# Patient Record
Sex: FEMALE | Race: WHITE | NOT HISPANIC OR LATINO | Employment: OTHER | URBAN - METROPOLITAN AREA
[De-identification: names, ages, dates, MRNs, and addresses within clinical notes are randomized per-mention and may not be internally consistent; named-entity substitution may affect disease eponyms.]

---

## 2017-01-11 ENCOUNTER — HOSPITAL ENCOUNTER (OUTPATIENT)
Dept: RADIOLOGY | Facility: HOSPITAL | Age: 72
Discharge: HOME | End: 2017-01-11
Attending: FAMILY MEDICINE
Payer: MEDICARE

## 2017-01-11 ENCOUNTER — HOSPITAL ENCOUNTER (OUTPATIENT)
Dept: RADIOLOGY | Facility: HOSPITAL | Age: 72
Discharge: HOME | End: 2017-01-11
Attending: NURSE PRACTITIONER
Payer: MEDICARE

## 2017-01-11 DIAGNOSIS — M81.0 OSTEOPOROSIS, POST-MENOPAUSAL: ICD-10-CM

## 2017-01-11 DIAGNOSIS — C50.912 BREAST CANCER, LEFT: ICD-10-CM

## 2017-01-11 PROCEDURE — 71020 XR CHEST PA AND LATERAL: CPT | Mod: 26,,, | Performed by: RADIOLOGY

## 2017-01-11 PROCEDURE — 77080 DXA BONE DENSITY AXIAL: CPT | Mod: 26,,, | Performed by: RADIOLOGY

## 2017-01-11 PROCEDURE — 77061 BREAST TOMOSYNTHESIS UNI: CPT | Mod: TC,RT,LT

## 2017-01-11 PROCEDURE — 77061 BREAST TOMOSYNTHESIS UNI: CPT | Mod: 26,LT,, | Performed by: RADIOLOGY

## 2017-01-11 PROCEDURE — 71020 XR CHEST PA AND LATERAL: CPT | Mod: TC,PO

## 2017-01-11 PROCEDURE — 77080 DXA BONE DENSITY AXIAL: CPT | Mod: TC,PO

## 2017-01-11 PROCEDURE — 77065 DX MAMMO INCL CAD UNI: CPT | Mod: 26,LT,, | Performed by: RADIOLOGY

## 2017-01-13 ENCOUNTER — OFFICE VISIT (OUTPATIENT)
Dept: HEMATOLOGY/ONCOLOGY | Facility: CLINIC | Age: 72
End: 2017-01-13
Payer: MEDICARE

## 2017-01-13 VITALS
TEMPERATURE: 99 F | WEIGHT: 199.5 LBS | HEIGHT: 64 IN | RESPIRATION RATE: 18 BRPM | SYSTOLIC BLOOD PRESSURE: 137 MMHG | DIASTOLIC BLOOD PRESSURE: 66 MMHG | BODY MASS INDEX: 34.06 KG/M2 | HEART RATE: 68 BPM

## 2017-01-13 DIAGNOSIS — M85.80 OSTEOPENIA: ICD-10-CM

## 2017-01-13 DIAGNOSIS — C50.912 MALIGNANT NEOPLASM OF LEFT FEMALE BREAST, UNSPECIFIED SITE OF BREAST: Primary | ICD-10-CM

## 2017-01-13 PROCEDURE — 3078F DIAST BP <80 MM HG: CPT | Mod: S$GLB,,, | Performed by: NURSE PRACTITIONER

## 2017-01-13 PROCEDURE — 1160F RVW MEDS BY RX/DR IN RCRD: CPT | Mod: S$GLB,,, | Performed by: NURSE PRACTITIONER

## 2017-01-13 PROCEDURE — 1159F MED LIST DOCD IN RCRD: CPT | Mod: S$GLB,,, | Performed by: NURSE PRACTITIONER

## 2017-01-13 PROCEDURE — 3075F SYST BP GE 130 - 139MM HG: CPT | Mod: S$GLB,,, | Performed by: NURSE PRACTITIONER

## 2017-01-13 PROCEDURE — 99213 OFFICE O/P EST LOW 20 MIN: CPT | Mod: S$GLB,,, | Performed by: NURSE PRACTITIONER

## 2017-01-13 PROCEDURE — 1157F ADVNC CARE PLAN IN RCRD: CPT | Mod: S$GLB,,, | Performed by: NURSE PRACTITIONER

## 2017-01-13 PROCEDURE — 1126F AMNT PAIN NOTED NONE PRSNT: CPT | Mod: S$GLB,,, | Performed by: NURSE PRACTITIONER

## 2017-01-13 PROCEDURE — 99999 PR PBB SHADOW E&M-EST. PATIENT-LVL IV: CPT | Mod: PBBFAC,,, | Performed by: NURSE PRACTITIONER

## 2017-01-13 NOTE — PROGRESS NOTES
HISTORY OF PRESENT ILLNESS:  This is a 71-year-old white female known to Dr. Corral for stage I infiltrating left breast carcinoma.  She was diagnosed with   this in 1995 and treated with lumpectomy followed by radiation.  In 1998, she   was diagnosed with DCIS of the left breast, which was high-grade, ER positive   and NE negative.  She then underwent a left mastectomy with immediate   reconstruction as well as 60 months of adjuvant Tamoxifen/Arimidex.  She   presents to the clinic today for her annual evaluation.  She carries a diagnosis   of osteoporosis that is now found to be osteopenic.  Dr. Palumbo is managing   this with calcium supplementation as well as weightbearing exercises.  The   patient had one injection of Prolia, but declined further injections.  She   presents to the clinic today for her annual breast evaluation.   She denies any   new breast complaints, bone pain, fevers, chills, drenching night sweats, lymphadenopathy,   irregular heartbeat, chest pain, abdominal discomfort, nausea, vomiting,   constipation, diarrhea, postmenopausal bleeding, difficulties with hot   flashes, etc.  No other new complaints or pertinent findings on a 14-point   review of systems.  No other new complaints or pertinent findings on a 14-point review of systems.    PHYSICAL EXAMINATION:  GENERAL:  Well-developed, well-nourished white female in no acute distress.    Alert and oriented x3.  VITAL SIGNS:  Weight 199.5 pounds (increase of 19 1/2 pounds in one year),   /66, pulse 68 and regular, respirations 18, temperature 98.9.  HEENT:  Normocephalic, atraumatic.  Oral mucosa pink and moist.  Lips without   lesions.  Tongue midline.  Oropharynx clear.  Nonicteric sclerae.  NECK:  Supple, no adenopathy.  No carotid bruits, thyromegaly or thyroid nodule.  HEART:  Regular rate and rhythm without murmur, gallop or rub.  LUNGS:  Clear to auscultation bilaterally.  Normal respiratory effort.  ABDOMEN:  Soft,  nontender, nondistended with positive normoactive bowel sounds,   no hepatosplenomegaly.  EXTREMITIES:  No cyanosis, clubbing or edema.  Distal pulses are intact.  AXILLAE AND GROIN:  No palpable pathologic lymphadenopathy is appreciated.  SKIN:  Intact/turgor normal.  NEUROLOGIC:  Cranial nerves II-XII grossly intact.  Motor:  Good muscle bulk and   tone.  Strength/sensory 5/5 throughout.  Gait stable.  BREASTS:  Right breast remains soft; free of masses, tenderness, nipple   discharge or inversion.  Left breast with noted reconstruction with no signs of   local reoccurrence.    LABORATORY:  Dated 01/11/2017:  WBC 6.87, H & H 13.1/38.6, platelets 270.    Differential remarkable for 73.8% grans, 15% lymphs.  Chemistry:  Sodium 141, potassium 4.0, chloride 103, CO2 of 26, BUN 18,   creatinine 0.9.  EGFR > 60.  Glucose 107, calcium 9.7, albumin 3.6.    Alk phos 128,  AST 27, ALT 41, .    RADIOLOGY:  Chest x-ray dated 01/11/2017, impression:  No acute cardiopulmonary.    Mammogram dated 01/11/2017, impression:  No mammographic evidence of malignancy.    Mammogram in one year is recommended.  ACR BI-RADS category 1 negative.    BONE MINERAL DENSITY:  Impression:  Osteopenia with 12% risk of major osteoporotic fracture.    IMPRESSION:  1.  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  2.  Osteopenia, followed by Dr. Palumbo.    PLAN:  1.  In regards to #1, we will have the patient continue with observation and   return in one year with interval CBC, CMP, LDH, chest x-ray and mammogram prior   to appointment.  2.  In regards to osteopenia, the patient will follow with Dr. Palumbo; continue calcium & vitamin D  supplementation daily along with weight bearing exercises/walking.    Assessment/plan reviewed and approved by Dr. Corral.

## 2017-03-24 RX ORDER — LISINOPRIL AND HYDROCHLOROTHIAZIDE 12.5; 2 MG/1; MG/1
TABLET ORAL
Qty: 90 TABLET | Refills: 0 | Status: SHIPPED | OUTPATIENT
Start: 2017-03-24 | End: 2017-07-01 | Stop reason: SDUPTHER

## 2017-06-14 ENCOUNTER — TELEPHONE (OUTPATIENT)
Dept: FAMILY MEDICINE | Facility: CLINIC | Age: 72
End: 2017-06-14

## 2017-07-03 RX ORDER — LISINOPRIL AND HYDROCHLOROTHIAZIDE 12.5; 2 MG/1; MG/1
TABLET ORAL
Qty: 90 TABLET | Refills: 0 | Status: SHIPPED | OUTPATIENT
Start: 2017-07-03 | End: 2017-10-20 | Stop reason: SDUPTHER

## 2017-10-02 ENCOUNTER — OFFICE VISIT (OUTPATIENT)
Dept: DERMATOLOGY | Facility: CLINIC | Age: 72
End: 2017-10-02
Payer: MEDICARE

## 2017-10-02 ENCOUNTER — IMMUNIZATION (OUTPATIENT)
Dept: FAMILY MEDICINE | Facility: CLINIC | Age: 72
End: 2017-10-02
Payer: MEDICARE

## 2017-10-02 VITALS — HEIGHT: 64 IN | WEIGHT: 199 LBS | BODY MASS INDEX: 33.97 KG/M2

## 2017-10-02 DIAGNOSIS — Z12.83 SKIN CANCER SCREENING: ICD-10-CM

## 2017-10-02 DIAGNOSIS — R23.8 VENOUS LAKE OF LIP: ICD-10-CM

## 2017-10-02 DIAGNOSIS — L57.0 ACTINIC KERATOSES: Primary | ICD-10-CM

## 2017-10-02 DIAGNOSIS — L40.8 SEBOPSORIASIS: ICD-10-CM

## 2017-10-02 DIAGNOSIS — L71.9 ROSACEA: ICD-10-CM

## 2017-10-02 PROCEDURE — 90662 IIV NO PRSV INCREASED AG IM: CPT | Mod: S$GLB,,, | Performed by: INTERNAL MEDICINE

## 2017-10-02 PROCEDURE — 1126F AMNT PAIN NOTED NONE PRSNT: CPT | Mod: S$GLB,,, | Performed by: DERMATOLOGY

## 2017-10-02 PROCEDURE — 17000 DESTRUCT PREMALG LESION: CPT | Mod: S$GLB,,, | Performed by: DERMATOLOGY

## 2017-10-02 PROCEDURE — 17003 DESTRUCT PREMALG LES 2-14: CPT | Mod: S$GLB,,, | Performed by: DERMATOLOGY

## 2017-10-02 PROCEDURE — 99214 OFFICE O/P EST MOD 30 MIN: CPT | Mod: 25,S$GLB,, | Performed by: DERMATOLOGY

## 2017-10-02 PROCEDURE — 99999 PR PBB SHADOW E&M-EST. PATIENT-LVL II: CPT | Mod: PBBFAC,,, | Performed by: DERMATOLOGY

## 2017-10-02 PROCEDURE — 3008F BODY MASS INDEX DOCD: CPT | Mod: S$GLB,,, | Performed by: DERMATOLOGY

## 2017-10-02 PROCEDURE — 1159F MED LIST DOCD IN RCRD: CPT | Mod: S$GLB,,, | Performed by: DERMATOLOGY

## 2017-10-02 PROCEDURE — G0008 ADMIN INFLUENZA VIRUS VAC: HCPCS | Mod: S$GLB,,, | Performed by: INTERNAL MEDICINE

## 2017-10-02 RX ORDER — SULFACETAMIDE SODIUM, SULFUR 100; 50 MG/G; MG/G
EMULSION TOPICAL
Qty: 360 G | Refills: 3 | Status: SHIPPED | OUTPATIENT
Start: 2017-10-02 | End: 2020-10-27

## 2017-10-02 RX ORDER — FLUOCINOLONE ACETONIDE 0.11 MG/ML
OIL AURICULAR (OTIC)
Qty: 20 ML | Refills: 2 | Status: SHIPPED | OUTPATIENT
Start: 2017-10-02 | End: 2019-07-08

## 2017-10-02 RX ORDER — FLUOCINOLONE ACETONIDE 0.1 MG/ML
SOLUTION TOPICAL
Qty: 60 ML | Refills: 5 | Status: SHIPPED | OUTPATIENT
Start: 2017-10-02 | End: 2017-12-11

## 2017-10-02 RX ORDER — TRETINOIN 0.25 MG/G
CREAM TOPICAL
Qty: 45 G | Refills: 3 | Status: SHIPPED | OUTPATIENT
Start: 2017-10-02

## 2017-10-02 NOTE — PROGRESS NOTES
Subjective:       Patient ID:  Yuliana Mattson is a 72 y.o. female who presents for No chief complaint on file.    Last seen 9/27/2016 for rosacea and Sebopsoriasis.Has some itching in ears.  Well controlled with current regimen.  Also has a few crusty spots on face and lip that appeared in last year.    Request Refills today and would like paper prescriptions so can shop around.  -     sulfacetamide sodium-sulfur 10-5 % (w/w) Clsr; Wash face qd - bid    -     doxycycline (MONODOX) 100 MG capsule; 1 po daily with food prn flare  -     tretinoin (RETIN-A) 0.025 % cream; Apply a pea-sized amount to entire face at bedtime, start every other night and increase as tolerated. Take night off if irritation develops.     -     fluocinolone acetonide oil (DERMOTIC OIL) 0.01 % Drop; 5 drops in each ear bid x 7-10 days    Phx AK's   Father & Sisters hx of NMSC     history cryo to ISKs in past. Resolved.    dark spot on lip, months, asx, no tx      Review of Systems   Constitutional: Negative for weight loss, weight gain and fatigue.   Skin: Positive for daily sunscreen use, activity-related sunscreen use and wears hat.   Hematologic/Lymphatic: Does not bruise/bleed easily.        Objective:    Physical Exam   Constitutional: She appears well-developed and well-nourished. No distress.   HENT:   Mouth/Throat: Lips normal.    Eyes: Lids are normal.  No conjunctival no injection.   Neurological: She is alert and oriented to person, place, and time. She is not disoriented.   Psychiatric: She has a normal mood and affect.   Skin:   Areas Examined (abnormalities noted in diagram):   Scalp / Hair Palpated and Inspected  Head / Face Inspection Performed  Neck Inspection Performed  Chest / Axilla Inspection Performed  Abdomen Inspection Performed  Back Inspection Performed  RUE Inspected  LUE Inspection Performed                   Diagram Legend     Erythematous scaling macule/papule c/w actinic keratosis       Vascular papule c/w  angioma      Pigmented verrucoid papule/plaque c/w seborrheic keratosis      Yellow umbilicated papule c/w sebaceous hyperplasia      Irregularly shaped tan macule c/w lentigo     1-2 mm smooth white papules consistent with Milia      Movable subcutaneous cyst with punctum c/w epidermal inclusion cyst      Subcutaneous movable cyst c/w pilar cyst      Firm pink to brown papule c/w dermatofibroma      Pedunculated fleshy papule(s) c/w skin tag(s)      Evenly pigmented macule c/w junctional nevus     Mildly variegated pigmented, slightly irregular-bordered macule c/w mildly atypical nevus      Flesh colored to evenly pigmented papule c/w intradermal nevus       Pink pearly papule/plaque c/w basal cell carcinoma      Erythematous hyperkeratotic cursted plaque c/w SCC      Surgical scar with no sign of skin cancer recurrence      Open and closed comedones      Inflammatory papules and pustules      Verrucoid papule consistent consistent with wart     Erythematous eczematous patches and plaques     Dystrophic onycholytic nail with subungual debris c/w onychomycosis     Umbilicated papule    Erythematous-base heme-crusted tan verrucoid plaque consistent with inflamed seborrheic keratosis     Erythematous Silvery Scaling Plaque c/w Psoriasis     See annotation      Assessment / Plan:        Actinic keratoses  Cryosurgery Procedure Note    Verbal consent from the patient is obtained and the patient is aware of the precancerous quality and need for treatment of these lesions. Liquid nitrogen cryosurgery is applied to the 2 actinic keratoses, as detailed in the physical exam, to produce a freeze injury. The patient is aware that blisters may form and is instructed on wound care with gentle cleansing and use of vaseline ointment to keep moist until healed. The patient is supplied a handout on cryosurgery and is instructed to call if lesions do not completely resolve. Discussed risk postinflammatory pigmentary changes.        Rosacea, face  Well controlled with below, refill today  -     sulfacetamide sodium-sulfur 10-5 % (w/w) Clsr; Wash face qd - bid  Dispense: 360 g; Refill: 3  -     tretinoin (RETIN-A) 0.025 % cream; Apply a pea-sized amount to entire face at bedtime, start every other night and increase as tolerated. Take night off if irritation develops.  Dispense: 45 g; Refill: 3    Sebopsoriasis    -     fluocinolone acetonide oil (DERMOTIC OIL) 0.01 % Drop; 5 drops in each ear bid x 7-10 days, avoid chronic use  Dispense: 20 mL; Refill: 2  -     fluocinolone (SYNALAR) 0.01 % external solution; Apply to affected area scalp bid prn  Dispense: 60 mL; Refill: 5  discussed avoiding chronic use and to use only on affected areas- risk atrophy, striae, ecchymoses, hypopigmentation      Skin cancer screening  Upper body skin examination performed today including at least 6 points as noted in physical examination. No lesions suspicious for malignancy noted.        Venous lake of lip  This is a benign vascular lesion. Reassurance given. No treatment required.                Return in about 6 months (around 4/2/2018).

## 2017-10-22 RX ORDER — LISINOPRIL AND HYDROCHLOROTHIAZIDE 12.5; 2 MG/1; MG/1
TABLET ORAL
Qty: 90 TABLET | Refills: 0 | Status: SHIPPED | OUTPATIENT
Start: 2017-10-22 | End: 2017-12-11 | Stop reason: SDUPTHER

## 2017-10-31 RX ORDER — FLUTICASONE PROPIONATE 50 MCG
1 SPRAY, SUSPENSION (ML) NASAL DAILY
Qty: 48 G | Refills: 3 | Status: SHIPPED | OUTPATIENT
Start: 2017-10-31 | End: 2018-02-01 | Stop reason: SDUPTHER

## 2017-10-31 RX ORDER — MONTELUKAST SODIUM 10 MG/1
10 TABLET ORAL NIGHTLY
Qty: 90 TABLET | Refills: 3 | Status: SHIPPED | OUTPATIENT
Start: 2017-10-31 | End: 2019-01-15

## 2017-11-17 ENCOUNTER — OFFICE VISIT (OUTPATIENT)
Dept: PRIMARY CARE CLINIC | Facility: CLINIC | Age: 72
End: 2017-11-17
Payer: MEDICARE

## 2017-11-17 VITALS
OXYGEN SATURATION: 97 % | SYSTOLIC BLOOD PRESSURE: 144 MMHG | TEMPERATURE: 99 F | HEART RATE: 92 BPM | DIASTOLIC BLOOD PRESSURE: 86 MMHG | BODY MASS INDEX: 34.03 KG/M2 | HEIGHT: 64 IN | WEIGHT: 199.31 LBS

## 2017-11-17 DIAGNOSIS — J06.9 URI, ACUTE: Primary | ICD-10-CM

## 2017-11-17 PROCEDURE — 99213 OFFICE O/P EST LOW 20 MIN: CPT | Mod: S$GLB,,, | Performed by: NURSE PRACTITIONER

## 2017-11-17 PROCEDURE — 99999 PR PBB SHADOW E&M-EST. PATIENT-LVL IV: CPT | Mod: PBBFAC,,, | Performed by: NURSE PRACTITIONER

## 2017-11-17 RX ORDER — BENZONATATE 200 MG/1
200 CAPSULE ORAL 3 TIMES DAILY PRN
Qty: 30 CAPSULE | Refills: 1 | Status: SHIPPED | OUTPATIENT
Start: 2017-11-17 | End: 2017-11-27

## 2017-11-17 RX ORDER — AZITHROMYCIN 250 MG/1
TABLET, FILM COATED ORAL
Qty: 6 TABLET | Refills: 0 | Status: SHIPPED | OUTPATIENT
Start: 2017-11-17 | End: 2017-12-11 | Stop reason: ALTCHOICE
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
## 2017-11-17 NOTE — PATIENT INSTRUCTIONS
"Your symptoms today are consistent with upper respiratory infection.  This is likely a viral illness that needs to run its course.    Azithromycin antibiotic prescribed for the infection.  Start it if you don't improve in the next couple of days..  If you get stomach upset from antibiotics, try taking probiotic or eating a yogurt with active culture to prevent stomach upset while on the antibiotic.    Comfort measures:  Increase fluids  Get plenty of rest  Vaporizer or frequent showers may be helpful.  Take tylenol of ibuprofen as needed for pain or fever  For cough and thick mucus secretions, you can take over the counter Nyquil, drink plenty of water while taking this to help loosen mucus.  The following is also prescribed for the cough benzonatate  If you have high blood pressure, avoid any over the counter medications with "D" or decongestant.    Salt water gargles.  Saline and flonase nasal spray  Wash cloth with warm water placed over the sinus area can lessen discomfort and pressure.  Sleep with head of bed elevated to decrease drainage, cough and congestion.    I recommend frequent handwashing to limit spread of infection to others     If you are not better in 3-5 days, if worse or you have concerns or questions, please do not hesitate to call.  You can reach us at [phone] Tueday through Friday (except holidays) 10 a.m. To 6 p.m.  Or you can follow up with your primary care provider/NP.    Thank you for using the [facility]!    JENNIFER Gabriel, CNP, FNP-BC  [facility]  [facility]    "

## 2017-11-17 NOTE — Clinical Note
Tommy Palumbo MD,  I saw your patient today in the [facility].  If you have any questions, please do not hesitate to contact me.  Thank you!  FÉLIX Gabriel

## 2017-12-11 ENCOUNTER — OFFICE VISIT (OUTPATIENT)
Dept: FAMILY MEDICINE | Facility: CLINIC | Age: 72
End: 2017-12-11
Payer: MEDICARE

## 2017-12-11 VITALS
BODY MASS INDEX: 34.36 KG/M2 | TEMPERATURE: 99 F | HEART RATE: 76 BPM | WEIGHT: 201.25 LBS | OXYGEN SATURATION: 96 % | DIASTOLIC BLOOD PRESSURE: 70 MMHG | SYSTOLIC BLOOD PRESSURE: 122 MMHG | HEIGHT: 64 IN

## 2017-12-11 DIAGNOSIS — E55.9 VITAMIN D DEFICIENCY: ICD-10-CM

## 2017-12-11 DIAGNOSIS — I10 ESSENTIAL HYPERTENSION: ICD-10-CM

## 2017-12-11 DIAGNOSIS — Z00.00 PREVENTATIVE HEALTH CARE: Primary | ICD-10-CM

## 2017-12-11 PROCEDURE — 99999 PR PBB SHADOW E&M-EST. PATIENT-LVL IV: CPT | Mod: PBBFAC,,, | Performed by: FAMILY MEDICINE

## 2017-12-11 PROCEDURE — 99397 PER PM REEVAL EST PAT 65+ YR: CPT | Mod: S$GLB,,, | Performed by: FAMILY MEDICINE

## 2017-12-11 PROCEDURE — 99499 UNLISTED E&M SERVICE: CPT | Mod: S$GLB,,, | Performed by: FAMILY MEDICINE

## 2017-12-11 RX ORDER — LISINOPRIL AND HYDROCHLOROTHIAZIDE 12.5; 2 MG/1; MG/1
1 TABLET ORAL DAILY
Qty: 90 TABLET | Refills: 3 | Status: SHIPPED | OUTPATIENT
Start: 2017-12-11 | End: 2018-02-01 | Stop reason: SDUPTHER

## 2017-12-11 NOTE — PROGRESS NOTES
Chief Complaint   Patient presents with    Follow-up     Annual check up     HISTORY OF PRESENT ILLNESS:  here for annual HTN f/u.  HTN - tolerating Lisinopril HCT 20/12.5 daily  HLD - following a low-fat diet for the past year  Allergic rhinitis - Tolerating flonase and Singulair as needed during fall season  Osteoporosis - BMD 12/10/12 showed Osteoporosis -- there is a 12% risk of a major osteoporotic fracture in the next 10 years (FRAX). She took Prolia in December and feels like this caused a number of symptoms (aching in back, generalized itching, dizziness episodes).  Managing calcium intake with diet  Breast cancer - following with Dr. Corral annually in January      Past Medical History:   Diagnosis Date    Allergic rhinitis     Anticoagulant long-term use     ASPIRIN ONLY - (stops it when she presents a hemorrhagic cystitis)    Bladder cancer     Blood transfusion     Breast cancer     CAD (coronary artery disease), native coronary artery     40% non obstructive    Cholelithiasis     Endometriosis     Headache(784.0)     HEARING LOSS     Hemorrhoids     HLD (hyperlipidemia)     Hypertension     Left wrist fracture 2009    traumatic    Osteoporosis, postmenopausal     PONV (postoperative nausea and vomiting)     Rash     Rosacea     Vaginal delivery     x 2       Past Surgical History:   Procedure Laterality Date    APPENDECTOMY      BLADDER TUMOR EXCISION  1979    [facility], [place], operated by [facility], dr garcia - no recurrences since    BREAST SURGERY Left 1998    HYSTERECTOMY      MASTECTOMY  1995    left side - cancer - had radiotherapy 1995, 1998 had chemotherapy    oophrect      pelvic mass      benign    TONSILLECTOMY      TONSILLECTOMY, ADENOIDECTOMY, BILATERAL MYRINGOTOMY AND TUBES      tram flap Left 1998       Review of patient's allergies indicates:   Allergen Reactions    Compazine [prochlorperazine edisylate] Anaphylaxis       Social History     Social History    Marital status:       Spouse name: N/A    Number of children: N/A    Years of education: N/A     Occupational History    retired accounting      Social History Main Topics    Smoking status: Never Smoker    Smokeless tobacco: Never Used    Alcohol use No    Drug use: No    Sexual activity: Not on file     Other Topics Concern    Not on file     Social History Narrative    No narrative on file       Current Outpatient Prescriptions on File Prior to Visit   Medication Sig Dispense Refill    doxycycline (MONODOX) 100 MG capsule 1 po daily with food prn flare 30 capsule 3    fluocinolone acetonide oil (DERMOTIC OIL) 0.01 % Drop 5 drops in each ear bid x 7-10 days, avoid chronic use 20 mL 2    fluticasone (FLONASE) 50 mcg/actuation nasal spray 1 spray by Each Nare route once daily. 48 g 3    montelukast (SINGULAIR) 10 mg tablet Take 1 tablet (10 mg total) by mouth every evening. 90 tablet 3    pimecrolimus (ELIDEL) 1 % cream aaa eyelids bid x 2 weeks then prn pruritus 30 g 2    sulfacetamide sodium-sulfur 10-5 % (w/w) Clsr Wash face qd - bid 360 g 3    tretinoin (RETIN-A) 0.025 % cream Apply a pea-sized amount to entire face at bedtime, start every other night and increase as tolerated. Take night off if irritation develops. 45 g 3    [DISCONTINUED] lisinopril-hydrochlorothiazide (PRINZIDE,ZESTORETIC) 20-12.5 mg per tablet TAKE 1 TABLET BY MOUTH EVERY DAY 90 tablet 0    fluocinonide (LIDEX) 0.05 % external solution Apply topically 2 (two) times daily. aaa scalp scale and itch bid prn 60 mL 3    [DISCONTINUED] azithromycin (Z-OLE) 250 MG tablet 2 tabs Day 1 then 1 tab Days 2-5 6 tablet 0    [DISCONTINUED] fluocinolone (SYNALAR) 0.01 % external solution Apply to affected area scalp bid prn 60 mL 5     No current facility-administered medications on file prior to visit.        Family History   Problem Relation Age of Onset    Glaucoma Father     Glaucoma Paternal Aunt     Glaucoma Paternal Grandmother      "Cancer Cousin      1st, ovarian    Amblyopia Neg Hx     Blindness Neg Hx     Cataracts Neg Hx     Hypertension Neg Hx     Macular degeneration Neg Hx     Retinal detachment Neg Hx     Strabismus Neg Hx     Stroke Neg Hx     Thyroid disease Neg Hx        REVIEW OF SYSTEMS:   GENERAL: No fever, chills, fatigability or weight changes.  EYES: Visual acuity fine. Denies blurriness, tearing, itching, photophobia, diplopia, or visual changes.   EARS: Denies ear pain, discharge, tinnitus or vertigo. Denies hearing loss.   MOUTH & THROAT: No hoarseness, change in voice, swallowing difficulty. No excessive gum bleeding.   CARDIOVASCULAR: Denies dyspnea, orthopnea, or palpitations.  GI/ABDOMEN: Appetite fine. No weight loss. Denies nausea, vomiting, diarrhea, constipation, abdominal pain, hematemesis or blood in stool.  URINARY: No dysuria,hematuria, nocturia, incontinence, flank pain, urgency, or urinary difficulty.  PERIPHERAL VASCULAR: No claudication, cold intolerance or cyanosis.    PHYSICAL EXAM:   /70 (BP Location: Right arm, Patient Position: Sitting, BP Method: Medium (Manual))   Pulse 76   Temp 98.8 °F (37.1 °C) (Oral)   Ht 5' 4" (1.626 m)   Wt 91.3 kg (201 lb 4.5 oz)   SpO2 96%   BMI 34.55 kg/m²   GENERAL: This is a healthy-appearing white female, sitting   upright, in no apparent distress. Alert and oriented x4.   TMs clear  Oropharynx clear  NECK: Supple. There is no lymphadenopathy, thyromegaly or JVD.  CV: S1, S2, RRR; 2/6 SHENA at RSB   CHEST: Clear to auscultation bilaterally with good respiratory movement.  ABD: soft, NT/ND + BS no HSM   EXTREMITIES: Showed no cyanosis, clubbing. Trace edema     Results for orders placed or performed in visit on 01/11/17   Lipid panel   Result Value Ref Range    Cholesterol 187 120 - 199 mg/dL    Triglycerides 99 30 - 150 mg/dL    HDL 55 40 - 75 mg/dL    LDL Cholesterol 112.2 63.0 - 159.0 mg/dL    HDL/Chol Ratio 29.4 20.0 - 50.0 %    Total Cholesterol/HDL " Ratio 3.4 2.0 - 5.0    Non-HDL Cholesterol 132 mg/dL   CBC w/ DIFF   Result Value Ref Range    WBC 6.87 3.90 - 12.70 K/uL    RBC 4.55 4.00 - 5.40 M/uL    Hemoglobin 13.1 12.0 - 16.0 g/dL    Hematocrit 38.6 37.0 - 48.5 %    MCV 85 82 - 98 fL    MCH 28.8 27.0 - 31.0 pg    MCHC 33.9 32.0 - 36.0 %    RDW 13.8 11.5 - 14.5 %    Platelets 270 150 - 350 K/uL    MPV 9.6 9.2 - 12.9 fL    Gran # 5.1 1.8 - 7.7 K/uL    Lymph # 1.0 1.0 - 4.8 K/uL    Mono # 0.6 0.3 - 1.0 K/uL    Eos # 0.1 0.0 - 0.5 K/uL    Baso # 0.06 0.00 - 0.20 K/uL    Gran% 73.8 (H) 38.0 - 73.0 %    Lymph% 15.0 (L) 18.0 - 48.0 %    Mono% 9.0 4.0 - 15.0 %    Eosinophil% 1.3 0.0 - 8.0 %    Basophil% 0.9 0.0 - 1.9 %    Differential Method Automated    CMP   Result Value Ref Range    Sodium 141 136 - 145 mmol/L    Potassium 4.0 3.5 - 5.1 mmol/L    Chloride 103 95 - 110 mmol/L    CO2 26 23 - 29 mmol/L    Glucose 107 70 - 110 mg/dL    BUN, Bld 18 8 - 23 mg/dL    Creatinine 0.9 0.5 - 1.4 mg/dL    Calcium 9.7 8.7 - 10.5 mg/dL    Total Protein 6.9 6.0 - 8.4 g/dL    Albumin 3.6 3.5 - 5.2 g/dL    Total Bilirubin 0.5 0.1 - 1.0 mg/dL    Alkaline Phosphatase 128 55 - 135 U/L    AST 27 10 - 40 U/L    ALT 41 10 - 44 U/L    Anion Gap 12 8 - 16 mmol/L    eGFR if African American >60 >60 mL/min/1.73 m^2    eGFR if non African American >60 >60 mL/min/1.73 m^2   LDH   Result Value Ref Range     110 - 260 U/L   Vitamin D   Result Value Ref Range    Vit D, 25-Hydroxy 13 (L) 30 - 96 ng/mL         A/P:  Preventative health care    Vitamin D deficiency  -     Calcitriol; Future; Expected date: 12/11/2017    Essential hypertension  -     lisinopril-hydrochlorothiazide (PRINZIDE,ZESTORETIC) 20-12.5 mg per tablet; Take 1 tablet by mouth once daily.  Dispense: 90 tablet; Refill: 3    add vitamin d to upcoming labs  Continue lisinopril-hydrochlorothiazide (PRINZIDE,ZESTORETIC) 20-12.5 mg per tablet; Take 1 tablet by mouth once daily.  Continue low-fat diet  Continue regular weight  bearing exrcise  Discussed dietary replacement of calcium and vitamin D  Continue follow-up with oncology  Counseled on regular exercise, maintenance of a healthy weight, balanced diet rich in fruits/vegetables and lean protein, and avoidance of unhealthy habits like smoking and excessive alcohol intake.    F/u annually or PRN

## 2018-01-10 ENCOUNTER — OFFICE VISIT (OUTPATIENT)
Dept: FAMILY MEDICINE | Facility: CLINIC | Age: 73
End: 2018-01-10
Payer: MEDICARE

## 2018-01-10 VITALS
OXYGEN SATURATION: 97 % | WEIGHT: 197.56 LBS | SYSTOLIC BLOOD PRESSURE: 114 MMHG | HEIGHT: 64 IN | DIASTOLIC BLOOD PRESSURE: 84 MMHG | HEART RATE: 86 BPM | TEMPERATURE: 99 F | RESPIRATION RATE: 16 BRPM | BODY MASS INDEX: 33.73 KG/M2

## 2018-01-10 DIAGNOSIS — I10 ESSENTIAL HYPERTENSION: ICD-10-CM

## 2018-01-10 DIAGNOSIS — J06.9 UPPER RESPIRATORY TRACT INFECTION, UNSPECIFIED TYPE: ICD-10-CM

## 2018-01-10 DIAGNOSIS — R05.9 COUGH: ICD-10-CM

## 2018-01-10 DIAGNOSIS — Z85.3 HISTORY OF BREAST CANCER: ICD-10-CM

## 2018-01-10 DIAGNOSIS — J01.00 ACUTE NON-RECURRENT MAXILLARY SINUSITIS: Primary | ICD-10-CM

## 2018-01-10 DIAGNOSIS — Z85.51 HISTORY OF BLADDER CANCER: ICD-10-CM

## 2018-01-10 PROCEDURE — 99999 PR PBB SHADOW E&M-EST. PATIENT-LVL IV: CPT | Mod: PBBFAC,,, | Performed by: NURSE PRACTITIONER

## 2018-01-10 PROCEDURE — 99499 UNLISTED E&M SERVICE: CPT | Mod: S$GLB,,, | Performed by: NURSE PRACTITIONER

## 2018-01-10 PROCEDURE — 99214 OFFICE O/P EST MOD 30 MIN: CPT | Mod: S$GLB,,, | Performed by: NURSE PRACTITIONER

## 2018-01-10 RX ORDER — BENZONATATE 200 MG/1
200 CAPSULE ORAL 3 TIMES DAILY PRN
Qty: 30 CAPSULE | Refills: 1 | Status: SHIPPED | OUTPATIENT
Start: 2018-01-10 | End: 2018-01-20

## 2018-01-10 RX ORDER — CEFDINIR 300 MG/1
300 CAPSULE ORAL 2 TIMES DAILY
Qty: 20 CAPSULE | Refills: 0 | Status: SHIPPED | OUTPATIENT
Start: 2018-01-10 | End: 2018-01-20

## 2018-01-10 NOTE — PROGRESS NOTES
Subjective:       Patient ID: Yuliana Mattson is a 72 y.o. female.    Chief Complaint: Fatigue; Cough (dry ); Fever (away on 28th ); and Headache    Cough   This is a new problem. The current episode started 1 to 4 weeks ago (12-26-18). The problem has been gradually worsening. The problem occurs constantly. The cough is non-productive. Associated symptoms include a fever, nasal congestion, postnasal drip, rhinorrhea, a sore throat and wheezing. Pertinent negatives include no chest pain, chills, ear congestion, heartburn, hemoptysis, myalgias, rash, shortness of breath or sweats. Nothing aggravates the symptoms. Treatments tried: flonase and singulair, dayquil, tessalon pearles  The treatment provided mild relief. There is no history of asthma, bronchiectasis, bronchitis, COPD, emphysema, environmental allergies or pneumonia.   Fever    This is a new problem. The current episode started in the past 7 days. The problem occurs every several days. The problem has been gradually worsening. The maximum temperature noted was 99 to 99.9 F. The temperature was taken using an oral thermometer. Associated symptoms include congestion, coughing, a sore throat and wheezing. Pertinent negatives include no abdominal pain, chest pain, diarrhea, nausea, rash, sleepiness or urinary pain. She has tried acetaminophen for the symptoms. The treatment provided no relief.     Vitals:    01/10/18 1310   BP: 114/84   Pulse: 86   Resp: 16   Temp: 99.1 °F (37.3 °C)     Review of Systems   Constitutional: Positive for fatigue and fever. Negative for chills and diaphoresis.   HENT: Positive for congestion, postnasal drip, rhinorrhea and sore throat.    Eyes: Negative.    Respiratory: Positive for cough and wheezing. Negative for hemoptysis and shortness of breath.    Cardiovascular: Negative.  Negative for chest pain.   Gastrointestinal: Negative.  Negative for abdominal pain, diarrhea, heartburn and nausea.   Endocrine: Negative.     Genitourinary: Negative.  Negative for dysuria and hematuria.   Musculoskeletal: Negative.  Negative for myalgias.   Skin: Negative for color change and rash.   Allergic/Immunologic: Negative.  Negative for environmental allergies.   Neurological: Negative.  Negative for speech difficulty and numbness.   Hematological: Negative.    Psychiatric/Behavioral: Negative.        Past Medical History:   Diagnosis Date    Allergic rhinitis     Anticoagulant long-term use     ASPIRIN ONLY - (stops it when she presents a hemorrhagic cystitis)    Bladder cancer     Blood transfusion     Breast cancer     CAD (coronary artery disease), native coronary artery     40% non obstructive    Cholelithiasis     Endometriosis     Headache(784.0)     HEARING LOSS     Hemorrhoids     HLD (hyperlipidemia)     Hypertension     Left wrist fracture 2009    traumatic    Osteoporosis, postmenopausal     PONV (postoperative nausea and vomiting)     Rash     Rosacea     Vaginal delivery     x 2     Objective:      Physical Exam   Constitutional: She is oriented to person, place, and time. She appears well-developed and well-nourished. She appears ill.   HENT:   Head: Normocephalic and atraumatic.   Right Ear: Hearing, tympanic membrane and ear canal normal.   Left Ear: Hearing, tympanic membrane and ear canal normal.   Nose: Mucosal edema and rhinorrhea present. Right sinus exhibits maxillary sinus tenderness. Right sinus exhibits no frontal sinus tenderness. Left sinus exhibits maxillary sinus tenderness. Left sinus exhibits no frontal sinus tenderness.   Mouth/Throat: Uvula is midline and mucous membranes are normal. Posterior oropharyngeal erythema present.   Eyes: Conjunctivae and EOM are normal. Pupils are equal, round, and reactive to light.   Neck: Neck supple.   Cardiovascular: Normal rate, regular rhythm, normal heart sounds and intact distal pulses.  Exam reveals no friction rub.    No murmur heard.  Pulmonary/Chest:  Effort normal and breath sounds normal. No respiratory distress. She has no wheezes. She has no rales.   Abdominal: Soft. Bowel sounds are normal.   Musculoskeletal: Normal range of motion.   Neurological: She is alert and oriented to person, place, and time.   Skin: Skin is warm and dry.   Psychiatric: She has a normal mood and affect. Her behavior is normal. Judgment and thought content normal.   Nursing note and vitals reviewed.      Assessment:       1. Acute non-recurrent maxillary sinusitis    2. Upper respiratory tract infection, unspecified type    3. Cough    4. Essential hypertension    5. History of bladder cancer    6. History of breast cancer        Plan:       Acute non-recurrent maxillary sinusitis  -     benzonatate (TESSALON) 200 MG capsule; Take 1 capsule (200 mg total) by mouth 3 (three) times daily as needed for Cough.  Dispense: 30 capsule; Refill: 1  -     cefdinir (OMNICEF) 300 MG capsule; Take 1 capsule (300 mg total) by mouth 2 (two) times daily.  Dispense: 20 capsule; Refill: 0    Upper respiratory tract infection, unspecified type  -     benzonatate (TESSALON) 200 MG capsule; Take 1 capsule (200 mg total) by mouth 3 (three) times daily as needed for Cough.  Dispense: 30 capsule; Refill: 1  -     cefdinir (OMNICEF) 300 MG capsule; Take 1 capsule (300 mg total) by mouth 2 (two) times daily.  Dispense: 20 capsule; Refill: 0    Cough  -     benzonatate (TESSALON) 200 MG capsule; Take 1 capsule (200 mg total) by mouth 3 (three) times daily as needed for Cough.  Dispense: 30 capsule; Refill: 1  -     cefdinir (OMNICEF) 300 MG capsule; Take 1 capsule (300 mg total) by mouth 2 (two) times daily.  Dispense: 20 capsule; Refill: 0    Essential hypertension  Stable on meds     History of bladder cancer  History of breast cancer  No issues with any complications with cancers     FU if not better

## 2018-01-12 ENCOUNTER — HOSPITAL ENCOUNTER (OUTPATIENT)
Dept: RADIOLOGY | Facility: HOSPITAL | Age: 73
Discharge: HOME | End: 2018-01-12
Attending: NURSE PRACTITIONER
Payer: MEDICARE

## 2018-01-12 DIAGNOSIS — C50.912 MALIGNANT NEOPLASM OF LEFT FEMALE BREAST: ICD-10-CM

## 2018-01-12 DIAGNOSIS — R92.8 ABNORMAL MAMMOGRAM OF RIGHT BREAST: ICD-10-CM

## 2018-01-12 PROCEDURE — 76642 ULTRASOUND BREAST LIMITED: CPT | Mod: 26,RT,, | Performed by: RADIOLOGY

## 2018-01-12 PROCEDURE — 71046 X-RAY EXAM CHEST 2 VIEWS: CPT | Mod: 26,,, | Performed by: RADIOLOGY

## 2018-01-12 PROCEDURE — 77065 DX MAMMO INCL CAD UNI: CPT | Mod: 26,,, | Performed by: RADIOLOGY

## 2018-01-12 PROCEDURE — 71046 X-RAY EXAM CHEST 2 VIEWS: CPT | Mod: TC,FY,PO

## 2018-01-12 PROCEDURE — 77065 DX MAMMO INCL CAD UNI: CPT | Mod: TC,PO

## 2018-01-12 PROCEDURE — 77061 BREAST TOMOSYNTHESIS UNI: CPT | Mod: TC,PO

## 2018-01-12 PROCEDURE — 77061 BREAST TOMOSYNTHESIS UNI: CPT | Mod: 26,,, | Performed by: RADIOLOGY

## 2018-01-12 PROCEDURE — 76642 ULTRASOUND BREAST LIMITED: CPT | Mod: TC,PO,RT

## 2018-01-15 ENCOUNTER — OFFICE VISIT (OUTPATIENT)
Dept: HEMATOLOGY/ONCOLOGY | Facility: CLINIC | Age: 73
End: 2018-01-15
Payer: MEDICARE

## 2018-01-15 VITALS
WEIGHT: 196.75 LBS | TEMPERATURE: 100 F | RESPIRATION RATE: 16 BRPM | HEART RATE: 70 BPM | SYSTOLIC BLOOD PRESSURE: 133 MMHG | HEIGHT: 64 IN | DIASTOLIC BLOOD PRESSURE: 72 MMHG | BODY MASS INDEX: 33.59 KG/M2

## 2018-01-15 DIAGNOSIS — M94.9 DISORDER OF BONE AND CARTILAGE: ICD-10-CM

## 2018-01-15 DIAGNOSIS — M89.9 DISORDER OF BONE AND CARTILAGE: ICD-10-CM

## 2018-01-15 DIAGNOSIS — Z85.3 HISTORY OF BREAST CANCER: Primary | ICD-10-CM

## 2018-01-15 DIAGNOSIS — Z12.31 ENCOUNTER FOR SCREENING MAMMOGRAM FOR MALIGNANT NEOPLASM OF BREAST: ICD-10-CM

## 2018-01-15 PROCEDURE — 99999 PR PBB SHADOW E&M-EST. PATIENT-LVL IV: CPT | Mod: PBBFAC,,, | Performed by: NURSE PRACTITIONER

## 2018-01-15 PROCEDURE — 99213 OFFICE O/P EST LOW 20 MIN: CPT | Mod: S$GLB,,, | Performed by: NURSE PRACTITIONER

## 2018-01-15 PROCEDURE — 99499 UNLISTED E&M SERVICE: CPT | Mod: S$GLB,,, | Performed by: NURSE PRACTITIONER

## 2018-01-15 NOTE — PROGRESS NOTES
HISTORY OF PRESENT ILLNESS:  This is a 72-year-old white female known to Dr. Corral for stage I infiltrating left breast carcinoma.  She was diagnosed with   this in 1995 and treated with lumpectomy followed by radiation.  In 1998, she   was diagnosed with DCIS of the left breast, which was high-grade, ER positive   and MN negative.  She then underwent a left mastectomy with immediate   reconstruction as well as 60 months of adjuvant Tamoxifen/Arimidex.  She   presents to the clinic today for her annual evaluation.  She carries a diagnosis   of osteoporosis that is now found to be osteopenic.  Dr. Palumbo is managing   this with calcium supplementation as well as weightbearing exercises.  The   patient had one injection of Prolia, but declined further injections.  She   presents to the clinic today for her annual breast evaluation.   She denies any   new breast complaints, bone pain, fevers, chills, drenching night sweats, lymphadenopathy,   irregular heartbeat, chest pain, abdominal discomfort, nausea, vomiting, constipation, diarrhea,   postmenopausal bleeding, difficulties with hot flashes, etc.  She is requesting a   Measurement on the cyst found in the right breast.  No other new complaints or   pertinent findings on a 14-point review of systems.    PHYSICAL EXAMINATION:  GENERAL:  Well-developed, well-nourished white female in no acute distress.    Alert and oriented x3.  VITAL SIGNS:    Wt Readings from Last 3 Encounters:   01/15/18 89.3 kg (196 lb 12.2 oz)   01/10/18 89.6 kg (197 lb 8.5 oz)   12/11/17 91.3 kg (201 lb 4.5 oz)     Temp Readings from Last 3 Encounters:   01/15/18 99.5 °F (37.5 °C)   01/10/18 99.1 °F (37.3 °C) (Oral)   12/11/17 98.8 °F (37.1 °C) (Oral)     BP Readings from Last 3 Encounters:   01/15/18 133/72   01/10/18 114/84   12/11/17 122/70     Pulse Readings from Last 3 Encounters:   01/15/18 70   01/10/18 86   12/11/17 76     HEENT:  Normocephalic, atraumatic.  Oral mucosa pink and  moist.  Lips without   lesions.  Tongue midline.  Oropharynx clear.  Nonicteric sclerae.  NECK:  Supple, no adenopathy.  No carotid bruits, thyromegaly or thyroid nodule.  HEART:  Regular rate and rhythm without murmur, gallop or rub.  LUNGS:  Clear to auscultation bilaterally.  Normal respiratory effort.  ABDOMEN:  Soft, nontender, nondistended with positive normoactive bowel sounds,   no hepatosplenomegaly.  EXTREMITIES:  No cyanosis, clubbing or edema.  Distal pulses are intact.  AXILLAE AND GROIN:  No palpable pathologic lymphadenopathy is appreciated.  SKIN:  Intact/turgor normal.  NEUROLOGIC:  Cranial nerves II-XII grossly intact.  Motor:  Good muscle bulk and   tone.  Strength/sensory 5/5 throughout.  Gait stable.  BREASTS:  Right breast remains soft; free of masses, tenderness, nipple   discharge or inversion.  Left breast with noted reconstruction with no signs of   local reoccurrence.    LABORATORY:    Lab Results   Component Value Date    WBC 7.91 01/12/2018    HGB 13.3 01/12/2018    HCT 39.0 01/12/2018    MCV 87 01/12/2018     01/12/2018     Differential remarkable for 74.5% grans, 12.1% lymph    CMP  Sodium   Date Value Ref Range Status   01/12/2018 136 136 - 145 mmol/L Final     Potassium   Date Value Ref Range Status   01/12/2018 3.9 3.5 - 5.1 mmol/L Final     Chloride   Date Value Ref Range Status   01/12/2018 100 95 - 110 mmol/L Final     CO2   Date Value Ref Range Status   01/12/2018 27 23 - 29 mmol/L Final     Glucose   Date Value Ref Range Status   01/12/2018 111 (H) 70 - 110 mg/dL Final     BUN, Bld   Date Value Ref Range Status   01/12/2018 16 8 - 23 mg/dL Final     Creatinine   Date Value Ref Range Status   01/12/2018 0.8 0.5 - 1.4 mg/dL Final     Calcium   Date Value Ref Range Status   01/12/2018 9.6 8.7 - 10.5 mg/dL Final     Total Protein   Date Value Ref Range Status   01/12/2018 7.0 6.0 - 8.4 g/dL Final     Albumin   Date Value Ref Range Status   01/12/2018 3.5 3.5 - 5.2 g/dL Final      Total Bilirubin   Date Value Ref Range Status   01/12/2018 0.7 0.1 - 1.0 mg/dL Final     Comment:     For infants and newborns, interpretation of results should be based  on gestational age, weight and in agreement with clinical  observations.  Premature Infant recommended reference ranges:  Up to 24 hours.............<8.0 mg/dL  Up to 48 hours............<12.0 mg/dL  3-5 days..................<15.0 mg/dL  6-29 days.................<15.0 mg/dL       Alkaline Phosphatase   Date Value Ref Range Status   01/12/2018 124 55 - 135 U/L Final     AST   Date Value Ref Range Status   01/12/2018 33 10 - 40 U/L Final     ALT   Date Value Ref Range Status   01/12/2018 43 10 - 44 U/L Final     Anion Gap   Date Value Ref Range Status   01/12/2018 9 8 - 16 mmol/L Final     eGFR if    Date Value Ref Range Status   01/12/2018 >60 >60 mL/min/1.73 m^2 Final     eGFR if non    Date Value Ref Range Status   01/12/2018 >60 >60 mL/min/1.73 m^2 Final     Comment:     Calculation used to obtain the estimated glomerular filtration  rate (eGFR) is the CKD-EPI equation.            RADIOLOGY:  Chest x-ray dated 01/12/2018,   Impression        No evidence of active chest disease.     Mammogram dated 01/12/2018, Impression: There is no mammographic or sonographic evidence of malignancy.   BI-RADS Category: Right: 2 - Benign  Overall: 2 - Benign   Recommendation:  Routine screening mammogram in 1 year is recommended.    IMPRESSION:  1.  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  2.  Oval cyst in right breast - benign  3.  Osteopenia, followed by Dr. Palumbo.     PLAN:  1.  In regards to #1, return in one year with interval CBC, CMP, LDH, BMD, chest x-ray and mammogram prior to appointment.  2.  In regards to osteopenia, the patient will follow with Dr. Palumbo; continue calcium & vitamin D  supplementation daily along with weight bearing  exercises/walking.    Assessment/plan reviewed and approved by Dr. Corral.

## 2018-02-01 DIAGNOSIS — I10 ESSENTIAL HYPERTENSION: ICD-10-CM

## 2018-02-01 RX ORDER — FLUTICASONE PROPIONATE 50 MCG
1 SPRAY, SUSPENSION (ML) NASAL DAILY
Qty: 48 G | Refills: 3 | Status: SHIPPED | OUTPATIENT
Start: 2018-02-01 | End: 2020-01-17 | Stop reason: ALTCHOICE

## 2018-02-01 RX ORDER — LISINOPRIL AND HYDROCHLOROTHIAZIDE 12.5; 2 MG/1; MG/1
1 TABLET ORAL DAILY
Qty: 90 TABLET | Refills: 3 | Status: SHIPPED | OUTPATIENT
Start: 2018-02-01 | End: 2019-01-15

## 2018-04-13 ENCOUNTER — PES CALL (OUTPATIENT)
Dept: ADMINISTRATIVE | Facility: CLINIC | Age: 73
End: 2018-04-13

## 2018-06-28 ENCOUNTER — TELEPHONE (OUTPATIENT)
Dept: FAMILY MEDICINE | Facility: CLINIC | Age: 73
End: 2018-06-28

## 2018-09-25 ENCOUNTER — IMMUNIZATION (OUTPATIENT)
Dept: FAMILY MEDICINE | Facility: CLINIC | Age: 73
End: 2018-09-25
Payer: MEDICARE

## 2018-09-25 PROCEDURE — 90662 IIV NO PRSV INCREASED AG IM: CPT | Mod: PBBFAC,PO

## 2018-10-10 ENCOUNTER — TELEPHONE (OUTPATIENT)
Dept: FAMILY MEDICINE | Facility: CLINIC | Age: 73
End: 2018-10-10

## 2018-10-11 ENCOUNTER — TELEPHONE (OUTPATIENT)
Dept: FAMILY MEDICINE | Facility: CLINIC | Age: 73
End: 2018-10-11

## 2018-10-11 NOTE — TELEPHONE ENCOUNTER
I spoke to Ms. Mattson concerning scheduling her annual [facility] wellness visit~ pt declines to schedule.

## 2018-12-12 ENCOUNTER — OFFICE VISIT (OUTPATIENT)
Dept: FAMILY MEDICINE | Facility: CLINIC | Age: 73
End: 2018-12-12
Payer: MEDICARE

## 2018-12-12 VITALS
HEIGHT: 64 IN | DIASTOLIC BLOOD PRESSURE: 80 MMHG | TEMPERATURE: 98 F | WEIGHT: 172.38 LBS | BODY MASS INDEX: 29.43 KG/M2 | HEART RATE: 64 BPM | SYSTOLIC BLOOD PRESSURE: 120 MMHG

## 2018-12-12 DIAGNOSIS — N39.0 URINARY TRACT INFECTION WITH HEMATURIA, SITE UNSPECIFIED: ICD-10-CM

## 2018-12-12 DIAGNOSIS — R31.9 URINARY TRACT INFECTION WITH HEMATURIA, SITE UNSPECIFIED: ICD-10-CM

## 2018-12-12 DIAGNOSIS — N39.0 URINARY TRACT INFECTION WITHOUT HEMATURIA, SITE UNSPECIFIED: ICD-10-CM

## 2018-12-12 DIAGNOSIS — R30.0 DYSURIA: Primary | ICD-10-CM

## 2018-12-12 LAB
BILIRUB SERPL-MCNC: ABNORMAL MG/DL
BLOOD URINE, POC: 250
COLOR, POC UA: ABNORMAL
GLUCOSE UR QL STRIP: ABNORMAL
KETONES UR QL STRIP: ABNORMAL
LEUKOCYTE ESTERASE URINE, POC: ABNORMAL
NITRITE, POC UA: ABNORMAL
PH, POC UA: 7
PROTEIN, POC: ABNORMAL
SPECIFIC GRAVITY, POC UA: 1.01
UROBILINOGEN, POC UA: NORMAL

## 2018-12-12 PROCEDURE — 3074F SYST BP LT 130 MM HG: CPT | Mod: CPTII,S$GLB,, | Performed by: PHYSICIAN ASSISTANT

## 2018-12-12 PROCEDURE — 3079F DIAST BP 80-89 MM HG: CPT | Mod: CPTII,S$GLB,, | Performed by: PHYSICIAN ASSISTANT

## 2018-12-12 PROCEDURE — 99213 OFFICE O/P EST LOW 20 MIN: CPT | Mod: 25,S$GLB,, | Performed by: PHYSICIAN ASSISTANT

## 2018-12-12 PROCEDURE — 81001 URINALYSIS AUTO W/SCOPE: CPT | Mod: S$GLB,,, | Performed by: PHYSICIAN ASSISTANT

## 2018-12-12 PROCEDURE — 99999 PR PBB SHADOW E&M-EST. PATIENT-LVL IV: CPT | Mod: PBBFAC,HCWC,, | Performed by: PHYSICIAN ASSISTANT

## 2018-12-12 PROCEDURE — 87086 URINE CULTURE/COLONY COUNT: CPT | Mod: HCWC

## 2018-12-12 PROCEDURE — 1101F PT FALLS ASSESS-DOCD LE1/YR: CPT | Mod: CPTII,S$GLB,, | Performed by: PHYSICIAN ASSISTANT

## 2018-12-12 RX ORDER — SULFAMETHOXAZOLE AND TRIMETHOPRIM 800; 160 MG/1; MG/1
1 TABLET ORAL 2 TIMES DAILY
Qty: 20 TABLET | Refills: 0 | Status: SHIPPED | OUTPATIENT
Start: 2018-12-12 | End: 2018-12-22

## 2018-12-12 RX ORDER — PHENAZOPYRIDINE HYDROCHLORIDE 200 MG/1
200 TABLET, FILM COATED ORAL 3 TIMES DAILY PRN
Qty: 12 TABLET | Refills: 1 | Status: SHIPPED | OUTPATIENT
Start: 2018-12-12 | End: 2018-12-22

## 2018-12-12 NOTE — PROGRESS NOTES
Subjective:       Patient ID: Yuliana Mattson is a 73 y.o. female.    Chief Complaint: Urinary Tract Infection    HPI   dysuria and frequency x 3 days  Pain and blood x 1 day  Past hx of bladder cancer  Review of Systems   Constitutional: Negative.  Negative for activity change, appetite change, chills, diaphoresis, fatigue, fever and unexpected weight change.   HENT: Negative.    Eyes: Negative.    Respiratory: Negative.    Cardiovascular: Negative.    Gastrointestinal: Negative.    Endocrine: Negative.    Genitourinary: Positive for dysuria, frequency, hematuria and urgency. Negative for flank pain.   Musculoskeletal: Negative.    Skin: Negative.  Negative for rash.       Objective:      Physical Exam   Constitutional: She appears well-developed and well-nourished. No distress.   HENT:   Head: Normocephalic and atraumatic.   Right Ear: External ear normal.   Left Ear: External ear normal.   Nose: Nose normal.   Mouth/Throat: Oropharynx is clear and moist. No oropharyngeal exudate.   Eyes: Conjunctivae are normal. No scleral icterus.   Neck: Normal range of motion. Neck supple. No tracheal deviation present. No thyromegaly present.   Abdominal: Soft. She exhibits no distension and no mass. There is no tenderness. There is no rebound and no guarding.   No cva tenderness  No organomegaly   Musculoskeletal: She exhibits no edema.   Lymphadenopathy:     She has no cervical adenopathy.   Skin: Skin is warm and dry. No rash noted.   Vitals reviewed.      Assessment:       1. Dysuria    2. Urinary tract infection without hematuria, site unspecified        Plan:       Dysuria  -     POCT urinalysis, dipstick or tablet reag  -     Urine culture  -     sulfamethoxazole-trimethoprim 800-160mg (BACTRIM DS) 800-160 mg Tab; Take 1 tablet by mouth 2 (two) times daily. for 10 days  Dispense: 20 tablet; Refill: 0  -     phenazopyridine (PYRIDIUM) 200 MG tablet; Take 1 tablet (200 mg total) by mouth 3 (three) times daily as needed  for Pain.  Dispense: 12 tablet; Refill: 1  -     Urinalysis; Future; Expected date: 01/03/2019    Urinary tract infection without hematuria, site unspecified  -     POCT urinalysis, dipstick or tablet reag  -     Urine culture  -     sulfamethoxazole-trimethoprim 800-160mg (BACTRIM DS) 800-160 mg Tab; Take 1 tablet by mouth 2 (two) times daily. for 10 days  Dispense: 20 tablet; Refill: 0  -     phenazopyridine (PYRIDIUM) 200 MG tablet; Take 1 tablet (200 mg total) by mouth 3 (three) times daily as needed for Pain.  Dispense: 12 tablet; Refill: 1  -     Urinalysis; Future; Expected date: 01/03/2019    Urinary tract infection with hematuria, site unspecified    f/u check in 3 wks

## 2018-12-14 LAB
BACTERIA UR CULT: NORMAL
BACTERIA UR CULT: NORMAL

## 2019-01-03 ENCOUNTER — LAB VISIT (OUTPATIENT)
Dept: LAB | Facility: HOSPITAL | Age: 74
End: 2019-01-03
Attending: PHYSICIAN ASSISTANT
Payer: MEDICARE

## 2019-01-03 DIAGNOSIS — R30.0 DYSURIA: ICD-10-CM

## 2019-01-03 DIAGNOSIS — N39.0 URINARY TRACT INFECTION WITHOUT HEMATURIA, SITE UNSPECIFIED: ICD-10-CM

## 2019-01-03 LAB
BILIRUB UR QL STRIP: NEGATIVE
CLARITY UR: CLEAR
COLOR UR: YELLOW
GLUCOSE UR QL STRIP: NEGATIVE
HGB UR QL STRIP: NEGATIVE
KETONES UR QL STRIP: NEGATIVE
LEUKOCYTE ESTERASE UR QL STRIP: NEGATIVE
NITRITE UR QL STRIP: NEGATIVE
PH UR STRIP: 5 [PH] (ref 5–8)
PROT UR QL STRIP: NEGATIVE
SP GR UR STRIP: 1.01 (ref 1–1.03)
URN SPEC COLLECT METH UR: NORMAL

## 2019-01-03 PROCEDURE — 81003 URINALYSIS AUTO W/O SCOPE: CPT | Mod: PO

## 2019-01-08 ENCOUNTER — TELEPHONE (OUTPATIENT)
Dept: OPHTHALMOLOGY | Facility: CLINIC | Age: 74
End: 2019-01-08

## 2019-01-08 NOTE — TELEPHONE ENCOUNTER
----- Message from Sánchez Crawley sent at 1/8/2019  4:41 PM CST -----  Contact: Patient  Type: Needs Medical Advice    Who Called: Patient  Best Call Back Number: [phone]; [phone]  Additional Information: Patient has questions about upcoming appointment. Please call to advise. Thanks!

## 2019-01-08 NOTE — TELEPHONE ENCOUNTER
Spoke to patient, informed her Dr. Joseph does do cataract surgery and we can see her for an exam.

## 2019-01-14 ENCOUNTER — HOSPITAL ENCOUNTER (OUTPATIENT)
Dept: RADIOLOGY | Facility: HOSPITAL | Age: 74
Discharge: HOME | End: 2019-01-14
Attending: NURSE PRACTITIONER
Payer: MEDICARE

## 2019-01-14 DIAGNOSIS — Z85.3 HISTORY OF BREAST CANCER: ICD-10-CM

## 2019-01-14 DIAGNOSIS — Z12.31 ENCOUNTER FOR SCREENING MAMMOGRAM FOR MALIGNANT NEOPLASM OF BREAST: ICD-10-CM

## 2019-01-14 DIAGNOSIS — M89.9 DISORDER OF BONE AND CARTILAGE: ICD-10-CM

## 2019-01-14 DIAGNOSIS — M94.9 DISORDER OF BONE AND CARTILAGE: ICD-10-CM

## 2019-01-14 PROCEDURE — 77067 MAMMO DIGITAL SCREENING RIGHT WITH TOMOSYNTHESIS_CAD: ICD-10-PCS | Mod: 26,52,, | Performed by: RADIOLOGY

## 2019-01-14 PROCEDURE — 77067 SCR MAMMO BI INCL CAD: CPT | Mod: 26,52,, | Performed by: RADIOLOGY

## 2019-01-14 PROCEDURE — 71046 X-RAY EXAM CHEST 2 VIEWS: CPT | Mod: 26,,, | Performed by: RADIOLOGY

## 2019-01-14 PROCEDURE — 71046 X-RAY EXAM CHEST 2 VIEWS: CPT | Mod: TC,FY,PO

## 2019-01-14 PROCEDURE — 77067 SCR MAMMO BI INCL CAD: CPT | Mod: TC,PO

## 2019-01-14 PROCEDURE — 77080 DXA BONE DENSITY AXIAL: CPT | Mod: 26,,, | Performed by: RADIOLOGY

## 2019-01-14 PROCEDURE — 77063 MAMMO DIGITAL SCREENING RIGHT WITH TOMOSYNTHESIS_CAD: ICD-10-PCS | Mod: 26,52,, | Performed by: RADIOLOGY

## 2019-01-14 PROCEDURE — 71046 XR CHEST PA AND LATERAL: ICD-10-PCS | Mod: 26,,, | Performed by: RADIOLOGY

## 2019-01-14 PROCEDURE — 77063 BREAST TOMOSYNTHESIS BI: CPT | Mod: 26,52,, | Performed by: RADIOLOGY

## 2019-01-14 PROCEDURE — 77080 DXA BONE DENSITY AXIAL: CPT | Mod: TC,PO

## 2019-01-14 PROCEDURE — 77080 DEXA BONE DENSITY SPINE HIP: ICD-10-PCS | Mod: 26,,, | Performed by: RADIOLOGY

## 2019-01-15 ENCOUNTER — OFFICE VISIT (OUTPATIENT)
Dept: FAMILY MEDICINE | Facility: CLINIC | Age: 74
End: 2019-01-15
Payer: MEDICARE

## 2019-01-15 VITALS
SYSTOLIC BLOOD PRESSURE: 114 MMHG | BODY MASS INDEX: 29.24 KG/M2 | HEIGHT: 64 IN | DIASTOLIC BLOOD PRESSURE: 68 MMHG | HEART RATE: 68 BPM | WEIGHT: 171.31 LBS | RESPIRATION RATE: 18 BRPM | OXYGEN SATURATION: 99 %

## 2019-01-15 DIAGNOSIS — I10 HYPERTENSION, UNSPECIFIED TYPE: ICD-10-CM

## 2019-01-15 DIAGNOSIS — Z00.00 PREVENTATIVE HEALTH CARE: Primary | ICD-10-CM

## 2019-01-15 PROCEDURE — 99499 RISK ADDL DX/OHS AUDIT: ICD-10-PCS | Mod: S$GLB,,, | Performed by: FAMILY MEDICINE

## 2019-01-15 PROCEDURE — 3074F PR MOST RECENT SYSTOLIC BLOOD PRESSURE < 130 MM HG: ICD-10-PCS | Mod: CPTII,S$GLB,, | Performed by: FAMILY MEDICINE

## 2019-01-15 PROCEDURE — 99213 OFFICE O/P EST LOW 20 MIN: CPT | Mod: S$GLB,,, | Performed by: FAMILY MEDICINE

## 2019-01-15 PROCEDURE — 3074F SYST BP LT 130 MM HG: CPT | Mod: CPTII,S$GLB,, | Performed by: FAMILY MEDICINE

## 2019-01-15 PROCEDURE — 3078F PR MOST RECENT DIASTOLIC BLOOD PRESSURE < 80 MM HG: ICD-10-PCS | Mod: CPTII,S$GLB,, | Performed by: FAMILY MEDICINE

## 2019-01-15 PROCEDURE — 99213 PR OFFICE/OUTPT VISIT, EST, LEVL III, 20-29 MIN: ICD-10-PCS | Mod: S$GLB,,, | Performed by: FAMILY MEDICINE

## 2019-01-15 PROCEDURE — 3078F DIAST BP <80 MM HG: CPT | Mod: CPTII,S$GLB,, | Performed by: FAMILY MEDICINE

## 2019-01-15 PROCEDURE — 99499 UNLISTED E&M SERVICE: CPT | Mod: S$GLB,,, | Performed by: FAMILY MEDICINE

## 2019-01-15 PROCEDURE — 99999 PR PBB SHADOW E&M-EST. PATIENT-LVL IV: ICD-10-PCS | Mod: PBBFAC,,, | Performed by: FAMILY MEDICINE

## 2019-01-15 PROCEDURE — 99999 PR PBB SHADOW E&M-EST. PATIENT-LVL IV: CPT | Mod: PBBFAC,,, | Performed by: FAMILY MEDICINE

## 2019-01-15 RX ORDER — LISINOPRIL 10 MG/1
10 TABLET ORAL DAILY
Qty: 30 TABLET | Refills: 5 | Status: SHIPPED | OUTPATIENT
Start: 2019-01-15 | End: 2019-07-08 | Stop reason: DRUGHIGH

## 2019-01-15 NOTE — PROGRESS NOTES
Chief Complaint   Patient presents with    Preventative health care     HISTORY OF PRESENT ILLNESS:   Here for annual exam and for HTN f/u.    HTN - tolerating Lisinopril HCT 20/12.5 daily  HLD - following a low-fat diet for the past year  Allergic rhinitis - Tolerating flonase and Singulair as needed during fall season  Osteoporosis - BMD 12/10/12 showed Osteoporosis -- there is a 12% risk of a major osteoporotic fracture in the next 10 years (FRAX). She took Prolia in December and feels like this caused a number of symptoms (aching in back, generalized itching, dizziness episodes).  Managing calcium intake with diet  Breast cancer - following with Dr. Corral annually in January  Lost 30 pounds in the last year with weight watchers. Getting some low blood pressures and occasional faintness.      Past Medical History:   Diagnosis Date    Allergic rhinitis     Anticoagulant long-term use     ASPIRIN ONLY - (stops it when she presents a hemorrhagic cystitis)    Bladder cancer     Blood transfusion     Breast cancer     CAD (coronary artery disease), native coronary artery     40% non obstructive    Cholelithiasis     Endometriosis     Headache(784.0)     HEARING LOSS     Hemorrhoids     HLD (hyperlipidemia)     Hypertension     Left wrist fracture 2009    traumatic    Osteoporosis, postmenopausal     PONV (postoperative nausea and vomiting)     Rash     Rosacea     Vaginal delivery     x 2       Past Surgical History:   Procedure Laterality Date    APPENDECTOMY      BLADDER TUMOR EXCISION  1979    [facility], dr garcia - no recurrences since    BREAST BIOPSY Right     4 core bx negative    BREAST SURGERY Left 1998    COLONOSCOPY N/A 10/8/2013    Performed by Guru [facility]ox [facility]    HYSTERECTOMY      MASTECTOMY  1995    left side - cancer - had radiotherapy 1995, 1998 had chemotherapy    oophrect      pelvic mass      benign    TONSILLECTOMY      TONSILLECTOMY,  ADENOIDECTOMY, BILATERAL MYRINGOTOMY AND TUBES      tram flap Left 1998       Review of patient's allergies indicates:   Allergen Reactions    Compazine [prochlorperazine edisylate] Anaphylaxis       Social History     Socioeconomic History    Marital status:      Spouse name: Not on file    Number of children: Not on file    Years of education: Not on file    Highest education level: Not on file   Social Needs    Financial resource strain: Not on file    Food insecurity - worry: Not on file    Food insecurity - inability: Not on file    Transportation needs - medical: Not on file    Transportation needs - non-medical: Not on file   Occupational History    Occupation: retired accounting   Tobacco Use    Smoking status: Never Smoker    Smokeless tobacco: Never Used   Substance and Sexual Activity    Alcohol use: No    Drug use: No    Sexual activity: Not on file   Other Topics Concern    Not on file   Social History Narrative    Not on file       Current Outpatient Medications on File Prior to Visit   Medication Sig Dispense Refill    doxycycline (MONODOX) 100 MG capsule 1 po daily with food prn flare 30 capsule 3    fluocinolone acetonide oil (DERMOTIC OIL) 0.01 % Drop 5 drops in each ear bid x 7-10 days, avoid chronic use 20 mL 2    fluocinonide (LIDEX) 0.05 % external solution Apply topically 2 (two) times daily. aaa scalp scale and itch bid prn 60 mL 3    fluticasone (FLONASE) 50 mcg/actuation nasal spray 1 spray (50 mcg total) by Each Nare route once daily. 48 g 3    pimecrolimus (ELIDEL) 1 % cream aaa eyelids bid x 2 weeks then prn pruritus 30 g 2    sulfacetamide sodium-sulfur 10-5 % (w/w) Clsr Wash face qd - bid 360 g 3    tretinoin (RETIN-A) 0.025 % cream Apply a pea-sized amount to entire face at bedtime, start every other night and increase as tolerated. Take night off if irritation develops. 45 g 3    [DISCONTINUED] lisinopril-hydrochlorothiazide (PRINZIDE,ZESTORETIC)  "20-12.5 mg per tablet Take 1 tablet by mouth once daily. 90 tablet 3    [DISCONTINUED] montelukast (SINGULAIR) 10 mg tablet Take 1 tablet (10 mg total) by mouth every evening. 90 tablet 3     No current facility-administered medications on file prior to visit.        Family History   Problem Relation Age of Onset    Glaucoma Father     Glaucoma Paternal Aunt     Glaucoma Paternal Grandmother     Cancer Cousin         1st, ovarian    Amblyopia Neg Hx     Blindness Neg Hx     Cataracts Neg Hx     Hypertension Neg Hx     Macular degeneration Neg Hx     Retinal detachment Neg Hx     Strabismus Neg Hx     Stroke Neg Hx     Thyroid disease Neg Hx        REVIEW OF SYSTEMS:   GENERAL: No fever, chills, fatigability or weight changes.  EYES: Visual acuity fine. Denies blurriness, tearing, itching, photophobia, diplopia, or visual changes.   EARS: Denies ear pain, discharge, tinnitus or vertigo. Denies hearing loss.   MOUTH & THROAT: No hoarseness, change in voice, swallowing difficulty. No excessive gum bleeding.   CARDIOVASCULAR: Denies dyspnea, orthopnea, or palpitations.  GI/ABDOMEN: Appetite fine. No weight loss. Denies nausea, vomiting, diarrhea, constipation, abdominal pain, hematemesis or blood in stool.  URINARY: No dysuria,hematuria, nocturia, incontinence, flank pain, urgency, or urinary difficulty.  PERIPHERAL VASCULAR: No claudication, cold intolerance or cyanosis.    PHYSICAL EXAM:   /68   Pulse 68   Resp 18   Ht 5' 4" (1.626 m)   Wt 77.7 kg (171 lb 4.8 oz)   SpO2 99%   BMI 29.40 kg/m²   GENERAL: This is a healthy-appearing white female, sitting   upright, in no apparent distress. Alert and oriented x4.   TMs clear  Oropharynx clear  NECK: Supple. There is no lymphadenopathy, thyromegaly or JVD.  CV: S1, S2, RRR; 2/6 SHENA at RSB   CHEST: Clear to auscultation bilaterally with good respiratory movement.  ABD: soft, NT/ND + BS no HSM   EXTREMITIES: Showed no cyanosis, clubbing. Trace edema "     Results for orders placed or performed in visit on 01/14/19   CBC w/ DIFF   Result Value Ref Range    WBC 7.20 3.90 - 12.70 K/uL    RBC 4.21 4.00 - 5.40 M/uL    Hemoglobin 12.7 12.0 - 16.0 g/dL    Hematocrit 37.9 37.0 - 48.5 %    MCV 90 82 - 98 fL    MCH 30.2 27.0 - 31.0 pg    MCHC 33.5 32.0 - 36.0 g/dL    RDW 13.9 11.5 - 14.5 %    Platelets 271 150 - 350 K/uL    MPV 9.8 9.2 - 12.9 fL    Gran # (ANC) 5.4 1.8 - 7.7 K/uL    Lymph # 1.0 1.0 - 4.8 K/uL    Mono # 0.7 0.3 - 1.0 K/uL    Eos # 0.1 0.0 - 0.5 K/uL    Baso # 0.05 0.00 - 0.20 K/uL    Gran% 75.0 (H) 38.0 - 73.0 %    Lymph% 13.6 (L) 18.0 - 48.0 %    Mono% 9.9 4.0 - 15.0 %    Eosinophil% 0.8 0.0 - 8.0 %    Basophil% 0.7 0.0 - 1.9 %    Differential Method Automated    CMP   Result Value Ref Range    Sodium 138 136 - 145 mmol/L    Potassium 4.4 3.5 - 5.1 mmol/L    Chloride 101 95 - 110 mmol/L    CO2 28 23 - 29 mmol/L    Glucose 103 70 - 110 mg/dL    BUN, Bld 13 8 - 23 mg/dL    Creatinine 0.8 0.5 - 1.4 mg/dL    Calcium 10.1 8.7 - 10.5 mg/dL    Total Protein 6.8 6.0 - 8.4 g/dL    Albumin 3.7 3.5 - 5.2 g/dL    Total Bilirubin 0.3 0.1 - 1.0 mg/dL    Alkaline Phosphatase 123 55 - 135 U/L    AST 19 10 - 40 U/L    ALT 19 10 - 44 U/L    Anion Gap 9 8 - 16 mmol/L    eGFR if African American >60 >60 mL/min/1.73 m^2    eGFR if non African American >60 >60 mL/min/1.73 m^2   LDH   Result Value Ref Range     110 - 260 U/L         A/P:  Preventative health care    Hypertension, unspecified type  -     lisinopril 10 MG tablet; Take 1 tablet (10 mg total) by mouth once daily.  Dispense: 30 tablet; Refill: 5      Stop lisinopril HCT  Begin lisinopril 10mg daily  Nurse BP check in 2 weeks; if diastolic still in 60s, would likely drop to 5mg daily  Continue low-fat diet  Continue regular weight bearing exrcise  The test results show osteopenia.  I would suggest you take calcium 1200mg and vitamin D 2,000 IU daily in addition to regular walking to help strengthen your bones.   We will need to repeat the bone density test in 2 years.  Continue follow-up with oncology  Counseled on regular exercise, maintenance of a healthy weight, balanced diet rich in fruits/vegetables and lean protein, and avoidance of unhealthy habits like smoking and excessive alcohol intake.    F/u annually or PRN

## 2019-01-17 ENCOUNTER — OFFICE VISIT (OUTPATIENT)
Dept: HEMATOLOGY/ONCOLOGY | Facility: CLINIC | Age: 74
End: 2019-01-17
Payer: MEDICARE

## 2019-01-17 VITALS
BODY MASS INDEX: 29.1 KG/M2 | HEART RATE: 70 BPM | TEMPERATURE: 98 F | WEIGHT: 170.44 LBS | HEIGHT: 64 IN | RESPIRATION RATE: 18 BRPM | DIASTOLIC BLOOD PRESSURE: 59 MMHG | SYSTOLIC BLOOD PRESSURE: 116 MMHG

## 2019-01-17 DIAGNOSIS — M89.9 DISORDER OF BONE AND CARTILAGE: ICD-10-CM

## 2019-01-17 DIAGNOSIS — M85.80 OSTEOPENIA, UNSPECIFIED LOCATION: ICD-10-CM

## 2019-01-17 DIAGNOSIS — M94.9 DISORDER OF BONE AND CARTILAGE: ICD-10-CM

## 2019-01-17 DIAGNOSIS — Z85.3 HISTORY OF BREAST CANCER: Primary | ICD-10-CM

## 2019-01-17 DIAGNOSIS — Z90.12 H/O LEFT MASTECTOMY: ICD-10-CM

## 2019-01-17 PROCEDURE — 3078F DIAST BP <80 MM HG: CPT | Mod: CPTII,S$GLB,, | Performed by: NURSE PRACTITIONER

## 2019-01-17 PROCEDURE — 99999 PR PBB SHADOW E&M-EST. PATIENT-LVL IV: ICD-10-PCS | Mod: PBBFAC,,, | Performed by: NURSE PRACTITIONER

## 2019-01-17 PROCEDURE — 99213 OFFICE O/P EST LOW 20 MIN: CPT | Mod: S$GLB,,, | Performed by: NURSE PRACTITIONER

## 2019-01-17 PROCEDURE — 1101F PR PT FALLS ASSESS DOC 0-1 FALLS W/OUT INJ PAST YR: ICD-10-PCS | Mod: CPTII,S$GLB,, | Performed by: NURSE PRACTITIONER

## 2019-01-17 PROCEDURE — 99213 PR OFFICE/OUTPT VISIT, EST, LEVL III, 20-29 MIN: ICD-10-PCS | Mod: S$GLB,,, | Performed by: NURSE PRACTITIONER

## 2019-01-17 PROCEDURE — 3074F PR MOST RECENT SYSTOLIC BLOOD PRESSURE < 130 MM HG: ICD-10-PCS | Mod: CPTII,S$GLB,, | Performed by: NURSE PRACTITIONER

## 2019-01-17 PROCEDURE — 1101F PT FALLS ASSESS-DOCD LE1/YR: CPT | Mod: CPTII,S$GLB,, | Performed by: NURSE PRACTITIONER

## 2019-01-17 PROCEDURE — 3078F PR MOST RECENT DIASTOLIC BLOOD PRESSURE < 80 MM HG: ICD-10-PCS | Mod: CPTII,S$GLB,, | Performed by: NURSE PRACTITIONER

## 2019-01-17 PROCEDURE — 99999 PR PBB SHADOW E&M-EST. PATIENT-LVL IV: CPT | Mod: PBBFAC,,, | Performed by: NURSE PRACTITIONER

## 2019-01-17 PROCEDURE — 3074F SYST BP LT 130 MM HG: CPT | Mod: CPTII,S$GLB,, | Performed by: NURSE PRACTITIONER

## 2019-01-17 NOTE — PROGRESS NOTES
HISTORY OF PRESENT ILLNESS:  This is a 73-year-old white female known   to Dr. Corral for DCIS of the left breast.  She was diagnosed in 1995 and treated   with lumpectomy followed by radiation.  In 1998, she was diagnosed with   Stage I infiltrating left breast carcinoma, which was high-grade, ER positive   and CA negative.  She then underwent a left mastectomy with immediate   reconstruction, 4 cycles of A/C as well as 60 months of adjuvant Tamoxifen/  Arimidex.  She also carries a diagnosis of osteoporosis that is now osteopenic.    Dr. Palumbo is managing this with calcium supplementation as well as   weightbearing exercises.  To note: The patient had one injection of Prolia,   but declined further injections.      She presents to the clinic today for her annual breast evaluation (approx 20 yrs).    She denies any new breast complaints, bone pain, fevers, chills, drenching night   sweats, lymphadenopathy, irregular heartbeat, chest pain, abdominal discomfort,   nausea, vomiting, constipation, diarrhea, postmenopausal bleeding, difficulties   with hot flashes, unexplained weight loss (on Weight Watcher's), etc.  She is   questioning Digital Screening vs Digital Diagnostic Mammograms.  We   will proceed with Digital Diagnostic Mammograms in the future per patient   request.  No other new complaints or pertinent findings on a 14-point review   of systems.    PHYSICAL EXAMINATION:  GENERAL:  Well-developed, well-nourished white female in no acute distress.    Alert and oriented x3.  VITAL SIGNS:  Weight:  Loss of 26 pounds in 1 year on Weight Watchers  Wt Readings from Last 3 Encounters:   01/17/19 77.3 kg (170 lb 6.7 oz)   01/15/19 77.7 kg (171 lb 4.8 oz)   12/12/18 78.2 kg (172 lb 6.4 oz)     Temp Readings from Last 3 Encounters:   01/17/19 97.8 °F (36.6 °C)   12/12/18 97.5 °F (36.4 °C) (Oral)   01/15/18 99.5 °F (37.5 °C)     BP Readings from Last 3 Encounters:   01/17/19 (!) 116/59   01/15/19 114/68   12/12/18  120/80     Pulse Readings from Last 3 Encounters:   01/17/19 70   01/15/19 68   12/12/18 64     HEENT:  Normocephalic, atraumatic.  Oral mucosa pink and moist.  Lips without   lesions.  Tongue midline.  Oropharynx clear.  Nonicteric sclerae.  NECK:  Supple, no adenopathy.  No carotid bruits, thyromegaly or thyroid nodule.  HEART:  Regular rate and rhythm without murmur, gallop or rub.  LUNGS:  Clear to auscultation bilaterally.  Normal respiratory effort.  ABDOMEN:  Soft, nontender, nondistended with positive normoactive bowel sounds,   no hepatosplenomegaly.  EXTREMITIES:  No cyanosis, clubbing or edema.  Distal pulses are intact.  AXILLAE AND GROIN:  No palpable pathologic lymphadenopathy is appreciated.  SKIN:  Intact/turgor normal.  NEUROLOGIC:  Cranial nerves II-XII grossly intact.  Motor:  Good muscle bulk and   tone.  Strength/sensory 5/5 throughout.  Gait stable.  BREASTS:  Right breast remains soft; free of masses, tenderness, nipple   discharge or inversion.  Left breast with noted reconstruction with no signs of   local reoccurrence.    LABORATORY:    Lab Results   Component Value Date    WBC 7.20 01/14/2019    HGB 12.7 01/14/2019    HCT 37.9 01/14/2019    MCV 90 01/14/2019     01/14/2019     Differential remarkable for 75% grans, 13.6% lymph    CMP  Sodium   Date Value Ref Range Status   01/14/2019 138 136 - 145 mmol/L Final     Potassium   Date Value Ref Range Status   01/14/2019 4.4 3.5 - 5.1 mmol/L Final     Chloride   Date Value Ref Range Status   01/14/2019 101 95 - 110 mmol/L Final     CO2   Date Value Ref Range Status   01/14/2019 28 23 - 29 mmol/L Final     Glucose   Date Value Ref Range Status   01/14/2019 103 70 - 110 mg/dL Final     BUN, Bld   Date Value Ref Range Status   01/14/2019 13 8 - 23 mg/dL Final     Creatinine   Date Value Ref Range Status   01/14/2019 0.8 0.5 - 1.4 mg/dL Final     Calcium   Date Value Ref Range Status   01/14/2019 10.1 8.7 - 10.5 mg/dL Final     Total Protein    Date Value Ref Range Status   01/14/2019 6.8 6.0 - 8.4 g/dL Final     Albumin   Date Value Ref Range Status   01/14/2019 3.7 3.5 - 5.2 g/dL Final     Total Bilirubin   Date Value Ref Range Status   01/14/2019 0.3 0.1 - 1.0 mg/dL Final     Comment:     For infants and newborns, interpretation of results should be based  on gestational age, weight and in agreement with clinical  observations.  Premature Infant recommended reference ranges:  Up to 24 hours.............<8.0 mg/dL  Up to 48 hours............<12.0 mg/dL  3-5 days..................<15.0 mg/dL  6-29 days.................<15.0 mg/dL       Alkaline Phosphatase   Date Value Ref Range Status   01/14/2019 123 55 - 135 U/L Final     AST   Date Value Ref Range Status   01/14/2019 19 10 - 40 U/L Final     ALT   Date Value Ref Range Status   01/14/2019 19 10 - 44 U/L Final     Anion Gap   Date Value Ref Range Status   01/14/2019 9 8 - 16 mmol/L Final     eGFR if    Date Value Ref Range Status   01/14/2019 >60 >60 mL/min/1.73 m^2 Final     eGFR if non    Date Value Ref Range Status   01/14/2019 >60 >60 mL/min/1.73 m^2 Final     Comment:     Calculation used to obtain the estimated glomerular filtration  rate (eGFR) is the CKD-EPI equation.            RADIOLOGY:  Chest x-ray dated 01/12/2018,   Impression      No evidence of active chest disease.     Mammogram (digital screening) dated 01/15/19, Impression: No mammographic evidence of malignancy.   BI-RADS Category 1: Negative   Recommendation:  Routine screening mammogram in 1 year is recommended.    CXR dated 01/14/19:  Impression:  No radiographic evidence of active chest disease.    BMD 01/14/19:  Osteopenia with decreased density in vertebral bone & increased density in femoral neck.    IMPRESSION:  1.  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  2.  Oval cyst in right breast - benign  3.  Osteopenia, followed by Dr. Palumbo.     PLAN:  1.  In  regards to #1, return in one year with interval CBC, CMP, LDH, Vitamin D,chest x-ray and   Digital Diagnostic mammogram prior to appointment.  2.  In regards to osteopenia, the patient will follow with Dr. Palumbo; continue calcium & vitamin D  supplementation daily along with weight bearing exercises/walking.    Assessment/plan reviewed and approved by Dr. Lopez.

## 2019-01-22 ENCOUNTER — PATIENT MESSAGE (OUTPATIENT)
Dept: HEMATOLOGY/ONCOLOGY | Facility: CLINIC | Age: 74
End: 2019-01-22

## 2019-01-29 ENCOUNTER — CLINICAL SUPPORT (OUTPATIENT)
Dept: FAMILY MEDICINE | Facility: CLINIC | Age: 74
End: 2019-01-29
Payer: MEDICARE

## 2019-01-29 VITALS — HEART RATE: 68 BPM | DIASTOLIC BLOOD PRESSURE: 66 MMHG | SYSTOLIC BLOOD PRESSURE: 140 MMHG

## 2019-01-29 DIAGNOSIS — Z00.00 PREVENTATIVE HEALTH CARE: Primary | ICD-10-CM

## 2019-01-29 PROCEDURE — 99999 PR PBB SHADOW E&M-EST. PATIENT-LVL I: CPT | Mod: PBBFAC,,,

## 2019-01-29 PROCEDURE — 99499 NO LOS: ICD-10-PCS | Mod: S$GLB,,, | Performed by: FAMILY MEDICINE

## 2019-01-29 PROCEDURE — 99999 PR PBB SHADOW E&M-EST. PATIENT-LVL I: ICD-10-PCS | Mod: PBBFAC,,,

## 2019-01-29 PROCEDURE — 99499 UNLISTED E&M SERVICE: CPT | Mod: S$GLB,,, | Performed by: FAMILY MEDICINE

## 2019-01-29 NOTE — PROGRESS NOTES
Yuliana Mattson 73 y.o. female is here today for Blood Pressure check.   History of HTN yes.    Review of patient's allergies indicates:   Allergen Reactions    Compazine [prochlorperazine edisylate] Anaphylaxis     Creatinine   Date Value Ref Range Status   01/14/2019 0.8 0.5 - 1.4 mg/dL Final     Sodium   Date Value Ref Range Status   01/14/2019 138 136 - 145 mmol/L Final     Potassium   Date Value Ref Range Status   01/14/2019 4.4 3.5 - 5.1 mmol/L Final   ]  Patient verifies taking blood pressure medications on a regular basis at the same time of the day.     Current Outpatient Medications:     doxycycline (MONODOX) 100 MG capsule, 1 po daily with food prn flare, Disp: 30 capsule, Rfl: 3    fluocinolone acetonide oil (DERMOTIC OIL) 0.01 % Drop, 5 drops in each ear bid x 7-10 days, avoid chronic use, Disp: 20 mL, Rfl: 2    fluocinonide (LIDEX) 0.05 % external solution, Apply topically 2 (two) times daily. aaa scalp scale and itch bid prn, Disp: 60 mL, Rfl: 3    fluticasone (FLONASE) 50 mcg/actuation nasal spray, 1 spray (50 mcg total) by Each Nare route once daily., Disp: 48 g, Rfl: 3    lisinopril 10 MG tablet, Take 1 tablet (10 mg total) by mouth once daily., Disp: 30 tablet, Rfl: 5    pimecrolimus (ELIDEL) 1 % cream, aaa eyelids bid x 2 weeks then prn pruritus, Disp: 30 g, Rfl: 2    sulfacetamide sodium-sulfur 10-5 % (w/w) Clsr, Wash face qd - bid, Disp: 360 g, Rfl: 3    tretinoin (RETIN-A) 0.025 % cream, Apply a pea-sized amount to entire face at bedtime, start every other night and increase as tolerated. Take night off if irritation develops., Disp: 45 g, Rfl: 3  Does patient have record of home blood pressure readings yes. Readings have been averaging 130/60's.   Last dose of blood pressure medication was taken at 830am.  Patient is asymptomatic.   Complains of nothing.    BP: (!) 140/66 , Pulse: 68 .    Blood pressure reading after 15 minutes was 136/72, Pulse 66.  Dr. Palumbo  notified.

## 2019-03-20 ENCOUNTER — OFFICE VISIT (OUTPATIENT)
Dept: FAMILY MEDICINE | Facility: CLINIC | Age: 74
End: 2019-03-20
Payer: MEDICARE

## 2019-03-20 VITALS
SYSTOLIC BLOOD PRESSURE: 136 MMHG | BODY MASS INDEX: 29.47 KG/M2 | RESPIRATION RATE: 18 BRPM | OXYGEN SATURATION: 99 % | HEART RATE: 61 BPM | TEMPERATURE: 98 F | DIASTOLIC BLOOD PRESSURE: 82 MMHG | HEIGHT: 64 IN | WEIGHT: 172.63 LBS

## 2019-03-20 DIAGNOSIS — Z85.51 HISTORY OF BLADDER CANCER: ICD-10-CM

## 2019-03-20 DIAGNOSIS — J30.9 ALLERGIC RHINITIS, UNSPECIFIED SEASONALITY, UNSPECIFIED TRIGGER: Primary | ICD-10-CM

## 2019-03-20 DIAGNOSIS — E78.5 HYPERLIPIDEMIA, UNSPECIFIED HYPERLIPIDEMIA TYPE: ICD-10-CM

## 2019-03-20 DIAGNOSIS — I10 ESSENTIAL HYPERTENSION: ICD-10-CM

## 2019-03-20 DIAGNOSIS — H26.9 CATARACT, UNSPECIFIED CATARACT TYPE, UNSPECIFIED LATERALITY: ICD-10-CM

## 2019-03-20 DIAGNOSIS — Z01.818 PREOPERATIVE CLEARANCE: ICD-10-CM

## 2019-03-20 PROCEDURE — 99999 PR PBB SHADOW E&M-EST. PATIENT-LVL III: CPT | Mod: PBBFAC,,, | Performed by: NURSE PRACTITIONER

## 2019-03-20 PROCEDURE — 3079F DIAST BP 80-89 MM HG: CPT | Mod: CPTII,S$GLB,, | Performed by: NURSE PRACTITIONER

## 2019-03-20 PROCEDURE — 99999 PR PBB SHADOW E&M-EST. PATIENT-LVL III: ICD-10-PCS | Mod: PBBFAC,,, | Performed by: NURSE PRACTITIONER

## 2019-03-20 PROCEDURE — 99214 PR OFFICE/OUTPT VISIT, EST, LEVL IV, 30-39 MIN: ICD-10-PCS | Mod: S$GLB,,, | Performed by: NURSE PRACTITIONER

## 2019-03-20 PROCEDURE — 3079F PR MOST RECENT DIASTOLIC BLOOD PRESSURE 80-89 MM HG: ICD-10-PCS | Mod: CPTII,S$GLB,, | Performed by: NURSE PRACTITIONER

## 2019-03-20 PROCEDURE — 1101F PT FALLS ASSESS-DOCD LE1/YR: CPT | Mod: CPTII,S$GLB,, | Performed by: NURSE PRACTITIONER

## 2019-03-20 PROCEDURE — 99214 OFFICE O/P EST MOD 30 MIN: CPT | Mod: S$GLB,,, | Performed by: NURSE PRACTITIONER

## 2019-03-20 PROCEDURE — 1101F PR PT FALLS ASSESS DOC 0-1 FALLS W/OUT INJ PAST YR: ICD-10-PCS | Mod: CPTII,S$GLB,, | Performed by: NURSE PRACTITIONER

## 2019-03-20 PROCEDURE — 3075F SYST BP GE 130 - 139MM HG: CPT | Mod: CPTII,S$GLB,, | Performed by: NURSE PRACTITIONER

## 2019-03-20 PROCEDURE — 3075F PR MOST RECENT SYSTOLIC BLOOD PRESS GE 130-139MM HG: ICD-10-PCS | Mod: CPTII,S$GLB,, | Performed by: NURSE PRACTITIONER

## 2019-03-20 RX ORDER — MONTELUKAST SODIUM 10 MG/1
10 TABLET ORAL NIGHTLY
Qty: 90 TABLET | Refills: 3 | Status: SHIPPED | OUTPATIENT
Start: 2019-03-20 | End: 2019-04-19

## 2019-03-20 RX ORDER — LEVOCETIRIZINE DIHYDROCHLORIDE 5 MG/1
5 TABLET, FILM COATED ORAL NIGHTLY
COMMUNITY
End: 2020-12-23

## 2019-03-20 NOTE — PROGRESS NOTES
Subjective:       Patient ID: Yuliana Mattson is a 73 y.o. female.    Chief Complaint: Pre-op Exam (paperwork)    Here today for cataract clearance - doing well - no issues   Feeling good   Will refill singulair for her allergies - since xyzal not working as well       Vitals:    03/20/19 1039   BP: 136/82   Pulse: 61   Resp: 18   Temp: 98.4 °F (36.9 °C)     Review of Systems   Constitutional: Negative.  Negative for activity change, diaphoresis, fatigue, fever and unexpected weight change.   HENT: Negative.  Negative for hearing loss, rhinorrhea and trouble swallowing.    Eyes: Negative.  Negative for discharge and visual disturbance.   Respiratory: Negative.  Negative for cough, chest tightness, shortness of breath and wheezing.    Cardiovascular: Negative.  Negative for chest pain and palpitations.   Gastrointestinal: Negative.  Negative for abdominal pain, blood in stool, constipation, diarrhea, nausea and vomiting.   Endocrine: Negative.  Negative for polydipsia and polyuria.   Genitourinary: Negative.  Negative for difficulty urinating, dysuria, hematuria and menstrual problem.   Musculoskeletal: Negative.  Negative for arthralgias, joint swelling and neck pain.   Skin: Negative for color change and rash.   Allergic/Immunologic: Negative.    Neurological: Negative.  Negative for speech difficulty, weakness, numbness and headaches.   Hematological: Negative.    Psychiatric/Behavioral: Negative.  Negative for confusion and dysphoric mood.       Past Medical History:   Diagnosis Date    Allergic rhinitis     Anticoagulant long-term use     ASPIRIN ONLY - (stops it when she presents a hemorrhagic cystitis)    Bladder cancer     Blood transfusion     Breast cancer     CAD (coronary artery disease), native coronary artery     40% non obstructive    Cholelithiasis     Endometriosis     Headache(784.0)     HEARING LOSS     Hemorrhoids     HLD (hyperlipidemia)     Hypertension     Left wrist fracture  2009    traumatic    Malignant neoplasm of other specified sites of bladder 12/3/2009    Osteoporosis, postmenopausal     PONV (postoperative nausea and vomiting)     Rash     Rosacea     Vaginal delivery     x 2     Objective:      Physical Exam   Constitutional: She is oriented to person, place, and time. She appears well-developed and well-nourished.   HENT:   Head: Normocephalic and atraumatic.   Right Ear: Hearing, tympanic membrane, external ear and ear canal normal.   Left Ear: Hearing, tympanic membrane, external ear and ear canal normal.   Nose: Nose normal. No mucosal edema, rhinorrhea or sinus tenderness. Right sinus exhibits no maxillary sinus tenderness and no frontal sinus tenderness. Left sinus exhibits no maxillary sinus tenderness and no frontal sinus tenderness.   Mouth/Throat: Uvula is midline, oropharynx is clear and moist and mucous membranes are normal. No oropharyngeal exudate or posterior oropharyngeal edema.   Eyes: Conjunctivae and EOM are normal. Pupils are equal, round, and reactive to light.   Neck: Normal range of motion. Neck supple.   Cardiovascular: Normal rate, regular rhythm, normal heart sounds and intact distal pulses. Exam reveals no friction rub.   No murmur heard.  Pulmonary/Chest: Effort normal and breath sounds normal. No stridor. No respiratory distress. She has no wheezes. She has no rales.   Abdominal: Soft. Bowel sounds are normal.   Musculoskeletal: Normal range of motion. She exhibits no edema or tenderness.   Neurological: She is alert and oriented to person, place, and time. No cranial nerve deficit. Coordination normal.   Skin: Skin is warm and dry.   Psychiatric: She has a normal mood and affect. Her behavior is normal. Judgment and thought content normal.   Nursing note and vitals reviewed.      Assessment:       1. Allergic rhinitis, unspecified seasonality, unspecified trigger    2. History of bladder cancer    3. Essential hypertension    4.  Hyperlipidemia, unspecified hyperlipidemia type    5. Cataract, unspecified cataract type, unspecified laterality    6. Preoperative clearance        Plan:       Allergic rhinitis, unspecified seasonality, unspecified trigger    montelukast (SINGULAIR) 10 mg tablet; Take 1 tablet (10 mg total) by mouth every evening.  Dispense: 90 tablet; Refill: 3    History of bladder cancer  Stable     Essential hypertension  On meds - stable     Hyperlipidemia, unspecified hyperlipidemia type  Followed - worked on diet and lost 30 lbs     Cataract, unspecified cataract type, unspecified laterality  Preoperative clearance    preop clearance form completed              Stable to have cataract surgery and FU eye also   FU with Dr Palumbo as directed

## 2019-07-02 ENCOUNTER — TELEPHONE (OUTPATIENT)
Dept: FAMILY MEDICINE | Facility: CLINIC | Age: 74
End: 2019-07-02

## 2019-07-08 ENCOUNTER — HOSPITAL ENCOUNTER (OUTPATIENT)
Dept: RADIOLOGY | Facility: HOSPITAL | Age: 74
Discharge: HOME | End: 2019-07-08
Attending: NURSE PRACTITIONER
Payer: MEDICARE

## 2019-07-08 ENCOUNTER — OFFICE VISIT (OUTPATIENT)
Dept: FAMILY MEDICINE | Facility: CLINIC | Age: 74
End: 2019-07-08
Payer: MEDICARE

## 2019-07-08 VITALS
HEART RATE: 69 BPM | TEMPERATURE: 99 F | DIASTOLIC BLOOD PRESSURE: 90 MMHG | WEIGHT: 171.75 LBS | SYSTOLIC BLOOD PRESSURE: 138 MMHG | BODY MASS INDEX: 29.32 KG/M2 | OXYGEN SATURATION: 97 % | HEIGHT: 64 IN

## 2019-07-08 DIAGNOSIS — Z91.09 ENVIRONMENTAL ALLERGIES: ICD-10-CM

## 2019-07-08 DIAGNOSIS — M54.2 NECK PAIN: ICD-10-CM

## 2019-07-08 DIAGNOSIS — J34.89 SINUS PRESSURE: ICD-10-CM

## 2019-07-08 DIAGNOSIS — M79.10 MYALGIA: ICD-10-CM

## 2019-07-08 DIAGNOSIS — J34.89 SINUS PRESSURE: Primary | ICD-10-CM

## 2019-07-08 DIAGNOSIS — I10 ESSENTIAL HYPERTENSION: ICD-10-CM

## 2019-07-08 DIAGNOSIS — H57.9 ITCHY EYES: ICD-10-CM

## 2019-07-08 DIAGNOSIS — R51.9 ACUTE NONINTRACTABLE HEADACHE, UNSPECIFIED HEADACHE TYPE: ICD-10-CM

## 2019-07-08 PROCEDURE — 3080F DIAST BP >= 90 MM HG: CPT | Mod: CPTII,S$GLB,, | Performed by: NURSE PRACTITIONER

## 2019-07-08 PROCEDURE — 1101F PR PT FALLS ASSESS DOC 0-1 FALLS W/OUT INJ PAST YR: ICD-10-PCS | Mod: CPTII,S$GLB,, | Performed by: NURSE PRACTITIONER

## 2019-07-08 PROCEDURE — 70220 X-RAY EXAM OF SINUSES: CPT | Mod: 26,,, | Performed by: RADIOLOGY

## 2019-07-08 PROCEDURE — 99214 OFFICE O/P EST MOD 30 MIN: CPT | Mod: S$GLB,,, | Performed by: NURSE PRACTITIONER

## 2019-07-08 PROCEDURE — 99499 UNLISTED E&M SERVICE: CPT | Mod: S$GLB,,, | Performed by: NURSE PRACTITIONER

## 2019-07-08 PROCEDURE — 70220 XR SINUSES MIN 3 VIEWS: ICD-10-PCS | Mod: 26,,, | Performed by: RADIOLOGY

## 2019-07-08 PROCEDURE — 99499 RISK ADDL DX/OHS AUDIT: ICD-10-PCS | Mod: S$GLB,,, | Performed by: NURSE PRACTITIONER

## 2019-07-08 PROCEDURE — 72050 X-RAY EXAM NECK SPINE 4/5VWS: CPT | Mod: TC,FY,PO

## 2019-07-08 PROCEDURE — 72050 X-RAY EXAM NECK SPINE 4/5VWS: CPT | Mod: 26,,, | Performed by: RADIOLOGY

## 2019-07-08 PROCEDURE — 99999 PR PBB SHADOW E&M-EST. PATIENT-LVL IV: ICD-10-PCS | Mod: PBBFAC,,, | Performed by: NURSE PRACTITIONER

## 2019-07-08 PROCEDURE — 72050 XR CERVICAL SPINE COMPLETE 5 VIEW: ICD-10-PCS | Mod: 26,,, | Performed by: RADIOLOGY

## 2019-07-08 PROCEDURE — 3080F PR MOST RECENT DIASTOLIC BLOOD PRESSURE >= 90 MM HG: ICD-10-PCS | Mod: CPTII,S$GLB,, | Performed by: NURSE PRACTITIONER

## 2019-07-08 PROCEDURE — 3075F PR MOST RECENT SYSTOLIC BLOOD PRESS GE 130-139MM HG: ICD-10-PCS | Mod: CPTII,S$GLB,, | Performed by: NURSE PRACTITIONER

## 2019-07-08 PROCEDURE — 3075F SYST BP GE 130 - 139MM HG: CPT | Mod: CPTII,S$GLB,, | Performed by: NURSE PRACTITIONER

## 2019-07-08 PROCEDURE — 99999 PR PBB SHADOW E&M-EST. PATIENT-LVL IV: CPT | Mod: PBBFAC,,, | Performed by: NURSE PRACTITIONER

## 2019-07-08 PROCEDURE — 70220 X-RAY EXAM OF SINUSES: CPT | Mod: TC,FY,PO

## 2019-07-08 PROCEDURE — 99214 PR OFFICE/OUTPT VISIT, EST, LEVL IV, 30-39 MIN: ICD-10-PCS | Mod: S$GLB,,, | Performed by: NURSE PRACTITIONER

## 2019-07-08 PROCEDURE — 1101F PT FALLS ASSESS-DOCD LE1/YR: CPT | Mod: CPTII,S$GLB,, | Performed by: NURSE PRACTITIONER

## 2019-07-08 RX ORDER — MONTELUKAST SODIUM 10 MG/1
10 TABLET ORAL NIGHTLY
Qty: 90 TABLET | Refills: 0 | Status: SHIPPED | OUTPATIENT
Start: 2019-07-08 | End: 2020-03-04

## 2019-07-08 RX ORDER — LISINOPRIL 20 MG/1
20 TABLET ORAL DAILY
Qty: 90 TABLET | Refills: 3 | Status: SHIPPED | OUTPATIENT
Start: 2019-07-08 | End: 2020-07-22

## 2019-07-08 RX ORDER — BRIMONIDINE TARTRATE 1 MG/ML
SOLUTION/ DROPS OPHTHALMIC
Refills: 4 | COMMUNITY
Start: 2019-06-19 | End: 2021-02-28

## 2019-07-08 NOTE — PROGRESS NOTES
Subjective:       Patient ID: Yuliana Mattson is a 73 y.o. female.    Chief Complaint: Neck Pain; Headache; Cough (Dry(Allergies)); and Dry Eye (and Itchy(Allergies))        Has issues with cataract surgery - has been on Opth steroids   Now on glaucome drops     Neck Pain    This is a recurrent problem. The current episode started 1 to 4 weeks ago. The problem occurs intermittently. The pain is associated with nothing. The quality of the pain is described as aching. Associated symptoms include headaches. Pertinent negatives include no chest pain, fever, leg pain, numbness, pain with swallowing, paresis, photophobia, syncope, tingling, trouble swallowing, visual change or weakness.   Sinusitis   This is a new problem. The current episode started 1 to 4 weeks ago. The problem has been gradually worsening since onset. There has been no fever. Associated symptoms include congestion, ear pain, headaches, neck pain and sinus pressure. Pertinent negatives include no chills, coughing, diaphoresis, hoarse voice, shortness of breath, sneezing, sore throat or swollen glands. Treatments tried: antihistamine.     Vitals:    07/08/19 0959   BP: (!) 138/90   Pulse: 69   Temp: 98.5 °F (36.9 °C)     Review of Systems   Constitutional: Positive for fatigue. Negative for activity change, appetite change, chills, diaphoresis and fever.   HENT: Positive for congestion, ear pain, postnasal drip, sinus pressure and sinus pain. Negative for hoarse voice, sneezing, sore throat and trouble swallowing.    Eyes: Positive for redness, itching and visual disturbance. Negative for photophobia.        Visual disturbance right eye due to cataract surgery.   Respiratory: Negative.  Negative for cough, shortness of breath and wheezing.    Cardiovascular: Negative.  Negative for chest pain and syncope.   Gastrointestinal: Negative.  Negative for abdominal pain, diarrhea and nausea.   Endocrine: Negative.    Genitourinary: Negative.  Negative for  dysuria and hematuria.   Musculoskeletal: Positive for neck pain.   Skin: Negative.  Negative for color change and rash.   Allergic/Immunologic: Negative.    Neurological: Positive for headaches. Negative for tingling, speech difficulty, weakness and numbness.   Hematological: Negative.    Psychiatric/Behavioral: Negative.        Past Medical History:   Diagnosis Date    Allergic rhinitis     Anticoagulant long-term use     ASPIRIN ONLY - (stops it when she presents a hemorrhagic cystitis)    Bladder cancer     Blood transfusion     Breast cancer     CAD (coronary artery disease), native coronary artery     40% non obstructive    Cholelithiasis     Endometriosis     Headache(784.0)     HEARING LOSS     Hemorrhoids     HLD (hyperlipidemia)     Hypertension     Left wrist fracture 2009    traumatic    Malignant neoplasm of other specified sites of bladder 12/3/2009    Osteoporosis, postmenopausal     PONV (postoperative nausea and vomiting)     Rash     Rosacea     Vaginal delivery     x 2       Objective:      Physical Exam   Constitutional: She is oriented to person, place, and time. She appears well-developed and well-nourished.   HENT:   Head: Normocephalic and atraumatic.       Right Ear: Hearing, tympanic membrane, external ear and ear canal normal.   Left Ear: Hearing, tympanic membrane, external ear and ear canal normal.   Nose: No mucosal edema, rhinorrhea or sinus tenderness. Right sinus exhibits maxillary sinus tenderness and frontal sinus tenderness. Left sinus exhibits maxillary sinus tenderness and frontal sinus tenderness.   Mouth/Throat: Uvula is midline, oropharynx is clear and moist and mucous membranes are normal. No oropharyngeal exudate or posterior oropharyngeal edema.   Headache pain occipital area.  Radiating down and to upper new traps.  Pain along the sternocleidomastoid.   Eyes: Pupils are equal, round, and reactive to light. Conjunctivae and EOM are normal.   Neck:  Normal range of motion. Neck supple.   Cardiovascular: Normal rate, regular rhythm, normal heart sounds and intact distal pulses. Exam reveals no friction rub.   No murmur heard.  Pulmonary/Chest: Effort normal and breath sounds normal. No stridor. No respiratory distress. She has no wheezes. She has no rales.   Abdominal: Soft. Bowel sounds are normal.   Musculoskeletal: She exhibits no edema.        Cervical back: She exhibits decreased range of motion, tenderness, pain and spasm.   No radicular pain   Neurological: She is alert and oriented to person, place, and time. No cranial nerve deficit. Coordination normal.   Skin: Skin is warm and dry.   Psychiatric: She has a normal mood and affect. Her behavior is normal. Judgment and thought content normal.   Nursing note and vitals reviewed.      Assessment:       1. Sinus pressure    2. Neck pain    3. Myalgia    4. Itchy eyes    5. Acute nonintractable headache, unspecified headache type    6. Environmental allergies    7. Essential hypertension        Plan:       Sinus pressure  -     Ambulatory Referral to Physical/Occupational Therapy  -     X-Ray Sinuses Min 3 Views; Future; Expected date: 07/08/2019    Neck pain  -     Ambulatory Referral to Physical/Occupational Therapy    Myalgia  -     Ambulatory Referral to Physical/Occupational Therapy  -     X-Ray Cervical Spine Complete 5 view; Future; Expected date: 07/08/2019    Itchy eyes  Discussed with her some allergy eyedrops that she could use over-the-counter.  But she should discuss this with her ophthalmologist based on issues with both eyes.    Acute nonintractable headache, unspecified headache type    Environmental allergies  -     montelukast (SINGULAIR) 10 mg tablet; Take 1 tablet (10 mg total) by mouth every evening.  Dispense: 90 tablet; Refill: 0  Can go back on Singulair at this time.  Do not use Flonase.    Essential hypertension  -     lisinopril (PRINIVIL,ZESTRIL) 20 MG tablet; Take 1 tablet (20 mg  total) by mouth once daily.  Dispense: 90 tablet; Refill: 3  Increased lisinopril back to 20 mg due to higher blood pressure readings.  Monitor BP at home.        Physical therapy for neck pain  And myalgia.  X-rays for sinus pressure and pain to exclude acute sinusitis.  Neck x-ray for evaluation of neck pain.  Will follow if not better.  Follow-up as directed by PCP.

## 2019-07-30 ENCOUNTER — CLINICAL SUPPORT (OUTPATIENT)
Dept: REHABILITATION | Facility: HOSPITAL | Age: 74
End: 2019-07-30
Payer: MEDICARE

## 2019-07-30 DIAGNOSIS — M54.2 NECK PAIN: ICD-10-CM

## 2019-07-30 PROCEDURE — 97162 PT EVAL MOD COMPLEX 30 MIN: CPT | Mod: PO

## 2019-07-30 PROCEDURE — 97110 THERAPEUTIC EXERCISES: CPT | Mod: PO

## 2019-07-30 NOTE — PLAN OF CARE
[facility]  Physical Therapy Initial Evaluation    Name: Yuliana Mattson  Clinic Number: [id number]    Therapy Diagnosis:   Encounter Diagnosis   Name Primary?    Neck pain      Physician: Cece Wheeler, *    Physician Orders: PT Eval and Treat   Medical Diagnosis from Referral: J34.89 (ICD-10-CM) - Sinus pressure M54.2 (ICD-10-CM) - Neck pain M79.10 (ICD-10-CM) - Myalgia     Evaluation Date: 7/30/2019  Authorization Period Expiration: 09/24/2019  Plan of Care Expiration: 09/17/2019  Visit # / Visits authorized: 1/ 12    Time In: 1300  Time Out: 1400  Total Billable Time: 55 minutes    Precautions: Standard    Subjective   Date of onset: Chronic pain with increased in symptoms in Apr 2018  History of current condition - Yuliana reports: several year Hx of neck pain and soreness in midline cervical spine into thoracic spine with tightness through that area also. In  Apr she started to have increase in pain, beginning  in  [place] she woke up and had decreased ROM and pinching on right side of the neck and then progressed to bilateral upper trap area. Over the past 2 weeks pain has localized back into the neck area but still worse than her baseline  Previous pain and she continues to have pain with ROM       Past Medical History:   Diagnosis Date    Allergic rhinitis     Anticoagulant long-term use     ASPIRIN ONLY - (stops it when she presents a hemorrhagic cystitis)    Bladder cancer     Blood transfusion     Breast cancer     CAD (coronary artery disease), native coronary artery     40% non obstructive    Cholelithiasis     Endometriosis     Headache(784.0)     HEARING LOSS     Hemorrhoids     HLD (hyperlipidemia)     Hypertension     Left wrist fracture 2009    traumatic    Malignant neoplasm of other specified sites of bladder 12/3/2009    Osteoporosis, postmenopausal     PONV (postoperative nausea and vomiting)     Rash     Rosacea     Vaginal delivery     x 2      Yuliana Mattson  has a past surgical history that includes Hysterectomy; oophrect; TONSILLECTOMY, ADENOIDECTOMY, BILATERAL yringotomy and tubes; Appendectomy; Tonsillectomy; pelvic mass; Bladder tumor excision (1979); tram flap (Left, 1998); Breast biopsy (Right); Breast surgery (Left, 1998); and Mastectomy, partial (1995).    Yuliana has a current medication list which includes the following prescription(s): alphagan p, fluticasone propionate, levocetirizine, lisinopril, montelukast, sulfacetamide sodium-sulfur, and tretinoin.    Review of patient's allergies indicates:   Allergen Reactions    Prochlorperazine edisylate Anaphylaxis     Other reaction(s): Unknown        Imaging, X-Ray:     FINDINGS:  There is prominent disc space narrowing and anterior and posterior marginal osteophyte formation at C5-C6.  There is uncovertebral spurring and facet arthropathy resulting in mild bilateral neuroforaminal stenosis at C5-C6.  No acute fracture or osseous destructive process.  No prevertebral soft tissue swelling.  No widening of the anterior atlantodental interval.      Impression       As above      Electronically signed by: Camilo Calderon MD  Date: 07/08/2019  Time: 14:18       Prior Therapy: none  Social History:  lives with their spouse  Occupation: none  Prior Level of Function: none  Current Level of Function: driving    Pain:  Current 2/10, worst 3/10, best 2/10   Location: bilateral neck   Description: Aching and Dull  Aggravating Factors: Flexing, Lifting and turning  Easing Factors: rest and NSAID    Pts goals: decrease pain      Objective   Mental status: oriented x3  Posture/ Alignment: Good    ROM: Cervical  ROM:   AROM  Comment   Flexion: 1 finger from chest* Pulling into T-Spine   Extension: 45 degrees* Midline cervical pain   Lat Flex R: 25 degrees*    Lat Flex L: 25  Degrees*    Rotation R: 60 degrees*    Rotation L: 60  Degrees*    *pain  Dermatomes:   Right Left Comment   C4 intact intact    C5  intact intact    C6 intact intact    C7 intact intact    C8 intact intact    T1 intact intact      Myotomes:   Right Left Comment   Shoulder abduction (C5): 4+/5 4+/5    Wrist extension (C6): 5/5 5/5    Wrist flexion (C7): 5/5 5/5    Thumb Extension (C8): 5/5 5/5     (T1): 5/5 4/5      DTR:   Right Left Comment   Biceps (C5-6) 2+ 2+    Triceps (C6-7) 2+ 2+          Special Tests:  Cervical radiculopathy   (-) Spurlings A, (-) Cervical Quadrant w/out radicular symptoms, (+) Neck Distraction, (-) Cervical rotation < 60 deg ipsilateral side,(-) Peripheralization w/ lower cervical mobility assessment     Palpation:  + TTP Bilateral upper traps, cervical paraspinals      Accessory Motion Assessment   PAIVM: C2-C6 grade IV lateral glides with pain and hypomobility; C2-T4 central PA glides WNL w/ reproduction of pain      Pt/family was provided educational information, including: role of PT, goals for PT, scheduling - pt verbalized understanding. Discussed insurance plan with pt.     CMS Impairment/Limitation/Restriction for FOTO neck Survey    Therapist reviewed FOTO scores for Yuliana Mattson on 7/30/2019.   FOTO documents entered into [employer] - see Media section.    Limitation Score: 44%           TREATMENT   Treatment Time In: 1340  Treatment Time Out: 1350  Total Treatment time separate from Evaluation: 10 minutes    Yuliana received therapeutic exercises to develop strength, ROM, flexibility and posture for 10 minutes including:  Chin tucks x 10  Upper trap stretch: Bilateral x  3 min  Barrel hugs x 8      Home Exercises and Patient Education Provided    Education provided:   - stay moving:don't have to push into sharp pain but  don't be afraid of it either    Written Home Exercises Provided: Patient instructed to cont prior HEP.  Exercises were reviewed and Yuliana was able to demonstrate them prior to the end of the session.  Yuliana demonstrated good  understanding of the education provided.     See EMR under  Media for exercises provided 7/30/2019.    Assessment     Pt presents with acute on chronic mechanical neck pain with decreased ROM and pain causing limitations and pain with ADLs. She has some hypomobility as would be expected in the cervical/thoracic spine that can be improved hopefully leading to decreased pain. She should benefit from some manual therapy and progressive exercise plan.     Pt prognosis is Good.   Pt will benefit from skilled outpatient Physical Therapy to address the deficits stated above and in the chart below, provide pt/family education, and to maximize pt's level of independence.     Plan of care discussed with patient: Yes  Pt's spiritual, cultural and educational needs considered and patient is agreeable to the plan of care and goals as stated below:     Anticipated Barriers for therapy: none    Medical Necessity is demonstrated by the following  History  Co-morbidities and personal factors that may impact the plan of care Co-morbidities:   advanced age, coping style/mechanism and HTN    Personal Factors:   age  coping style     moderate   Examination  Body Structures and Functions, activity limitations and participation restrictions that may impact the plan of care Body Regions:   neck    Body Systems:    ROM  strength    Participation Restrictions:   none    Activity limitations:   Learning and applying knowledge  no deficits    General Tasks and Commands  no deficits    Communication  no deficits    Mobility  lifting and carrying objects  driving (bike, car, motorcycle)    Self care  washing oneself (bathing, drying, washing hands)    Domestic Life  doing house work (cleaning house, washing dishes, laundry)    Interactions/Relationships  no deficits    Life Areas  no deficits    Community and Social Life  recreation and leisure         moderate   Clinical Presentation evolving clinical presentation with changing clinical characteristics moderate   Decision Making/ Complexity Score:  moderate     Goals:  Short Term Goals: 4 weeks   Decrease pain with ADLs at worse by 2 points or more  Increase cervical rotation to 80 degrees or more    Long Term Goals: 8 weeks   Decrease pain at worse by 4 points with ADLs  Decrease FOTO limitations by 10% or more      Plan    Plan of care Certification: 7/30/2019 to 09/17/2019    Outpatient physical therapy 2 times weekly to include: pt ed, HEP, therapeutic exercises, therapeutic activities, neuromuscular re-education/ balance exercises, manual therapy, and modalities prn. Cont PT for 2 months.      Geo Mcclure, PT

## 2019-08-05 NOTE — PROGRESS NOTES
Physical Therapy Daily Treatment Note     Name: Yuliana Mattson  Clinic Number: [id number]    Therapy Diagnosis:   Encounter Diagnosis   Name Primary?    Neck pain      Physician: Cece Wheeler, *    Visit Date: 8/6/2019   Physician Orders: PT Eval and Treat   Medical Diagnosis from Referral: J34.89 (ICD-10-CM) - Sinus pressure M54.2 (ICD-10-CM) - Neck pain M79.10 (ICD-10-CM) - Myalgia      Evaluation Date: 7/30/2019  Authorization Period Expiration: 09/24/2019  Plan of Care Expiration: 09/17/2019  Visit # / Visits authorized: 2/ 12     Time In: 1003  Time Out: 1100  Total Billable Time: 57 minutes     Precautions: Standard       Subjective     Pt reports: she has been doing her exercises and is feeling a little better with decreased pain . She thinks the exercises have been helpful and has been doing them during the day. She is still having pain to bilateral neck and into upper traps but can be intermittent now.     She was compliant with home exercise program.  Response to previous treatment: decreased pain  Functional change: turning head now with less pain    Pain: 2=/10  Location: bilateral head  and neck      Objective     Yuliana received therapeutic exercises to develop strength, endurance, ROM and flexibility for 27 minutes including:  UBE x 4 min: alternating each min  Chin tucks: 2 x 10  Levator stretch: x 3 min  Supine shoulder flexion: x 15 , 1lb PVC  Supine serratus punches x 15, 1lb PVC  Upper trap stretch: 3 min  Passive Cervical, ROM; rotation    Yuliana received the following manual therapy techniques: Joint mobilizations, Manual traction and Soft tissue Mobilization were applied to the: neck for 30 minutes, including:  Manual cervical Traction  Sub-occipital release  Cervical : Jnt mobs: garde II-IV, C2-C7, Rotational  Cervical : Jnt mobs: grade II-IV, C2-T1, Central P-As  Cervical: Jnt mobs: Grade: II-IV, C2-C7, Lateral glides    Home Exercises Provided and Patient Education Provided      Education provided:   - continue with current exercises  - function 1st then decreased pain usually    Written Home Exercises Provided: Patient instructed to cont prior HEP.  Exercises were reviewed and Yuliana was able to demonstrate them prior to the end of the session.  Yuliana demonstrated good  understanding of the education provided.     See EMR under Patient Instructions for exercises provided prior visit.    Assessment   Patient is reporting some improvement with symptoms since last visit and did well with her exercises today. I will see how she does after treatment today to see how irritable her tissue is. She will continue to progress on her exercises per her response to treatment.    Yuliana is progressing well towards her goals.   Pt prognosis is Good.     Pt will continue to benefit from skilled outpatient physical therapy to address the deficits listed in the problem list box on initial evaluation, provide pt/family education and to maximize pt's level of independence in the home and community environment.     Pt's spiritual, cultural and educational needs considered and pt agreeable to plan of care and goals.    Anticipated barriers to physical therapy:     Goals:  Short Term Goals: 4 weeks   Decrease pain with ADLs at worse by 2 points or more:progressing, not met  Increase cervical rotation to 80 degrees or more:progressing not met     Long Term Goals: 8 weeks   Decrease pain at worse by 4 points with ADLs:progressing, not met:progressing, not met  Decrease FOTO limitations by 10% or more:progressing not met        Plan    Plan of care Certification: 7/30/2019 to 09/17/2019     Outpatient physical therapy 2 times weekly to include: pt ed, HEP, therapeutic exercises, therapeutic activities, neuromuscular re-education/ balance exercises, manual therapy, and modalities prn. Cont PT for 2 months.       Geo Mccluer, PT

## 2019-08-06 ENCOUNTER — CLINICAL SUPPORT (OUTPATIENT)
Dept: REHABILITATION | Facility: HOSPITAL | Age: 74
End: 2019-08-06
Payer: MEDICARE

## 2019-08-06 DIAGNOSIS — M54.2 NECK PAIN: ICD-10-CM

## 2019-08-06 PROCEDURE — 97110 THERAPEUTIC EXERCISES: CPT | Mod: PO

## 2019-08-06 PROCEDURE — 97140 MANUAL THERAPY 1/> REGIONS: CPT | Mod: PO

## 2019-08-08 ENCOUNTER — CLINICAL SUPPORT (OUTPATIENT)
Dept: REHABILITATION | Facility: HOSPITAL | Age: 74
End: 2019-08-08
Payer: MEDICARE

## 2019-08-08 DIAGNOSIS — M54.2 NECK PAIN: ICD-10-CM

## 2019-08-08 PROCEDURE — 97110 THERAPEUTIC EXERCISES: CPT | Mod: PO

## 2019-08-08 PROCEDURE — 97140 MANUAL THERAPY 1/> REGIONS: CPT | Mod: PO

## 2019-08-08 NOTE — PROGRESS NOTES
Physical Therapy Daily Treatment Note     Name: Yuliana Mattson  Clinic Number: [id number]    Therapy Diagnosis:   Encounter Diagnosis   Name Primary?    Neck pain      Physician: Cece Wheeler, *    Visit Date: 8/8/2019   Physician Orders: PT Eval and Treat   Medical Diagnosis from Referral: J34.89 (ICD-10-CM) - Sinus pressure M54.2 (ICD-10-CM) - Neck pain M79.10 (ICD-10-CM) - Myalgia      Evaluation Date: 7/30/2019  Authorization Period Expiration: 09/24/2019  Plan of Care Expiration: 09/17/2019  Visit # / Visits authorized: 3/ 12     Time In: 1000  Time Out: 1055  Total Billable Time: 55 minutes     Precautions: Standard       Subjective     Pt reports: felt a little sore after lat visit but was gone by the next day. She has been feeling better  overall.     She was compliant with home exercise program.  Response to previous treatment: decreased pain  Functional change: turning head now with less pain    Pain: 1/10  Location: bilateral head  and neck      Objective     Yuliana received therapeutic exercises to develop strength, endurance, ROM and flexibility for 25 minutes including:  UBE x 4 min: alternating each min  Chin tucks: 2 x 10  Levator stretch: x 3 min  Supine shoulder flexion: x 15 , 1lb PVC  Supine serratus punches x 15, 1lb PVC  Upper trap stretch: 3 min  Levator scap stretch x 3 min  Chin tucks x 10, 5-10 sec holds  Passive Cervical, ROM; rotation    Yuliana received the following manual therapy techniques: Joint mobilizations, Manual traction and Soft tissue Mobilization were applied to the: neck for 30 minutes, including:  Manual cervical Traction  Sub-occipital release  Cervical : Jnt mobs: garde II-IV, C2-C7, Rotational  Cervical : Jnt mobs: grade II-IV, C2-T1, Central P-As  Cervical: Jnt mobs: Grade: II-IV, C2-C7, Lateral glides  Thoracic: Central P-As    Home Exercises Provided and Patient Education Provided     Education provided:   - continue with current exercises      Written  Home Exercises Provided: Patient instructed to cont prior HEP.  Exercises were reviewed and Yuliana was able to demonstrate them prior to the end of the session.  Yuliana demonstrated good  understanding of the education provided.     See EMR under Patient Instructions for exercises provided prior visit.    Assessment   Patient continues to report improvement with symptoms with decrease in the intensity of her symptoms. She will continue with treatment and slowly advance as appopriate    Yuliana is progressing well towards her goals.   Pt prognosis is Good.     Pt will continue to benefit from skilled outpatient physical therapy to address the deficits listed in the problem list box on initial evaluation, provide pt/family education and to maximize pt's level of independence in the home and community environment.     Pt's spiritual, cultural and educational needs considered and pt agreeable to plan of care and goals.    Anticipated barriers to physical therapy:     Goals:  Short Term Goals: 4 weeks   Decrease pain with ADLs at worse by 2 points or more:progressing, not met  Increase cervical rotation to 80 degrees or more:progressing not met     Long Term Goals: 8 weeks   Decrease pain at worse by 4 points with ADLs:progressing, not met:progressing, not met  Decrease FOTO limitations by 10% or more:progressing not met        Plan    Plan of care Certification: 7/30/2019 to 09/17/2019     Outpatient physical therapy 2 times weekly to include: pt ed, HEP, therapeutic exercises, therapeutic activities, neuromuscular re-education/ balance exercises, manual therapy, and modalities prn. Cont PT for 2 months.       Geo Mcclure, PT

## 2019-08-20 ENCOUNTER — CLINICAL SUPPORT (OUTPATIENT)
Dept: REHABILITATION | Facility: HOSPITAL | Age: 74
End: 2019-08-20
Payer: MEDICARE

## 2019-08-20 DIAGNOSIS — M54.2 NECK PAIN: ICD-10-CM

## 2019-08-20 PROCEDURE — 97140 MANUAL THERAPY 1/> REGIONS: CPT | Mod: PO

## 2019-08-20 PROCEDURE — 97110 THERAPEUTIC EXERCISES: CPT | Mod: PO

## 2019-08-20 NOTE — PROGRESS NOTES
Physical Therapy Daily Treatment Note     Name: Yuliana Mattson  Clinic Number: [id number]    Therapy Diagnosis:   Encounter Diagnosis   Name Primary?    Neck pain      Physician: Cece Wheeler, *    Visit Date: 8/20/2019   Physician Orders: PT Eval and Treat   Medical Diagnosis from Referral: J34.89 (ICD-10-CM) - Sinus pressure M54.2 (ICD-10-CM) - Neck pain M79.10 (ICD-10-CM) - Myalgia      Evaluation Date: 7/30/2019  Authorization Period Expiration: 09/24/2019  Plan of Care Expiration: 09/17/2019  Visit # / Visits authorized: 4/ 12     Time In: 0955  Time Out: 1045  Total Billable Time: 50 minutes     Precautions: Standard       Subjective     Pt reports:she has been feeling good without any pain despite being busy and traveling on a plane. She has continued to do her exercises at home and throughout the day w/o any problems.         She was compliant with home exercise program.  Response to previous treatment: minimal pain if any now  Functional change: able to turn head w/o symptoms    Pain: 0/10  Location: bilateral head  and neck      Objective     Yuliana received therapeutic exercises to develop strength, endurance, ROM and flexibility for 20 minutes including:  UBE x 4 min: alternating each min  Chin tucks: 2 x 10  Levator stretch: x 3 min  Supine shoulder flexion: x 15 , 1lb PVC  Supine serratus punches x 15, 1lb PVC  Upper trap stretch: 3 min  Levator scap stretch x 3 min  Chin tucks x 10, 5-10 sec holds  Passive Cervical, ROM; rotation x 2 min  Seated: rows: yellow theraband    Yuliana received the following manual therapy techniques: Joint mobilizations, Manual traction and Soft tissue Mobilization were applied to the: neck for 30 minutes, including:  Manual cervical Traction  Sub-occipital release  Cervical : Jnt mobs: garde II-IV, C2-C7, Rotational  Cervical : Jnt mobs: grade II-IV, C2-T1, Central P-As  Cervical: Jnt mobs: Grade: II-IV, C2-C7, Lateral glides  Thoracic: Central P-As    Home  Exercises Provided and Patient Education Provided     Education provided:   - continue with current exercises  - start doing rows with theraband 2-3 times a week      Written Home Exercises Provided: Patient instructed to cont prior HEP.  Exercises were reviewed and Yuliana was able to demonstrate them prior to the end of the session.  Yuliana demonstrated good  understanding of the education provided.     See EMR under Patient Instructions for exercises provided prior visit.    Assessment   Patient is doing really well and hasn't been having symptoms despite several mental and physical stressors over the past week . She has been able to stay consistent with her HEP which has been helpful. I had her cancel her appt for Thursday and will see how she does with treatment just once this week.     Yuliana is progressing well towards her goals.   Pt prognosis is Good.     Pt will continue to benefit from skilled outpatient physical therapy to address the deficits listed in the problem list box on initial evaluation, provide pt/family education and to maximize pt's level of independence in the home and community environment.     Pt's spiritual, cultural and educational needs considered and pt agreeable to plan of care and goals.    Anticipated barriers to physical therapy:     Goals:  Short Term Goals: 4 weeks   Decrease pain with ADLs at worse by 2 points or more: met  Increase cervical rotation to 80 degrees or more: met     Long Term Goals: 8 weeks   Decrease pain at worse by 4 points with ADLs:progressing, not met:progressing, not met  Decrease FOTO limitations by 10% or more:progressing not met        Plan    Plan of care Certification: 7/30/2019 to 09/17/2019     Outpatient physical therapy 2 times weekly to include: pt ed, HEP, therapeutic exercises, therapeutic activities, neuromuscular re-education/ balance exercises, manual therapy, and modalities prn. Cont PT for 2 months.       Geo Mcclure, PT

## 2019-09-24 ENCOUNTER — IMMUNIZATION (OUTPATIENT)
Dept: FAMILY MEDICINE | Facility: CLINIC | Age: 74
End: 2019-09-24
Payer: MEDICARE

## 2019-09-24 PROCEDURE — 90662 FLU VACCINE - HIGH DOSE (65+) PRESERVATIVE FREE IM: ICD-10-PCS | Mod: S$GLB,,, | Performed by: FAMILY MEDICINE

## 2019-09-24 PROCEDURE — G0008 ADMIN INFLUENZA VIRUS VAC: HCPCS | Mod: S$GLB,,, | Performed by: FAMILY MEDICINE

## 2019-09-24 PROCEDURE — 90662 IIV NO PRSV INCREASED AG IM: CPT | Mod: S$GLB,,, | Performed by: FAMILY MEDICINE

## 2019-09-24 PROCEDURE — G0008 FLU VACCINE - HIGH DOSE (65+) PRESERVATIVE FREE IM: ICD-10-PCS | Mod: S$GLB,,, | Performed by: FAMILY MEDICINE

## 2020-01-02 ENCOUNTER — PATIENT OUTREACH (OUTPATIENT)
Dept: ADMINISTRATIVE | Facility: HOSPITAL | Age: 75
End: 2020-01-02

## 2020-01-08 ENCOUNTER — HOSPITAL ENCOUNTER (OUTPATIENT)
Dept: RADIOLOGY | Facility: HOSPITAL | Age: 75
Discharge: HOME | End: 2020-01-08
Attending: NURSE PRACTITIONER
Payer: MEDICARE

## 2020-01-08 DIAGNOSIS — Z85.3 HISTORY OF BREAST CANCER: ICD-10-CM

## 2020-01-08 PROCEDURE — 77061 MAMMO DIGITAL DIAGNOSTIC RIGHT WITH TOMOSYNTHESIS_CAD: ICD-10-PCS | Mod: 26,,, | Performed by: RADIOLOGY

## 2020-01-08 PROCEDURE — 71046 XR CHEST PA AND LATERAL: ICD-10-PCS | Mod: 26,,, | Performed by: RADIOLOGY

## 2020-01-08 PROCEDURE — 77065 MAMMO DIGITAL DIAGNOSTIC RIGHT WITH TOMOSYNTHESIS_CAD: ICD-10-PCS | Mod: 26,,, | Performed by: RADIOLOGY

## 2020-01-08 PROCEDURE — 77061 BREAST TOMOSYNTHESIS UNI: CPT | Mod: TC,PO

## 2020-01-08 PROCEDURE — 71046 X-RAY EXAM CHEST 2 VIEWS: CPT | Mod: 26,,, | Performed by: RADIOLOGY

## 2020-01-08 PROCEDURE — 71046 X-RAY EXAM CHEST 2 VIEWS: CPT | Mod: TC,FY,PO

## 2020-01-08 PROCEDURE — 77065 DX MAMMO INCL CAD UNI: CPT | Mod: 26,,, | Performed by: RADIOLOGY

## 2020-01-08 PROCEDURE — 77061 BREAST TOMOSYNTHESIS UNI: CPT | Mod: 26,,, | Performed by: RADIOLOGY

## 2020-01-08 PROCEDURE — 77065 DX MAMMO INCL CAD UNI: CPT | Mod: TC,PO

## 2020-01-17 ENCOUNTER — OFFICE VISIT (OUTPATIENT)
Dept: HEMATOLOGY/ONCOLOGY | Facility: CLINIC | Age: 75
End: 2020-01-17
Payer: MEDICARE

## 2020-01-17 VITALS
SYSTOLIC BLOOD PRESSURE: 148 MMHG | RESPIRATION RATE: 16 BRPM | WEIGHT: 180.75 LBS | BODY MASS INDEX: 30.86 KG/M2 | HEIGHT: 64 IN | HEART RATE: 68 BPM | OXYGEN SATURATION: 98 % | DIASTOLIC BLOOD PRESSURE: 82 MMHG | TEMPERATURE: 98 F

## 2020-01-17 DIAGNOSIS — M89.9 DISORDER OF BONE AND CARTILAGE: ICD-10-CM

## 2020-01-17 DIAGNOSIS — M85.80 OSTEOPENIA, UNSPECIFIED LOCATION: ICD-10-CM

## 2020-01-17 DIAGNOSIS — M94.9 DISORDER OF BONE AND CARTILAGE: ICD-10-CM

## 2020-01-17 DIAGNOSIS — Z85.3 HISTORY OF BREAST CANCER: Primary | ICD-10-CM

## 2020-01-17 DIAGNOSIS — M85.88 OTHER SPECIFIED DISORDERS OF BONE DENSITY AND STRUCTURE, OTHER SITE: ICD-10-CM

## 2020-01-17 DIAGNOSIS — E55.9 VITAMIN D DEFICIENCY: ICD-10-CM

## 2020-01-17 DIAGNOSIS — Z90.12 H/O LEFT MASTECTOMY: ICD-10-CM

## 2020-01-17 PROCEDURE — 3079F PR MOST RECENT DIASTOLIC BLOOD PRESSURE 80-89 MM HG: ICD-10-PCS | Mod: CPTII,S$GLB,, | Performed by: NURSE PRACTITIONER

## 2020-01-17 PROCEDURE — 1101F PR PT FALLS ASSESS DOC 0-1 FALLS W/OUT INJ PAST YR: ICD-10-PCS | Mod: CPTII,S$GLB,, | Performed by: NURSE PRACTITIONER

## 2020-01-17 PROCEDURE — 1159F PR MEDICATION LIST DOCUMENTED IN MEDICAL RECORD: ICD-10-PCS | Mod: S$GLB,,, | Performed by: NURSE PRACTITIONER

## 2020-01-17 PROCEDURE — 99214 PR OFFICE/OUTPT VISIT, EST, LEVL IV, 30-39 MIN: ICD-10-PCS | Mod: S$GLB,,, | Performed by: NURSE PRACTITIONER

## 2020-01-17 PROCEDURE — 99999 PR PBB SHADOW E&M-EST. PATIENT-LVL IV: CPT | Mod: PBBFAC,,, | Performed by: NURSE PRACTITIONER

## 2020-01-17 PROCEDURE — 1126F PR PAIN SEVERITY QUANTIFIED, NO PAIN PRESENT: ICD-10-PCS | Mod: S$GLB,,, | Performed by: NURSE PRACTITIONER

## 2020-01-17 PROCEDURE — 99214 OFFICE O/P EST MOD 30 MIN: CPT | Mod: S$GLB,,, | Performed by: NURSE PRACTITIONER

## 2020-01-17 PROCEDURE — 3077F PR MOST RECENT SYSTOLIC BLOOD PRESSURE >= 140 MM HG: ICD-10-PCS | Mod: CPTII,S$GLB,, | Performed by: NURSE PRACTITIONER

## 2020-01-17 PROCEDURE — 1159F MED LIST DOCD IN RCRD: CPT | Mod: S$GLB,,, | Performed by: NURSE PRACTITIONER

## 2020-01-17 PROCEDURE — 1126F AMNT PAIN NOTED NONE PRSNT: CPT | Mod: S$GLB,,, | Performed by: NURSE PRACTITIONER

## 2020-01-17 PROCEDURE — 99999 PR PBB SHADOW E&M-EST. PATIENT-LVL IV: ICD-10-PCS | Mod: PBBFAC,,, | Performed by: NURSE PRACTITIONER

## 2020-01-17 PROCEDURE — 3079F DIAST BP 80-89 MM HG: CPT | Mod: CPTII,S$GLB,, | Performed by: NURSE PRACTITIONER

## 2020-01-17 PROCEDURE — 1101F PT FALLS ASSESS-DOCD LE1/YR: CPT | Mod: CPTII,S$GLB,, | Performed by: NURSE PRACTITIONER

## 2020-01-17 PROCEDURE — 3077F SYST BP >= 140 MM HG: CPT | Mod: CPTII,S$GLB,, | Performed by: NURSE PRACTITIONER

## 2020-01-17 RX ORDER — ERGOCALCIFEROL 1.25 MG/1
50000 CAPSULE ORAL
Qty: 12 CAPSULE | Refills: 0 | Status: SHIPPED | OUTPATIENT
Start: 2020-01-17 | End: 2020-04-04

## 2020-01-17 NOTE — PROGRESS NOTES
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  She was diagnosed in 1995 and treated with lumpectomy followed by radiation.  In 1998, she was diagnosed with Stage I infiltrating left breast carcinoma, which was high-grade, ER positive and MN negative.  She then underwent a left mastectomy with immediate reconstruction, 4 cycles of A/C as well as 60 months of adjuvant Tamoxifen/Arimidex.  She also carries a diagnosis of osteoporosis that is now osteopenic.  Dr. Palumbo is managing this with calcium supplementation as well as weightbearing exercises.  To note: The patient had one injection of Prolia, but declined further injections.      She presents to the clinic today for her annual breast evaluation (approx 21 yrs).  She reports increased fatigue of late.  She denies any new breast complaints, bone pain, fevers, chills, drenching night sweats, lymphadenopathy, irregular heartbeat, chest pain, abdominal discomfort, nausea, vomiting, constipation, diarrhea, postmenopausal bleeding, difficulties with hot flashes, unexplained weight loss, etc.  To note:  Patient requested Digital Diagnostic Mammograms each year.  No other new complaints or pertinent findings on a 14-point review of systems.    PHYSICAL EXAMINATION:  GENERAL:  Well-developed, well-nourished white female in no acute distress.  Alert and oriented x3.  VITAL SIGNS:  Weight:  Gain of 10 pounds in 1 year   Wt Readings from Last 3 Encounters:   01/17/20 82 kg (180 lb 12.4 oz)   07/08/19 77.9 kg (171 lb 11.8 oz)   03/20/19 78.3 kg (172 lb 9.9 oz)     Temp Readings from Last 3 Encounters:   01/17/20 98.4 °F (36.9 °C)   07/08/19 98.5 °F (36.9 °C) (Oral)   03/20/19 98.4 °F (36.9 °C)     BP Readings from Last 3 Encounters:   01/17/20 (!) 148/82   07/08/19 (!) 138/90   03/20/19 136/82     Pulse Readings from Last 3 Encounters:   01/17/20 68   07/08/19 69   03/20/19 61     HEENT:  Normocephalic,  atraumatic.  Oral mucosa pink and moist.  Lips without lesions.  Tongue midline.  Oropharynx clear.  Nonicteric sclerae.  NECK:  Supple, no adenopathy.  No carotid bruits, thyromegaly or thyroid nodule.  HEART:  Regular rate and rhythm without murmur, gallop or rub.  LUNGS:  Clear to auscultation bilaterally.  Normal respiratory effort.  ABDOMEN:  Soft, nontender, nondistended with positive normoactive bowel sounds, no hepatosplenomegaly.  EXTREMITIES:  No cyanosis, clubbing or edema.  Distal pulses are intact.  AXILLAE AND GROIN:  No palpable pathologic lymphadenopathy is appreciated.  SKIN:  Intact/turgor normal.  NEUROLOGIC:  Cranial nerves II-XII grossly intact.  Motor:  Good muscle bulk and tone.  Strength/sensory 5/5 throughout.  Gait stable.  BREASTS:  Right breast remains soft; free of masses, tenderness, nipple discharge or inversion.  Left breast with noted reconstruction with no signs of local reoccurrence.    LABORATORY:    Lab Results   Component Value Date    WBC 7.15 01/08/2020    RBC 4.17 01/08/2020    HGB 12.5 01/08/2020    HCT 37.7 01/08/2020    MCV 90 01/08/2020    MCH 30.0 01/08/2020    MCHC 33.2 01/08/2020    RDW 13.4 01/08/2020     01/08/2020    MPV 10.4 01/08/2020    GRAN 5.2 01/08/2020    GRAN 73.0 01/08/2020    LYMPH 1.0 01/08/2020    LYMPH 13.7 (L) 01/08/2020    MONO 0.7 01/08/2020    MONO 10.3 01/08/2020    EOS 0.2 01/08/2020    BASO 0.06 01/08/2020    EOSINOPHIL 2.2 01/08/2020    BASOPHIL 0.8 01/08/2020     CMP  Sodium   Date Value Ref Range Status   01/08/2020 140 136 - 145 mmol/L Final     Potassium   Date Value Ref Range Status   01/08/2020 4.8 3.5 - 5.1 mmol/L Final     Chloride   Date Value Ref Range Status   01/08/2020 107 95 - 110 mmol/L Final     CO2   Date Value Ref Range Status   01/08/2020 25 23 - 29 mmol/L Final     Glucose   Date Value Ref Range Status   01/08/2020 109 70 - 110 mg/dL Final     BUN, Bld   Date Value Ref Range Status   01/08/2020 16 8 - 23 mg/dL Final  "    Creatinine   Date Value Ref Range Status   01/08/2020 0.9 0.5 - 1.4 mg/dL Final     Calcium   Date Value Ref Range Status   01/08/2020 9.7 8.7 - 10.5 mg/dL Final     Total Protein   Date Value Ref Range Status   01/08/2020 6.7 6.0 - 8.4 g/dL Final     Albumin   Date Value Ref Range Status   01/08/2020 3.7 3.5 - 5.2 g/dL Final     Total Bilirubin   Date Value Ref Range Status   01/08/2020 0.5 0.1 - 1.0 mg/dL Final     Comment:     For infants and newborns, interpretation of results should be based  on gestational age, weight and in agreement with clinical  observations.  Premature Infant recommended reference ranges:  Up to 24 hours.............<8.0 mg/dL  Up to 48 hours............<12.0 mg/dL  3-5 days..................<15.0 mg/dL  6-29 days.................<15.0 mg/dL       Alkaline Phosphatase   Date Value Ref Range Status   01/08/2020 125 55 - 135 U/L Final     AST   Date Value Ref Range Status   01/08/2020 15 10 - 40 U/L Final     ALT   Date Value Ref Range Status   01/08/2020 15 10 - 44 U/L Final     Anion Gap   Date Value Ref Range Status   01/08/2020 8 8 - 16 mmol/L Final     eGFR if    Date Value Ref Range Status   01/08/2020 >60 >60 mL/min/1.73 m^2 Final     eGFR if non    Date Value Ref Range Status   01/08/2020 >60 >60 mL/min/1.73 m^2 Final     Comment:     Calculation used to obtain the estimated glomerular filtration  rate (eGFR) is the CKD-EPI equation.            Vitamin D:  "7"    RADIOLOGY:  Chest x-ray dated 01/08/2020,   Impression No evidence of acute cardiopulmonary disease    Mammogram (digital screening) dated 01/08/2020, Impression: No mammographic evidence of malignancy.   BI-RADS Category 1: Negative   Recommendation:  Routine screening mammogram in 1 year is recommended.    BMD 01/14/19:  Osteopenia with decreased density in vertebral bone & increased density in femoral neck.    IMPRESSION:  1.  Stage I infiltrating left breast carcinoma with " recurrent DCIS -- STACY.  2.  Oval cyst in right breast - benign  3.  Osteopenia, followed by Dr. Palumbo.  4.  Vitamin D deficiency - Ergocalciferol 50,000 units weekly x 12; Vitamin D level in 3 1/2 months; phone review  5.  Left mastectomy with reconstruction     PLAN:  1.  In regards to #1, return in one year with interval CBC, CMP, LDH, Vitamin D, CXR, BMD and Digital Diagnostic mammogram, right prior to appointment.  2.  In regards to osteopenia, the patient will follow with Dr. Palumbo; continue calcium & vitamin D supplementation daily along with weight bearing exercises/walking.  3.  Recheck Vitamin D level in 3 1/2 months - phone review    Assessment/plan reviewed and approved by Dr. Corral

## 2020-02-10 ENCOUNTER — OFFICE VISIT (OUTPATIENT)
Dept: HEMATOLOGY/ONCOLOGY | Facility: CLINIC | Age: 75
End: 2020-02-10
Payer: MEDICARE

## 2020-02-10 VITALS
DIASTOLIC BLOOD PRESSURE: 69 MMHG | HEART RATE: 65 BPM | RESPIRATION RATE: 20 BRPM | SYSTOLIC BLOOD PRESSURE: 134 MMHG | HEIGHT: 64 IN | BODY MASS INDEX: 31.81 KG/M2 | TEMPERATURE: 98 F | WEIGHT: 186.31 LBS

## 2020-02-10 DIAGNOSIS — E55.9 VITAMIN D DEFICIENCY: ICD-10-CM

## 2020-02-10 DIAGNOSIS — Z85.3 HISTORY OF BREAST CANCER: ICD-10-CM

## 2020-02-10 DIAGNOSIS — N64.4 PAIN IN THE BREAST: Primary | ICD-10-CM

## 2020-02-10 PROCEDURE — 3078F DIAST BP <80 MM HG: CPT | Mod: CPTII,S$GLB,, | Performed by: NURSE PRACTITIONER

## 2020-02-10 PROCEDURE — 3288F FALL RISK ASSESSMENT DOCD: CPT | Mod: CPTII,S$GLB,, | Performed by: NURSE PRACTITIONER

## 2020-02-10 PROCEDURE — 99214 OFFICE O/P EST MOD 30 MIN: CPT | Mod: S$GLB,,, | Performed by: NURSE PRACTITIONER

## 2020-02-10 PROCEDURE — 1100F PTFALLS ASSESS-DOCD GE2>/YR: CPT | Mod: CPTII,S$GLB,, | Performed by: NURSE PRACTITIONER

## 2020-02-10 PROCEDURE — 99999 PR PBB SHADOW E&M-EST. PATIENT-LVL IV: CPT | Mod: PBBFAC,,, | Performed by: NURSE PRACTITIONER

## 2020-02-10 PROCEDURE — 1125F PR PAIN SEVERITY QUANTIFIED, PAIN PRESENT: ICD-10-PCS | Mod: S$GLB,,, | Performed by: NURSE PRACTITIONER

## 2020-02-10 PROCEDURE — 3078F PR MOST RECENT DIASTOLIC BLOOD PRESSURE < 80 MM HG: ICD-10-PCS | Mod: CPTII,S$GLB,, | Performed by: NURSE PRACTITIONER

## 2020-02-10 PROCEDURE — 99999 PR PBB SHADOW E&M-EST. PATIENT-LVL IV: ICD-10-PCS | Mod: PBBFAC,,, | Performed by: NURSE PRACTITIONER

## 2020-02-10 PROCEDURE — 99214 PR OFFICE/OUTPT VISIT, EST, LEVL IV, 30-39 MIN: ICD-10-PCS | Mod: S$GLB,,, | Performed by: NURSE PRACTITIONER

## 2020-02-10 PROCEDURE — 1159F PR MEDICATION LIST DOCUMENTED IN MEDICAL RECORD: ICD-10-PCS | Mod: S$GLB,,, | Performed by: NURSE PRACTITIONER

## 2020-02-10 PROCEDURE — 1100F PR PT FALLS ASSESS DOC 2+ FALLS/FALL W/INJURY/YR: ICD-10-PCS | Mod: CPTII,S$GLB,, | Performed by: NURSE PRACTITIONER

## 2020-02-10 PROCEDURE — 3075F SYST BP GE 130 - 139MM HG: CPT | Mod: CPTII,S$GLB,, | Performed by: NURSE PRACTITIONER

## 2020-02-10 PROCEDURE — 1125F AMNT PAIN NOTED PAIN PRSNT: CPT | Mod: S$GLB,,, | Performed by: NURSE PRACTITIONER

## 2020-02-10 PROCEDURE — 3288F PR FALLS RISK ASSESSMENT DOCUMENTED: ICD-10-PCS | Mod: CPTII,S$GLB,, | Performed by: NURSE PRACTITIONER

## 2020-02-10 PROCEDURE — 3075F PR MOST RECENT SYSTOLIC BLOOD PRESS GE 130-139MM HG: ICD-10-PCS | Mod: CPTII,S$GLB,, | Performed by: NURSE PRACTITIONER

## 2020-02-10 PROCEDURE — 1159F MED LIST DOCD IN RCRD: CPT | Mod: S$GLB,,, | Performed by: NURSE PRACTITIONER

## 2020-02-10 NOTE — PROGRESS NOTES
"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  She was diagnosed in 1995 and treated with lumpectomy followed by radiation.  In 1998, she was diagnosed with Stage I infiltrating left breast carcinoma, which was high-grade, ER positive and CA negative.  She then underwent a left mastectomy with immediate reconstruction, 4 cycles of A/C as well as 60 months of adjuvant Tamoxifen/Arimidex.  She also carries a diagnosis of osteoporosis that is now osteopenic.  Dr. Palumbo is managing this with calcium supplementation as well as weightbearing exercises.  To note: The patient had one injection of Prolia, but declined further injections.      She presents to the clinic today with complaints of right breast pain that started 4 days ago.  She describes the pain as "sharp" at first and now is dull ache.  She reports that she fell approximately a week and 1/2 earlier.  She has some swelling under her right axilla/chest area.  She reports that she has been compliant with Vitamin D supplementation.  She denies any fevers, chills, drenching night sweats, lymphadenopathy, irregular heartbeat, chest pain, bleeding, etc.  To note:  Patient requested Digital Diagnostic Mammograms each year.  No other new complaints or pertinent findings on a 10-point review of systems.    PHYSICAL EXAMINATION:  GENERAL:  Well-developed, well-nourished white female in no acute distress.  Alert and oriented x3.  VITAL SIGNS:    Wt Readings from Last 3 Encounters:   02/10/20 84.5 kg (186 lb 4.6 oz)   01/17/20 82 kg (180 lb 12.4 oz)   07/08/19 77.9 kg (171 lb 11.8 oz)     Temp Readings from Last 3 Encounters:   02/10/20 98.2 °F (36.8 °C)   01/17/20 98.4 °F (36.9 °C)   07/08/19 98.5 °F (36.9 °C) (Oral)     BP Readings from Last 3 Encounters:   02/10/20 134/69   01/17/20 (!) 148/82   07/08/19 (!) 138/90     Pulse Readings from Last 3 Encounters:   02/10/20 65   01/17/20 68   07/08/19 69 "     HEENT:  Normocephalic, atraumatic.  Oral mucosa pink and moist.  Lips without lesions.  Tongue midline.  Oropharynx clear.  Nonicteric sclerae.  NECK:  Supple, no adenopathy.  No carotid bruits, thyromegaly or thyroid nodule.  HEART:  Regular rate and rhythm without murmur, gallop or rub.  LUNGS:  Clear to auscultation bilaterally.  Normal respiratory effort.  ABDOMEN:  Soft, nontender, nondistended with positive normoactive bowel sounds, no hepatosplenomegaly.  EXTREMITIES:  No cyanosis, clubbing or edema.  Distal pulses are intact.  AXILLAE AND GROIN:  No palpable pathologic lymphadenopathy is appreciated.  SKIN:  Intact/turgor normal.  NEUROLOGIC:  Cranial nerves II-XII grossly intact.  Motor:  Good muscle bulk and tone.  Strength/sensory 5/5 throughout.  Gait stable.  BREASTS:  Right breast remains soft; free of masses, tender to Upper outer quadrant, no nipple discharge or inversion.  Left breast with noted reconstruction with no signs of local reoccurrence.    RADIOLOGY:  Chest x-ray dated 01/08/2020,   Impression No evidence of acute cardiopulmonary disease    Mammogram (digital screening) dated 01/08/2020, Impression: No mammographic evidence of malignancy.   BI-RADS Category 1: Negative   Recommendation:  Routine screening mammogram in 1 year is recommended.    BMD 01/14/19:  Osteopenia with decreased density in vertebral bone & increased density in femoral neck.    IMPRESSION:  1.  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  2.  Oval cyst in right breast - benign  3.  Osteopenia, followed by Dr. Palumbo.  4.  Vitamin D deficiency - Ergocalciferol 50,000 units weekly x 12; Vitamin D level in 3 1/2 months; phone review  5.  Left mastectomy with reconstruction  3.  Right breast pain - evaluate with US asap; phone review.     PLAN:  Schedule US of right breast asap - phone review results.    Assessment/plan reviewed and approved by Dr. Corral

## 2020-02-12 ENCOUNTER — HOSPITAL ENCOUNTER (OUTPATIENT)
Dept: RADIOLOGY | Facility: HOSPITAL | Age: 75
Discharge: HOME | End: 2020-02-12
Attending: NURSE PRACTITIONER
Payer: MEDICARE

## 2020-02-12 ENCOUNTER — TELEPHONE (OUTPATIENT)
Dept: HEMATOLOGY/ONCOLOGY | Facility: CLINIC | Age: 75
End: 2020-02-12

## 2020-02-12 DIAGNOSIS — N64.4 PAIN IN THE BREAST: ICD-10-CM

## 2020-02-12 DIAGNOSIS — Z85.3 HISTORY OF BREAST CANCER: ICD-10-CM

## 2020-02-12 PROCEDURE — 76642 US BREAST RIGHT LIMITED: ICD-10-PCS | Mod: 26,RT,, | Performed by: RADIOLOGY

## 2020-02-12 PROCEDURE — 76642 ULTRASOUND BREAST LIMITED: CPT | Mod: 26,RT,, | Performed by: RADIOLOGY

## 2020-02-12 PROCEDURE — 76642 ULTRASOUND BREAST LIMITED: CPT | Mod: TC,PO,RT

## 2020-03-02 DIAGNOSIS — Z91.09 ENVIRONMENTAL ALLERGIES: ICD-10-CM

## 2020-03-04 RX ORDER — MONTELUKAST SODIUM 10 MG/1
TABLET ORAL
Qty: 90 TABLET | Refills: 0 | Status: SHIPPED | OUTPATIENT
Start: 2020-03-04 | End: 2020-10-27

## 2020-03-04 NOTE — PROGRESS NOTES
Refill Authorization Note     is requesting a refill authorization.    Brief assessment and rationale for refill: DEFER:not active on med list           Medication Therapy Plan: singulair not active on med list; please advise; defer     Medication reconciliation completed: No                         Comments:   Requested Prescriptions   Pending Prescriptions Disp Refills    montelukast (SINGULAIR) 10 mg tablet [Pharmacy Med Name: MONTELUKAST 10MG TABLETS] 90 tablet 0     Sig: TAKE 1 TABLET(10 MG) BY MOUTH EVERY EVENING       Pulmonology:  Leukotriene Inhibitors Passed - 3/3/2020  2:05 PM        Passed - Patient is at least 18 years old        Passed - Office visit in past 12 months or future 90 days.     Recent Outpatient Visits            3 weeks ago Pain in the breast    [facility] Valentino Martínez NP    1 month ago History of breast cancer    [facility] Valentino Martínez NP    8 months ago Sinus pressure    [facility] Cece Wheeler NP    11 months ago Allergic rhinitis, unspecified seasonality, unspecified trigger    [facility] Cece Wheeler NP    1 year ago History of breast cancer    [facility] Valentino Martínez NP          Future Appointments              In 1 month LAB, [place] [facility] [place]    In 10 months [facility] MAMMO1 [facility] [place]    In 10 months [facility] XR2 [facility]    In 10 months LAB, [place] [facility] [place]    In 10 months [facility] DEXA1 [facility] [place]    In 10 months Valentino Martínez NP [facility] [place]                Passed - An appropriate indication is on the problem list     Allergic Rhinitis  Sinusitis  Seasonal Allergies   Asthma               Appointments  past 12m or future 3m with PCP     Date Provider   Last Visit   1/15/2019 Tommy Palumbo MD   Next Visit   Visit date not found Tommy Palumbo MD   .  ED visits in past 90 days: 0       Note composed:11:18 AM 03/04/2020

## 2020-03-12 ENCOUNTER — PATIENT OUTREACH (OUTPATIENT)
Dept: ADMINISTRATIVE | Facility: HOSPITAL | Age: 75
End: 2020-03-12

## 2020-03-12 NOTE — PROGRESS NOTES
Chart review completed 03/12/2020.  Care Everywhere updates requested and reviewed.  Immunizations reconciled. Media reviewed.       Health Maintenance Due   Topic Date Due    Shingles Vaccine (2 of 3) 06/02/2014

## 2020-04-14 ENCOUNTER — OFFICE VISIT (OUTPATIENT)
Dept: FAMILY MEDICINE | Facility: CLINIC | Age: 75
End: 2020-04-14
Payer: MEDICARE

## 2020-04-14 ENCOUNTER — TELEPHONE (OUTPATIENT)
Dept: FAMILY MEDICINE | Facility: CLINIC | Age: 75
End: 2020-04-14

## 2020-04-14 DIAGNOSIS — I10 ESSENTIAL HYPERTENSION: Primary | ICD-10-CM

## 2020-04-14 PROCEDURE — 99442 PR PHYSICIAN TELEPHONE EVALUATION 11-20 MIN: CPT | Mod: 95,,, | Performed by: PHYSICIAN ASSISTANT

## 2020-04-14 PROCEDURE — 99442 PR PHYSICIAN TELEPHONE EVALUATION 11-20 MIN: ICD-10-PCS | Mod: 95,,, | Performed by: PHYSICIAN ASSISTANT

## 2020-04-14 RX ORDER — AMLODIPINE BESYLATE 5 MG/1
5 TABLET ORAL DAILY
Qty: 90 TABLET | Refills: 1 | Status: SHIPPED | OUTPATIENT
Start: 2020-04-14 | End: 2020-10-27

## 2020-04-14 NOTE — TELEPHONE ENCOUNTER
----- Message from Nancy Bennie sent at 4/14/2020  4:19 PM CDT -----  Contact: Pt  Type: Needs Medical Advice    Who Called:      Best Call Back Number: [phone]  Additional Information: Requesting a call back from Nurse, regarding pt blood pressure 176/100 has been high and she wants to do a VV ,please call back with advice ASAP on cell

## 2020-04-14 NOTE — TELEPHONE ENCOUNTER
Called pt, set up [employer] to set up video visit. Scheduled appt with stefan jeter to talk about bp

## 2020-04-14 NOTE — PATIENT INSTRUCTIONS
Controlling High Blood Pressure  High blood pressure (hypertension) is often called the silent killer. This is because many people who have it dont know it. High blood pressure is defined as 140/90 mm Hg or higher. Know your blood pressure and remember to check it regularly. Doing so can save your life. Here are some things you can do to help control your blood pressure.    Choose heart-healthy foods  · Select low-salt, low-fat foods. Limit sodium intake to 2,400 mg per day or the amount suggested by your healthcare provider.  · Limit canned, dried, cured, packaged, and fast foods. These can contain a lot of salt.  · Eat 8 to 10 servings of fruits and vegetables every day.  · Choose lean meats, fish, or chicken.  · Eat whole-grain pasta, brown rice, and beans.  · Eat 2 to 3 servings of low-fat or fat-free dairy products.  · Ask your doctor about the DASH eating plan. This plan helps reduce blood pressure.  · When you go to a restaurant, ask that your meal be prepared with no added salt.  Maintain a healthy weight  · Ask your healthcare provider how many calories to eat a day. Then stick to that number.  · Ask your healthcare provider what weight range is healthiest for you. If you are overweight, a weight loss of only 3% to 5% of your body weight can help lower blood pressure. Generally, a good weight loss goal is to lose 10% of your body weight in a year.  · Limit snacks and sweets.  · Get regular exercise.  Get up and get active  · Choose activities you enjoy. Find ones you can do with friends or family. This includes bicycling, dancing, walking, and jogging.  · Park farther away from building entrances.  · Use stairs instead of the elevator.  · When you can, walk or bike instead of driving.  · [place] leaves, garden, or do household repairs.  · Be active at a moderate to vigorous level of physical activity for at least 40 minutes for a minimum of 3 to 4 days a week.   Manage stress  · Make time to relax and enjoy  life. Find time to laugh.  · Communicate your concerns with your loved ones and your healthcare provider.  · Visit with family and friends, and keep up with hobbies.  Limit alcohol and quit smoking  · Men should have no more than 2 drinks per day.  · Women should have no more than 1 drink per day.  · Talk with your healthcare provider about quitting smoking. Smoking significantly increases your risk for heart disease and stroke. Ask your healthcare provider about community smoking cessation programs and other options.  Medicines  If lifestyle changes arent enough, your healthcare provider may prescribe high blood pressure medicine. Take all medicines as prescribed. If you have any questions about your medicines, ask your healthcare provider before stopping or changing them.   Date Last Reviewed: 4/27/2016  © 2321-7596 [employer]. [address] [place] [address], [place] [address]. All rights reserved. This information is not intended as a substitute for professional medical care. Always follow your healthcare professional's instructions.

## 2020-04-14 NOTE — PROGRESS NOTES
Subjective:       Patient ID: Yuliana Mattson is a 74 y.o. female.    Chief Complaint: No chief complaint on file.    The patient location is: [place], [place]  The chief complaint leading to consultation is: Hypertension  Visit type: audiovisual  Total time spent with patient: 20 min  Each patient to whom he or she provides medical services by telemedicine is:  (1) informed of the relationship between the physician and patient and the respective role of any other health care provider with respect to management of the patient; and (2) notified that he or she may decline to receive medical services by telemedicine and may withdraw from such care at any time.    Notes:   Hypertension   This is a chronic problem. The current episode started 1 to 4 weeks ago (Her BP was recorded with an home wrist monitor. Her numbers were 134-70/ to 160/100 range). The problem has been gradually worsening since onset. The problem is uncontrolled. Associated symptoms include headaches. Pertinent negatives include no chest pain or shortness of breath. Associated agents: Stress and weight gain.  Risk factors for coronary artery disease include family history, post-menopausal state, stress and sedentary lifestyle. Past treatments include ACE inhibitors. The current treatment provides mild improvement. Compliance problems include exercise, diet and psychosocial issues.      Past Medical History:   Diagnosis Date    Allergic rhinitis     Anticoagulant long-term use     ASPIRIN ONLY - (stops it when she presents a hemorrhagic cystitis)    Bladder cancer     Blood transfusion     Breast cancer     CAD (coronary artery disease), native coronary artery     40% non obstructive    Cholelithiasis     Endometriosis     Headache(784.0)     HEARING LOSS     Hemorrhoids     HLD (hyperlipidemia)     Hypertension     Left wrist fracture 2009    traumatic    Malignant neoplasm of other specified sites of bladder 12/3/2009    Osteoporosis,  postmenopausal     PONV (postoperative nausea and vomiting)     Rash     Rosacea     Vaginal delivery     x 2       Review of Systems   Constitutional: Positive for activity change. Negative for chills, fatigue and fever.   HENT: Negative.    Respiratory: Negative for chest tightness and shortness of breath.    Cardiovascular: Negative for chest pain.   Gastrointestinal: Negative for abdominal pain.   Neurological: Positive for headaches.       Objective:      Physical Exam   Constitutional: No distress.   Skin: She is not diaphoretic.       Assessment:       1. Essential hypertension        Plan:       Essential hypertension  ADD:  amLODIPine (NORVASC) 5 MG tablet; Take 1 tablet (5 mg total) by mouth once daily.  Dispense: 90 tablet; Refill: 1  The patient is asked to make an attempt to improve diet and exercise patterns to aid in medical management of this problem.

## 2020-04-24 ENCOUNTER — LAB VISIT (OUTPATIENT)
Dept: LAB | Facility: HOSPITAL | Age: 75
End: 2020-04-24
Attending: FAMILY MEDICINE
Payer: MEDICARE

## 2020-04-24 DIAGNOSIS — E55.9 VITAMIN D DEFICIENCY: ICD-10-CM

## 2020-04-24 DIAGNOSIS — E55.9 VITAMIN D DEFICIENCY: Primary | ICD-10-CM

## 2020-04-24 LAB — 25(OH)D3+25(OH)D2 SERPL-MCNC: 17 NG/ML (ref 30–96)

## 2020-04-24 PROCEDURE — 36415 COLL VENOUS BLD VENIPUNCTURE: CPT | Mod: PO

## 2020-04-24 PROCEDURE — 82306 VITAMIN D 25 HYDROXY: CPT

## 2020-04-24 RX ORDER — ERGOCALCIFEROL 1.25 MG/1
50000 CAPSULE ORAL
Qty: 12 CAPSULE | Refills: 0 | Status: SHIPPED | OUTPATIENT
Start: 2020-04-24 | End: 2020-07-11

## 2020-05-05 ENCOUNTER — PATIENT MESSAGE (OUTPATIENT)
Dept: ADMINISTRATIVE | Facility: HOSPITAL | Age: 75
End: 2020-05-05

## 2020-05-11 ENCOUNTER — TELEPHONE (OUTPATIENT)
Dept: FAMILY MEDICINE | Facility: CLINIC | Age: 75
End: 2020-05-11

## 2020-05-11 ENCOUNTER — LAB VISIT (OUTPATIENT)
Dept: LAB | Facility: HOSPITAL | Age: 75
End: 2020-05-11
Attending: FAMILY MEDICINE
Payer: MEDICARE

## 2020-05-11 DIAGNOSIS — N39.0 URINARY TRACT INFECTION WITHOUT HEMATURIA, SITE UNSPECIFIED: Primary | ICD-10-CM

## 2020-05-11 DIAGNOSIS — N39.0 URINARY TRACT INFECTION WITHOUT HEMATURIA, SITE UNSPECIFIED: ICD-10-CM

## 2020-05-11 LAB
BACTERIA #/AREA URNS HPF: ABNORMAL /HPF
BILIRUB UR QL STRIP: NEGATIVE
CLARITY UR: ABNORMAL
COLOR UR: ABNORMAL
GLUCOSE UR QL STRIP: NEGATIVE
HGB UR QL STRIP: ABNORMAL
KETONES UR QL STRIP: NEGATIVE
LEUKOCYTE ESTERASE UR QL STRIP: ABNORMAL
MICROSCOPIC COMMENT: ABNORMAL
NITRITE UR QL STRIP: NEGATIVE
PH UR STRIP: 6 [PH] (ref 5–8)
PROT UR QL STRIP: ABNORMAL
RBC #/AREA URNS HPF: 30 /HPF (ref 0–4)
SP GR UR STRIP: 1.02 (ref 1–1.03)
URN SPEC COLLECT METH UR: ABNORMAL
WBC #/AREA URNS HPF: 5 /HPF (ref 0–5)

## 2020-05-11 PROCEDURE — 81000 URINALYSIS NONAUTO W/SCOPE: CPT | Mod: PO

## 2020-05-11 NOTE — TELEPHONE ENCOUNTER
----- Message from Sánchez Crawley sent at 5/11/2020 11:04 AM CDT -----  Contact: Patient  Type: Needs Medical Advice    Who Called:  Patient  Best Call Back Number: [phone]  Additional Information: Patient would like to discuss receiving a urine test due to a UTI. Please call to advise. Thanks!

## 2020-05-11 NOTE — TELEPHONE ENCOUNTER
Spoke with pt, verbalized understandings. Will do urine today and follow up with doctor on may 28th.

## 2020-05-12 ENCOUNTER — TELEPHONE (OUTPATIENT)
Dept: FAMILY MEDICINE | Facility: CLINIC | Age: 75
End: 2020-05-12

## 2020-05-12 ENCOUNTER — OFFICE VISIT (OUTPATIENT)
Dept: FAMILY MEDICINE | Facility: CLINIC | Age: 75
End: 2020-05-12
Payer: MEDICARE

## 2020-05-12 DIAGNOSIS — N30.01 ACUTE CYSTITIS WITH HEMATURIA: Primary | ICD-10-CM

## 2020-05-12 DIAGNOSIS — Z85.51 HISTORY OF BLADDER CANCER: ICD-10-CM

## 2020-05-12 DIAGNOSIS — R31.9 HEMATURIA, UNSPECIFIED TYPE: Primary | ICD-10-CM

## 2020-05-12 PROCEDURE — 3288F PR FALLS RISK ASSESSMENT DOCUMENTED: ICD-10-PCS | Mod: CPTII,95,, | Performed by: FAMILY MEDICINE

## 2020-05-12 PROCEDURE — 1100F PTFALLS ASSESS-DOCD GE2>/YR: CPT | Mod: CPTII,95,, | Performed by: FAMILY MEDICINE

## 2020-05-12 PROCEDURE — 1159F MED LIST DOCD IN RCRD: CPT | Mod: 95,,, | Performed by: FAMILY MEDICINE

## 2020-05-12 PROCEDURE — 1100F PR PT FALLS ASSESS DOC 2+ FALLS/FALL W/INJURY/YR: ICD-10-PCS | Mod: CPTII,95,, | Performed by: FAMILY MEDICINE

## 2020-05-12 PROCEDURE — 99213 PR OFFICE/OUTPT VISIT, EST, LEVL III, 20-29 MIN: ICD-10-PCS | Mod: 95,,, | Performed by: FAMILY MEDICINE

## 2020-05-12 PROCEDURE — 99213 OFFICE O/P EST LOW 20 MIN: CPT | Mod: 95,,, | Performed by: FAMILY MEDICINE

## 2020-05-12 PROCEDURE — 1159F PR MEDICATION LIST DOCUMENTED IN MEDICAL RECORD: ICD-10-PCS | Mod: 95,,, | Performed by: FAMILY MEDICINE

## 2020-05-12 PROCEDURE — 3288F FALL RISK ASSESSMENT DOCD: CPT | Mod: CPTII,95,, | Performed by: FAMILY MEDICINE

## 2020-05-12 RX ORDER — PHENAZOPYRIDINE HYDROCHLORIDE 200 MG/1
200 TABLET, FILM COATED ORAL 3 TIMES DAILY PRN
Qty: 30 TABLET | Refills: 0 | Status: SHIPPED | OUTPATIENT
Start: 2020-05-12 | End: 2020-05-22

## 2020-05-12 RX ORDER — SULFAMETHOXAZOLE AND TRIMETHOPRIM 800; 160 MG/1; MG/1
1 TABLET ORAL 2 TIMES DAILY
Qty: 28 TABLET | Refills: 0 | Status: SHIPPED | OUTPATIENT
Start: 2020-05-12 | End: 2020-05-26

## 2020-05-12 NOTE — TELEPHONE ENCOUNTER
----- Message from Heidi Briceño MA sent at 5/12/2020  9:01 AM CDT -----  Contact: self  Type:  Test Results    Who Called:  Patient   Name of Test (Lab/Mammo/Etc):  Lab ..urine   Date of Test:  05/11  Ordering Provider:  Deepali   Where the test was performed[place]  [facility]   Best Call Back Number: [phone]     Additional Information:  Patient states she will like something called into pharmacy for UTI today please           [id number] [place] [id number] [place] [id number] [address] [facility] [address] [place] [id number]  [address] [address] [address]  Phone: [phone] Fax: [phone]

## 2020-05-12 NOTE — PROGRESS NOTES
The patient location is: home  The chief complaint leading to consultation is: possible UTI  Visit type: Virtual visit with synchronous audio and video  Total time spent with patient: 15 minutes  Each patient to whom he or she provides medical services by telemedicine is:  (1) informed of the relationship between the physician and patient and the respective role of any other health care provider with respect to management of the patient; and (2) notified that he or she may decline to receive medical services by telemedicine and may withdraw from such care at any time.    HPI:    Onset yesterday with urgency and blood in urine.  Some suprapubic pain  Now with frequency every 10-15 minutes  Concerned about possible UTI  She does have h/o bladder cancer    She was treated with Bactrim DS on on 3/28     Following with ophthalmology for possible Sjorgen's syndrome    Past Medical History:   Diagnosis Date    Allergic rhinitis     Anticoagulant long-term use     ASPIRIN ONLY - (stops it when she presents a hemorrhagic cystitis)    Bladder cancer     Blood transfusion     Breast cancer     CAD (coronary artery disease), native coronary artery     40% non obstructive    Cholelithiasis     Endometriosis     Headache(784.0)     HEARING LOSS     Hemorrhoids     HLD (hyperlipidemia)     Hypertension     Left wrist fracture 2009    traumatic    Malignant neoplasm of other specified sites of bladder 12/3/2009    Osteoporosis, postmenopausal     PONV (postoperative nausea and vomiting)     Rash     Rosacea     Vaginal delivery     x 2       Past Surgical History:   Procedure Laterality Date    APPENDECTOMY      BLADDER TUMOR EXCISION  1979    [facility], dr garcia - no recurrences since    BREAST BIOPSY Right     4 core bx negative    BREAST SURGERY Left 1998    HYSTERECTOMY      MASTECTOMY, PARTIAL  1995    left side - cancer - had radiotherapy 1995, 1998 had chemotherapy    oophrect       pelvic mass      benign    TONSILLECTOMY      TONSILLECTOMY, ADENOIDECTOMY, BILATERAL MYRINGOTOMY AND TUBES      tram flap Left 1998       Review of patient's allergies indicates:   Allergen Reactions    Prochlorperazine edisylate Anaphylaxis     Other reaction(s): Unknown       Social History     Socioeconomic History    Marital status:      Spouse name: Not on file    Number of children: Not on file    Years of education: Not on file    Highest education level: Not on file   Occupational History    Occupation: retired accounting   Social Needs    Financial resource strain: Not hard at all    Food insecurity:     Worry: Never true     Inability: Never true    Transportation needs:     Medical: No     Non-medical: No   Tobacco Use    Smoking status: Never Smoker    Smokeless tobacco: Never Used   Substance and Sexual Activity    Alcohol use: No     Frequency: 2-4 times a month     Drinks per session: 1 or 2     Binge frequency: Never    Drug use: No    Sexual activity: Not on file   Lifestyle    Physical activity:     Days per week: 0 days     Minutes per session: Not on file    Stress: To some extent   Relationships    Social connections:     Talks on phone: Patient refused     Gets together: Patient refused     Attends Islam service: Not on file     Active member of club or organization: Patient refused     Attends meetings of clubs or organizations: Patient refused     Relationship status:    Other Topics Concern    Not on file   Social History Narrative    Not on file       Current Outpatient Medications on File Prior to Visit   Medication Sig Dispense Refill    ALPHAGAN P 0.1 % Drop INTILL 1 DROP INTO BOTH EYES TWICE DAILY  4    amLODIPine (NORVASC) 5 MG tablet Take 1 tablet (5 mg total) by mouth once daily. 90 tablet 1    ergocalciferol (ERGOCALCIFEROL) 50,000 unit Cap Take 1 capsule (50,000 Units total) by mouth every 7 days. for 12 doses 12 capsule 0     levocetirizine (XYZAL) 5 MG tablet Take 5 mg by mouth every evening.      lisinopril (PRINIVIL,ZESTRIL) 20 MG tablet Take 1 tablet (20 mg total) by mouth once daily. 90 tablet 3    montelukast (SINGULAIR) 10 mg tablet TAKE 1 TABLET(10 MG) BY MOUTH EVERY EVENING 90 tablet 0    sulfacetamide sodium-sulfur 10-5 % (w/w) Clsr Wash face qd - bid 360 g 3    tretinoin (RETIN-A) 0.025 % cream Apply a pea-sized amount to entire face at bedtime, start every other night and increase as tolerated. Take night off if irritation develops. 45 g 3     No current facility-administered medications on file prior to visit.        Family History   Problem Relation Age of Onset    Glaucoma Father     Glaucoma Paternal Aunt     Glaucoma Paternal Grandmother     Cancer Cousin         1st, ovarian    Amblyopia Neg Hx     Blindness Neg Hx     Cataracts Neg Hx     Hypertension Neg Hx     Macular degeneration Neg Hx     Retinal detachment Neg Hx     Strabismus Neg Hx     Stroke Neg Hx     Thyroid disease Neg Hx         Review of Systems   Constitutional: Negative for chills and weight loss.   Gastrointestinal: Positive for abdominal pain. Negative for constipation, nausea and vomiting.   Genitourinary: Positive for dysuria, frequency, hematuria and urgency. Negative for flank pain.   Skin: Negative for rash.       Physical Exam   Constitutional: She is oriented to person, place, and time. She appears well-developed and well-nourished.   Eyes: Pupils are equal, round, and reactive to light. EOM are normal.   Neck: Normal range of motion. Neck supple.   Pulmonary/Chest: Effort normal.   Neurological: She is alert and oriented to person, place, and time.   Psychiatric: She has a normal mood and affect. Her behavior is normal. Judgment and thought content normal.        Acute cystitis with hematuria  -     sulfamethoxazole-trimethoprim 800-160mg (BACTRIM DS) 800-160 mg Tab; Take 1 tablet by mouth 2 (two) times daily. for 14 days   Dispense: 28 tablet; Refill: 0  -     phenazopyridine (PYRIDIUM) 200 MG tablet; Take 1 tablet (200 mg total) by mouth 3 (three) times daily as needed for Pain.  Dispense: 30 tablet; Refill: 0     increase fluids  F/u with urology if hematuria persists    Answers for HPI/ROS submitted by the patient on 5/12/2020   Dysuria  Chronicity: recurrent  Onset: yesterday  Frequency: every urination  Progression since onset: gradually worsening  Pain quality: aching, burning  Pain - numeric: 7/10  Fever: no fever  Sexually active?: No  History of pyelonephritis?: No  discharge: No  hesitancy: No  possible pregnancy: No  sweats: No  withholding: No  behavior changes: No  Treatments tried: home medications, increased fluids, sitz baths  Improvement on treatment: moderate  Pain severity: moderate  catheterization: Yes  diabetes insipidus: No  diabetes mellitus: No  hypertension: Yes  recurrent UTIs: Yes  single kidney: No  STD: No  urological procedure: Yes  kidney stones: No

## 2020-05-12 NOTE — TELEPHONE ENCOUNTER
Spoke to pt to give provider's recommendations. Pt declined appt to see urologist, insisted on a visit with provider to get antibiotic for UTI. Offered a vv this afternoon. Pt accepted.

## 2020-05-12 NOTE — TELEPHONE ENCOUNTER
Urine showed a lot of blood in the urine, but no signs of any infection.  I would recommend she follow-up with a urologist regarding this since she does have a history of bladder cancer. Referral entered.

## 2020-05-14 ENCOUNTER — LAB VISIT (OUTPATIENT)
Dept: LAB | Facility: HOSPITAL | Age: 75
End: 2020-05-14
Payer: MEDICARE

## 2020-05-14 DIAGNOSIS — M35.01 SJOGREN'S SYNDROME WITH KERATOCONJUNCTIVITIS SICCA: ICD-10-CM

## 2020-05-14 DIAGNOSIS — H44.133 SYMPATHETIC UVEITIS OF BOTH EYES: ICD-10-CM

## 2020-05-14 DIAGNOSIS — H20.00 ACUTE IRIDOCYCLITIS: Primary | ICD-10-CM

## 2020-05-14 DIAGNOSIS — M35.00 SJOGREN'S SYNDROME: ICD-10-CM

## 2020-05-14 LAB
ALBUMIN SERPL BCP-MCNC: 3.8 G/DL (ref 3.5–5.2)
ALP SERPL-CCNC: 140 U/L (ref 55–135)
ALT SERPL W/O P-5'-P-CCNC: 17 U/L (ref 10–44)
APTT BLDCRRT: 29.6 SEC (ref 21–32)
AST SERPL-CCNC: 19 U/L (ref 10–40)
BASOPHILS # BLD AUTO: 0.07 K/UL (ref 0–0.2)
BASOPHILS NFR BLD: 0.9 % (ref 0–1.9)
BILIRUB DIRECT SERPL-MCNC: 0.2 MG/DL (ref 0.1–0.3)
BILIRUB SERPL-MCNC: 0.4 MG/DL (ref 0.1–1)
CHOLEST SERPL-MCNC: 197 MG/DL (ref 120–199)
CHOLEST/HDLC SERPL: 2.9 {RATIO} (ref 2–5)
CRP SERPL-MCNC: 33.2 MG/L (ref 0–8.2)
DIFFERENTIAL METHOD: ABNORMAL
EOSINOPHIL # BLD AUTO: 0.1 K/UL (ref 0–0.5)
EOSINOPHIL NFR BLD: 1.1 % (ref 0–8)
ERYTHROCYTE [DISTWIDTH] IN BLOOD BY AUTOMATED COUNT: 13.9 % (ref 11.5–14.5)
ERYTHROCYTE [SEDIMENTATION RATE] IN BLOOD BY WESTERGREN METHOD: 18 MM/HR (ref 0–20)
ESTIMATED AVG GLUCOSE: 126 MG/DL (ref 68–131)
GLUCOSE SERPL-MCNC: 121 MG/DL (ref 70–110)
HBA1C MFR BLD HPLC: 6 % (ref 4–5.6)
HCT VFR BLD AUTO: 41.9 % (ref 37–48.5)
HDLC SERPL-MCNC: 67 MG/DL (ref 40–75)
HDLC SERPL: 34 % (ref 20–50)
HGB BLD-MCNC: 13.1 G/DL (ref 12–16)
IMM GRANULOCYTES # BLD AUTO: 0.05 K/UL (ref 0–0.04)
IMM GRANULOCYTES NFR BLD AUTO: 0.6 % (ref 0–0.5)
INR PPP: 0.9 (ref 0.8–1.2)
LDLC SERPL CALC-MCNC: 112.2 MG/DL (ref 63–159)
LYMPHOCYTES # BLD AUTO: 1.1 K/UL (ref 1–4.8)
LYMPHOCYTES NFR BLD: 14 % (ref 18–48)
MCH RBC QN AUTO: 29.6 PG (ref 27–31)
MCHC RBC AUTO-ENTMCNC: 31.3 G/DL (ref 32–36)
MCV RBC AUTO: 95 FL (ref 82–98)
MONOCYTES # BLD AUTO: 0.7 K/UL (ref 0.3–1)
MONOCYTES NFR BLD: 8.5 % (ref 4–15)
NEUTROPHILS # BLD AUTO: 6.1 K/UL (ref 1.8–7.7)
NEUTROPHILS NFR BLD: 74.9 % (ref 38–73)
NONHDLC SERPL-MCNC: 130 MG/DL
NRBC BLD-RTO: 0 /100 WBC
PLATELET # BLD AUTO: 290 K/UL (ref 150–350)
PMV BLD AUTO: 10.3 FL (ref 9.2–12.9)
PROT SERPL-MCNC: 6.8 G/DL (ref 6–8.4)
PROTHROMBIN TIME: 9.9 SEC (ref 9–12.5)
RBC # BLD AUTO: 4.42 M/UL (ref 4–5.4)
RHEUMATOID FACT SERPL-ACNC: <10 IU/ML (ref 0–15)
TRIGL SERPL-MCNC: 89 MG/DL (ref 30–150)
TSH SERPL DL<=0.005 MIU/L-ACNC: 0.64 UIU/ML (ref 0.4–4)
VIT B12 SERPL-MCNC: 367 PG/ML (ref 210–950)
WBC # BLD AUTO: 8.15 K/UL (ref 3.9–12.7)

## 2020-05-14 PROCEDURE — 81374 HLA I TYPING 1 ANTIGEN LR: CPT | Mod: PO

## 2020-05-14 PROCEDURE — 86235 NUCLEAR ANTIGEN ANTIBODY: CPT

## 2020-05-14 PROCEDURE — 85613 RUSSELL VIPER VENOM DILUTED: CPT

## 2020-05-14 PROCEDURE — 80076 HEPATIC FUNCTION PANEL: CPT

## 2020-05-14 PROCEDURE — 84443 ASSAY THYROID STIM HORMONE: CPT

## 2020-05-14 PROCEDURE — 36415 COLL VENOUS BLD VENIPUNCTURE: CPT | Mod: PO

## 2020-05-14 PROCEDURE — 85651 RBC SED RATE NONAUTOMATED: CPT | Mod: PO

## 2020-05-14 PROCEDURE — 86038 ANTINUCLEAR ANTIBODIES: CPT

## 2020-05-14 PROCEDURE — 80061 LIPID PANEL: CPT | Mod: GA

## 2020-05-14 PROCEDURE — 85025 COMPLETE CBC W/AUTO DIFF WBC: CPT

## 2020-05-14 PROCEDURE — 86592 SYPHILIS TEST NON-TREP QUAL: CPT

## 2020-05-14 PROCEDURE — 86618 LYME DISEASE ANTIBODY: CPT

## 2020-05-14 PROCEDURE — 86235 NUCLEAR ANTIGEN ANTIBODY: CPT | Mod: 59

## 2020-05-14 PROCEDURE — 83036 HEMOGLOBIN GLYCOSYLATED A1C: CPT | Mod: GA

## 2020-05-14 PROCEDURE — 85610 PROTHROMBIN TIME: CPT | Mod: GA,PO

## 2020-05-14 PROCEDURE — 86140 C-REACTIVE PROTEIN: CPT

## 2020-05-14 PROCEDURE — 82947 ASSAY GLUCOSE BLOOD QUANT: CPT | Mod: GA

## 2020-05-14 PROCEDURE — 86431 RHEUMATOID FACTOR QUANT: CPT

## 2020-05-14 PROCEDURE — 85730 THROMBOPLASTIN TIME PARTIAL: CPT | Mod: GA,PO

## 2020-05-14 PROCEDURE — 86039 ANTINUCLEAR ANTIBODIES (ANA): CPT

## 2020-05-14 PROCEDURE — 82607 VITAMIN B-12: CPT | Mod: GA

## 2020-05-15 LAB — RPR SER QL: NORMAL

## 2020-05-18 LAB
ANA PATTERN 1: NORMAL
ANA SER QL IF: POSITIVE
ANA TITR SER IF: NORMAL {TITER}
ANTI-SSA ANTIBODY: 0.05 RATIO (ref 0–0.99)
ANTI-SSA INTERPRETATION: NEGATIVE
ANTI-SSB ANTIBODY: 0.06 RATIO (ref 0–0.99)
ANTI-SSB INTERPRETATION: NEGATIVE
B BURGDOR AB SER IA-ACNC: 0.02 INDEX VALUE

## 2020-05-19 LAB
ANTI SM ANTIBODY: 0.09 RATIO (ref 0–0.99)
ANTI SM/RNP ANTIBODY: 0.07 RATIO (ref 0–0.99)
ANTI-SM INTERPRETATION: NEGATIVE
ANTI-SM/RNP INTERPRETATION: NEGATIVE
ANTI-SSA ANTIBODY: 0.05 RATIO (ref 0–0.99)
ANTI-SSA INTERPRETATION: NEGATIVE
ANTI-SSB ANTIBODY: 0.06 RATIO (ref 0–0.99)
ANTI-SSB INTERPRETATION: NEGATIVE
DSDNA AB SER-ACNC: NORMAL [IU]/ML
HLA B27 INTERPRETATION: NORMAL
HLA-B27 RELATED AG QL: NEGATIVE
HLA-B27 RELATED AG QL: NORMAL
LA PPP-IMP: NEGATIVE

## 2020-05-27 ENCOUNTER — TELEPHONE (OUTPATIENT)
Dept: UROLOGY | Facility: CLINIC | Age: 75
End: 2020-05-27

## 2020-05-27 NOTE — TELEPHONE ENCOUNTER
Advised no sooner appts, but if having gross hematuria, could see if Deepali could order urine cytology, those results might be in the system when she comes to see Dr. Sylvester. Pt has hx of bladder cancer and would like to avoid having a cystoscopy again if possible

## 2020-05-27 NOTE — TELEPHONE ENCOUNTER
----- Message from Eric Gregg sent at 5/27/2020  9:10 AM CDT -----  Contact: pt  Type:  Same Day Appointment Request    Caller is requesting a same day appointment.  Caller declined first available appointment listed below.      Name of Caller:  pt  When is the first available appointment?  06/02/2020  Symptoms:  Blood in urine today  Best Call Back Number: [phone]  Additional Information:    Systems of UTI but no bacteria in specimen only blood. Referred by Dr Palumbo

## 2020-05-28 ENCOUNTER — OFFICE VISIT (OUTPATIENT)
Dept: FAMILY MEDICINE | Facility: CLINIC | Age: 75
End: 2020-05-28
Payer: MEDICARE

## 2020-05-28 VITALS
BODY MASS INDEX: 32.73 KG/M2 | TEMPERATURE: 98 F | SYSTOLIC BLOOD PRESSURE: 128 MMHG | DIASTOLIC BLOOD PRESSURE: 80 MMHG | WEIGHT: 190.69 LBS | OXYGEN SATURATION: 96 % | HEART RATE: 78 BPM

## 2020-05-28 DIAGNOSIS — Z00.00 PREVENTATIVE HEALTH CARE: Primary | ICD-10-CM

## 2020-05-28 DIAGNOSIS — I10 ESSENTIAL HYPERTENSION: ICD-10-CM

## 2020-05-28 DIAGNOSIS — R31.9 HEMATURIA, UNSPECIFIED TYPE: ICD-10-CM

## 2020-05-28 DIAGNOSIS — E55.9 VITAMIN D DEFICIENCY: ICD-10-CM

## 2020-05-28 PROCEDURE — 99499 UNLISTED E&M SERVICE: CPT | Mod: S$GLB,,, | Performed by: FAMILY MEDICINE

## 2020-05-28 PROCEDURE — 99213 PR OFFICE/OUTPT VISIT, EST, LEVL III, 20-29 MIN: ICD-10-PCS | Mod: S$GLB,,, | Performed by: FAMILY MEDICINE

## 2020-05-28 PROCEDURE — 3074F PR MOST RECENT SYSTOLIC BLOOD PRESSURE < 130 MM HG: ICD-10-PCS | Mod: CPTII,S$GLB,, | Performed by: FAMILY MEDICINE

## 2020-05-28 PROCEDURE — 99999 PR PBB SHADOW E&M-EST. PATIENT-LVL IV: ICD-10-PCS | Mod: PBBFAC,,, | Performed by: FAMILY MEDICINE

## 2020-05-28 PROCEDURE — 3079F PR MOST RECENT DIASTOLIC BLOOD PRESSURE 80-89 MM HG: ICD-10-PCS | Mod: CPTII,S$GLB,, | Performed by: FAMILY MEDICINE

## 2020-05-28 PROCEDURE — 99499 RISK ADDL DX/OHS AUDIT: ICD-10-PCS | Mod: S$GLB,,, | Performed by: FAMILY MEDICINE

## 2020-05-28 PROCEDURE — 3079F DIAST BP 80-89 MM HG: CPT | Mod: CPTII,S$GLB,, | Performed by: FAMILY MEDICINE

## 2020-05-28 PROCEDURE — 99213 OFFICE O/P EST LOW 20 MIN: CPT | Mod: S$GLB,,, | Performed by: FAMILY MEDICINE

## 2020-05-28 PROCEDURE — 99999 PR PBB SHADOW E&M-EST. PATIENT-LVL IV: CPT | Mod: PBBFAC,,, | Performed by: FAMILY MEDICINE

## 2020-05-28 PROCEDURE — 3074F SYST BP LT 130 MM HG: CPT | Mod: CPTII,S$GLB,, | Performed by: FAMILY MEDICINE

## 2020-05-28 NOTE — PROGRESS NOTES
Chief Complaint   Patient presents with    Annual Exam     HISTORY OF PRESENT ILLNESS:   Here for annual exam and for HTN f/u.    She did complete course of bactrim  still with hematuria  She does have a f/u with urology with likely planned cystoscopy    HTN - tolerating amlodipine and Lisinopril   HLD - following a low-fat diet for the past year  Allergic rhinitis - Tolerating flonase and Singulair as needed during fall season  Osteoporosis - BMD 12/10/12 showed Osteoporosis -- there is a 12% risk of a major osteoporotic fracture in the next 10 years (FRAX). She took Prolia and feels like this caused a number of symptoms (aching in back, generalized itching, dizziness episodes).  Managing calcium intake with diet  Breast cancer - following with Dr. Corral annually in January  Vitamin d deficiency - taking vitamin D    Following with ophthalmologist. 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  She feels like these symptoms have been progressive over the last 9 months.  Getting some pale appearance of fingers at times      Past Medical History:   Diagnosis Date    Allergic rhinitis     Anticoagulant long-term use     ASPIRIN ONLY - (stops it when she presents a hemorrhagic cystitis)    Bladder cancer     Blood transfusion     Breast cancer     CAD (coronary artery disease), native coronary artery     40% non obstructive    Cholelithiasis     Endometriosis     Headache(784.0)     HEARING LOSS     Hemorrhoids     HLD (hyperlipidemia)     Hypertension     Left wrist fracture 2009    traumatic    Malignant neoplasm of other specified sites of bladder 12/3/2009    Osteoporosis, postmenopausal     PONV (postoperative nausea and vomiting)     Rash     Rosacea     Vaginal delivery     x 2       Past Surgical History:   Procedure Laterality Date     APPENDECTOMY      BLADDER TUMOR EXCISION  1979    [facility], dr garcia - no recurrences since    BREAST BIOPSY Right     4 core bx negative    BREAST SURGERY Left 1998    HYSTERECTOMY      MASTECTOMY, PARTIAL  1995    left side - cancer - had radiotherapy 1995, 1998 had chemotherapy    oophrect      pelvic mass      benign    TONSILLECTOMY      TONSILLECTOMY, ADENOIDECTOMY, BILATERAL MYRINGOTOMY AND TUBES      tram flap Left 1998       Review of patient's allergies indicates:   Allergen Reactions    Compazine [prochlorperazine edisylate] Anaphylaxis       Social History     Socioeconomic History    Marital status:      Spouse name: Not on file    Number of children: Not on file    Years of education: Not on file    Highest education level: Not on file   Occupational History    Occupation: retired accounting   Social Needs    Financial resource strain: Not hard at all    Food insecurity:     Worry: Never true     Inability: Never true    Transportation needs:     Medical: No     Non-medical: No   Tobacco Use    Smoking status: Never Smoker    Smokeless tobacco: Never Used   Substance and Sexual Activity    Alcohol use: No     Frequency: Monthly or less     Drinks per session: 1 or 2     Binge frequency: Never    Drug use: No    Sexual activity: Not on file   Lifestyle    Physical activity:     Days per week: 0 days     Minutes per session: Not on file    Stress: To some extent   Relationships    Social connections:     Talks on phone: More than three times a week     Gets together: Patient refused     Attends Judaism service: Not on file     Active member of club or organization: Patient refused     Attends meetings of clubs or organizations: Patient refused     Relationship status:    Other Topics Concern    Not on file   Social History Narrative    Not on file       Current Outpatient Medications on File Prior to Visit   Medication Sig Dispense Refill     ALPHAGAN P 0.1 % Drop INTILL 1 DROP INTO BOTH EYES TWICE DAILY  4    amLODIPine (NORVASC) 5 MG tablet Take 1 tablet (5 mg total) by mouth once daily. 90 tablet 1    ergocalciferol (ERGOCALCIFEROL) 50,000 unit Cap Take 1 capsule (50,000 Units total) by mouth every 7 days. for 12 doses 12 capsule 0    lisinopril (PRINIVIL,ZESTRIL) 20 MG tablet Take 1 tablet (20 mg total) by mouth once daily. 90 tablet 3    sulfacetamide sodium-sulfur 10-5 % (w/w) Clsr Wash face qd - bid 360 g 3    tretinoin (RETIN-A) 0.025 % cream Apply a pea-sized amount to entire face at bedtime, start every other night and increase as tolerated. Take night off if irritation develops. 45 g 3    levocetirizine (XYZAL) 5 MG tablet Take 5 mg by mouth every evening.      montelukast (SINGULAIR) 10 mg tablet TAKE 1 TABLET(10 MG) BY MOUTH EVERY EVENING (Patient not taking: Reported on 5/28/2020) 90 tablet 0     No current facility-administered medications on file prior to visit.        Family History   Problem Relation Age of Onset    Glaucoma Father     Glaucoma Paternal Aunt     Glaucoma Paternal Grandmother     Cancer Cousin         1st, ovarian    Amblyopia Neg Hx     Blindness Neg Hx     Cataracts Neg Hx     Hypertension Neg Hx     Macular degeneration Neg Hx     Retinal detachment Neg Hx     Strabismus Neg Hx     Stroke Neg Hx     Thyroid disease Neg Hx        REVIEW OF SYSTEMS:   GENERAL: No fever, chills, or weight changes.  EARS: Denies ear pain, discharge, tinnitus or vertigo. Denies hearing loss.   MOUTH & THROAT: No hoarseness, change in voice, swallowing difficulty. No excessive gum bleeding.   CARDIOVASCULAR: Denies dyspnea, orthopnea, or palpitations.  GI/ABDOMEN: Appetite fine. No weight loss. Denies nausea, vomiting, constipation, abdominal pain, hematemesis or blood in stool.  URINARY: No dysuria,hematuria, nocturia, incontinence, flank pain, urgency, or urinary difficulty.  PERIPHERAL VASCULAR: No claudication, cold  intolerance or cyanosis.    Answers for HPI/ROS submitted by the patient on 5/26/2020   activity change: Yes  unexpected weight change: Yes  neck pain: Yes  hearing loss: No  rhinorrhea: No  trouble swallowing: No  eye discharge: No  visual disturbance: Yes  chest tightness: No  wheezing: No  chest pain: Yes  palpitations: Yes  blood in stool: No  constipation: No  vomiting: Yes  diarrhea: Yes  polydipsia: No  polyuria: Yes  difficulty urinating: No  hematuria: Yes  menstrual problem: No  dysuria: Yes  joint swelling: No  arthralgias: Yes  headaches: Yes  weakness: Yes  confusion: No  dysphoric mood: Yes      PHYSICAL EXAM:   /80 (BP Location: Left arm, Patient Position: Sitting)   Pulse 78   Temp 98.4 °F (36.9 °C) (Temporal)   Wt 86.5 kg (190 lb 11.2 oz)   SpO2 96%   BMI 32.73 kg/m²   GENERAL: This is a healthy-appearing white female, sitting   upright, in no apparent distress. Alert and oriented x4.   TMs clear  Oropharynx clear  NECK: Supple. There is no lymphadenopathy, thyromegaly or JVD.  CV: S1, S2, RRR; 2/6 SHENA at RSB   CHEST: Clear to auscultation bilaterally with good respiratory movement.  ABD: soft, NT/ND + BS no HSM   EXTREMITIES: Showed no cyanosis, clubbing. Trace edema     Results for orders placed or performed in visit on 05/14/20   CBC W/ AUTO DIFFERENTIAL   Result Value Ref Range    WBC 8.15 3.90 - 12.70 K/uL    RBC 4.42 4.00 - 5.40 M/uL    Hemoglobin 13.1 12.0 - 16.0 g/dL    Hematocrit 41.9 37.0 - 48.5 %    Mean Corpuscular Volume 95 82 - 98 fL    Mean Corpuscular Hemoglobin 29.6 27.0 - 31.0 pg    Mean Corpuscular Hemoglobin Conc 31.3 (L) 32.0 - 36.0 g/dL    RDW 13.9 11.5 - 14.5 %    Platelets 290 150 - 350 K/uL    MPV 10.3 9.2 - 12.9 fL    Immature Granulocytes 0.6 (H) 0.0 - 0.5 %    Gran # (ANC) 6.1 1.8 - 7.7 K/uL    Immature Grans (Abs) 0.05 (H) 0.00 - 0.04 K/uL    Lymph # 1.1 1.0 - 4.8 K/uL    Mono # 0.7 0.3 - 1.0 K/uL    Eos # 0.1 0.0 - 0.5 K/uL    Baso # 0.07 0.00 - 0.20 K/uL     nRBC 0 0 /100 WBC    Gran% 74.9 (H) 38.0 - 73.0 %    Lymph% 14.0 (L) 18.0 - 48.0 %    Mono% 8.5 4.0 - 15.0 %    Eosinophil% 1.1 0.0 - 8.0 %    Basophil% 0.9 0.0 - 1.9 %    Differential Method Automated    LIPID PANEL   Result Value Ref Range    Cholesterol 197 120 - 199 mg/dL    Triglycerides 89 30 - 150 mg/dL    HDL 67 40 - 75 mg/dL    LDL Cholesterol 112.2 63.0 - 159.0 mg/dL    Hdl/Cholesterol Ratio 34.0 20.0 - 50.0 %    Total Cholesterol/HDL Ratio 2.9 2.0 - 5.0    Non-HDL Cholesterol 130 mg/dL   HEPATIC FUNCTION PANEL   Result Value Ref Range    Total Protein 6.8 6.0 - 8.4 g/dL    Albumin 3.8 3.5 - 5.2 g/dL    Total Bilirubin 0.4 0.1 - 1.0 mg/dL    Bilirubin, Direct 0.2 0.1 - 0.3 mg/dL    AST 19 10 - 40 U/L    ALT 17 10 - 44 U/L    Alkaline Phosphatase 140 (H) 55 - 135 U/L   GLUCOSE, FASTING   Result Value Ref Range    Glucose, Fasting 121 (H) 70 - 110 mg/dL   TSH   Result Value Ref Range    TSH 0.638 0.400 - 4.000 uIU/mL   VITAMIN B12   Result Value Ref Range    Vitamin B-12 367 210 - 950 pg/mL   LYME DISEASE ANTIBODY BY EIA   Result Value Ref Range    Lyme Ab 0.02 <0.90 Index Value   RHEUMATOID FACTOR   Result Value Ref Range    Rheumatoid Factor <10.0 0.0 - 15.0 IU/mL   HLA B27 ANTIGEN   Result Value Ref Range    HLA B27 Interpretation TAQ: [id number]  SAPE: 202       B27 Testing Date 05/18/2020 12:59 PM     HLA B27 Result Negative    LUPUS ANTICOAGULANT (DRVVT)   Result Value Ref Range    DRVVT, Lupus Anticoagulant Negative Negative   PROTIME-INR   Result Value Ref Range    Prothrombin Time 9.9 9.0 - 12.5 sec    INR 0.9 0.8 - 1.2   APTT   Result Value Ref Range    aPTT 29.6 21.0 - 32.0 sec   ANTI -SSA ANTIBODY   Result Value Ref Range    Anti-SSA Antibody 0.05 0.00 - 0.99 Ratio    Anti-SSA Interpretation Negative Negative   ANTI-SSB ANTIBODY   Result Value Ref Range    Anti-SSB Antibody 0.06 0.00 - 0.99 Ratio    Anti-SSB Interpretation Negative Negative   RPR   Result Value Ref Range    RPR Non-reactive  Non-reactive   Sedimentation rate   Result Value Ref Range    Sed Rate 18 0 - 20 mm/Hr   HEMOGLOBIN A1C   Result Value Ref Range    Hemoglobin A1C 6.0 (H) 4.0 - 5.6 %    Estimated Avg Glucose 126 68 - 131 mg/dL   C-REACTIVE PROTEIN   Result Value Ref Range    CRP 33.2 (H) 0.0 - 8.2 mg/L   LISS   Result Value Ref Range    LISS Screen Positive (A) Negative <1:80   LISS Profile   Result Value Ref Range    Anti Sm Antibody 0.09 0.00 - 0.99 Ratio    Anti-Sm Interpretation Negative Negative    Anti-SSA Antibody 0.05 0.00 - 0.99 Ratio    Anti-SSA Interpretation Negative Negative    Anti-SSB Antibody 0.06 0.00 - 0.99 Ratio    Anti-SSB Interpretation Negative Negative    ds DNA Ab Negative 1:10 Negative 1:10    Anti Sm/RNP Antibody 0.07 0.00 - 0.99 Ratio    Anti-Sm/RNP Interpretation Negative Negative   LISS Titer 1   Result Value Ref Range    LISS Titer 1 1:640    LISS Pattern 1   Result Value Ref Range    LISS PATTERN 1 Speckled          A/P:  Preventative health care    Hematuria, unspecified type  -     Cytology, Urine; Future; Expected date: 05/28/2020  -     Urinalysis; Future; Expected date: 05/28/2020  -     Urine culture; Future; Expected date: 05/28/2020    Essential hypertension    Vitamin D deficiency       Suspect fatigue is related to low vitamin D; continue vitamin d deficiency  Urine testing and F/u with urology as planned  Continue lisinopril and norvasc  Continue low-fat diet  Continue regular weight bearing exrcise  Continue follow-up with oncology  Counseled on regular exercise, maintenance of a healthy weight, balanced diet rich in fruits/vegetables and lean protein, and avoidance of unhealthy habits like smoking and excessive alcohol intake.  Agree with f/u with rheumatology as planned  Consider low dose of prozac for what I suspect is dysthymia  F/u annually or PRN

## 2020-06-01 ENCOUNTER — TELEPHONE (OUTPATIENT)
Dept: FAMILY MEDICINE | Facility: CLINIC | Age: 75
End: 2020-06-01

## 2020-06-01 RX ORDER — NITROFURANTOIN 25; 75 MG/1; MG/1
100 CAPSULE ORAL 2 TIMES DAILY
Qty: 10 CAPSULE | Refills: 0 | Status: SHIPPED | OUTPATIENT
Start: 2020-06-01 | End: 2020-06-06

## 2020-06-01 NOTE — TELEPHONE ENCOUNTER
Urine culture showed E.coli resistent to Bactrim. Erx sent for macrobid BID for 5 days. Also f/u with urology as planned

## 2020-06-01 NOTE — TELEPHONE ENCOUNTER
Spoke with pt to review results and provider's recommendations. Pt verbalized understanding. No questions voiced at this time. Pt will keep appt with urology as planned.

## 2020-06-02 ENCOUNTER — OFFICE VISIT (OUTPATIENT)
Dept: UROLOGY | Facility: CLINIC | Age: 75
End: 2020-06-02
Payer: MEDICARE

## 2020-06-02 VITALS
HEIGHT: 64 IN | WEIGHT: 190.5 LBS | BODY MASS INDEX: 32.52 KG/M2 | HEART RATE: 80 BPM | DIASTOLIC BLOOD PRESSURE: 78 MMHG | SYSTOLIC BLOOD PRESSURE: 131 MMHG

## 2020-06-02 DIAGNOSIS — N30.00 ACUTE CYSTITIS WITHOUT HEMATURIA: ICD-10-CM

## 2020-06-02 DIAGNOSIS — Z85.51 HISTORY OF BLADDER CANCER: Primary | ICD-10-CM

## 2020-06-02 DIAGNOSIS — R31.9 HEMATURIA, UNSPECIFIED TYPE: ICD-10-CM

## 2020-06-02 PROCEDURE — 99204 PR OFFICE/OUTPT VISIT, NEW, LEVL IV, 45-59 MIN: ICD-10-PCS | Mod: S$GLB,,, | Performed by: UROLOGY

## 2020-06-02 PROCEDURE — 99999 PR PBB SHADOW E&M-EST. PATIENT-LVL III: ICD-10-PCS | Mod: PBBFAC,,, | Performed by: UROLOGY

## 2020-06-02 PROCEDURE — 1125F AMNT PAIN NOTED PAIN PRSNT: CPT | Mod: S$GLB,,, | Performed by: UROLOGY

## 2020-06-02 PROCEDURE — 3078F DIAST BP <80 MM HG: CPT | Mod: CPTII,S$GLB,, | Performed by: UROLOGY

## 2020-06-02 PROCEDURE — 3078F PR MOST RECENT DIASTOLIC BLOOD PRESSURE < 80 MM HG: ICD-10-PCS | Mod: CPTII,S$GLB,, | Performed by: UROLOGY

## 2020-06-02 PROCEDURE — 1101F PT FALLS ASSESS-DOCD LE1/YR: CPT | Mod: CPTII,S$GLB,, | Performed by: UROLOGY

## 2020-06-02 PROCEDURE — 3075F SYST BP GE 130 - 139MM HG: CPT | Mod: CPTII,S$GLB,, | Performed by: UROLOGY

## 2020-06-02 PROCEDURE — 99204 OFFICE O/P NEW MOD 45 MIN: CPT | Mod: S$GLB,,, | Performed by: UROLOGY

## 2020-06-02 PROCEDURE — 1101F PR PT FALLS ASSESS DOC 0-1 FALLS W/OUT INJ PAST YR: ICD-10-PCS | Mod: CPTII,S$GLB,, | Performed by: UROLOGY

## 2020-06-02 PROCEDURE — 1159F MED LIST DOCD IN RCRD: CPT | Mod: S$GLB,,, | Performed by: UROLOGY

## 2020-06-02 PROCEDURE — 1159F PR MEDICATION LIST DOCUMENTED IN MEDICAL RECORD: ICD-10-PCS | Mod: S$GLB,,, | Performed by: UROLOGY

## 2020-06-02 PROCEDURE — 3075F PR MOST RECENT SYSTOLIC BLOOD PRESS GE 130-139MM HG: ICD-10-PCS | Mod: CPTII,S$GLB,, | Performed by: UROLOGY

## 2020-06-02 PROCEDURE — 99999 PR PBB SHADOW E&M-EST. PATIENT-LVL III: CPT | Mod: PBBFAC,,, | Performed by: UROLOGY

## 2020-06-02 PROCEDURE — 1125F PR PAIN SEVERITY QUANTIFIED, PAIN PRESENT: ICD-10-PCS | Mod: S$GLB,,, | Performed by: UROLOGY

## 2020-06-02 RX ORDER — SULFAMETHOXAZOLE AND TRIMETHOPRIM 800; 160 MG/1; MG/1
1 TABLET ORAL
COMMUNITY
Start: 2020-03-28 | End: 2020-10-27

## 2020-06-02 RX ORDER — CYCLOSPORINE 0.5 MG/ML
EMULSION OPHTHALMIC
COMMUNITY
Start: 2020-03-02 | End: 2022-09-22

## 2020-06-02 RX ORDER — CIPROFLOXACIN 500 MG/1
500 TABLET ORAL 2 TIMES DAILY
Qty: 30 TABLET | Refills: 2 | Status: SHIPPED | OUTPATIENT
Start: 2020-06-02 | End: 2020-06-17

## 2020-06-02 RX ORDER — PREDNISOLONE ACETATE 10 MG/ML
SUSPENSION/ DROPS OPHTHALMIC
COMMUNITY
Start: 2020-05-06 | End: 2020-10-27

## 2020-06-02 NOTE — PROGRESS NOTES
UROLOGY [place]  6 2 20    Cc recurrent uti    Age 74, has been having relatively frequent uti lately. The last culture was E. Coli >(10)5, this month; three years ago had Klebsiella pneumoniae >(10)5. Both had community patterns of resistance (ie, pansensitive or multisensitive). Pt would like to be on medication to have at home, in order to start taking it as soon as she feels she is infected.     For her last uti she was put empirically on bactrim, and it alarmed pt that this medication was not effective. However,  looking at her sensitivity report, it is understandable that it would not be effective, since by chance it was the only antibiotic not effective. Instead, she was given nitrofurantoin once the sensitivity was known, and she is doing better now.       [facility]    Surgical:  has a past surgical history that includes Hysterectomy; oophrect; TONSILLECTOMY, ADENOIDECTOMY, BILATERAL yringotomy and tubes; Appendectomy; Tonsillectomy; pelvic mass; Bladder tumor excision (1979); tram flap (Left, 1998); Breast biopsy (Right); Breast surgery (Left, 1998); and Mastectomy, partial (1995).    Medical:  has a past medical history of Allergic rhinitis, Anticoagulant long-term use, Bladder cancer, Blood transfusion, Breast cancer, CAD (coronary artery disease), native coronary artery, Cholelithiasis, Endometriosis, Headache(784.0), HEARING LOSS, Hemorrhoids, HLD (hyperlipidemia), Hypertension, Left wrist fracture, Malignant neoplasm of other specified sites of bladder, Osteoporosis, postmenopausal, PONV (postoperative nausea and vomiting), Rash, Rosacea, and Vaginal delivery.    Familial: father had glaucoma, cousin had ovarian cancer    Social:     Meds:   Current Outpatient Medications on File Prior to Visit   Medication Sig Dispense Refill    ALPHAGAN P 0.1 % Drop INTI  4    amLODIPine (NORVASC) 5 MG tablet Take 1 tablet (5 mg total) by mo 90 tablet 1    ergocalciferol (ERGOCALCIFEROL) 50,000 unit Cap Take 1 capsule  (50,000 Units total) byoses 12 capsule 0    levocetirizine (XYZAL) 5 MG tablet Take 5 mg by mouth e      lisinopril (PRINIVIL,ZESTRIL) 20 MG tablet Take 1 tablet (20 mg total) by m 90 tablet 3    nitrofurantoin, macrocrystal-monohydrate, (MACROBID) 100 MG capsule Take 1 capsule (100 mg total) by mouth 2 ( 10 capsule 0    tretinoin (RETIN-A) 0.025 % cream Apply a pea-sized amount to enti 45 g 3    montelukast (SINGULAIR) 10 mg tablet TAKE 1 TABLET(10 MG) 90 tablet 0    sulfacetamide sodium-sulfur 10-5 % (w/w) Clsr Wash face qd - bid (Patient not ta 360 g 3     REVIEW OF SYSTEMS  GENERAL:  No headaches or dizzy spells.   HEENT: vision preserved. Sinuses: No complaints.   CARDIOPULMONARY: No swelling of the legs; no chest pain. No shortness of breath.   GASTROINTESTINAL: No heartburn. Denies diarrhea; denies constipation, no blood or mucus in stools.   GENITOURINARY: Denies dysuria, bleeding or incontinence.   MUSCULOSKELETAL: No arthritic complaints such as pain or stiffness.   PSYCHIATRIC: No history of depression or anxiety.   ENDOCRINOLOGIC: No reports of sweating, cold or heat intolerance. No polyuria or polydipsia.   NEUROLOGICAL: No dizziness, syncope, paralysis  LYMPHATICS: No enlarged nodes. No history of splenectomy.    Pt alert, oriented, no distress  HEENT: wnl.  Neck: supple, no JVD, no lymphadenopathy  Chest: CV NSR  Lungs: normal chest expansion, no labored breathing  Abdomen flat, nontender, no organomegaly, no masses.  Extremities: no edema, peripheral pulses nl  Neuro: preserved    IMP    Recurrent uti  Finish current antibiotic for her uti diagnosed last week.   Can start cipro 500 bid x 3 days at the first sign of a uti (patient-initiated antibiotic therapy).  Has remote hx of bladder malignancy with no recurrences in 35 years; no follow up necessary.

## 2020-06-02 NOTE — LETTER
June 2, 2020      Tommy Palumbo MD  [address] [facility] [address]  [facility] [address]           [facility] [facility]  [address] [place] [address]  Phone: [phone]  Fax: [phone]          Patient: Yuliana Mattson   MR Number: [id number]   YOB: 1945   Date of Visit: 6/2/2020       Dear Dr. Tommy Palumbo:    Thank you for referring Yuliana Mattson to me for evaluation. Attached you will find relevant portions of my assessment and plan of care.    If you have questions, please do not hesitate to call me. I look forward to following Yuliana Mattson along with you.    Sincerely,    Gomez Sylvester MD    Enclosure  CC:  No Recipients    If you would like to receive this communication electronically, please contact [email or web address] or [phone] to request more information on [facility] access.    For providers and/or their staff who would like to refer a patient to [facility], please contact us through our one-stop-shop provider referral line, [facility], at [phone].    If you feel you have received this communication in error or would no longer like to receive these types of communications, please e-mail [email or web address]

## 2020-07-20 DIAGNOSIS — E55.9 VITAMIN D DEFICIENCY: Primary | ICD-10-CM

## 2020-07-29 ENCOUNTER — LAB VISIT (OUTPATIENT)
Dept: LAB | Facility: HOSPITAL | Age: 75
End: 2020-07-29
Attending: FAMILY MEDICINE
Payer: MEDICARE

## 2020-07-29 DIAGNOSIS — E55.9 VITAMIN D DEFICIENCY: ICD-10-CM

## 2020-07-29 LAB — 25(OH)D3+25(OH)D2 SERPL-MCNC: 22 NG/ML (ref 30–96)

## 2020-07-29 PROCEDURE — 36415 COLL VENOUS BLD VENIPUNCTURE: CPT | Mod: PO

## 2020-07-29 PROCEDURE — 82306 VITAMIN D 25 HYDROXY: CPT

## 2020-08-04 ENCOUNTER — PATIENT MESSAGE (OUTPATIENT)
Dept: HEMATOLOGY/ONCOLOGY | Facility: CLINIC | Age: 75
End: 2020-08-04

## 2020-08-04 DIAGNOSIS — E55.9 VITAMIN D DEFICIENCY: Primary | ICD-10-CM

## 2020-08-04 RX ORDER — ERGOCALCIFEROL 1.25 MG/1
50000 CAPSULE ORAL
Qty: 12 CAPSULE | Refills: 0 | Status: SHIPPED | OUTPATIENT
Start: 2020-08-04 | End: 2020-10-21

## 2020-08-04 NOTE — TELEPHONE ENCOUNTER
Vitamin D 22 ( 17, 7)  Will do 12 weeks more of Vitamin D replacement (50,000 units/week)  Vitamin D level in 3 months.

## 2020-09-14 ENCOUNTER — IMMUNIZATION (OUTPATIENT)
Dept: PHARMACY | Facility: CLINIC | Age: 75
End: 2020-09-14
Payer: MEDICARE

## 2020-09-30 ENCOUNTER — LAB VISIT (OUTPATIENT)
Dept: PRIMARY CARE CLINIC | Facility: OTHER | Age: 75
End: 2020-09-30
Attending: INTERNAL MEDICINE
Payer: MEDICARE

## 2020-09-30 DIAGNOSIS — Z03.818 ENCOUNTER FOR OBSERVATION FOR SUSPECTED EXPOSURE TO OTHER BIOLOGICAL AGENTS RULED OUT: ICD-10-CM

## 2020-09-30 PROCEDURE — U0003 INFECTIOUS AGENT DETECTION BY NUCLEIC ACID (DNA OR RNA); SEVERE ACUTE RESPIRATORY SYNDROME CORONAVIRUS 2 (SARS-COV-2) (CORONAVIRUS DISEASE [COVID-19]), AMPLIFIED PROBE TECHNIQUE, MAKING USE OF HIGH THROUGHPUT TECHNOLOGIES AS DESCRIBED BY CMS-2020-01-R: HCPCS

## 2020-10-01 LAB — SARS-COV-2 RNA RESP QL NAA+PROBE: NOT DETECTED

## 2020-10-08 ENCOUNTER — OFFICE VISIT (OUTPATIENT)
Dept: OTOLARYNGOLOGY | Facility: CLINIC | Age: 75
End: 2020-10-08
Payer: MEDICARE

## 2020-10-08 ENCOUNTER — CLINICAL SUPPORT (OUTPATIENT)
Dept: AUDIOLOGY | Facility: CLINIC | Age: 75
End: 2020-10-08
Payer: MEDICARE

## 2020-10-08 VITALS — TEMPERATURE: 99 F | WEIGHT: 190.5 LBS | HEIGHT: 64 IN | BODY MASS INDEX: 32.52 KG/M2

## 2020-10-08 DIAGNOSIS — H90.42 SENSORINEURAL HEARING LOSS (SNHL) OF LEFT EAR WITH UNRESTRICTED HEARING OF RIGHT EAR: ICD-10-CM

## 2020-10-08 DIAGNOSIS — H93.11 TINNITUS, RIGHT: Primary | ICD-10-CM

## 2020-10-08 DIAGNOSIS — H61.23 BILATERAL IMPACTED CERUMEN: ICD-10-CM

## 2020-10-08 DIAGNOSIS — H91.90 PERCEIVED HEARING CHANGES: ICD-10-CM

## 2020-10-08 DIAGNOSIS — H60.543 DERMATITIS OF BOTH EAR CANALS: Primary | ICD-10-CM

## 2020-10-08 PROCEDURE — 1126F AMNT PAIN NOTED NONE PRSNT: CPT | Mod: S$GLB,,, | Performed by: NURSE PRACTITIONER

## 2020-10-08 PROCEDURE — 1101F PR PT FALLS ASSESS DOC 0-1 FALLS W/OUT INJ PAST YR: ICD-10-PCS | Mod: CPTII,S$GLB,, | Performed by: NURSE PRACTITIONER

## 2020-10-08 PROCEDURE — 99499 RISK ADDL DX/OHS AUDIT: ICD-10-PCS | Mod: S$GLB,,, | Performed by: NURSE PRACTITIONER

## 2020-10-08 PROCEDURE — 1159F MED LIST DOCD IN RCRD: CPT | Mod: S$GLB,,, | Performed by: NURSE PRACTITIONER

## 2020-10-08 PROCEDURE — 92557 PR COMPREHENSIVE HEARING TEST: ICD-10-PCS | Mod: S$GLB,,, | Performed by: AUDIOLOGIST-HEARING AID FITTER

## 2020-10-08 PROCEDURE — 1101F PT FALLS ASSESS-DOCD LE1/YR: CPT | Mod: CPTII,S$GLB,, | Performed by: NURSE PRACTITIONER

## 2020-10-08 PROCEDURE — G0268 REMOVAL OF IMPACTED WAX MD: HCPCS | Mod: S$GLB,,, | Performed by: NURSE PRACTITIONER

## 2020-10-08 PROCEDURE — 99203 OFFICE O/P NEW LOW 30 MIN: CPT | Mod: 25,S$GLB,, | Performed by: NURSE PRACTITIONER

## 2020-10-08 PROCEDURE — 99999 PR PBB SHADOW E&M-EST. PATIENT-LVL I: CPT | Mod: PBBFAC,,,

## 2020-10-08 PROCEDURE — 1159F PR MEDICATION LIST DOCUMENTED IN MEDICAL RECORD: ICD-10-PCS | Mod: S$GLB,,, | Performed by: NURSE PRACTITIONER

## 2020-10-08 PROCEDURE — 3008F PR BODY MASS INDEX (BMI) DOCUMENTED: ICD-10-PCS | Mod: CPTII,S$GLB,, | Performed by: NURSE PRACTITIONER

## 2020-10-08 PROCEDURE — 99203 PR OFFICE/OUTPT VISIT, NEW, LEVL III, 30-44 MIN: ICD-10-PCS | Mod: 25,S$GLB,, | Performed by: NURSE PRACTITIONER

## 2020-10-08 PROCEDURE — 92557 COMPREHENSIVE HEARING TEST: CPT | Mod: S$GLB,,, | Performed by: AUDIOLOGIST-HEARING AID FITTER

## 2020-10-08 PROCEDURE — 99999 PR PBB SHADOW E&M-EST. PATIENT-LVL III: CPT | Mod: PBBFAC,,, | Performed by: NURSE PRACTITIONER

## 2020-10-08 PROCEDURE — 99999 PR PBB SHADOW E&M-EST. PATIENT-LVL III: ICD-10-PCS | Mod: PBBFAC,,, | Performed by: NURSE PRACTITIONER

## 2020-10-08 PROCEDURE — 1126F PR PAIN SEVERITY QUANTIFIED, NO PAIN PRESENT: ICD-10-PCS | Mod: S$GLB,,, | Performed by: NURSE PRACTITIONER

## 2020-10-08 PROCEDURE — 3008F BODY MASS INDEX DOCD: CPT | Mod: CPTII,S$GLB,, | Performed by: NURSE PRACTITIONER

## 2020-10-08 PROCEDURE — G0268 PR REMOVAL OF IMPACTED WAX MD: ICD-10-PCS | Mod: S$GLB,,, | Performed by: NURSE PRACTITIONER

## 2020-10-08 PROCEDURE — 99999 PR PBB SHADOW E&M-EST. PATIENT-LVL I: ICD-10-PCS | Mod: PBBFAC,,,

## 2020-10-08 PROCEDURE — 99499 UNLISTED E&M SERVICE: CPT | Mod: S$GLB,,, | Performed by: NURSE PRACTITIONER

## 2020-10-08 RX ORDER — FLUOCINOLONE ACETONIDE 0.11 MG/ML
3 OIL AURICULAR (OTIC) 2 TIMES DAILY PRN
Qty: 1 BOTTLE | Refills: 1 | Status: SHIPPED | OUTPATIENT
Start: 2020-10-08 | End: 2021-10-08

## 2020-10-08 NOTE — PROGRESS NOTES
"Subjective:       Patient ID: Yuliana Mattson is a 75 y.o. female.    Chief Complaint: No chief complaint on file.    HPI   Patient was treated for right-sided Meniere's by Dr. Johnson in April 2013. Audiogram in April 2013 showed a significant low-frequency HL AD which was treated with steroids and Valium. MRI-IAC was negative for retrocochlear pathology. Patient's repeat audiogram the following month (May 2013) showed normal hearing AD across all frequencies.   Patient returns today for hearing loss AD, sudden-onset starting 16 days ago. Has been gradually improving since then. Had some low roaring tinnitus AD. Some slight spinning/off-balance "but nothing like before."   She uses mineral oil and steroid gtts in her ears for chronic dermatitis, but nothing seems to control the itch.    Review of Systems   Constitutional: Negative.    HENT: Positive for hearing loss and tinnitus.    Eyes: Negative.    Respiratory: Negative.    Cardiovascular: Negative.    Gastrointestinal: Negative.    Musculoskeletal: Negative.    Integumentary:  Negative.   Neurological: Negative.    Hematological: Negative.    Psychiatric/Behavioral: Negative.          Objective:      Physical Exam  Vitals signs and nursing note reviewed.   Constitutional:       General: She is not in acute distress.     Appearance: She is well-developed. She is not ill-appearing or diaphoretic.   HENT:      Head: Normocephalic and atraumatic.      Right Ear: Hearing, tympanic membrane, ear canal and external ear normal. No middle ear effusion. Tympanic membrane is not erythematous.      Left Ear: Hearing, tympanic membrane, ear canal and external ear normal.  No middle ear effusion. Tympanic membrane is not erythematous.      Nose: Nose normal.      Mouth/Throat:      Pharynx: Uvula midline.   Eyes:      General: Lids are normal. No scleral icterus.        Right eye: No discharge.         Left eye: No discharge.   Neck:      Musculoskeletal: Normal range of " motion and neck supple.      Trachea: Trachea normal. No tracheal deviation.   Cardiovascular:      Rate and Rhythm: Normal rate.   Pulmonary:      Effort: Pulmonary effort is normal. No respiratory distress.      Breath sounds: No stridor. No wheezing.   Musculoskeletal: Normal range of motion.   Skin:     General: Skin is warm and dry.      Coloration: Skin is not pale.   Neurological:      Mental Status: She is alert and oriented to person, place, and time.      Coordination: Coordination normal.      Gait: Gait normal.   Psychiatric:         Speech: Speech normal.         Behavior: Behavior normal. Behavior is cooperative.         Thought Content: Thought content normal.         Judgment: Judgment normal.         SEPARATE PROCEDURE IN OFFICE:   Procedure: Removal of impacted cerumen, bilateral   Pre Procedure Diagnosis: Cerumen Impaction   Post Procedure Diagnosis: Cerumen Impaction   Verbal informed consent in regards to risk of trauma to ear canal, ear drum or hearing, discomfort during procedure and/or inability to remove cerumen impaction in one session or unforeseen events or complications.   No anesthesia.     Procedure in detail:   Ear canal visualized bilateral with appropriate size ear speculum utilizing Operating Head Binocular Otomicroscope   Utilizing the following:  Ring curet, delicate alligator forceps, and/or suction cannula was used. The impacted cerumen of the ear canals was removed atraumatically. The TM and EAC were then inspected and found to be clear of wax. See description of TMs/EACs in PE above.   Complications: No   Condition: Improved/Good     Assessment:     Hearing is essentially WNL AU    Chronic dermatitis of both ear canals  Bilateral cerumen impactions removed  Plan:     DermOtic gtts AU prn    Return as needed for any ENT concerns

## 2020-10-08 NOTE — PROGRESS NOTES
Yuliana Mattson was seen 10/08/2020 for an audiological evaluation. Patient complains of sudden hearing loss with roaring tinnitus AD 16 days ago. Pt reports she also felt off balance and had nausea. She was Dx with Meniere's disease by Dr Johnson in 2013. She says the hearing seems to be better since it began 16 days ago but still wanted to have it checked.     Results reveal normal hearing from 250-8000Hz bilaterally with the exception of a mild SNHL at 4K Hz for the left ear.    Speech Reception Thresholds were  5 dBHL for the right ear and 5 dBHL for the left ear.    Word recognition scores were excellent bilaterally.   Tympanograms were unable to obtain for the right ear and unable to obtain for the left ear.    Audiogram results were reviewed in detail with patient and all questions were answered. Results will be reviewed by ENT at the completion of this note. Recommend hearing test in one year due to fluctuation or sooner if hearing changes and hearing protection when around loud noises.

## 2020-10-27 ENCOUNTER — OFFICE VISIT (OUTPATIENT)
Dept: FAMILY MEDICINE | Facility: CLINIC | Age: 75
End: 2020-10-27
Payer: MEDICARE

## 2020-10-27 ENCOUNTER — NURSE TRIAGE (OUTPATIENT)
Dept: ADMINISTRATIVE | Facility: CLINIC | Age: 75
End: 2020-10-27

## 2020-10-27 VITALS
HEIGHT: 64 IN | TEMPERATURE: 99 F | HEART RATE: 77 BPM | WEIGHT: 187.19 LBS | OXYGEN SATURATION: 98 % | BODY MASS INDEX: 31.96 KG/M2 | DIASTOLIC BLOOD PRESSURE: 84 MMHG | SYSTOLIC BLOOD PRESSURE: 158 MMHG

## 2020-10-27 DIAGNOSIS — E55.9 VITAMIN D DEFICIENCY, UNSPECIFIED: ICD-10-CM

## 2020-10-27 DIAGNOSIS — I10 ESSENTIAL HYPERTENSION: Primary | ICD-10-CM

## 2020-10-27 DIAGNOSIS — R42 DIZZINESS AND GIDDINESS: ICD-10-CM

## 2020-10-27 DIAGNOSIS — R79.89 LOW VITAMIN D LEVEL: Primary | ICD-10-CM

## 2020-10-27 PROCEDURE — 99999 PR PBB SHADOW E&M-EST. PATIENT-LVL IV: ICD-10-PCS | Mod: PBBFAC,,, | Performed by: NURSE PRACTITIONER

## 2020-10-27 PROCEDURE — 1126F AMNT PAIN NOTED NONE PRSNT: CPT | Mod: S$GLB,,, | Performed by: NURSE PRACTITIONER

## 2020-10-27 PROCEDURE — 1126F PR PAIN SEVERITY QUANTIFIED, NO PAIN PRESENT: ICD-10-PCS | Mod: S$GLB,,, | Performed by: NURSE PRACTITIONER

## 2020-10-27 PROCEDURE — 3008F BODY MASS INDEX DOCD: CPT | Mod: CPTII,S$GLB,, | Performed by: NURSE PRACTITIONER

## 2020-10-27 PROCEDURE — 99999 PR PBB SHADOW E&M-EST. PATIENT-LVL IV: CPT | Mod: PBBFAC,,, | Performed by: NURSE PRACTITIONER

## 2020-10-27 PROCEDURE — 1159F MED LIST DOCD IN RCRD: CPT | Mod: S$GLB,,, | Performed by: NURSE PRACTITIONER

## 2020-10-27 PROCEDURE — 1101F PR PT FALLS ASSESS DOC 0-1 FALLS W/OUT INJ PAST YR: ICD-10-PCS | Mod: CPTII,S$GLB,, | Performed by: NURSE PRACTITIONER

## 2020-10-27 PROCEDURE — 3079F PR MOST RECENT DIASTOLIC BLOOD PRESSURE 80-89 MM HG: ICD-10-PCS | Mod: CPTII,S$GLB,, | Performed by: NURSE PRACTITIONER

## 2020-10-27 PROCEDURE — 1101F PT FALLS ASSESS-DOCD LE1/YR: CPT | Mod: CPTII,S$GLB,, | Performed by: NURSE PRACTITIONER

## 2020-10-27 PROCEDURE — 99214 PR OFFICE/OUTPT VISIT, EST, LEVL IV, 30-39 MIN: ICD-10-PCS | Mod: S$GLB,,, | Performed by: NURSE PRACTITIONER

## 2020-10-27 PROCEDURE — 3008F PR BODY MASS INDEX (BMI) DOCUMENTED: ICD-10-PCS | Mod: CPTII,S$GLB,, | Performed by: NURSE PRACTITIONER

## 2020-10-27 PROCEDURE — 99214 OFFICE O/P EST MOD 30 MIN: CPT | Mod: S$GLB,,, | Performed by: NURSE PRACTITIONER

## 2020-10-27 PROCEDURE — 3077F SYST BP >= 140 MM HG: CPT | Mod: CPTII,S$GLB,, | Performed by: NURSE PRACTITIONER

## 2020-10-27 PROCEDURE — 3077F PR MOST RECENT SYSTOLIC BLOOD PRESSURE >= 140 MM HG: ICD-10-PCS | Mod: CPTII,S$GLB,, | Performed by: NURSE PRACTITIONER

## 2020-10-27 PROCEDURE — 1159F PR MEDICATION LIST DOCUMENTED IN MEDICAL RECORD: ICD-10-PCS | Mod: S$GLB,,, | Performed by: NURSE PRACTITIONER

## 2020-10-27 PROCEDURE — 3079F DIAST BP 80-89 MM HG: CPT | Mod: CPTII,S$GLB,, | Performed by: NURSE PRACTITIONER

## 2020-10-27 RX ORDER — LISINOPRIL AND HYDROCHLOROTHIAZIDE 12.5; 2 MG/1; MG/1
1 TABLET ORAL DAILY
Qty: 90 TABLET | Refills: 3 | Status: SHIPPED | OUTPATIENT
Start: 2020-10-27 | End: 2021-08-12

## 2020-10-27 RX ORDER — BROMFENAC SODIUM 0.7 MG/ML
SOLUTION/ DROPS OPHTHALMIC
COMMUNITY
End: 2020-11-18

## 2020-10-27 NOTE — PROGRESS NOTES
Answers for HPI/ROS submitted by the patient on 10/26/2020   Hypertension  Chronicity: recurrent  Onset: more than 1 year ago  Progression since onset: waxing and waning  blurred vision: No  chest pain: No  headaches: Yes  malaise/fatigue: Yes  orthopnea: No  palpitations: No  peripheral edema: Yes  PND: No  Agents associated with hypertension: no associated agents

## 2020-10-27 NOTE — TELEPHONE ENCOUNTER
Houseguest that stayed with patient over the weekend tested positive for COVID today. Pt just left 430pm appt with Dr. Daisy Hargrove, pt denies symptoms but worried about exposure. Pt also has outpt CT scan scheduled for tomorrow morning and unsure if she should keep that.     Pt also has been on 50,000 units of Vit D, suppose to be retested on Thursday for labs unsure if she should cancel that too. Recommended pt call oncology (Valentino Martínez NP).     Reason for Disposition   [1] COVID-19 EXPOSURE (Close Contact) AND [2] within last 14 days BUT [3] NO symptoms    Additional Information   Negative: [1] COVID-19 EXPOSURE (Close Contact) within last 14 days AND [2] needs COVID-19 lab test to return to work AND [3] NO symptoms   Negative: [1] COVID-19 EXPOSURE (Close Contact) within last 14 days AND [2] exposed person is a healthcare worker who was NOT using all recommended personal protective equipment (i.e., a respirator-N95 mask, eye protection, gloves, and gown) AND [3] NO symptoms    Protocols used: CORONAVIRUS (COVID-19) EXPOSURE-A-

## 2020-10-27 NOTE — PROGRESS NOTES
Subjective:       Patient ID: Yuliana Mattson is a 75 y.o. female.    Chief Complaint: Hypertension    Patient has noticed some increased BP for the last 3 months. Amlodipine 5mg was added. BP was well controlled after that. Home BPs have been labile, has had a headache for the last few days with Bp elevations for about 2 weeks. Previously was on lisinopril hct, stopped because Bps were low/well controlled. She would prefer to restart this and stop amlodipine. . Over the last 2 weeks had some intermittent vertigo with hearing loss x 2 episodes. Saw ENT but symptoms were completely resolved when seen.      Past Medical History:  No date: Allergic rhinitis  No date: Anticoagulant long-term use      Comment:  ASPIRIN ONLY - (stops it when she presents a hemorrhagic               cystitis)  No date: Bladder cancer  No date: Blood transfusion  No date: Breast cancer  No date: CAD (coronary artery disease), native coronary artery      Comment:  40% non obstructive  No date: Cholelithiasis  No date: Endometriosis  No date: Headache(784.0)  No date: HEARING LOSS  No date: Hemorrhoids  No date: HLD (hyperlipidemia)  No date: Hypertension  2009: Left wrist fracture      Comment:  traumatic  12/3/2009: Malignant neoplasm of other specified sites of bladder  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, postmenopausal  No date: PONV (postoperative nausea and vomiting)  No date: Rash  No date: Rosacea  No date: Vaginal delivery      Comment:  x 2    Past Surgical History:  No date: APPENDECTOMY  1979: BLADDER TUMOR EXCISION      Comment:  [facility], dr garcia - no recurrences since  No date: BREAST BIOPSY; Right      Comment:  4 core bx negative  1998: BREAST SURGERY; Left  No date: HYSTERECTOMY  1995: MASTECTOMY, PARTIAL      Comment:  left side - cancer - had radiotherapy 1995, 1998 had                chemotherapy  No date: oophrect  No date: pelvic mass      Comment:  benign  No date: TONSILLECTOMY  No date: TONSILLECTOMY, ADENOIDECTOMY,  BILATERAL MYRINGOTOMY AND TUBES  1998: tram flap; Left    Review of patient's family history indicates:  Problem: Glaucoma      Relation: Father          Age of Onset: (Not Specified)  Problem: Glaucoma      Relation: Paternal Aunt          Age of Onset: (Not Specified)  Problem: Glaucoma      Relation: Paternal Grandmother          Age of Onset: (Not Specified)  Problem: Cancer      Relation: Cousin          Age of Onset: (Not Specified)          Comment: 1st, ovarian  Problem: Amblyopia      Relation: Neg Hx          Age of Onset: (Not Specified)  Problem: Blindness      Relation: Neg Hx          Age of Onset: (Not Specified)  Problem: Cataracts      Relation: Neg Hx          Age of Onset: (Not Specified)  Problem: Hypertension      Relation: Neg Hx          Age of Onset: (Not Specified)  Problem: Macular degeneration      Relation: Neg Hx          Age of Onset: (Not Specified)  Problem: Retinal detachment      Relation: Neg Hx          Age of Onset: (Not Specified)  Problem: Strabismus      Relation: Neg Hx          Age of Onset: (Not Specified)  Problem: Stroke      Relation: Neg Hx          Age of Onset: (Not Specified)  Problem: Thyroid disease      Relation: Neg Hx          Age of Onset: (Not Specified)      Social History    Socioeconomic History      Marital status:       Spouse name: Not on file      Number of children: Not on file      Years of education: Not on file      Highest education level: Not on file    Occupational History      Occupation: retired accounting    Social Needs      Financial resource strain: Not hard at all      Food insecurity        Worry: Never true        Inability: Never true      Transportation needs        Medical: No        Non-medical: No    Tobacco Use      Smoking status: Never Smoker      Smokeless tobacco: Never Used    Substance and Sexual Activity      Alcohol use: No        Frequency: Monthly or less        Drinks per session: 1 or 2        Binge frequency:  Never      Drug use: No      Sexual activity: Not on file    Lifestyle      Physical activity        Days per week: 0 days        Minutes per session: Not on file      Stress: To some extent    Relationships      Social connections        Talks on phone: More than three times a week        Gets together: Patient refused        Attends Congregational service: Not on file        Active member of club or organization: Patient refused        Attends meetings of clubs or organizations: Patient refused        Relationship status:     Other Topics      Concerns:        Not on file    Social History Narrative      Not on file      Current Outpatient Medications:  ALPHAGAN P 0.1 % Drop, INTILL 1 DROP INTO BOTH EYES TWICE DAILY, Disp: , Rfl: 4  amLODIPine (NORVASC) 5 MG tablet, Take 1 tablet (5 mg total) by mouth once daily., Disp: 90 tablet, Rfl: 1  bromfenac (PROLENSA) 0.07 % Drop, Apply to eye., Disp: , Rfl:   fluocinolone acetonide oiL (DERMOTIC OIL) 0.01 % Drop, Place 3 drops in ear(s) 2 (two) times daily as needed (itchy ears)., Disp: 1 Bottle, Rfl: 1  levocetirizine (XYZAL) 5 MG tablet, Take 5 mg by mouth every evening., Disp: , Rfl:   lisinopriL (PRINIVIL,ZESTRIL) 20 MG tablet, TAKE 1 TABLET(20 MG) BY MOUTH EVERY DAY, Disp: 90 tablet, Rfl: 3  RESTASIS 0.05 % ophthalmic emulsion, INT 1 GTT IN OU BID, Disp: , Rfl:   tretinoin (RETIN-A) 0.025 % cream, Apply a pea-sized amount to entire face at bedtime, start every other night and increase as tolerated. Take night off if irritation develops., Disp: 45 g, Rfl: 3    No current facility-administered medications for this visit.       Review of patient's allergies indicates:   -- Prochlorperazine edisylate -- Anaphylaxis    --  Other reaction(s): Unknown    Hypertension  This is a recurrent problem. The current episode started more than 1 year ago. The problem has been waxing and waning since onset. Associated symptoms include headaches, malaise/fatigue and peripheral edema.  Pertinent negatives include no blurred vision, chest pain, orthopnea, palpitations, PND or shortness of breath. There are no associated agents to hypertension.     Review of Systems   Constitutional: Positive for malaise/fatigue. Negative for chills and fever.   HENT: Positive for hearing loss.    Eyes: Negative for blurred vision.   Respiratory: Negative for cough, shortness of breath and wheezing.    Cardiovascular: Negative for chest pain, palpitations, orthopnea and PND.   Genitourinary: Negative.    Neurological: Positive for dizziness and headaches.         Objective:      Physical Exam  Constitutional:       Appearance: Normal appearance.   Cardiovascular:      Rate and Rhythm: Normal rate and regular rhythm.   Pulmonary:      Effort: Pulmonary effort is normal. No respiratory distress.      Breath sounds: Normal breath sounds.   Neurological:      General: No focal deficit present.      Mental Status: She is alert and oriented to person, place, and time.      Cranial Nerves: No cranial nerve deficit.      Sensory: No sensory deficit.   Psychiatric:         Mood and Affect: Mood normal.         Behavior: Behavior normal.         Assessment:       1. Essential hypertension    2. Dizziness and giddiness        Plan:       1. Dizziness and giddiness  Follow up with ENT  - CT Head Without Contrast; Future    2. Essential hypertension  Stop amlodipine, start lisinopril 20/12.5, monitor Bp daily at home, check BMP and recheck of BP ion clinic in 1 week, bring home log and cuff to visit.   - CT Head Without Contrast; Future  - lisinopriL-hydrochlorothiazide (PRINZIDE,ZESTORETIC) 20-12.5 mg per tablet; Take 1 tablet by mouth once daily.  Dispense: 90 tablet; Refill: 3  - Basic Metabolic Panel; Future

## 2020-10-28 ENCOUNTER — TELEPHONE (OUTPATIENT)
Dept: FAMILY MEDICINE | Facility: CLINIC | Age: 75
End: 2020-10-28

## 2020-10-28 DIAGNOSIS — E55.9 VITAMIN D DEFICIENCY: Primary | ICD-10-CM

## 2020-10-28 NOTE — TELEPHONE ENCOUNTER
I signed the order you pended, but there is already an order in the system I placed earlier today. I linked it previously to the lab she already had scheduled,.

## 2020-10-28 NOTE — TELEPHONE ENCOUNTER
Call patient and help her reschedule her testing if she wants to reschedule. Vitamin D added to labs.   If she is asymptomatic I do not know what the protocol is for lab as far as having to reschedule due to exposure, I guess we can call and check for her?   If she wants to be tested for covid due to exposure she should wait at least 5 days post exposure unless she develops symptoms prior.

## 2020-10-28 NOTE — TELEPHONE ENCOUNTER
Spoke with pt, pushed back pt appointments since she was exposed to covid. I rescheduled the lab appointment but I am not seeing the Vitamin D order so I can link to blood work. Order pending please advise, thank you.

## 2020-11-02 ENCOUNTER — OFFICE VISIT (OUTPATIENT)
Dept: FAMILY MEDICINE | Facility: CLINIC | Age: 75
End: 2020-11-02
Payer: MEDICARE

## 2020-11-02 DIAGNOSIS — F41.9 ANXIETY: ICD-10-CM

## 2020-11-02 DIAGNOSIS — J30.9 ALLERGIC RHINITIS, UNSPECIFIED SEASONALITY, UNSPECIFIED TRIGGER: ICD-10-CM

## 2020-11-02 DIAGNOSIS — Z20.822 CLOSE EXPOSURE TO COVID-19 VIRUS: Primary | ICD-10-CM

## 2020-11-02 PROCEDURE — 1101F PR PT FALLS ASSESS DOC 0-1 FALLS W/OUT INJ PAST YR: ICD-10-PCS | Mod: CPTII,95,, | Performed by: NURSE PRACTITIONER

## 2020-11-02 PROCEDURE — 1159F MED LIST DOCD IN RCRD: CPT | Mod: 95,,, | Performed by: NURSE PRACTITIONER

## 2020-11-02 PROCEDURE — 99213 OFFICE O/P EST LOW 20 MIN: CPT | Mod: 95,,, | Performed by: NURSE PRACTITIONER

## 2020-11-02 PROCEDURE — 1101F PT FALLS ASSESS-DOCD LE1/YR: CPT | Mod: CPTII,95,, | Performed by: NURSE PRACTITIONER

## 2020-11-02 PROCEDURE — 99213 PR OFFICE/OUTPT VISIT, EST, LEVL III, 20-29 MIN: ICD-10-PCS | Mod: 95,,, | Performed by: NURSE PRACTITIONER

## 2020-11-02 PROCEDURE — 1159F PR MEDICATION LIST DOCUMENTED IN MEDICAL RECORD: ICD-10-PCS | Mod: 95,,, | Performed by: NURSE PRACTITIONER

## 2020-11-02 RX ORDER — HYDROXYZINE PAMOATE 25 MG/1
25 CAPSULE ORAL EVERY 8 HOURS PRN
Qty: 30 CAPSULE | Refills: 0 | Status: SHIPPED | OUTPATIENT
Start: 2020-11-02 | End: 2021-02-28

## 2020-11-02 NOTE — PROGRESS NOTES
Answers for HPI/ROS submitted by the patient on 11/1/2020   activity change: No  unexpected weight change: No  neck pain: No  hearing loss: Yes  rhinorrhea: Yes  trouble swallowing: No  eye discharge: No  visual disturbance: No  chest tightness: No  wheezing: No  chest pain: No  palpitations: No  blood in stool: No  constipation: No  vomiting: No  diarrhea: No  polydipsia: No  polyuria: No  difficulty urinating: No  hematuria: No  menstrual problem: No  dysuria: No  joint swelling: No  arthralgias: No  headaches: Yes  weakness: No  confusion: No  dysphoric mood: No

## 2020-11-02 NOTE — PROGRESS NOTES
Subjective:       Patient ID: Yuliana Mattson is a 75 y.o. female.    Chief Complaint: No chief complaint on file.    The patient location is: [place], [place]  The chief complaint leading to consultation is: covid exposure    Visit type: audiovisual    Face to Face time with patient: 15  15 minutes of total time spent on the encounter, which includes face to face time and non-face to face time preparing to see the patient (eg, review of tests), Obtaining and/or reviewing separately obtained history, Documenting clinical information in the electronic or other health record, Independently interpreting results (not separately reported) and communicating results to the patient/family/caregiver, or Care coordination (not separately reported).         Each patient to whom he or she provides medical services by telemedicine is:  (1) informed of the relationship between the physician and patient and the respective role of any other health care provider with respect to management of the patient; and (2) notified that he or she may decline to receive medical services by telemedicine and may withdraw from such care at any time.    Notes:   Patient had a family member staying in her home Oct 24-25, he was diagnosed with covid on 10/26. They have been quarantining but she and her  would like to be tested for covid. Only symptoms she has been having in mild sinus drainage/congestion.   Patient also requests something mild fopr anxiety to take, she is having ongoing episodes of vertigo with a ccompanying hearing loss, seeing ENT, CT scan scheduled. Does not want anything daily for anxiety.    Past Medical History:  No date: Allergic rhinitis  No date: Anticoagulant long-term use      Comment:  ASPIRIN ONLY - (stops it when she presents a hemorrhagic               cystitis)  No date: Bladder cancer  No date: Blood transfusion  No date: Breast cancer  No date: CAD (coronary artery disease), native coronary artery       Comment:  40% non obstructive  No date: Cholelithiasis  No date: Endometriosis  No date: Headache(784.0)  No date: HEARING LOSS  No date: Hemorrhoids  No date: HLD (hyperlipidemia)  No date: Hypertension  2009: Left wrist fracture      Comment:  traumatic  12/3/2009: Malignant neoplasm of other specified sites of bladder  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, postmenopausal  No date: PONV (postoperative nausea and vomiting)  No date: Rash  No date: Rosacea  No date: Vaginal delivery      Comment:  x 2    Past Surgical History:  No date: APPENDECTOMY  1979: BLADDER TUMOR EXCISION      Comment:  [facility], dr garcia - no recurrences since  No date: BREAST BIOPSY; Right      Comment:  4 core bx negative  1998: BREAST SURGERY; Left  No date: HYSTERECTOMY  1995: MASTECTOMY, PARTIAL      Comment:  left side - cancer - had radiotherapy 1995, 1998 had                chemotherapy  No date: oophrect  No date: pelvic mass      Comment:  benign  No date: TONSILLECTOMY  No date: TONSILLECTOMY, ADENOIDECTOMY, BILATERAL MYRINGOTOMY AND TUBES  1998: tram flap; Left    Review of patient's family history indicates:  Problem: Glaucoma      Relation: Father          Age of Onset: (Not Specified)  Problem: Glaucoma      Relation: Paternal Aunt          Age of Onset: (Not Specified)  Problem: Glaucoma      Relation: Paternal Grandmother          Age of Onset: (Not Specified)  Problem: Cancer      Relation: Cousin          Age of Onset: (Not Specified)          Comment: 1st, ovarian  Problem: Amblyopia      Relation: Neg Hx          Age of Onset: (Not Specified)  Problem: Blindness      Relation: Neg Hx          Age of Onset: (Not Specified)  Problem: Cataracts      Relation: Neg Hx          Age of Onset: (Not Specified)  Problem: Hypertension      Relation: Neg Hx          Age of Onset: (Not Specified)  Problem: Macular degeneration      Relation: Neg Hx          Age of Onset: (Not Specified)  Problem: Retinal detachment      Relation: Neg  Hx          Age of Onset: (Not Specified)  Problem: Strabismus      Relation: Neg Hx          Age of Onset: (Not Specified)  Problem: Stroke      Relation: Neg Hx          Age of Onset: (Not Specified)  Problem: Thyroid disease      Relation: Neg Hx          Age of Onset: (Not Specified)      Social History    Socioeconomic History      Marital status:       Spouse name: Not on file      Number of children: Not on file      Years of education: Not on file      Highest education level: Not on file    Occupational History      Occupation: retired accounting    Social Needs      Financial resource strain: Not hard at all      Food insecurity        Worry: Never true        Inability: Never true      Transportation needs        Medical: No        Non-medical: No    Tobacco Use      Smoking status: Never Smoker      Smokeless tobacco: Never Used    Substance and Sexual Activity      Alcohol use: No        Frequency: Monthly or less        Drinks per session: 1 or 2        Binge frequency: Never      Drug use: No      Sexual activity: Not on file    Lifestyle      Physical activity        Days per week: 0 days        Minutes per session: Not on file      Stress: To some extent    Relationships      Social connections        Talks on phone: More than three times a week        Gets together: Patient refused        Attends Mandaen service: Not on file        Active member of club or organization: Patient refused        Attends meetings of clubs or organizations: Patient refused        Relationship status:     Other Topics      Concerns:        Not on file    Social History Narrative      Not on file      Current Outpatient Medications:  ALPHAGAN P 0.1 % Drop, INTILL 1 DROP INTO BOTH EYES TWICE DAILY, Disp: , Rfl: 4  bromfenac (PROLENSA) 0.07 % Drop, Apply to eye., Disp: , Rfl:   fluocinolone acetonide oiL (DERMOTIC OIL) 0.01 % Drop, Place 3 drops in ear(s) 2 (two) times daily as needed (itchy ears)., Disp: 1  Bottle, Rfl: 1  hydrOXYzine pamoate (VISTARIL) 25 MG Cap, Take 1 capsule (25 mg total) by mouth every 8 (eight) hours as needed (anxiety)., Disp: 30 capsule, Rfl: 0  levocetirizine (XYZAL) 5 MG tablet, Take 5 mg by mouth every evening., Disp: , Rfl:   lisinopriL-hydrochlorothiazide (PRINZIDE,ZESTORETIC) 20-12.5 mg per tablet, Take 1 tablet by mouth once daily., Disp: 90 tablet, Rfl: 3  RESTASIS 0.05 % ophthalmic emulsion, INT 1 GTT IN OU BID, Disp: , Rfl:   tretinoin (RETIN-A) 0.025 % cream, Apply a pea-sized amount to entire face at bedtime, start every other night and increase as tolerated. Take night off if irritation develops., Disp: 45 g, Rfl: 3    No current facility-administered medications for this visit.       Review of patient's allergies indicates:   -- Prochlorperazine edisylate -- Anaphylaxis    --  Other reaction(s): Unknown    Review of Systems   Constitutional: Negative for activity change and unexpected weight change.   HENT: Positive for hearing loss and rhinorrhea. Negative for trouble swallowing.    Eyes: Negative for discharge and visual disturbance.   Respiratory: Negative for chest tightness and wheezing.    Cardiovascular: Negative for chest pain and palpitations.   Gastrointestinal: Negative for blood in stool, constipation, diarrhea and vomiting.   Endocrine: Negative for polydipsia and polyuria.   Genitourinary: Negative for difficulty urinating, dysuria, hematuria and menstrual problem.   Musculoskeletal: Negative for arthralgias, joint swelling and neck pain.   Neurological: Positive for headaches. Negative for weakness.   Psychiatric/Behavioral: Negative for confusion and dysphoric mood.         Objective:      Physical Exam  Constitutional:       Appearance: Normal appearance.   Pulmonary:      Effort: Pulmonary effort is normal. No respiratory distress.   Neurological:      General: No focal deficit present.      Mental Status: She is alert and oriented to person, place, and time.    Psychiatric:         Mood and Affect: Mood normal.         Behavior: Behavior normal.         Assessment:       1. Close exposure to COVID-19 virus    2. Allergic rhinitis, unspecified seasonality, unspecified trigger    3. Anxiety        Plan:       1. Close exposure to COVID-19 virus  COVID 19 test scheduled, self quarantine instructions attached.   - COVID-19 Routine Screening; Future    2. Allergic rhinitis, unspecified seasonality, unspecified trigger  Singulair daily (has at home)  - COVID-19 Routine Screening; Future    3. Anxiety  Follow up in 2 weeks, sooner for new or worsening, medication safety/side effects discussed. Continue ent follow up.   - hydrOXYzine pamoate (VISTARIL) 25 MG Cap; Take 1 capsule (25 mg total) by mouth every 8 (eight) hours as needed (anxiety).  Dispense: 30 capsule; Refill: 0

## 2020-11-02 NOTE — PATIENT INSTRUCTIONS
Instructions for Patients with Confirmed or Suspected COVID-19    If you are awaiting your test result, you will either be called or it will be released to the patient portal.  If you have any questions about your test, please visit [email or web address] or call our COVID-19 information line at [phone].      Instructions for non-hospitalized or discharged patients with confirmed or suspected COVID-19:       Stay home except to get medical care.    Separate yourself from other people and animals in your home.    Call ahead before visiting your doctor.    Wear a face mask.    Cover your coughs and sneezes.    Clean your hands often.    Avoid sharing personal household items.    Clean all high-touch surfaces every day.    Monitor your symptoms. Seek prompt medical attention if your illness is worsening (e.g., difficulty breathing). Before seeking care, call your healthcare provider.    If you have a medical emergency and must call 911, notify the dispatcher that you have or are being evaluated for COVID-19. If possible, put on a face mask before emergency medical services arrive.    Use the following symptom-based strategy to return to normal activity following a suspected or confirmed case of COVID-19. Continue isolation until:   o At least 3 days (72 hours) have passed since recovery defined as resolution of fever without the use of fever-reducing medications and improvement in respiratory symptoms (e.g. cough, shortness of breath), and   o At least 10 days have passed since the first positive test.       As one of the next steps, you will receive a call or text from the [place] Department of Health ([facility]) COVID-19 Tracing Team. See the contact information below so you know not to ignore the health departments call. It is important that you contact them back immediately so they can help.     Contact Tracer Number:  [phone]  Caller ID for most carriers: [place] Dept [facility]    What is  contact tracing?   Contact tracing is a process that helps identify everyone who has been in close contact with an infected person. Contact tracers let those people know they may have been exposed and guide them on next steps. Confidentiality is important for everyone; no one will be told who may have exposed them to the virus.   Your involvement is important. The more we know about where and how this virus is spreading, the better chance we have at stopping it from spreading further.  What does exposure mean?   Exposure means you have been within 6 feet for more than 15 minutes with a person who has or had COVID-19.  What kind of questions do the contact tracers ask?   A contact tracer will confirm your basic contact information including name, address, phone number, and next of kin, as well as asking about any symptoms you may have had. Theyll also ask you how you think you may have gotten sick, such as places where you may have been exposed to the virus, and people you were with. Those names will never be shared with anyone outside of that call, and will only be used to help trace and stop the spread of the virus.   I have privacy concerns. How will the state use my information?   Your privacy about your health is important. All calls are completed using call centers that use the appropriate health privacy protection measures (HIPAA compliance), meaning that your patient information is safe. No one will ever ask you any questions related to immigration status. Your health comes first.   Do I have to participate?   You do not have to participate, but we strongly encourage you to. Contact tracing can help us catch and control new outbreaks as theyre developing to keep your friends and family safe.   What if I dont hear from anyone?   If you dont receive a call within 24 hours, you can call the number above right away to inquire about your status. That line is open from 8:00 am - 8:00 p.m., 7 days a  week.  Contact tracing saves lives! Together, we have the power to beat this virus and keep our loved ones and neighbors safe.       Instructions for household members, intimate partners and caregivers in a non-healthcare setting of a patient with confirmed or suspected COVID-19:         Close contacts should monitor their health and call their healthcare provider right away if they develop symptoms suggestive of COVID-19 (e.g., fever, cough, shortness of breath).    Stay home except to get medical care. Separate yourself from other people and animals in the home.   Monitor the patients symptoms. If the patient is getting sicker, call his or her healthcare provider. If the patient has a medical emergency and you need to call 911, notify the dispatch personnel that the patient has or is being evaluated for COVID-19.    Wear a facemask when around other people such as sharing a room or vehicle and before entering a healthcare provider's office.   Cover coughs and sneezes with a tissue. Throw used tissues in a lined trash can immediately and wash hands.   Clean hands often with soap and water for at least 20 seconds or with an alcohol-based hand , rubbing hands together until they feel dry. Avoid touching your eyes, nose, and mouth with unwashed hands.   Clean all high-touch; surfaces every day, including counters, tabletops, doorknobs, bathroom fixtures, toilets, phones, keyboards, tablets, bedside tables, etc. Use a household cleaning spray or wipe according to label instructions.   Avoid sharing personal household items such as dishes, drinking glasses, cups, towels, bedding, etc. After these items are used, they should be washed thoroughly with soap and water.   Continue isolation until:   At least 3 days (72 hours) have passed since recovery defined as resolution of fever without the use of fever-reducing medications and improvement in respiratory symptoms (e.g. cough, shortness of breath),  and    At least 10 days have passed since the patients first positive test.    https://www.cdc.gov/coronavirus/2019-ncov/your-health/index.htm

## 2020-11-03 ENCOUNTER — PATIENT OUTREACH (OUTPATIENT)
Dept: ADMINISTRATIVE | Facility: HOSPITAL | Age: 75
End: 2020-11-03

## 2020-11-03 NOTE — PROGRESS NOTES
2020 Care Everywhere updates requested and reviewed.  Immunizations reconciled. Media reports reviewed.  Duplicate HM overrides and  orders removed.  Overdue HM topic chart audit and/or requested.  Overdue lab testing linked to upcoming lab appointments if applies.    LINKS DOWN 2020      Health Maintenance Due   Topic Date Due    Shingles Vaccine (2 of 3) 2014

## 2020-11-13 ENCOUNTER — HOSPITAL ENCOUNTER (OUTPATIENT)
Dept: RADIOLOGY | Facility: HOSPITAL | Age: 75
Discharge: HOME | End: 2020-11-13
Attending: NURSE PRACTITIONER
Payer: MEDICARE

## 2020-11-13 DIAGNOSIS — R42 DIZZINESS AND GIDDINESS: ICD-10-CM

## 2020-11-13 DIAGNOSIS — I10 ESSENTIAL HYPERTENSION: ICD-10-CM

## 2020-11-13 PROCEDURE — 70450 CT HEAD WITHOUT CONTRAST: ICD-10-PCS | Mod: 26,,, | Performed by: RADIOLOGY

## 2020-11-13 PROCEDURE — 70450 CT HEAD/BRAIN W/O DYE: CPT | Mod: TC,PO

## 2020-11-13 PROCEDURE — 70450 CT HEAD/BRAIN W/O DYE: CPT | Mod: 26,,, | Performed by: RADIOLOGY

## 2020-11-16 ENCOUNTER — TELEPHONE (OUTPATIENT)
Dept: FAMILY MEDICINE | Facility: CLINIC | Age: 75
End: 2020-11-16

## 2020-11-18 ENCOUNTER — OFFICE VISIT (OUTPATIENT)
Dept: FAMILY MEDICINE | Facility: CLINIC | Age: 75
End: 2020-11-18
Payer: MEDICARE

## 2020-11-18 ENCOUNTER — LAB VISIT (OUTPATIENT)
Dept: LAB | Facility: HOSPITAL | Age: 75
End: 2020-11-18
Attending: NURSE PRACTITIONER
Payer: MEDICARE

## 2020-11-18 VITALS
BODY MASS INDEX: 32.63 KG/M2 | WEIGHT: 191.13 LBS | HEART RATE: 77 BPM | DIASTOLIC BLOOD PRESSURE: 82 MMHG | TEMPERATURE: 98 F | HEIGHT: 64 IN | OXYGEN SATURATION: 95 % | SYSTOLIC BLOOD PRESSURE: 122 MMHG

## 2020-11-18 DIAGNOSIS — I10 ESSENTIAL HYPERTENSION: ICD-10-CM

## 2020-11-18 DIAGNOSIS — E55.9 VITAMIN D DEFICIENCY: Primary | ICD-10-CM

## 2020-11-18 DIAGNOSIS — E55.9 VITAMIN D DEFICIENCY: ICD-10-CM

## 2020-11-18 LAB — 25(OH)D3+25(OH)D2 SERPL-MCNC: 27 NG/ML (ref 30–96)

## 2020-11-18 PROCEDURE — 99999 PR PBB SHADOW E&M-EST. PATIENT-LVL IV: CPT | Mod: PBBFAC,,, | Performed by: NURSE PRACTITIONER

## 2020-11-18 PROCEDURE — 3079F DIAST BP 80-89 MM HG: CPT | Mod: CPTII,S$GLB,, | Performed by: NURSE PRACTITIONER

## 2020-11-18 PROCEDURE — 3079F PR MOST RECENT DIASTOLIC BLOOD PRESSURE 80-89 MM HG: ICD-10-PCS | Mod: CPTII,S$GLB,, | Performed by: NURSE PRACTITIONER

## 2020-11-18 PROCEDURE — 1101F PT FALLS ASSESS-DOCD LE1/YR: CPT | Mod: CPTII,S$GLB,, | Performed by: NURSE PRACTITIONER

## 2020-11-18 PROCEDURE — 1159F MED LIST DOCD IN RCRD: CPT | Mod: S$GLB,,, | Performed by: NURSE PRACTITIONER

## 2020-11-18 PROCEDURE — 1159F PR MEDICATION LIST DOCUMENTED IN MEDICAL RECORD: ICD-10-PCS | Mod: S$GLB,,, | Performed by: NURSE PRACTITIONER

## 2020-11-18 PROCEDURE — 3288F PR FALLS RISK ASSESSMENT DOCUMENTED: ICD-10-PCS | Mod: CPTII,S$GLB,, | Performed by: NURSE PRACTITIONER

## 2020-11-18 PROCEDURE — 3288F FALL RISK ASSESSMENT DOCD: CPT | Mod: CPTII,S$GLB,, | Performed by: NURSE PRACTITIONER

## 2020-11-18 PROCEDURE — 99214 PR OFFICE/OUTPT VISIT, EST, LEVL IV, 30-39 MIN: ICD-10-PCS | Mod: S$GLB,,, | Performed by: NURSE PRACTITIONER

## 2020-11-18 PROCEDURE — 3008F BODY MASS INDEX DOCD: CPT | Mod: CPTII,S$GLB,, | Performed by: NURSE PRACTITIONER

## 2020-11-18 PROCEDURE — 3008F PR BODY MASS INDEX (BMI) DOCUMENTED: ICD-10-PCS | Mod: CPTII,S$GLB,, | Performed by: NURSE PRACTITIONER

## 2020-11-18 PROCEDURE — 36415 COLL VENOUS BLD VENIPUNCTURE: CPT | Mod: PO

## 2020-11-18 PROCEDURE — 3074F SYST BP LT 130 MM HG: CPT | Mod: CPTII,S$GLB,, | Performed by: NURSE PRACTITIONER

## 2020-11-18 PROCEDURE — 99999 PR PBB SHADOW E&M-EST. PATIENT-LVL IV: ICD-10-PCS | Mod: PBBFAC,,, | Performed by: NURSE PRACTITIONER

## 2020-11-18 PROCEDURE — 1101F PR PT FALLS ASSESS DOC 0-1 FALLS W/OUT INJ PAST YR: ICD-10-PCS | Mod: CPTII,S$GLB,, | Performed by: NURSE PRACTITIONER

## 2020-11-18 PROCEDURE — 1126F PR PAIN SEVERITY QUANTIFIED, NO PAIN PRESENT: ICD-10-PCS | Mod: S$GLB,,, | Performed by: NURSE PRACTITIONER

## 2020-11-18 PROCEDURE — 3074F PR MOST RECENT SYSTOLIC BLOOD PRESSURE < 130 MM HG: ICD-10-PCS | Mod: CPTII,S$GLB,, | Performed by: NURSE PRACTITIONER

## 2020-11-18 PROCEDURE — 1126F AMNT PAIN NOTED NONE PRSNT: CPT | Mod: S$GLB,,, | Performed by: NURSE PRACTITIONER

## 2020-11-18 PROCEDURE — 82306 VITAMIN D 25 HYDROXY: CPT | Mod: GA

## 2020-11-18 PROCEDURE — 99214 OFFICE O/P EST MOD 30 MIN: CPT | Mod: S$GLB,,, | Performed by: NURSE PRACTITIONER

## 2020-11-18 NOTE — PROGRESS NOTES
Subjective:       Patient ID: Yuliana Mattson is a 75 y.o. female.    Chief Complaint: Hypertension    10/27/2020 stopped amlodipine, lisinopril hct started. Bp has been improved. Vit D level is due, she has been comliant with current supplement, feeling much better. No episodes of dizziness, mouth dryness improved with biotin, still some eye dryness.    Past Medical History:  No date: Allergic rhinitis  No date: Anticoagulant long-term use      Comment:  ASPIRIN ONLY - (stops it when she presents a hemorrhagic               cystitis)  No date: Bladder cancer  No date: Blood transfusion  No date: Breast cancer  No date: CAD (coronary artery disease), native coronary artery      Comment:  40% non obstructive  No date: Cholelithiasis  No date: Endometriosis  No date: Headache(784.0)  No date: HEARING LOSS  No date: Hemorrhoids  No date: HLD (hyperlipidemia)  No date: Hypertension  2009: Left wrist fracture      Comment:  traumatic  12/3/2009: Malignant neoplasm of other specified sites of bladder  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, postmenopausal  No date: PONV (postoperative nausea and vomiting)  No date: Rash  No date: Rosacea  No date: Vaginal delivery      Comment:  x 2    Past Surgical History:  No date: APPENDECTOMY  1979: BLADDER TUMOR EXCISION      Comment:  [facility] dr garcia - no recurrences since  No date: BREAST BIOPSY; Right      Comment:  4 core bx negative  1998: BREAST SURGERY; Left  No date: HYSTERECTOMY  1995: MASTECTOMY, PARTIAL      Comment:  left side - cancer - had radiotherapy 1995, 1998 had                chemotherapy  No date: oophrect  No date: pelvic mass      Comment:  benign  No date: TONSILLECTOMY  No date: TONSILLECTOMY, ADENOIDECTOMY, BILATERAL MYRINGOTOMY AND TUBES  1998: tram flap; Left    Review of patient's family history indicates:  Problem: Glaucoma      Relation: Father          Age of Onset: (Not Specified)  Problem: Glaucoma      Relation: Paternal Aunt          Age of Onset:  (Not Specified)  Problem: Glaucoma      Relation: Paternal Grandmother          Age of Onset: (Not Specified)  Problem: Cancer      Relation: Cousin          Age of Onset: (Not Specified)          Comment: 1st, ovarian  Problem: Amblyopia      Relation: Neg Hx          Age of Onset: (Not Specified)  Problem: Blindness      Relation: Neg Hx          Age of Onset: (Not Specified)  Problem: Cataracts      Relation: Neg Hx          Age of Onset: (Not Specified)  Problem: Hypertension      Relation: Neg Hx          Age of Onset: (Not Specified)  Problem: Macular degeneration      Relation: Neg Hx          Age of Onset: (Not Specified)  Problem: Retinal detachment      Relation: Neg Hx          Age of Onset: (Not Specified)  Problem: Strabismus      Relation: Neg Hx          Age of Onset: (Not Specified)  Problem: Stroke      Relation: Neg Hx          Age of Onset: (Not Specified)  Problem: Thyroid disease      Relation: Neg Hx          Age of Onset: (Not Specified)      Social History    Socioeconomic History      Marital status:       Spouse name: Not on file      Number of children: Not on file      Years of education: Not on file      Highest education level: Not on file    Occupational History      Occupation: retired accounting    Social Needs      Financial resource strain: Not hard at all      Food insecurity        Worry: Never true        Inability: Never true      Transportation needs        Medical: No        Non-medical: No    Tobacco Use      Smoking status: Never Smoker      Smokeless tobacco: Never Used    Substance and Sexual Activity      Alcohol use: No        Frequency: Monthly or less        Drinks per session: 1 or 2        Binge frequency: Never      Drug use: No      Sexual activity: Not on file    Lifestyle      Physical activity        Days per week: 0 days        Minutes per session: Not on file      Stress: To some extent    Relationships      Social connections        Talks on phone:  More than three times a week        Gets together: Patient refused        Attends Jain service: Not on file        Active member of club or organization: Patient refused        Attends meetings of clubs or organizations: Patient refused        Relationship status:     Other Topics      Concerns:        Not on file    Social History Narrative      Not on file      Current Outpatient Medications:  ALPHAGAN P 0.1 % Drop, INTILL 1 DROP INTO BOTH EYES TWICE DAILY, Disp: , Rfl: 4  fluocinolone acetonide oiL (DERMOTIC OIL) 0.01 % Drop, Place 3 drops in ear(s) 2 (two) times daily as needed (itchy ears)., Disp: 1 Bottle, Rfl: 1  lisinopriL-hydrochlorothiazide (PRINZIDE,ZESTORETIC) 20-12.5 mg per tablet, Take 1 tablet by mouth once daily., Disp: 90 tablet, Rfl: 3  RESTASIS 0.05 % ophthalmic emulsion, INT 1 GTT IN OU BID, Disp: , Rfl:   tretinoin (RETIN-A) 0.025 % cream, Apply a pea-sized amount to entire face at bedtime, start every other night and increase as tolerated. Take night off if irritation develops., Disp: 45 g, Rfl: 3  hydrOXYzine pamoate (VISTARIL) 25 MG Cap, Take 1 capsule (25 mg total) by mouth every 8 (eight) hours as needed (anxiety). (Patient not taking: Reported on 11/18/2020), Disp: 30 capsule, Rfl: 0  levocetirizine (XYZAL) 5 MG tablet, Take 5 mg by mouth every evening., Disp: , Rfl:     No current facility-administered medications for this visit.       Review of patient's allergies indicates:   -- Prochlorperazine edisylate -- Anaphylaxis    --  Other reaction(s): Unknown    Review of Systems   Constitutional: Negative for chills, fatigue and fever.   Respiratory: Negative.  Negative for cough and shortness of breath.    Cardiovascular: Negative.  Negative for chest pain.   Gastrointestinal: Negative.    Genitourinary: Negative.    Neurological: Negative for dizziness, light-headedness and headaches.   Psychiatric/Behavioral: Negative.          Objective:      Physical Exam  Constitutional:        Appearance: Normal appearance.   Cardiovascular:      Rate and Rhythm: Normal rate.   Pulmonary:      Effort: Pulmonary effort is normal. No respiratory distress.   Musculoskeletal:      Right lower leg: No edema.      Left lower leg: No edema.   Neurological:      General: No focal deficit present.      Mental Status: She is alert and oriented to person, place, and time.   Psychiatric:         Mood and Affect: Mood normal.         Behavior: Behavior normal.         Thought Content: Thought content normal.         Assessment:       1. Vitamin D deficiency    2. Essential hypertension        Plan:       1. Vitamin D deficiency    - Vitamin D; Future    2. Essential hypertension  Stable, continue current medication, recheck with PCP in 3 months. Goals discussed.

## 2020-11-19 ENCOUNTER — PATIENT MESSAGE (OUTPATIENT)
Dept: HEMATOLOGY/ONCOLOGY | Facility: CLINIC | Age: 75
End: 2020-11-19

## 2020-11-23 DIAGNOSIS — E55.9 VITAMIN D DEFICIENCY: Primary | ICD-10-CM

## 2020-12-15 ENCOUNTER — LAB VISIT (OUTPATIENT)
Dept: PRIMARY CARE CLINIC | Facility: OTHER | Age: 75
End: 2020-12-15
Attending: INTERNAL MEDICINE
Payer: MEDICARE

## 2020-12-15 DIAGNOSIS — Z03.818 ENCOUNTER FOR OBSERVATION FOR SUSPECTED EXPOSURE TO OTHER BIOLOGICAL AGENTS RULED OUT: ICD-10-CM

## 2020-12-15 PROCEDURE — U0003 INFECTIOUS AGENT DETECTION BY NUCLEIC ACID (DNA OR RNA); SEVERE ACUTE RESPIRATORY SYNDROME CORONAVIRUS 2 (SARS-COV-2) (CORONAVIRUS DISEASE [COVID-19]), AMPLIFIED PROBE TECHNIQUE, MAKING USE OF HIGH THROUGHPUT TECHNOLOGIES AS DESCRIBED BY CMS-2020-01-R: HCPCS

## 2020-12-16 LAB — SARS-COV-2 RNA RESP QL NAA+PROBE: NOT DETECTED

## 2020-12-23 ENCOUNTER — OFFICE VISIT (OUTPATIENT)
Dept: RHEUMATOLOGY | Facility: CLINIC | Age: 75
End: 2020-12-23
Payer: MEDICARE

## 2020-12-23 ENCOUNTER — HOSPITAL ENCOUNTER (OUTPATIENT)
Dept: RADIOLOGY | Facility: HOSPITAL | Age: 75
Discharge: HOME | End: 2020-12-23
Attending: INTERNAL MEDICINE
Payer: MEDICARE

## 2020-12-23 VITALS
HEART RATE: 75 BPM | HEIGHT: 64 IN | SYSTOLIC BLOOD PRESSURE: 152 MMHG | BODY MASS INDEX: 32.81 KG/M2 | DIASTOLIC BLOOD PRESSURE: 91 MMHG

## 2020-12-23 DIAGNOSIS — H81.03 MENIERE DISEASE, BILATERAL: ICD-10-CM

## 2020-12-23 DIAGNOSIS — Z85.3 HISTORY OF BREAST CANCER: ICD-10-CM

## 2020-12-23 DIAGNOSIS — H20.9 UVEITIS OF BOTH EYES: ICD-10-CM

## 2020-12-23 DIAGNOSIS — M47.814 THORACIC ARTHRITIS: ICD-10-CM

## 2020-12-23 DIAGNOSIS — L40.9 PSORIASIS OF SCALP: ICD-10-CM

## 2020-12-23 DIAGNOSIS — Z85.51 HISTORY OF BLADDER CANCER: Primary | ICD-10-CM

## 2020-12-23 DIAGNOSIS — M54.9 DORSALGIA, UNSPECIFIED: ICD-10-CM

## 2020-12-23 DIAGNOSIS — M46.92 CERVICAL SPONDYLITIS: ICD-10-CM

## 2020-12-23 DIAGNOSIS — Z85.51 HISTORY OF BLADDER CANCER: ICD-10-CM

## 2020-12-23 DIAGNOSIS — M35.00 SICCA SYNDROME: ICD-10-CM

## 2020-12-23 PROCEDURE — 99999 PR PBB SHADOW E&M-EST. PATIENT-LVL III: CPT | Mod: PBBFAC,,, | Performed by: INTERNAL MEDICINE

## 2020-12-23 PROCEDURE — 72110 X-RAY EXAM L-2 SPINE 4/>VWS: CPT | Mod: TC,FY,PO

## 2020-12-23 PROCEDURE — 96372 THER/PROPH/DIAG INJ SC/IM: CPT | Mod: S$GLB,,, | Performed by: INTERNAL MEDICINE

## 2020-12-23 PROCEDURE — 3008F PR BODY MASS INDEX (BMI) DOCUMENTED: ICD-10-PCS | Mod: CPTII,S$GLB,, | Performed by: INTERNAL MEDICINE

## 2020-12-23 PROCEDURE — 3008F BODY MASS INDEX DOCD: CPT | Mod: CPTII,S$GLB,, | Performed by: INTERNAL MEDICINE

## 2020-12-23 PROCEDURE — 72040 X-RAY EXAM NECK SPINE 2-3 VW: CPT | Mod: TC,FY,PO

## 2020-12-23 PROCEDURE — 96372 PR INJECTION,THERAP/PROPH/DIAG2ST, IM OR SUBCUT: ICD-10-PCS | Mod: S$GLB,,, | Performed by: INTERNAL MEDICINE

## 2020-12-23 PROCEDURE — 72040 XR CERVICAL SPINE AP LATERAL: ICD-10-PCS | Mod: 26,,, | Performed by: RADIOLOGY

## 2020-12-23 PROCEDURE — 3077F PR MOST RECENT SYSTOLIC BLOOD PRESSURE >= 140 MM HG: ICD-10-PCS | Mod: CPTII,S$GLB,, | Performed by: INTERNAL MEDICINE

## 2020-12-23 PROCEDURE — 3288F PR FALLS RISK ASSESSMENT DOCUMENTED: ICD-10-PCS | Mod: CPTII,S$GLB,, | Performed by: INTERNAL MEDICINE

## 2020-12-23 PROCEDURE — 99205 OFFICE O/P NEW HI 60 MIN: CPT | Mod: 25,S$GLB,, | Performed by: INTERNAL MEDICINE

## 2020-12-23 PROCEDURE — 72040 X-RAY EXAM NECK SPINE 2-3 VW: CPT | Mod: 26,,, | Performed by: RADIOLOGY

## 2020-12-23 PROCEDURE — 1159F PR MEDICATION LIST DOCUMENTED IN MEDICAL RECORD: ICD-10-PCS | Mod: S$GLB,,, | Performed by: INTERNAL MEDICINE

## 2020-12-23 PROCEDURE — 1125F PR PAIN SEVERITY QUANTIFIED, PAIN PRESENT: ICD-10-PCS | Mod: S$GLB,,, | Performed by: INTERNAL MEDICINE

## 2020-12-23 PROCEDURE — 72070 XR THORACIC SPINE AP LATERAL: ICD-10-PCS | Mod: 26,,, | Performed by: RADIOLOGY

## 2020-12-23 PROCEDURE — 3077F SYST BP >= 140 MM HG: CPT | Mod: CPTII,S$GLB,, | Performed by: INTERNAL MEDICINE

## 2020-12-23 PROCEDURE — 3080F DIAST BP >= 90 MM HG: CPT | Mod: CPTII,S$GLB,, | Performed by: INTERNAL MEDICINE

## 2020-12-23 PROCEDURE — 99205 PR OFFICE/OUTPT VISIT, NEW, LEVL V, 60-74 MIN: ICD-10-PCS | Mod: 25,S$GLB,, | Performed by: INTERNAL MEDICINE

## 2020-12-23 PROCEDURE — 3080F PR MOST RECENT DIASTOLIC BLOOD PRESSURE >= 90 MM HG: ICD-10-PCS | Mod: CPTII,S$GLB,, | Performed by: INTERNAL MEDICINE

## 2020-12-23 PROCEDURE — 72070 X-RAY EXAM THORAC SPINE 2VWS: CPT | Mod: TC,FY,PO

## 2020-12-23 PROCEDURE — 99999 PR PBB SHADOW E&M-EST. PATIENT-LVL III: ICD-10-PCS | Mod: PBBFAC,,, | Performed by: INTERNAL MEDICINE

## 2020-12-23 PROCEDURE — 1101F PR PT FALLS ASSESS DOC 0-1 FALLS W/OUT INJ PAST YR: ICD-10-PCS | Mod: CPTII,S$GLB,, | Performed by: INTERNAL MEDICINE

## 2020-12-23 PROCEDURE — 1125F AMNT PAIN NOTED PAIN PRSNT: CPT | Mod: S$GLB,,, | Performed by: INTERNAL MEDICINE

## 2020-12-23 PROCEDURE — 72070 X-RAY EXAM THORAC SPINE 2VWS: CPT | Mod: 26,,, | Performed by: RADIOLOGY

## 2020-12-23 PROCEDURE — 72110 X-RAY EXAM L-2 SPINE 4/>VWS: CPT | Mod: 26,,, | Performed by: RADIOLOGY

## 2020-12-23 PROCEDURE — 72110 XR LUMBAR SPINE COMPLETE 5 VIEW: ICD-10-PCS | Mod: 26,,, | Performed by: RADIOLOGY

## 2020-12-23 PROCEDURE — 1159F MED LIST DOCD IN RCRD: CPT | Mod: S$GLB,,, | Performed by: INTERNAL MEDICINE

## 2020-12-23 PROCEDURE — 1101F PT FALLS ASSESS-DOCD LE1/YR: CPT | Mod: CPTII,S$GLB,, | Performed by: INTERNAL MEDICINE

## 2020-12-23 PROCEDURE — 3288F FALL RISK ASSESSMENT DOCD: CPT | Mod: CPTII,S$GLB,, | Performed by: INTERNAL MEDICINE

## 2020-12-23 RX ORDER — KETOROLAC TROMETHAMINE 30 MG/ML
60 INJECTION, SOLUTION INTRAMUSCULAR; INTRAVENOUS
Status: COMPLETED | OUTPATIENT
Start: 2020-12-23 | End: 2020-12-23

## 2020-12-23 RX ORDER — MECLIZINE HYDROCHLORIDE 25 MG/1
25 TABLET ORAL 3 TIMES DAILY PRN
Qty: 90 TABLET | Refills: 3 | Status: SHIPPED | OUTPATIENT
Start: 2020-12-23 | End: 2021-05-06

## 2020-12-23 RX ORDER — ALPRAZOLAM 0.25 MG/1
0.25 TABLET ORAL 3 TIMES DAILY PRN
Qty: 45 TABLET | Refills: 0 | Status: SHIPPED | OUTPATIENT
Start: 2020-12-23 | End: 2021-02-25 | Stop reason: SDUPTHER

## 2020-12-23 RX ORDER — CYANOCOBALAMIN 1000 UG/ML
1000 INJECTION, SOLUTION INTRAMUSCULAR; SUBCUTANEOUS
Status: COMPLETED | OUTPATIENT
Start: 2020-12-23 | End: 2020-12-23

## 2020-12-23 RX ADMIN — CYANOCOBALAMIN 1000 MCG: 1000 INJECTION, SOLUTION INTRAMUSCULAR; SUBCUTANEOUS at 11:12

## 2020-12-23 RX ADMIN — KETOROLAC TROMETHAMINE 60 MG: 30 INJECTION, SOLUTION INTRAMUSCULAR; INTRAVENOUS at 11:12

## 2020-12-23 ASSESSMENT — ROUTINE ASSESSMENT OF PATIENT INDEX DATA (RAPID3)
PAIN SCORE: 3.5
PSYCHOLOGICAL DISTRESS SCORE: 1.1
FATIGUE SCORE: 2.2
PATIENT GLOBAL ASSESSMENT SCORE: 2.5
MDHAQ FUNCTION SCORE: 0.3
TOTAL RAPID3 SCORE: 2.33

## 2020-12-23 NOTE — PROGRESS NOTES
Medication ordered this visit administered without problems. Labs and x-rays scheduled for today. Let know we will call her with her new patient follow appointment

## 2020-12-23 NOTE — LETTER
December 29, 2020      Tommy Palumbo MD  [address] [facility] [address] [place] [address]           [place] - [facility]ology  [address] [place] [address]  Phone: [phone]  Fax: [phone]          Patient: Yuliana Mattson   MR Number: [id number]   YOB: 1945   Date of Visit: 12/23/2020       Dear Dr. Tommy Palumbo:    Thank you for referring Yuliana Mattson to me for evaluation. Attached you will find relevant portions of my assessment and plan of care.    If you have questions, please do not hesitate to call me. I look forward to following Yuliana Mattson along with you.    Sincerely,    Chau Hughes MD    Enclosure  CC:  No Recipients    If you would like to receive this communication electronically, please contact [email or web address] or [phone] to request more information on [employer] Link access.    For providers and/or their staff who would like to refer a patient to [facility], please contact us through our one-stop-shop provider referral line, [facility], at [phone].    If you feel you have received this communication in error or would no longer like to receive these types of communications, please e-mail [email or web address]

## 2020-12-23 NOTE — PROGRESS NOTES
Subjective:       Patient ID: Yuliana Mattson is a 75 y.o. female.    Chief Complaint: Pain, Disease Management, and Swelling    HPI: pt is a 76 yo with a h/o uveitis, iritis on  Prednisone, and restasis, she was dx with meniere's dz resolved wit steroids, she has had recurrent episodes of vertigo.she rarely get sick, she has had breast ca x 2 two different type prior to that she had bladder cancer. She has h/o warts and ringworms in the past. Age 37 had a hysterectomy, had a h/o ? Immune defiency but was not treated. Family hx ovarian ca Patient complains of arthralgias and myalgias for which has been present for a few years. Pain is located in multiple joints, both shoulder(s), both elbow(s), both wrist(s), both MCP(s): 1st, 2nd, 3rd, 4th and 5th, both PIP(s): 1st, 2nd, 3rd, 4th and 5th, both DIP(s): 1st and 2nd, both hip(s), both knee(s) and both MTP(s): 1st, 2nd, 3rd, 4th and 5th, is described as aching, pulsating, shooting and throbbing, and is constant, moderate .  Associated symptoms include: crepitation, decreased range of motion, edema, effusion, tenderness and warmth.  The patient has tried nothing for pain relief.  Related to injury:   no.    Review of Systems   Constitutional: Negative for activity change, appetite change, chills, diaphoresis, fatigue, fever and unexpected weight change.   HENT: Negative for congestion, dental problem, ear discharge, ear pain, facial swelling, mouth sores, nosebleeds, postnasal drip, rhinorrhea, sinus pressure, sneezing, sore throat, tinnitus, trouble swallowing and voice change.    Eyes: Negative for photophobia, pain, discharge, redness and itching.   Respiratory: Negative for apnea, cough, chest tightness, shortness of breath and wheezing.    Cardiovascular: Negative for chest pain, palpitations and leg swelling.   Gastrointestinal: Negative for abdominal distention, abdominal pain, constipation, diarrhea, nausea and vomiting.   Endocrine: Negative for cold  "intolerance, heat intolerance, polydipsia and polyuria.   Genitourinary: Negative for decreased urine volume, difficulty urinating, dysuria, flank pain, frequency, hematuria and urgency.   Musculoskeletal: Positive for arthralgias, back pain, joint swelling, neck pain and neck stiffness. Negative for gait problem and myalgias.   Skin: Negative for pallor, rash and wound.   Allergic/Immunologic: Negative for immunocompromised state.   Neurological: Negative for dizziness, tremors, weakness, numbness and headaches.   Hematological: Negative for adenopathy. Does not bruise/bleed easily.   Psychiatric/Behavioral: Negative for sleep disturbance. The patient is not nervous/anxious.          Objective:   BP (!) 152/91 (BP Location: Right arm, Patient Position: Sitting, BP Method: Medium (Automatic))   Pulse 75   Ht 5' 4" (1.626 m)   BMI 32.81 kg/m²      Physical Exam   Vitals reviewed.  Constitutional: She is oriented to person, place, and time and well-developed, well-nourished, and in no distress.   HENT:   Head: Normocephalic and atraumatic.   Mouth/Throat: Oropharynx is clear and moist.   Eyes: EOM are normal. Pupils are equal, round, and reactive to light.   Neck: Neck supple. No thyromegaly present.   Cardiovascular: Normal rate, regular rhythm and normal heart sounds.  Exam reveals no gallop and no friction rub.    No murmur heard.  Pulmonary/Chest: Breath sounds normal. She has no wheezes. She has no rales. She exhibits no tenderness.   Abdominal: There is no abdominal tenderness. There is no rebound and no guarding.       Right Side Rheumatological Exam     Examination finds the elbow, 1st MCP, 2nd MCP, 3rd MCP, 4th MCP and 5th MCP normal.    The patient is tender to palpation of the shoulder and knee    She has swelling of the knee    The patient has an enlarged wrist, 1st PIP, 2nd PIP, 3rd PIP, 4th PIP and 5th PIP    Shoulder Exam   Tenderness Location: acromion    Range of Motion   Active abduction: abnormal "   Adduction: abnormal  Sensation: normal    Knee Exam   Tenderness Location: medial joint line  Patellofemoral Crepitus: positive  Effusion: positive  Sensation: normal    Hip Exam   Tenderness Location: no tenderness  Sensation: normal    Elbow/Wrist Exam   Tenderness Location: no tenderness  Sensation: normal    Left Side Rheumatological Exam     Examination finds the elbow, knee, 1st MCP, 2nd MCP, 3rd MCP, 4th MCP and 5th MCP normal.    The patient is tender to palpation of the shoulder.    The patient has an enlarged wrist, 1st PIP, 2nd PIP, 3rd PIP, 4th PIP and 5th PIP.    Shoulder Exam   Tenderness Location: acromion    Range of Motion   Active abduction: abnormal   Sensation: normal    Knee Exam   Tenderness Location: lateral joint line and medial joint line    Patellofemoral Crepitus: positive  Effusion: positive  Sensation: normal    Hip Exam   Tenderness Location: no tenderness  Sensation: normal    Elbow/Wrist Exam   Sensation: normal      Back/Neck Exam   General Inspection   Gait: normal       Tenderness Right paramedian tenderness of the Lower C-Spine and Lower L-Spine.Left paramedian tenderness of the Upper C-Spine and Lower L-Spine.      Lymphadenopathy:     She has no cervical adenopathy.   Neurological: She is alert and oriented to person, place, and time. She has normal sensation and normal strength. Gait normal.   Reflex Scores:       Patellar reflexes are 3+ on the right side and 3+ on the left side.  Skin: No rash noted. No erythema. No pallor.     Psychiatric: Mood and affect normal.   Musculoskeletal: Tenderness present. No edema.        CT Head Without Contrast  Order: [id number]  Status:  Final result   Visible to patient:  Yes (Patient Portal) Next appt:  01/08/2021 at 10:00 AM in Radiology (Mammogram) Dx:  Dizziness and giddiness; Essential hy...  Details    Reading Physician Reading Date Result Priority   Abe Joseph MD  [phone] 11/13/2020 Routine      Narrative & Impression      EXAMINATION:  CT HEAD WITHOUT CONTRAST     CLINICAL HISTORY:  Dizziness, non-specific; Dizziness and giddiness     TECHNIQUE:  Low dose axial images were obtained through the head.  Coronal and sagittal reformations were also performed. Contrast was not administered.     COMPARISON:  MRI brain 04/09/2013     FINDINGS:  There is no acute intracranial hemorrhage.  Ventricles are of normal size and morphology.   No mass effect or midline shift is present.  The gray-white matter differentiation is within normal limits. The visualized portions of the mastoids are normal.  The visualized portions of the paranasal sinuses are normal.  Note is made of hyperostosis frontalis interna, a benign senescent finding.  The visualized portions of the orbits are normal.  No fractures are identified.     Impression:     1. No evidence of an acute intracranial abnormality.  2. Patchy areas of supratentorial white matter hypoattenuation, nonspecific and may most likely representing a mild degree of chronic microvascular ischemic change.        Electronically signed by: Abe Joseph  Date:                                            11/13/2020  Time:                                           10:27           Anti-SSA Antibody0.00 - 0.99 Ratio0.05 Anti-SSA InterpretationNegativeNegative   Anti-SSB Antibody0.00 - 0.99 Ratio0.06 Anti-SSB InterpretationNegativeNegative   HLA B27 ANTIGEN  Order: [id number]  Status:  Final result   Visible to patient:  No (not released) Next appt:  01/08/2021 at 10:00 AM in Radiology (Mammogram) Dx:  Acute iridocyclitis; Sjogren's syndro...  Component 7mo ago   HLA B27 Interpretation TAQ: 100218   SAPE: 202    B27 Testing Date 05/18/2020 12:59 PM    HLA B27 Result Negative            LISS Profile  Order: [id number]  Status:  Final result   Visible to patient:  Yes (Patient Portal) Next appt:  01/08/2021 at 10:00 AM in Radiology (Mammogram)   Ref Range & Units 7mo ago  (5/14/20) 7mo ago  (5/14/20) 7mo  ago  (5/14/20)   Anti Sm Antibody 0.00 - 0.99 Ratio 0.09      Anti-Sm Interpretation Negative Negative      Anti-SSA Antibody 0.00 - 0.99 Ratio 0.05   0.05    Anti-SSA Interpretation Negative Negative   Negative    Anti-SSB Antibody 0.00 - 0.99 Ratio 0.06  0.06     Anti-SSB Interpretation Negative Negative  Negative     ds DNA Ab Negative 1:10 Negative 1:10      Comment: Performed by fluorescent crithidia assay.   Anti Sm/RNP Antibody 0.00 - 0.99 Ratio 0.07      Anti-Sm/RNP Interpretation Negative Negative      Resulting Agency  [facility]         Specimen Collected: 05/14/20 10:00 Last Resulted: 05/19/20 19:02           LISS Titer 1  Order: [id number]  Status:  Final result   Visible to patient:  Yes (Patient Portal) Next appt:  01/08/2021 at 10:00 AM in Radiology (Mammogram)  Component 7mo ago   LISS Titer 1 1:640            Specimen Information: Urine, Clean Catch        Component 6mo ago   Urine Culture, Routine Abnormal   ESCHERICHIA COLI   >100,000 cfu/ml     Resulting Agency [facility]   Susceptibility     Escherichia coli     CULTURE, URINE     Amox/K Clav'ate <=8/4 mcg/mL Sensitive     Amp/Sulbactam 16/8 mcg/mL Intermediate     Ampicillin >16 mcg/mL Resistant     Cefazolin <=2 mcg/mL Sensitive     Cefepime <=2 mcg/mL Sensitive     Ceftriaxone <=1 mcg/mL Sensitive     Ciprofloxacin <=1 mcg/mL Sensitive     Ertapenem <=0.5 mcg/mL Sensitive     Gentamicin <=4 mcg/mL Sensitive     Levofloxacin <=2 mcg/mL Sensitive     Meropenem <=1 mcg/mL Sensitive     Nitrofurantoin <=32 mcg/mL Sensitive     Piperacillin/Tazo <=16 mcg/mL Sensitive     Tetracycline <=4 mcg/mL Sensitive     Tobramycin <=4 mcg/mL Sensitive     Trimeth/Sulfa >2/38 mcg/mL Resistant            Linear View         Specimen Collected: 05/28/20 10:17 Last Resulted: 05/30/20 22:34 Lab Flowsheet           Ref Range & Units 6mo ago 7mo ago    Specimen UA  Urine, Clean Catch  Urine, Clean Catch    Color, UA Yellow, Straw, [place] Yellow  RedAbnormal      Appearance, UA Clear Clear  HazyAbnormal     pH, UA 5.0 - 8.0 6.0  6.0    Specific Gravity, UA 1.005 - 1.030 1.015  1.020    Protein, UA Negative Negative  TraceAbnormal  CM    Comment: Recommend a 24 hour urine protein or a urine   protein/creatinine ratio if globulin induced proteinuria is   clinically suspected.    Glucose, UA Negative Negative  Negative    Ketones, UA Negative Negative  Negative    Bilirubin (UA) Negative Negative  Negative    Occult Blood UA Negative 1+Abnormal   3+Abnormal     Nitrite, UA Negative Negative  Negative    Leukocytes, UA Negative 1+Abnormal   1+Abnormal     Resulting Agency  [facility]      Specimen Collected: 05/28/20 10:17 Last Resulted: 05/28/20 10:47 Lab Flowsheet Order Details View Encounter      Sed Rate0 - 20 mm/Hr18 [facility]   CRP 0.0 - 8.2 mg/L 33.2High     Resulting Agency  [facility]      Specimen Collected: 05/14/20 10:00 Last Resulted: 05/14/20 20:54 Lab Flowsheet          Assessment:       1. History of bladder cancer    2. History of breast cancer    3. Uveitis of both eyes    4. Sicca syndrome    5. Cervical spondylitis    6. Thoracic arthritis    7. Psoriasis of scalp    8. Dorsalgia, unspecified    9. Meniere disease, bilateral            Plan:         Yuliana was seen today for pain, disease management and swelling.    Diagnoses and all orders for this visit:    History of bladder cancer  -     IgA; Future  -     IgE; Future  -     IgG; Future  -     IgG 1, 2, 3, and 4; Future  -     IgM; Future  -     LISS Screen w/Reflex; Future  -     Anti Sm/RNP Antibody; Future  -     Anti-DNA Ab, Double-Stranded; Future  -     Anti-Histone Antibody; Future  -     Anti-Scleroderma Antibody; Future  -     Anti-Smith Antibody; Future  -     Anti-Thyroglobulin Antibody; Future  -     Sjogrens syndrome-B extractable nuclear antibody; Future  -     Sjogrens syndrome-A extractable nuclear antibody; Future  -     Sedimentation rate; Future  -     Rheumatoid Factor; Future  -      C-Reactive Protein; Future  -     Cyclic Citrullinated Peptide Antibody, IgG; Future  -     TSH; Future  -     Thyroid Peroxidase Antibody; Future  -     T4, Free; Future  -     T3, Free; Future  -     Misc Sendout Test, Blood anti inner ear antyibody  68 kg antibody and hsp 70; Future  -     X-Ray Cervical Spine AP And Lateral; Future  -     X-Ray Thoracic Spine AP Lateral; Future  -     X-Ray Lumbar Spine Complete 5 View; Future    History of breast cancer  -     IgA; Future  -     IgE; Future  -     IgG; Future  -     IgG 1, 2, 3, and 4; Future  -     IgM; Future  -     LISS Screen w/Reflex; Future  -     Anti Sm/RNP Antibody; Future  -     Anti-DNA Ab, Double-Stranded; Future  -     Anti-Histone Antibody; Future  -     Anti-Scleroderma Antibody; Future  -     Anti-Smith Antibody; Future  -     Anti-Thyroglobulin Antibody; Future  -     Sjogrens syndrome-B extractable nuclear antibody; Future  -     Sjogrens syndrome-A extractable nuclear antibody; Future  -     Sedimentation rate; Future  -     Rheumatoid Factor; Future  -     C-Reactive Protein; Future  -     Cyclic Citrullinated Peptide Antibody, IgG; Future  -     TSH; Future  -     Thyroid Peroxidase Antibody; Future  -     T4, Free; Future  -     T3, Free; Future  -     Misc Sendout Test, Blood anti inner ear antyibody  68 kg antibody and hsp 70; Future  -     X-Ray Cervical Spine AP And Lateral; Future  -     X-Ray Thoracic Spine AP Lateral; Future  -     X-Ray Lumbar Spine Complete 5 View; Future    Uveitis of both eyes  -     IgA; Future  -     IgE; Future  -     IgG; Future  -     IgG 1, 2, 3, and 4; Future  -     IgM; Future  -     LISS Screen w/Reflex; Future  -     Anti Sm/RNP Antibody; Future  -     Anti-DNA Ab, Double-Stranded; Future  -     Anti-Histone Antibody; Future  -     Anti-Scleroderma Antibody; Future  -     Anti-Smith Antibody; Future  -     Anti-Thyroglobulin Antibody; Future  -     Sjogrens syndrome-B extractable nuclear antibody;  Future  -     Sjogrens syndrome-A extractable nuclear antibody; Future  -     Sedimentation rate; Future  -     Rheumatoid Factor; Future  -     C-Reactive Protein; Future  -     Cyclic Citrullinated Peptide Antibody, IgG; Future  -     TSH; Future  -     Thyroid Peroxidase Antibody; Future  -     T4, Free; Future  -     T3, Free; Future  -     Misc Sendout Test, Blood anti inner ear antyibody  68 kg antibody and hsp 70; Future  -     X-Ray Cervical Spine AP And Lateral; Future  -     X-Ray Thoracic Spine AP Lateral; Future  -     X-Ray Lumbar Spine Complete 5 View; Future  -     ketorolac injection 60 mg  -     cyanocobalamin injection 1,000 mcg  -     meclizine (ANTIVERT) 25 mg tablet; Take 1 tablet (25 mg total) by mouth 3 (three) times daily as needed.    Sicca syndrome  -     IgA; Future  -     IgE; Future  -     IgG; Future  -     IgG 1, 2, 3, and 4; Future  -     IgM; Future  -     LISS Screen w/Reflex; Future  -     Anti Sm/RNP Antibody; Future  -     Anti-DNA Ab, Double-Stranded; Future  -     Anti-Histone Antibody; Future  -     Anti-Scleroderma Antibody; Future  -     Anti-Smith Antibody; Future  -     Anti-Thyroglobulin Antibody; Future  -     Sjogrens syndrome-B extractable nuclear antibody; Future  -     Sjogrens syndrome-A extractable nuclear antibody; Future  -     Sedimentation rate; Future  -     Rheumatoid Factor; Future  -     C-Reactive Protein; Future  -     Cyclic Citrullinated Peptide Antibody, IgG; Future  -     TSH; Future  -     Thyroid Peroxidase Antibody; Future  -     T4, Free; Future  -     T3, Free; Future  -     Misc Sendout Test, Blood anti inner ear antyibody  68 kg antibody and hsp 70; Future  -     X-Ray Cervical Spine AP And Lateral; Future  -     X-Ray Thoracic Spine AP Lateral; Future  -     X-Ray Lumbar Spine Complete 5 View; Future  -     ketorolac injection 60 mg  -     cyanocobalamin injection 1,000 mcg  -     meclizine (ANTIVERT) 25 mg tablet; Take 1 tablet (25 mg total)  by mouth 3 (three) times daily as needed.    Cervical spondylitis  -     IgA; Future  -     IgE; Future  -     IgG; Future  -     IgG 1, 2, 3, and 4; Future  -     IgM; Future  -     LISS Screen w/Reflex; Future  -     Anti Sm/RNP Antibody; Future  -     Anti-DNA Ab, Double-Stranded; Future  -     Anti-Histone Antibody; Future  -     Anti-Scleroderma Antibody; Future  -     Anti-Smith Antibody; Future  -     Anti-Thyroglobulin Antibody; Future  -     Sjogrens syndrome-B extractable nuclear antibody; Future  -     Sjogrens syndrome-A extractable nuclear antibody; Future  -     Sedimentation rate; Future  -     Rheumatoid Factor; Future  -     C-Reactive Protein; Future  -     Cyclic Citrullinated Peptide Antibody, IgG; Future  -     TSH; Future  -     Thyroid Peroxidase Antibody; Future  -     T4, Free; Future  -     T3, Free; Future  -     Misc Sendout Test, Blood anti inner ear antyibody  68 kg antibody and hsp 70; Future  -     X-Ray Cervical Spine AP And Lateral; Future  -     X-Ray Thoracic Spine AP Lateral; Future  -     X-Ray Lumbar Spine Complete 5 View; Future  -     ketorolac injection 60 mg  -     cyanocobalamin injection 1,000 mcg  -     meclizine (ANTIVERT) 25 mg tablet; Take 1 tablet (25 mg total) by mouth 3 (three) times daily as needed.    Thoracic arthritis  -     IgA; Future  -     IgE; Future  -     IgG; Future  -     IgG 1, 2, 3, and 4; Future  -     IgM; Future  -     LISS Screen w/Reflex; Future  -     Anti Sm/RNP Antibody; Future  -     Anti-DNA Ab, Double-Stranded; Future  -     Anti-Histone Antibody; Future  -     Anti-Scleroderma Antibody; Future  -     Anti-Smith Antibody; Future  -     Anti-Thyroglobulin Antibody; Future  -     Sjogrens syndrome-B extractable nuclear antibody; Future  -     Sjogrens syndrome-A extractable nuclear antibody; Future  -     Sedimentation rate; Future  -     Rheumatoid Factor; Future  -     C-Reactive Protein; Future  -     Cyclic Citrullinated Peptide Antibody,  IgG; Future  -     TSH; Future  -     Thyroid Peroxidase Antibody; Future  -     T4, Free; Future  -     T3, Free; Future  -     Misc Sendout Test, Blood anti inner ear antyibody  68 kg antibody and hsp 70; Future  -     X-Ray Cervical Spine AP And Lateral; Future  -     X-Ray Thoracic Spine AP Lateral; Future  -     X-Ray Lumbar Spine Complete 5 View; Future  -     ketorolac injection 60 mg  -     cyanocobalamin injection 1,000 mcg  -     meclizine (ANTIVERT) 25 mg tablet; Take 1 tablet (25 mg total) by mouth 3 (three) times daily as needed.    Psoriasis of scalp  -     IgA; Future  -     IgE; Future  -     IgG; Future  -     IgG 1, 2, 3, and 4; Future  -     IgM; Future  -     LISS Screen w/Reflex; Future  -     Anti Sm/RNP Antibody; Future  -     Anti-DNA Ab, Double-Stranded; Future  -     Anti-Histone Antibody; Future  -     Anti-Scleroderma Antibody; Future  -     Anti-Smith Antibody; Future  -     Anti-Thyroglobulin Antibody; Future  -     Sjogrens syndrome-B extractable nuclear antibody; Future  -     Sjogrens syndrome-A extractable nuclear antibody; Future  -     Sedimentation rate; Future  -     Rheumatoid Factor; Future  -     C-Reactive Protein; Future  -     Cyclic Citrullinated Peptide Antibody, IgG; Future  -     TSH; Future  -     Thyroid Peroxidase Antibody; Future  -     T4, Free; Future  -     T3, Free; Future  -     Misc Sendout Test, Blood anti inner ear antyibody  68 kg antibody and hsp 70; Future  -     X-Ray Cervical Spine AP And Lateral; Future  -     X-Ray Thoracic Spine AP Lateral; Future  -     X-Ray Lumbar Spine Complete 5 View; Future  -     ketorolac injection 60 mg  -     cyanocobalamin injection 1,000 mcg  -     meclizine (ANTIVERT) 25 mg tablet; Take 1 tablet (25 mg total) by mouth 3 (three) times daily as needed.    Dorsalgia, unspecified  -     IgA; Future  -     IgE; Future  -     IgG; Future  -     IgG 1, 2, 3, and 4; Future  -     IgM; Future  -     LISS Screen w/Reflex; Future  -      Anti Sm/RNP Antibody; Future  -     Anti-DNA Ab, Double-Stranded; Future  -     Anti-Histone Antibody; Future  -     Anti-Scleroderma Antibody; Future  -     Anti-Smith Antibody; Future  -     Anti-Thyroglobulin Antibody; Future  -     Sjogrens syndrome-B extractable nuclear antibody; Future  -     Sjogrens syndrome-A extractable nuclear antibody; Future  -     Sedimentation rate; Future  -     Rheumatoid Factor; Future  -     C-Reactive Protein; Future  -     Cyclic Citrullinated Peptide Antibody, IgG; Future  -     TSH; Future  -     Thyroid Peroxidase Antibody; Future  -     T4, Free; Future  -     T3, Free; Future  -     Misc Sendout Test, Blood anti inner ear antyibody  68 kg antibody and hsp 70; Future  -     X-Ray Cervical Spine AP And Lateral; Future  -     X-Ray Thoracic Spine AP Lateral; Future  -     X-Ray Lumbar Spine Complete 5 View; Future  -     ketorolac injection 60 mg  -     cyanocobalamin injection 1,000 mcg  -     meclizine (ANTIVERT) 25 mg tablet; Take 1 tablet (25 mg total) by mouth 3 (three) times daily as needed.    Meniere disease, bilateral  -     IgA; Future  -     IgE; Future  -     IgG; Future  -     IgG 1, 2, 3, and 4; Future  -     IgM; Future  -     LISS Screen w/Reflex; Future  -     Anti Sm/RNP Antibody; Future  -     Anti-DNA Ab, Double-Stranded; Future  -     Anti-Histone Antibody; Future  -     Anti-Scleroderma Antibody; Future  -     Anti-Smith Antibody; Future  -     Anti-Thyroglobulin Antibody; Future  -     Sjogrens syndrome-B extractable nuclear antibody; Future  -     Sjogrens syndrome-A extractable nuclear antibody; Future  -     Sedimentation rate; Future  -     Rheumatoid Factor; Future  -     C-Reactive Protein; Future  -     Cyclic Citrullinated Peptide Antibody, IgG; Future  -     TSH; Future  -     Thyroid Peroxidase Antibody; Future  -     T4, Free; Future  -     T3, Free; Future  -     Misc Sendout Test, Blood anti inner ear antyibody  68 kg antibody and hsp 70;  Future  -     X-Ray Cervical Spine AP And Lateral; Future  -     X-Ray Thoracic Spine AP Lateral; Future  -     X-Ray Lumbar Spine Complete 5 View; Future  -     ketorolac injection 60 mg  -     cyanocobalamin injection 1,000 mcg  -     meclizine (ANTIVERT) 25 mg tablet; Take 1 tablet (25 mg total) by mouth 3 (three) times daily as needed.  -     ALPRAZolam (XANAX) 0.25 MG tablet; Take 1 tablet (0.25 mg total) by mouth 3 (three) times daily as needed for Anxiety.      More than 50% of the 60 minute encounter was spent face to face counseling the patient regarding current status and future plan of care as well as side of the medications. All questions were answered to patient's satisfaction

## 2021-01-06 ENCOUNTER — PATIENT MESSAGE (OUTPATIENT)
Dept: FAMILY MEDICINE | Facility: CLINIC | Age: 76
End: 2021-01-06

## 2021-01-07 ENCOUNTER — IMMUNIZATION (OUTPATIENT)
Dept: FAMILY MEDICINE | Facility: CLINIC | Age: 76
End: 2021-01-07
Payer: MEDICARE

## 2021-01-07 DIAGNOSIS — Z23 NEED FOR VACCINATION: ICD-10-CM

## 2021-01-07 PROCEDURE — 91300 COVID-19, MRNA, LNP-S, PF, 30 MCG/0.3 ML DOSE VACCINE: CPT | Mod: PBBFAC,PO

## 2021-01-14 ENCOUNTER — TELEPHONE (OUTPATIENT)
Dept: RHEUMATOLOGY | Facility: CLINIC | Age: 76
End: 2021-01-14

## 2021-01-28 ENCOUNTER — PATIENT OUTREACH (OUTPATIENT)
Dept: ADMINISTRATIVE | Facility: OTHER | Age: 76
End: 2021-01-28

## 2021-01-28 ENCOUNTER — IMMUNIZATION (OUTPATIENT)
Dept: FAMILY MEDICINE | Facility: CLINIC | Age: 76
End: 2021-01-28
Payer: MEDICARE

## 2021-01-28 DIAGNOSIS — Z23 NEED FOR VACCINATION: Primary | ICD-10-CM

## 2021-01-28 PROCEDURE — 91300 COVID-19, MRNA, LNP-S, PF, 30 MCG/0.3 ML DOSE VACCINE: CPT | Mod: PBBFAC,PO

## 2021-01-28 PROCEDURE — 0002A COVID-19, MRNA, LNP-S, PF, 30 MCG/0.3 ML DOSE VACCINE: CPT | Mod: PBBFAC,PO

## 2021-02-02 ENCOUNTER — PATIENT MESSAGE (OUTPATIENT)
Dept: HEMATOLOGY/ONCOLOGY | Facility: CLINIC | Age: 76
End: 2021-02-02

## 2021-02-04 ENCOUNTER — PATIENT MESSAGE (OUTPATIENT)
Dept: RHEUMATOLOGY | Facility: CLINIC | Age: 76
End: 2021-02-04

## 2021-02-08 ENCOUNTER — TELEPHONE (OUTPATIENT)
Dept: RHEUMATOLOGY | Facility: CLINIC | Age: 76
End: 2021-02-08

## 2021-02-12 ENCOUNTER — HOSPITAL ENCOUNTER (OUTPATIENT)
Dept: RADIOLOGY | Facility: HOSPITAL | Age: 76
Discharge: HOME | End: 2021-02-12
Attending: NURSE PRACTITIONER
Payer: COMMERCIAL

## 2021-02-12 DIAGNOSIS — M85.88 OTHER SPECIFIED DISORDERS OF BONE DENSITY AND STRUCTURE, OTHER SITE: ICD-10-CM

## 2021-02-12 DIAGNOSIS — M89.9 DISORDER OF BONE AND CARTILAGE: ICD-10-CM

## 2021-02-12 DIAGNOSIS — Z85.3 HISTORY OF BREAST CANCER: ICD-10-CM

## 2021-02-12 DIAGNOSIS — M94.9 DISORDER OF BONE AND CARTILAGE: ICD-10-CM

## 2021-02-12 DIAGNOSIS — Z90.12 H/O LEFT MASTECTOMY: ICD-10-CM

## 2021-02-12 PROCEDURE — 71046 X-RAY EXAM CHEST 2 VIEWS: CPT | Mod: 26,,, | Performed by: RADIOLOGY

## 2021-02-12 PROCEDURE — 77080 DXA BONE DENSITY AXIAL: CPT | Mod: TC,PO

## 2021-02-12 PROCEDURE — 77065 DX MAMMO INCL CAD UNI: CPT | Mod: 26,RT,, | Performed by: RADIOLOGY

## 2021-02-12 PROCEDURE — 71046 X-RAY EXAM CHEST 2 VIEWS: CPT | Mod: TC,FY,PO

## 2021-02-12 PROCEDURE — 77080 DEXA BONE DENSITY SPINE HIP: ICD-10-PCS | Mod: 26,,, | Performed by: RADIOLOGY

## 2021-02-12 PROCEDURE — 71046 XR CHEST PA AND LATERAL: ICD-10-PCS | Mod: 26,,, | Performed by: RADIOLOGY

## 2021-02-12 PROCEDURE — 77061 BREAST TOMOSYNTHESIS UNI: CPT | Mod: TC,PO,RT

## 2021-02-12 PROCEDURE — 77061 MAMMO DIGITAL DIAGNOSTIC RIGHT WITH TOMOSYNTHESIS_CAD: ICD-10-PCS | Mod: 26,RT,, | Performed by: RADIOLOGY

## 2021-02-12 PROCEDURE — 77065 MAMMO DIGITAL DIAGNOSTIC RIGHT WITH TOMOSYNTHESIS_CAD: ICD-10-PCS | Mod: 26,RT,, | Performed by: RADIOLOGY

## 2021-02-12 PROCEDURE — 77080 DXA BONE DENSITY AXIAL: CPT | Mod: 26,,, | Performed by: RADIOLOGY

## 2021-02-12 PROCEDURE — 77061 BREAST TOMOSYNTHESIS UNI: CPT | Mod: 26,RT,, | Performed by: RADIOLOGY

## 2021-02-15 ENCOUNTER — OFFICE VISIT (OUTPATIENT)
Dept: HEMATOLOGY/ONCOLOGY | Facility: CLINIC | Age: 76
End: 2021-02-15
Payer: COMMERCIAL

## 2021-02-15 VITALS
BODY MASS INDEX: 33.69 KG/M2 | HEART RATE: 74 BPM | DIASTOLIC BLOOD PRESSURE: 82 MMHG | WEIGHT: 197.31 LBS | OXYGEN SATURATION: 99 % | SYSTOLIC BLOOD PRESSURE: 136 MMHG | HEIGHT: 64 IN | TEMPERATURE: 99 F

## 2021-02-15 DIAGNOSIS — E55.9 VITAMIN D DEFICIENCY: ICD-10-CM

## 2021-02-15 DIAGNOSIS — Z85.3 HISTORY OF BREAST CANCER: Primary | ICD-10-CM

## 2021-02-15 PROCEDURE — 3288F FALL RISK ASSESSMENT DOCD: CPT | Mod: CPTII,S$GLB,, | Performed by: NURSE PRACTITIONER

## 2021-02-15 PROCEDURE — 1125F AMNT PAIN NOTED PAIN PRSNT: CPT | Mod: S$GLB,,, | Performed by: NURSE PRACTITIONER

## 2021-02-15 PROCEDURE — 1101F PR PT FALLS ASSESS DOC 0-1 FALLS W/OUT INJ PAST YR: ICD-10-PCS | Mod: CPTII,S$GLB,, | Performed by: NURSE PRACTITIONER

## 2021-02-15 PROCEDURE — 1101F PT FALLS ASSESS-DOCD LE1/YR: CPT | Mod: CPTII,S$GLB,, | Performed by: NURSE PRACTITIONER

## 2021-02-15 PROCEDURE — 3008F BODY MASS INDEX DOCD: CPT | Mod: CPTII,S$GLB,, | Performed by: NURSE PRACTITIONER

## 2021-02-15 PROCEDURE — 3079F DIAST BP 80-89 MM HG: CPT | Mod: CPTII,S$GLB,, | Performed by: NURSE PRACTITIONER

## 2021-02-15 PROCEDURE — 1159F MED LIST DOCD IN RCRD: CPT | Mod: S$GLB,,, | Performed by: NURSE PRACTITIONER

## 2021-02-15 PROCEDURE — 99999 PR PBB SHADOW E&M-EST. PATIENT-LVL IV: CPT | Mod: PBBFAC,,, | Performed by: NURSE PRACTITIONER

## 2021-02-15 PROCEDURE — 3075F PR MOST RECENT SYSTOLIC BLOOD PRESS GE 130-139MM HG: ICD-10-PCS | Mod: CPTII,S$GLB,, | Performed by: NURSE PRACTITIONER

## 2021-02-15 PROCEDURE — 1125F PR PAIN SEVERITY QUANTIFIED, PAIN PRESENT: ICD-10-PCS | Mod: S$GLB,,, | Performed by: NURSE PRACTITIONER

## 2021-02-15 PROCEDURE — 99499 RISK ADDL DX/OHS AUDIT: ICD-10-PCS | Mod: S$PBB,,, | Performed by: NURSE PRACTITIONER

## 2021-02-15 PROCEDURE — 99214 OFFICE O/P EST MOD 30 MIN: CPT | Mod: S$GLB,,, | Performed by: NURSE PRACTITIONER

## 2021-02-15 PROCEDURE — 3288F PR FALLS RISK ASSESSMENT DOCUMENTED: ICD-10-PCS | Mod: CPTII,S$GLB,, | Performed by: NURSE PRACTITIONER

## 2021-02-15 PROCEDURE — 99999 PR PBB SHADOW E&M-EST. PATIENT-LVL IV: ICD-10-PCS | Mod: PBBFAC,,, | Performed by: NURSE PRACTITIONER

## 2021-02-15 PROCEDURE — 1159F PR MEDICATION LIST DOCUMENTED IN MEDICAL RECORD: ICD-10-PCS | Mod: S$GLB,,, | Performed by: NURSE PRACTITIONER

## 2021-02-15 PROCEDURE — 3079F PR MOST RECENT DIASTOLIC BLOOD PRESSURE 80-89 MM HG: ICD-10-PCS | Mod: CPTII,S$GLB,, | Performed by: NURSE PRACTITIONER

## 2021-02-15 PROCEDURE — 3075F SYST BP GE 130 - 139MM HG: CPT | Mod: CPTII,S$GLB,, | Performed by: NURSE PRACTITIONER

## 2021-02-15 PROCEDURE — 3008F PR BODY MASS INDEX (BMI) DOCUMENTED: ICD-10-PCS | Mod: CPTII,S$GLB,, | Performed by: NURSE PRACTITIONER

## 2021-02-15 PROCEDURE — 99499 UNLISTED E&M SERVICE: CPT | Mod: S$PBB,,, | Performed by: NURSE PRACTITIONER

## 2021-02-15 PROCEDURE — 99214 PR OFFICE/OUTPT VISIT, EST, LEVL IV, 30-39 MIN: ICD-10-PCS | Mod: S$GLB,,, | Performed by: NURSE PRACTITIONER

## 2021-02-17 ENCOUNTER — PATIENT MESSAGE (OUTPATIENT)
Dept: HEMATOLOGY/ONCOLOGY | Facility: CLINIC | Age: 76
End: 2021-02-17

## 2021-02-24 ENCOUNTER — PATIENT MESSAGE (OUTPATIENT)
Dept: HEMATOLOGY/ONCOLOGY | Facility: CLINIC | Age: 76
End: 2021-02-24

## 2021-02-25 ENCOUNTER — OFFICE VISIT (OUTPATIENT)
Dept: RHEUMATOLOGY | Facility: CLINIC | Age: 76
End: 2021-02-25
Payer: COMMERCIAL

## 2021-02-25 VITALS
HEIGHT: 64 IN | HEART RATE: 80 BPM | WEIGHT: 194 LBS | SYSTOLIC BLOOD PRESSURE: 148 MMHG | DIASTOLIC BLOOD PRESSURE: 86 MMHG | BODY MASS INDEX: 33.12 KG/M2

## 2021-02-25 DIAGNOSIS — H81.03 MENIERE DISEASE, BILATERAL: ICD-10-CM

## 2021-02-25 DIAGNOSIS — Z85.3 HISTORY OF BREAST CANCER: ICD-10-CM

## 2021-02-25 DIAGNOSIS — H20.9 UVEITIS OF BOTH EYES: ICD-10-CM

## 2021-02-25 DIAGNOSIS — M47.814 THORACIC ARTHRITIS: ICD-10-CM

## 2021-02-25 DIAGNOSIS — Z85.51 HISTORY OF BLADDER CANCER: ICD-10-CM

## 2021-02-25 DIAGNOSIS — R76.8 ANA POSITIVE: Primary | ICD-10-CM

## 2021-02-25 PROCEDURE — 99215 PR OFFICE/OUTPT VISIT, EST, LEVL V, 40-54 MIN: ICD-10-PCS | Mod: S$GLB,,, | Performed by: INTERNAL MEDICINE

## 2021-02-25 PROCEDURE — 1159F MED LIST DOCD IN RCRD: CPT | Mod: S$GLB,,, | Performed by: INTERNAL MEDICINE

## 2021-02-25 PROCEDURE — 1159F PR MEDICATION LIST DOCUMENTED IN MEDICAL RECORD: ICD-10-PCS | Mod: S$GLB,,, | Performed by: INTERNAL MEDICINE

## 2021-02-25 PROCEDURE — 99999 PR PBB SHADOW E&M-EST. PATIENT-LVL III: CPT | Mod: PBBFAC,,, | Performed by: INTERNAL MEDICINE

## 2021-02-25 PROCEDURE — 3008F PR BODY MASS INDEX (BMI) DOCUMENTED: ICD-10-PCS | Mod: CPTII,S$GLB,, | Performed by: INTERNAL MEDICINE

## 2021-02-25 PROCEDURE — 3288F PR FALLS RISK ASSESSMENT DOCUMENTED: ICD-10-PCS | Mod: CPTII,S$GLB,, | Performed by: INTERNAL MEDICINE

## 2021-02-25 PROCEDURE — 1125F AMNT PAIN NOTED PAIN PRSNT: CPT | Mod: S$GLB,,, | Performed by: INTERNAL MEDICINE

## 2021-02-25 PROCEDURE — 3008F BODY MASS INDEX DOCD: CPT | Mod: CPTII,S$GLB,, | Performed by: INTERNAL MEDICINE

## 2021-02-25 PROCEDURE — 1125F PR PAIN SEVERITY QUANTIFIED, PAIN PRESENT: ICD-10-PCS | Mod: S$GLB,,, | Performed by: INTERNAL MEDICINE

## 2021-02-25 PROCEDURE — 99999 PR PBB SHADOW E&M-EST. PATIENT-LVL III: ICD-10-PCS | Mod: PBBFAC,,, | Performed by: INTERNAL MEDICINE

## 2021-02-25 PROCEDURE — 1101F PR PT FALLS ASSESS DOC 0-1 FALLS W/OUT INJ PAST YR: ICD-10-PCS | Mod: CPTII,S$GLB,, | Performed by: INTERNAL MEDICINE

## 2021-02-25 PROCEDURE — 3077F SYST BP >= 140 MM HG: CPT | Mod: CPTII,S$GLB,, | Performed by: INTERNAL MEDICINE

## 2021-02-25 PROCEDURE — 3079F PR MOST RECENT DIASTOLIC BLOOD PRESSURE 80-89 MM HG: ICD-10-PCS | Mod: CPTII,S$GLB,, | Performed by: INTERNAL MEDICINE

## 2021-02-25 PROCEDURE — 3079F DIAST BP 80-89 MM HG: CPT | Mod: CPTII,S$GLB,, | Performed by: INTERNAL MEDICINE

## 2021-02-25 PROCEDURE — 99215 OFFICE O/P EST HI 40 MIN: CPT | Mod: S$GLB,,, | Performed by: INTERNAL MEDICINE

## 2021-02-25 PROCEDURE — 3077F PR MOST RECENT SYSTOLIC BLOOD PRESSURE >= 140 MM HG: ICD-10-PCS | Mod: CPTII,S$GLB,, | Performed by: INTERNAL MEDICINE

## 2021-02-25 PROCEDURE — 3288F FALL RISK ASSESSMENT DOCD: CPT | Mod: CPTII,S$GLB,, | Performed by: INTERNAL MEDICINE

## 2021-02-25 PROCEDURE — 1101F PT FALLS ASSESS-DOCD LE1/YR: CPT | Mod: CPTII,S$GLB,, | Performed by: INTERNAL MEDICINE

## 2021-02-25 RX ORDER — ALPRAZOLAM 0.25 MG/1
0.25 TABLET ORAL 3 TIMES DAILY PRN
Qty: 45 TABLET | Refills: 0 | Status: SHIPPED | OUTPATIENT
Start: 2021-02-25 | End: 2022-11-02 | Stop reason: SDUPTHER

## 2021-02-25 RX ORDER — CHOLECALCIFEROL (VITAMIN D3) 25 MCG
5000 TABLET ORAL DAILY
COMMUNITY

## 2021-02-25 ASSESSMENT — ROUTINE ASSESSMENT OF PATIENT INDEX DATA (RAPID3)
PATIENT GLOBAL ASSESSMENT SCORE: 3.5
FATIGUE SCORE: 2.2
PSYCHOLOGICAL DISTRESS SCORE: 1.1
PAIN SCORE: 4
MDHAQ FUNCTION SCORE: 0.4
TOTAL RAPID3 SCORE: 2.94

## 2021-04-28 ENCOUNTER — OFFICE VISIT (OUTPATIENT)
Dept: HEMATOLOGY/ONCOLOGY | Facility: CLINIC | Age: 76
End: 2021-04-28
Payer: COMMERCIAL

## 2021-04-28 VITALS
SYSTOLIC BLOOD PRESSURE: 128 MMHG | OXYGEN SATURATION: 98 % | DIASTOLIC BLOOD PRESSURE: 78 MMHG | TEMPERATURE: 98 F | HEIGHT: 64 IN | WEIGHT: 194 LBS | BODY MASS INDEX: 33.12 KG/M2 | HEART RATE: 80 BPM

## 2021-04-28 DIAGNOSIS — Z85.3 HISTORY OF BREAST CANCER: ICD-10-CM

## 2021-04-28 DIAGNOSIS — R21 SKIN RASH: Primary | ICD-10-CM

## 2021-04-28 PROCEDURE — 1125F PR PAIN SEVERITY QUANTIFIED, PAIN PRESENT: ICD-10-PCS | Mod: S$GLB,,, | Performed by: NURSE PRACTITIONER

## 2021-04-28 PROCEDURE — 99213 OFFICE O/P EST LOW 20 MIN: CPT | Mod: S$GLB,,, | Performed by: NURSE PRACTITIONER

## 2021-04-28 PROCEDURE — 1159F MED LIST DOCD IN RCRD: CPT | Mod: S$GLB,,, | Performed by: NURSE PRACTITIONER

## 2021-04-28 PROCEDURE — 1125F AMNT PAIN NOTED PAIN PRSNT: CPT | Mod: S$GLB,,, | Performed by: NURSE PRACTITIONER

## 2021-04-28 PROCEDURE — 1101F PR PT FALLS ASSESS DOC 0-1 FALLS W/OUT INJ PAST YR: ICD-10-PCS | Mod: CPTII,S$GLB,, | Performed by: NURSE PRACTITIONER

## 2021-04-28 PROCEDURE — 3288F PR FALLS RISK ASSESSMENT DOCUMENTED: ICD-10-PCS | Mod: CPTII,S$GLB,, | Performed by: NURSE PRACTITIONER

## 2021-04-28 PROCEDURE — 99213 PR OFFICE/OUTPT VISIT, EST, LEVL III, 20-29 MIN: ICD-10-PCS | Mod: S$GLB,,, | Performed by: NURSE PRACTITIONER

## 2021-04-28 PROCEDURE — 3008F PR BODY MASS INDEX (BMI) DOCUMENTED: ICD-10-PCS | Mod: CPTII,S$GLB,, | Performed by: NURSE PRACTITIONER

## 2021-04-28 PROCEDURE — 3008F BODY MASS INDEX DOCD: CPT | Mod: CPTII,S$GLB,, | Performed by: NURSE PRACTITIONER

## 2021-04-28 PROCEDURE — 3288F FALL RISK ASSESSMENT DOCD: CPT | Mod: CPTII,S$GLB,, | Performed by: NURSE PRACTITIONER

## 2021-04-28 PROCEDURE — 99999 PR PBB SHADOW E&M-EST. PATIENT-LVL III: CPT | Mod: PBBFAC,,, | Performed by: NURSE PRACTITIONER

## 2021-04-28 PROCEDURE — 1101F PT FALLS ASSESS-DOCD LE1/YR: CPT | Mod: CPTII,S$GLB,, | Performed by: NURSE PRACTITIONER

## 2021-04-28 PROCEDURE — 1159F PR MEDICATION LIST DOCUMENTED IN MEDICAL RECORD: ICD-10-PCS | Mod: S$GLB,,, | Performed by: NURSE PRACTITIONER

## 2021-04-28 PROCEDURE — 99999 PR PBB SHADOW E&M-EST. PATIENT-LVL III: ICD-10-PCS | Mod: PBBFAC,,, | Performed by: NURSE PRACTITIONER

## 2021-05-06 ENCOUNTER — OFFICE VISIT (OUTPATIENT)
Dept: DERMATOLOGY | Facility: CLINIC | Age: 76
End: 2021-05-06
Payer: COMMERCIAL

## 2021-05-06 DIAGNOSIS — L98.9 DISEASE OF SKIN AND SUBCUTANEOUS TISSUE: Primary | ICD-10-CM

## 2021-05-06 DIAGNOSIS — L82.1 SEBORRHEIC KERATOSES: ICD-10-CM

## 2021-05-06 PROCEDURE — 99999 PR PBB SHADOW E&M-EST. PATIENT-LVL III: CPT | Mod: PBBFAC,,, | Performed by: DERMATOLOGY

## 2021-05-06 PROCEDURE — 88312 SPECIAL STAINS GROUP 1: CPT | Performed by: PATHOLOGY

## 2021-05-06 PROCEDURE — 1101F PR PT FALLS ASSESS DOC 0-1 FALLS W/OUT INJ PAST YR: ICD-10-PCS | Mod: CPTII,S$GLB,, | Performed by: DERMATOLOGY

## 2021-05-06 PROCEDURE — 1159F PR MEDICATION LIST DOCUMENTED IN MEDICAL RECORD: ICD-10-PCS | Mod: S$GLB,,, | Performed by: DERMATOLOGY

## 2021-05-06 PROCEDURE — 1126F AMNT PAIN NOTED NONE PRSNT: CPT | Mod: S$GLB,,, | Performed by: DERMATOLOGY

## 2021-05-06 PROCEDURE — 99212 PR OFFICE/OUTPT VISIT, EST, LEVL II, 10-19 MIN: ICD-10-PCS | Mod: 25,S$GLB,, | Performed by: DERMATOLOGY

## 2021-05-06 PROCEDURE — 88305 TISSUE EXAM BY PATHOLOGIST: ICD-10-PCS | Mod: 26,,, | Performed by: PATHOLOGY

## 2021-05-06 PROCEDURE — 1159F MED LIST DOCD IN RCRD: CPT | Mod: S$GLB,,, | Performed by: DERMATOLOGY

## 2021-05-06 PROCEDURE — 88342 IMHCHEM/IMCYTCHM 1ST ANTB: CPT | Mod: 26,,, | Performed by: PATHOLOGY

## 2021-05-06 PROCEDURE — 1101F PT FALLS ASSESS-DOCD LE1/YR: CPT | Mod: CPTII,S$GLB,, | Performed by: DERMATOLOGY

## 2021-05-06 PROCEDURE — 88312 SPECIAL STAINS GROUP 1: CPT | Mod: 26,,, | Performed by: PATHOLOGY

## 2021-05-06 PROCEDURE — 11104 PR PUNCH BIOPSY, SKIN, SINGLE LESION: ICD-10-PCS | Mod: S$GLB,,, | Performed by: DERMATOLOGY

## 2021-05-06 PROCEDURE — 11104 PUNCH BX SKIN SINGLE LESION: CPT | Mod: S$GLB,,, | Performed by: DERMATOLOGY

## 2021-05-06 PROCEDURE — 88305 TISSUE EXAM BY PATHOLOGIST: CPT | Performed by: PATHOLOGY

## 2021-05-06 PROCEDURE — 88342 IMHCHEM/IMCYTCHM 1ST ANTB: CPT | Performed by: PATHOLOGY

## 2021-05-06 PROCEDURE — 1126F PR PAIN SEVERITY QUANTIFIED, NO PAIN PRESENT: ICD-10-PCS | Mod: S$GLB,,, | Performed by: DERMATOLOGY

## 2021-05-06 PROCEDURE — 99212 OFFICE O/P EST SF 10 MIN: CPT | Mod: 25,S$GLB,, | Performed by: DERMATOLOGY

## 2021-05-06 PROCEDURE — 99999 PR PBB SHADOW E&M-EST. PATIENT-LVL III: ICD-10-PCS | Mod: PBBFAC,,, | Performed by: DERMATOLOGY

## 2021-05-06 PROCEDURE — 88305 TISSUE EXAM BY PATHOLOGIST: CPT | Mod: 26,,, | Performed by: PATHOLOGY

## 2021-05-06 PROCEDURE — 3288F PR FALLS RISK ASSESSMENT DOCUMENTED: ICD-10-PCS | Mod: CPTII,S$GLB,, | Performed by: DERMATOLOGY

## 2021-05-06 PROCEDURE — 3288F FALL RISK ASSESSMENT DOCD: CPT | Mod: CPTII,S$GLB,, | Performed by: DERMATOLOGY

## 2021-05-06 PROCEDURE — 88312 PR  SPECIAL STAINS,GROUP I: ICD-10-PCS | Mod: 26,,, | Performed by: PATHOLOGY

## 2021-05-06 PROCEDURE — 88342 CHG IMMUNOCYTOCHEMISTRY: ICD-10-PCS | Mod: 26,,, | Performed by: PATHOLOGY

## 2021-05-12 LAB
FINAL PATHOLOGIC DIAGNOSIS: NORMAL
GROSS: NORMAL
Lab: NORMAL
MICROSCOPIC EXAM: NORMAL

## 2021-05-19 ENCOUNTER — CLINICAL SUPPORT (OUTPATIENT)
Dept: DERMATOLOGY | Facility: CLINIC | Age: 76
End: 2021-05-19
Payer: COMMERCIAL

## 2021-05-19 VITALS — BODY MASS INDEX: 33.12 KG/M2 | HEIGHT: 64 IN | WEIGHT: 194 LBS

## 2021-05-19 DIAGNOSIS — Z48.02 VISIT FOR SUTURE REMOVAL: Primary | ICD-10-CM

## 2021-05-19 PROCEDURE — 99999 PR PBB SHADOW E&M-EST. PATIENT-LVL III: ICD-10-PCS | Mod: PBBFAC,,,

## 2021-05-19 PROCEDURE — 99999 PR PBB SHADOW E&M-EST. PATIENT-LVL III: CPT | Mod: PBBFAC,,,

## 2021-05-19 PROCEDURE — 99024 POSTOP FOLLOW-UP VISIT: CPT | Mod: S$GLB,,, | Performed by: DERMATOLOGY

## 2021-05-19 PROCEDURE — 99024 PR POST-OP FOLLOW-UP VISIT: ICD-10-PCS | Mod: S$GLB,,, | Performed by: DERMATOLOGY

## 2021-08-09 ENCOUNTER — LAB VISIT (OUTPATIENT)
Dept: LAB | Facility: HOSPITAL | Age: 76
End: 2021-08-09
Attending: INTERNAL MEDICINE
Payer: MEDICARE

## 2021-08-09 DIAGNOSIS — H81.03 MENIERE DISEASE, BILATERAL: ICD-10-CM

## 2021-08-09 DIAGNOSIS — Z85.51 HISTORY OF BLADDER CANCER: ICD-10-CM

## 2021-08-09 DIAGNOSIS — Z85.3 HISTORY OF BREAST CANCER: ICD-10-CM

## 2021-08-09 DIAGNOSIS — H20.9 UVEITIS OF BOTH EYES: ICD-10-CM

## 2021-08-09 DIAGNOSIS — M47.814 THORACIC ARTHRITIS: ICD-10-CM

## 2021-08-09 DIAGNOSIS — R76.8 ANA POSITIVE: ICD-10-CM

## 2021-08-09 LAB
CRP SERPL-MCNC: 13.9 MG/L (ref 0–8.2)
ERYTHROCYTE [SEDIMENTATION RATE] IN BLOOD BY WESTERGREN METHOD: 18 MM/HR (ref 0–20)

## 2021-08-09 PROCEDURE — 86140 C-REACTIVE PROTEIN: CPT | Performed by: INTERNAL MEDICINE

## 2021-08-09 PROCEDURE — 36415 COLL VENOUS BLD VENIPUNCTURE: CPT | Mod: PO | Performed by: INTERNAL MEDICINE

## 2021-08-09 PROCEDURE — 86039 ANTINUCLEAR ANTIBODIES (ANA): CPT | Performed by: INTERNAL MEDICINE

## 2021-08-09 PROCEDURE — 86038 ANTINUCLEAR ANTIBODIES: CPT | Performed by: INTERNAL MEDICINE

## 2021-08-09 PROCEDURE — 85651 RBC SED RATE NONAUTOMATED: CPT | Mod: PO | Performed by: INTERNAL MEDICINE

## 2021-08-09 PROCEDURE — 86235 NUCLEAR ANTIGEN ANTIBODY: CPT | Mod: 59 | Performed by: INTERNAL MEDICINE

## 2021-08-10 LAB
ANA PATTERN 1: NORMAL
ANA SER QL IF: POSITIVE
ANA TITR SER IF: NORMAL {TITER}

## 2021-08-12 ENCOUNTER — PATIENT MESSAGE (OUTPATIENT)
Dept: FAMILY MEDICINE | Facility: CLINIC | Age: 76
End: 2021-08-12

## 2021-08-12 LAB
ANTI SM ANTIBODY: 0.06 RATIO (ref 0–0.99)
ANTI SM/RNP ANTIBODY: 0.09 RATIO (ref 0–0.99)
ANTI-SM INTERPRETATION: NEGATIVE
ANTI-SM/RNP INTERPRETATION: NEGATIVE
ANTI-SSA ANTIBODY: 0.05 RATIO (ref 0–0.99)
ANTI-SSA INTERPRETATION: NEGATIVE
ANTI-SSB ANTIBODY: 0.07 RATIO (ref 0–0.99)
ANTI-SSB INTERPRETATION: NEGATIVE
DSDNA AB SER-ACNC: NORMAL [IU]/ML

## 2021-08-12 RX ORDER — LISINOPRIL 20 MG/1
20 TABLET ORAL DAILY
Qty: 30 TABLET | Refills: 0 | Status: SHIPPED | OUTPATIENT
Start: 2021-08-12 | End: 2022-04-13

## 2021-09-27 ENCOUNTER — IMMUNIZATION (OUTPATIENT)
Dept: FAMILY MEDICINE | Facility: CLINIC | Age: 76
End: 2021-09-27
Payer: MEDICARE

## 2021-09-27 DIAGNOSIS — Z23 NEED FOR VACCINATION: Primary | ICD-10-CM

## 2021-09-27 PROCEDURE — 91300 COVID-19, MRNA, LNP-S, PF, 30 MCG/0.3 ML DOSE VACCINE: CPT | Mod: HCNC,PBBFAC | Performed by: FAMILY MEDICINE

## 2021-09-27 PROCEDURE — 0003A COVID-19, MRNA, LNP-S, PF, 30 MCG/0.3 ML DOSE VACCINE: CPT | Mod: HCNC,PBBFAC | Performed by: FAMILY MEDICINE

## 2021-10-12 ENCOUNTER — OFFICE VISIT (OUTPATIENT)
Dept: RHEUMATOLOGY | Facility: CLINIC | Age: 76
End: 2021-10-12
Payer: MEDICARE

## 2021-10-12 ENCOUNTER — SPECIALTY PHARMACY (OUTPATIENT)
Dept: PHARMACY | Facility: CLINIC | Age: 76
End: 2021-10-12
Payer: MEDICARE

## 2021-10-12 VITALS
DIASTOLIC BLOOD PRESSURE: 84 MMHG | HEART RATE: 76 BPM | WEIGHT: 196 LBS | BODY MASS INDEX: 33.64 KG/M2 | SYSTOLIC BLOOD PRESSURE: 138 MMHG

## 2021-10-12 DIAGNOSIS — L40.9 PSORIASIS OF SCALP: ICD-10-CM

## 2021-10-12 DIAGNOSIS — H20.9 UVEITIS OF BOTH EYES: ICD-10-CM

## 2021-10-12 DIAGNOSIS — R68.2 DRY MOUTH: ICD-10-CM

## 2021-10-12 DIAGNOSIS — M35.05 SJOGREN'S SYNDROME WITH INFLAMMATORY ARTHRITIS: ICD-10-CM

## 2021-10-12 DIAGNOSIS — M46.92 CERVICAL SPONDYLITIS: ICD-10-CM

## 2021-10-12 DIAGNOSIS — Z85.51 HISTORY OF BLADDER CANCER: Primary | ICD-10-CM

## 2021-10-12 DIAGNOSIS — H81.03 MENIERE DISEASE, BILATERAL: ICD-10-CM

## 2021-10-12 DIAGNOSIS — I73.00 RAYNAUD'S PHENOMENON WITHOUT GANGRENE: ICD-10-CM

## 2021-10-12 DIAGNOSIS — Z85.3 HISTORY OF BREAST CANCER: ICD-10-CM

## 2021-10-12 DIAGNOSIS — L40.50 PSA (PSORIATIC ARTHRITIS): ICD-10-CM

## 2021-10-12 PROCEDURE — 3008F BODY MASS INDEX DOCD: CPT | Mod: HCNC,CPTII,S$GLB, | Performed by: INTERNAL MEDICINE

## 2021-10-12 PROCEDURE — 99215 PR OFFICE/OUTPT VISIT, EST, LEVL V, 40-54 MIN: ICD-10-PCS | Mod: HCNC,S$GLB,, | Performed by: INTERNAL MEDICINE

## 2021-10-12 PROCEDURE — 99499 RISK ADDL DX/OHS AUDIT: ICD-10-PCS | Mod: HCNC,S$GLB,, | Performed by: INTERNAL MEDICINE

## 2021-10-12 PROCEDURE — 1125F AMNT PAIN NOTED PAIN PRSNT: CPT | Mod: HCNC,CPTII,S$GLB, | Performed by: INTERNAL MEDICINE

## 2021-10-12 PROCEDURE — 3008F PR BODY MASS INDEX (BMI) DOCUMENTED: ICD-10-PCS | Mod: HCNC,CPTII,S$GLB, | Performed by: INTERNAL MEDICINE

## 2021-10-12 PROCEDURE — 4010F PR ACE/ARB THEARPY RXD/TAKEN: ICD-10-PCS | Mod: HCNC,CPTII,S$GLB, | Performed by: INTERNAL MEDICINE

## 2021-10-12 PROCEDURE — 3079F DIAST BP 80-89 MM HG: CPT | Mod: HCNC,CPTII,S$GLB, | Performed by: INTERNAL MEDICINE

## 2021-10-12 PROCEDURE — 99215 OFFICE O/P EST HI 40 MIN: CPT | Mod: HCNC,S$GLB,, | Performed by: INTERNAL MEDICINE

## 2021-10-12 PROCEDURE — 3075F PR MOST RECENT SYSTOLIC BLOOD PRESS GE 130-139MM HG: ICD-10-PCS | Mod: HCNC,CPTII,S$GLB, | Performed by: INTERNAL MEDICINE

## 2021-10-12 PROCEDURE — 4010F ACE/ARB THERAPY RXD/TAKEN: CPT | Mod: HCNC,CPTII,S$GLB, | Performed by: INTERNAL MEDICINE

## 2021-10-12 PROCEDURE — 3075F SYST BP GE 130 - 139MM HG: CPT | Mod: HCNC,CPTII,S$GLB, | Performed by: INTERNAL MEDICINE

## 2021-10-12 PROCEDURE — 3079F PR MOST RECENT DIASTOLIC BLOOD PRESSURE 80-89 MM HG: ICD-10-PCS | Mod: HCNC,CPTII,S$GLB, | Performed by: INTERNAL MEDICINE

## 2021-10-12 PROCEDURE — 1125F PR PAIN SEVERITY QUANTIFIED, PAIN PRESENT: ICD-10-PCS | Mod: HCNC,CPTII,S$GLB, | Performed by: INTERNAL MEDICINE

## 2021-10-12 PROCEDURE — 99999 PR PBB SHADOW E&M-EST. PATIENT-LVL III: ICD-10-PCS | Mod: PBBFAC,HCNC,, | Performed by: INTERNAL MEDICINE

## 2021-10-12 PROCEDURE — 99499 UNLISTED E&M SERVICE: CPT | Mod: HCNC,S$GLB,, | Performed by: INTERNAL MEDICINE

## 2021-10-12 PROCEDURE — 99999 PR PBB SHADOW E&M-EST. PATIENT-LVL III: CPT | Mod: PBBFAC,HCNC,, | Performed by: INTERNAL MEDICINE

## 2021-10-12 RX ORDER — BETAMETHASONE VALERATE 0.1 %
LOTION (ML) TOPICAL 2 TIMES DAILY
Qty: 60 BOTTLE | Refills: 3 | Status: SHIPPED | OUTPATIENT
Start: 2021-10-12 | End: 2022-04-13

## 2021-10-12 RX ORDER — APREMILAST 10-20-30MG
KIT ORAL
Qty: 54 TABLET | Refills: 0 | OUTPATIENT
Start: 2021-10-12 | End: 2021-12-07

## 2021-10-12 RX ORDER — APREMILAST 30 MG/1
30 TABLET, FILM COATED ORAL 2 TIMES DAILY
Qty: 60 TABLET | Refills: 12 | COMMUNITY
Start: 2021-10-12 | End: 2022-10-12

## 2021-10-12 RX ORDER — PILOCARPINE HYDROCHLORIDE 7.5 MG/1
7.5 TABLET, FILM COATED ORAL 3 TIMES DAILY
Qty: 90 TABLET | Refills: 11 | Status: SHIPPED | OUTPATIENT
Start: 2021-10-12 | End: 2022-02-15

## 2021-10-12 RX ORDER — FLUOCINOLONE ACETONIDE 0.11 MG/ML
OIL AURICULAR (OTIC)
COMMUNITY
End: 2022-03-02 | Stop reason: SDUPTHER

## 2021-10-16 ENCOUNTER — IMMUNIZATION (OUTPATIENT)
Dept: FAMILY MEDICINE | Facility: CLINIC | Age: 76
End: 2021-10-16
Payer: MEDICARE

## 2021-10-27 ENCOUNTER — PATIENT MESSAGE (OUTPATIENT)
Dept: HEMATOLOGY/ONCOLOGY | Facility: CLINIC | Age: 76
End: 2021-10-27
Payer: MEDICARE

## 2021-11-29 ENCOUNTER — OFFICE VISIT (OUTPATIENT)
Dept: FAMILY MEDICINE | Facility: CLINIC | Age: 76
End: 2021-11-29
Payer: MEDICARE

## 2021-11-29 VITALS
TEMPERATURE: 99 F | DIASTOLIC BLOOD PRESSURE: 70 MMHG | WEIGHT: 194 LBS | HEIGHT: 64 IN | OXYGEN SATURATION: 98 % | RESPIRATION RATE: 18 BRPM | BODY MASS INDEX: 33.12 KG/M2 | SYSTOLIC BLOOD PRESSURE: 126 MMHG | HEART RATE: 70 BPM

## 2021-11-29 DIAGNOSIS — Z00.00 PREVENTATIVE HEALTH CARE: Primary | ICD-10-CM

## 2021-11-29 DIAGNOSIS — I10 ESSENTIAL HYPERTENSION: ICD-10-CM

## 2021-11-29 DIAGNOSIS — K21.9 GASTROESOPHAGEAL REFLUX DISEASE WITHOUT ESOPHAGITIS: ICD-10-CM

## 2021-11-29 PROCEDURE — 99397 PER PM REEVAL EST PAT 65+ YR: CPT | Mod: HCNC,S$GLB,, | Performed by: FAMILY MEDICINE

## 2021-11-29 PROCEDURE — 4010F ACE/ARB THERAPY RXD/TAKEN: CPT | Mod: HCNC,CPTII,S$GLB, | Performed by: FAMILY MEDICINE

## 2021-11-29 PROCEDURE — 4010F PR ACE/ARB THEARPY RXD/TAKEN: ICD-10-PCS | Mod: HCNC,CPTII,S$GLB, | Performed by: FAMILY MEDICINE

## 2021-11-29 PROCEDURE — 99499 RISK ADDL DX/OHS AUDIT: ICD-10-PCS | Mod: HCNC,S$GLB,, | Performed by: FAMILY MEDICINE

## 2021-11-29 PROCEDURE — 99999 PR PBB SHADOW E&M-EST. PATIENT-LVL III: CPT | Mod: PBBFAC,HCNC,, | Performed by: FAMILY MEDICINE

## 2021-11-29 PROCEDURE — 99999 PR PBB SHADOW E&M-EST. PATIENT-LVL III: ICD-10-PCS | Mod: PBBFAC,HCNC,, | Performed by: FAMILY MEDICINE

## 2021-11-29 PROCEDURE — 99397 PR PREVENTIVE VISIT,EST,65 & OVER: ICD-10-PCS | Mod: HCNC,S$GLB,, | Performed by: FAMILY MEDICINE

## 2021-11-29 PROCEDURE — 99499 UNLISTED E&M SERVICE: CPT | Mod: HCNC,S$GLB,, | Performed by: FAMILY MEDICINE

## 2021-11-29 RX ORDER — LISINOPRIL AND HYDROCHLOROTHIAZIDE 12.5; 2 MG/1; MG/1
1 TABLET ORAL DAILY
Qty: 90 TABLET | Refills: 3 | Status: SHIPPED | OUTPATIENT
Start: 2021-11-29 | End: 2022-04-28

## 2021-11-29 RX ORDER — OMEPRAZOLE 40 MG/1
40 CAPSULE, DELAYED RELEASE ORAL DAILY
Qty: 90 CAPSULE | Refills: 0 | Status: SHIPPED | OUTPATIENT
Start: 2021-11-29 | End: 2022-03-07

## 2021-11-29 RX ORDER — LISINOPRIL AND HYDROCHLOROTHIAZIDE 12.5; 2 MG/1; MG/1
1 TABLET ORAL DAILY
COMMUNITY
End: 2021-11-29 | Stop reason: SDUPTHER

## 2022-01-31 ENCOUNTER — OFFICE VISIT (OUTPATIENT)
Dept: OTOLARYNGOLOGY | Facility: CLINIC | Age: 77
End: 2022-01-31
Payer: MEDICARE

## 2022-01-31 ENCOUNTER — CLINICAL SUPPORT (OUTPATIENT)
Dept: AUDIOLOGY | Facility: CLINIC | Age: 77
End: 2022-01-31
Payer: MEDICARE

## 2022-01-31 VITALS — WEIGHT: 187.63 LBS | BODY MASS INDEX: 32.03 KG/M2 | HEIGHT: 64 IN

## 2022-01-31 DIAGNOSIS — R05.8 DRY COUGH: ICD-10-CM

## 2022-01-31 DIAGNOSIS — H90.42 SENSORINEURAL HEARING LOSS (SNHL) OF LEFT EAR WITH UNRESTRICTED HEARING OF RIGHT EAR: Primary | ICD-10-CM

## 2022-01-31 DIAGNOSIS — H91.22 SUDDEN IDIOPATHIC HEARING LOSS OF LEFT EAR WITH UNRESTRICTED HEARING OF RIGHT EAR: Primary | ICD-10-CM

## 2022-01-31 DIAGNOSIS — R09.82 PND (POST-NASAL DRIP): ICD-10-CM

## 2022-01-31 PROCEDURE — 1159F MED LIST DOCD IN RCRD: CPT | Mod: HCNC,CPTII,S$GLB, | Performed by: NURSE PRACTITIONER

## 2022-01-31 PROCEDURE — 92567 PR TYMPA2METRY: ICD-10-PCS | Mod: HCNC,S$GLB,, | Performed by: AUDIOLOGIST-HEARING AID FITTER

## 2022-01-31 PROCEDURE — 3288F PR FALLS RISK ASSESSMENT DOCUMENTED: ICD-10-PCS | Mod: HCNC,CPTII,S$GLB, | Performed by: NURSE PRACTITIONER

## 2022-01-31 PROCEDURE — 1126F AMNT PAIN NOTED NONE PRSNT: CPT | Mod: HCNC,CPTII,S$GLB, | Performed by: NURSE PRACTITIONER

## 2022-01-31 PROCEDURE — 92557 PR COMPREHENSIVE HEARING TEST: ICD-10-PCS | Mod: HCNC,S$GLB,, | Performed by: AUDIOLOGIST-HEARING AID FITTER

## 2022-01-31 PROCEDURE — 3008F PR BODY MASS INDEX (BMI) DOCUMENTED: ICD-10-PCS | Mod: HCNC,CPTII,S$GLB, | Performed by: NURSE PRACTITIONER

## 2022-01-31 PROCEDURE — 1126F PR PAIN SEVERITY QUANTIFIED, NO PAIN PRESENT: ICD-10-PCS | Mod: HCNC,CPTII,S$GLB, | Performed by: NURSE PRACTITIONER

## 2022-01-31 PROCEDURE — 99214 PR OFFICE/OUTPT VISIT, EST, LEVL IV, 30-39 MIN: ICD-10-PCS | Mod: HCNC,S$GLB,, | Performed by: NURSE PRACTITIONER

## 2022-01-31 PROCEDURE — 1101F PT FALLS ASSESS-DOCD LE1/YR: CPT | Mod: HCNC,CPTII,S$GLB, | Performed by: NURSE PRACTITIONER

## 2022-01-31 PROCEDURE — 99999 PR PBB SHADOW E&M-EST. PATIENT-LVL III: ICD-10-PCS | Mod: PBBFAC,HCNC,, | Performed by: NURSE PRACTITIONER

## 2022-01-31 PROCEDURE — 1160F RVW MEDS BY RX/DR IN RCRD: CPT | Mod: HCNC,CPTII,S$GLB, | Performed by: NURSE PRACTITIONER

## 2022-01-31 PROCEDURE — 1101F PR PT FALLS ASSESS DOC 0-1 FALLS W/OUT INJ PAST YR: ICD-10-PCS | Mod: HCNC,CPTII,S$GLB, | Performed by: NURSE PRACTITIONER

## 2022-01-31 PROCEDURE — 92567 TYMPANOMETRY: CPT | Mod: HCNC,S$GLB,, | Performed by: AUDIOLOGIST-HEARING AID FITTER

## 2022-01-31 PROCEDURE — 92557 COMPREHENSIVE HEARING TEST: CPT | Mod: HCNC,S$GLB,, | Performed by: AUDIOLOGIST-HEARING AID FITTER

## 2022-01-31 PROCEDURE — 1159F PR MEDICATION LIST DOCUMENTED IN MEDICAL RECORD: ICD-10-PCS | Mod: HCNC,CPTII,S$GLB, | Performed by: NURSE PRACTITIONER

## 2022-01-31 PROCEDURE — 99214 OFFICE O/P EST MOD 30 MIN: CPT | Mod: HCNC,S$GLB,, | Performed by: NURSE PRACTITIONER

## 2022-01-31 PROCEDURE — 3288F FALL RISK ASSESSMENT DOCD: CPT | Mod: HCNC,CPTII,S$GLB, | Performed by: NURSE PRACTITIONER

## 2022-01-31 PROCEDURE — 1160F PR REVIEW ALL MEDS BY PRESCRIBER/CLIN PHARMACIST DOCUMENTED: ICD-10-PCS | Mod: HCNC,CPTII,S$GLB, | Performed by: NURSE PRACTITIONER

## 2022-01-31 PROCEDURE — 99999 PR PBB SHADOW E&M-EST. PATIENT-LVL III: CPT | Mod: PBBFAC,HCNC,, | Performed by: NURSE PRACTITIONER

## 2022-01-31 PROCEDURE — 3008F BODY MASS INDEX DOCD: CPT | Mod: HCNC,CPTII,S$GLB, | Performed by: NURSE PRACTITIONER

## 2022-01-31 RX ORDER — METHYLPREDNISOLONE 4 MG/1
TABLET ORAL
Qty: 21 TABLET | Refills: 0 | Status: SHIPPED | OUTPATIENT
Start: 2022-01-31 | End: 2022-04-13

## 2022-01-31 NOTE — PROGRESS NOTES
Subjective:       Patient ID: Yuliana Mattson is a 76 y.o. female.    Chief Complaint: No chief complaint on file.    HPI   Patient was last seen by me in October 2020 for dermatitis of both ear canals; treated with DermOtic gtts. Ears were cleaned and audiogram was done at that time. Patient returns for aural congestion with muffled hearing. Patient states that 1-1.5 weeks ago, she experienced sudden hearing loss AS that lasted for 3 hours, then returned to normal. Then one day last week, she lost her hearing AS X 10 hours, then returned to normal. Patient also reports PND and cough X 2 months.     Review of Systems   Constitutional: Negative.    HENT: Positive for hearing loss, postnasal drip and sinus pressure/congestion.    Eyes: Negative.    Respiratory: Positive for cough.    Cardiovascular: Negative.    Gastrointestinal: Positive for diarrhea.   Endocrine: Positive for cold intolerance.   Genitourinary: Negative.    Musculoskeletal: Positive for back pain and neck pain.   Integumentary:  Negative.   Neurological: Positive for headaches.   Hematological: Negative.    Psychiatric/Behavioral: Negative.          Objective:      Physical Exam  Vitals and nursing note reviewed.   Constitutional:       General: She is not in acute distress.Vital signs are normal.      Appearance: She is well-developed and well-nourished. She is not ill-appearing or diaphoretic.   HENT:      Head: Normocephalic and atraumatic.      Right Ear: Hearing, tympanic membrane, ear canal and external ear normal. No middle ear effusion. Tympanic membrane is not erythematous.      Left Ear: Hearing, tympanic membrane, ear canal and external ear normal.  No middle ear effusion. Tympanic membrane is not erythematous.      Nose: Nose normal.      Mouth/Throat:      Mouth: Oropharynx is clear and moist and mucous membranes are normal.      Pharynx: Uvula midline.   Eyes:      General: Lids are normal. No scleral icterus.        Right eye: No  discharge.         Left eye: No discharge.      Extraocular Movements: EOM normal.   Neck:      Trachea: Trachea normal. No tracheal deviation.   Cardiovascular:      Rate and Rhythm: Normal rate.   Pulmonary:      Effort: Pulmonary effort is normal. No respiratory distress.      Breath sounds: No stridor. No wheezing.   Musculoskeletal:         General: Normal range of motion.      Cervical back: Normal range of motion and neck supple.   Skin:     General: Skin is warm, dry and intact.      Coloration: Skin is not pale.      Nails: There is no cyanosis.   Neurological:      Mental Status: She is alert and oriented to person, place, and time.      Coordination: Coordination normal.      Gait: Gait normal.      Deep Tendon Reflexes: Strength normal.   Psychiatric:         Mood and Affect: Mood and affect normal.         Speech: Speech normal.         Behavior: Behavior normal. Behavior is cooperative.         Thought Content: Thought content normal.         Cognition and Memory: Cognition and memory normal.         Judgment: Judgment normal.         Assessment:       Problem List Items Addressed This Visit     Unspecified hearing loss - Primary    Relevant Medications    methylPREDNISolone (MEDROL DOSEPACK) 4 mg tablet      Other Visit Diagnoses     PND (post-nasal drip)        Dry cough              Plan:     Patient was given high-dose prednisone taper by Dr. Johnson in 2013 and experienced intolerable side effects.  She states she does not want to take high-dose prednisone ever again.  MDP for left-sided hearing loss. Repeat audiogram in one month. Possibly left cochlear hydrops (not vertigo)    Handout given and discussed on PND/Mucus management. Offered sinus imaging which she declined.   She had to discontinue nasal spray due to nasal burning.  She is already taking something for acid reflux.  Patient encouraged to return to clinic if symptoms worsen/persist and as needed for further ENT symptoms or concerns.

## 2022-01-31 NOTE — PATIENT INSTRUCTIONS
The treatment for sudden hearing loss is high dose prednisone.   The side effects of corticosteroid use can includes increased appetite with weight change, increased blood pressure, decreased sleep, upset stomach (possible ulcers), pancreatitis, glucose/metabolism changes, mood changes/irritability, immunosuppression, acute glaucoma, osteoporosis, and rarely osteonecrosis.   We need to do a hearing test to establish sudden hearing loss.     Coughing is a fundamental protective reflux in response to airway irritation. When cough lasts for more than 8 weeks, it is considered chronic. The protective cough reflex is important for clearing the airway of inhaled particles. Chemical irritants or inflammatory mediators can stimulate the vagus nerve resulting in cough.     Some of the Top Considerations for Chronic Cough:     1. Nasal allergies -- Typical constellation of symptoms seen with nasal allergies: itchy, red, watery eyes; itchy, red, watery nose; excessive sneezing; excessive stuffiness. If this one best describes your current state, then discuss with your primary care provider whether you should see an allergist or take daily allergy medications.     2. Sinus Infection -- Typical constellation of symptoms seen with acute bacterial sinus infection are:  Green-gold, foul-smelling, foul-tasting mucus from nose and throat, inability to breathe through nose, inability to smell or taste well, facial pain and swelling, dental pain, headaches around eyes, sore throat and productive cough. If this one best describes your current state, then let's get sinus imaging to rule out infection.     3.  Silent reflux -- Typical constellation of symptoms seen with silent reflux: post-nasal drip sensation with absence of significant runny nose or nasal congestion, sensation of thick or too much mucus in the back of throat, raspy voice, frequent throat clearing, lump in the back of throat, frequent sore throats. If this one best  "describes your current state, discuss with your primary care provider whether you should see a gastroenterologist or take daily reflux medications. Your GI doctor may want to do an Upper GI or obtain a barium swallow or pH monitoring test.     4. Irritable Larynx Syndrome -- Otherwise known as Laryngeal Sensory Neuropathy. Typical constellation of symptoms:  a dry persistent cough for months or even years, coughing fits last seconds to minutes and sometimes longer, frequent throat clearing, abrupt onset that may follow a viral illness or surgery. Diagnostic tests for asthma, allergy, and reflux are all normal, and patient has not responded to maximal therapy for those conditions. This usually responds to either low-dose Elavil or Gabapentin.     5. Asthma/Pulmonary (Lung) issue -- Typical constellation of symptoms: wheezing, shortness of breath. Discuss with your primary care provider whether you should see a pulmonologist (lung specialist) and have Pulmonary Function Testing (PFTs) done.     6. Certain blood pressure medications known as ACE-inhibitors, such as lisinopril, can cause chronic cough. Though estimates vary in literature, 20% or more of patients taking lisinopril (or other ACE-inhibitor for blood pressure) will develop a chronic dry cough.     7. Post-Viral Cough Syndrome -- Occasionally a cough can persist for 4-8 weeks after an acute upper respiratory viral illness, due to hyperactive sensitivity of the airway nerves. A steroid inhaler and night-time cough suppressant can often help.       Mucous (Post nasal drip) Management     A fullness sensation in the back of the throat is called "globus."   Many people attribute this fullness to a sensation of increased mucous, because they can feel a sticky material in the throat that they will occasionally cough up.     Nasal  / Throat Mucous  Our body NORMALLY makes 1 liter or more of saliva (spit) and nasal mucous every day. These body fluids are important " for breaking down the food we eat, protecting our teeth from cavities, and clearing out the pollen and irritants that we breathe in our nose. As our body makes these fluids, we swallow them throughout the day, where they are recycled back through the body. This process is happening all our life without us thinking about it. When an adjustment to this process causes the mucous to be increased or thicker, is when we notice it.      Some problems cause an INCREASE in these body fluids, which we perceive as irritating, excess phlegm in the throat.   However, it is not always an increase. Many times there is a change in CONSISTENCY of the mucous to make it thicker. This will cause the mucous to be held up in the throat instead of being swallowed easily.     Several factors can cause these problems:  1. Dryness of throat and nose which increases the mucous sticking - Causes include inadequate hydration, excess caffeine / soda / sugary drinks, medication side effects.  2. Acid reflux  3. Increased mucous production from allergies or chronic sinus drainage.      We will evaluate the cause(s) of increased or thickened mucous and consider different treatment strategies:     MANY COMMON medications cause dryness of the throat, including medications for allergy, depression/anxiety, heart, and urination issues.   There is some evidence that added sugars or processed sugars in the diet (not the kind that occur naturally in honey or ripe fruit) can increase mucus, as well as too much dairy. To avoid these refined carbohydrates, on food labels, watch out for wheat flour (also called white, refined or enriched flour) on the ingredients list.      Recommendations for Increased Mucous Sensation     1. Water, water, water - this cannot be understated. Most people are not drinking enough water regularly to keep up with body's demand. Recommend AT LEAST 64 oz of water per day, and more for men (up to 100 oz.) unless a medical  issue prevents this.  2. Reduce intake of caffeine / tea / sugary drinks or soda, which all will cause increased mucous.  3. If your nose is the source of mucous, nasal medications discussed by your doctor as well as nasal saline can be effective to wash away the mucous.  4. Prevention of acid reflux by avoiding late-night eating (nothing 3 hours before laying down at night), greasy and spicy foods, alcohol, and acidic foods  5. Humidifier in the bedroom if you find dryness increases at night.  6. Smoking cessation if you use tobacco  7. Examination your medication list with your doctor to determine medications that may be contributing     There are NUMEROUS over the counter mucous thinning agents. Here are some suggestions that can be purchased online:  1. Christopher's Breezer's (Sugar Free), Rajeev's black currant pastilles, and Entertainer's Secret Throat Relief can all help dry mouth and thickened mucous.   2. Biotene spray and mouthwash can help lubricate a dry mouth  3. Mucinex can be helpful in some cases, but stop taking if you don't notice improvement after a few weeks.

## 2022-02-11 ENCOUNTER — HOSPITAL ENCOUNTER (OUTPATIENT)
Dept: RADIOLOGY | Facility: HOSPITAL | Age: 77
Discharge: HOME | End: 2022-02-11
Attending: NURSE PRACTITIONER
Payer: MEDICARE

## 2022-02-11 VITALS — WEIGHT: 187.63 LBS | BODY MASS INDEX: 32.03 KG/M2 | HEIGHT: 64 IN

## 2022-02-11 DIAGNOSIS — Z85.3 HISTORY OF BREAST CANCER: ICD-10-CM

## 2022-02-11 PROCEDURE — 77061 BREAST TOMOSYNTHESIS UNI: CPT | Mod: 26,HCNC,RT, | Performed by: RADIOLOGY

## 2022-02-11 PROCEDURE — 71046 XR CHEST PA AND LATERAL: ICD-10-PCS | Mod: 26,HCNC,, | Performed by: RADIOLOGY

## 2022-02-11 PROCEDURE — 71046 X-RAY EXAM CHEST 2 VIEWS: CPT | Mod: 26,HCNC,, | Performed by: RADIOLOGY

## 2022-02-11 PROCEDURE — 77065 MAMMO DIGITAL DIAGNOSTIC RIGHT WITH TOMO: ICD-10-PCS | Mod: 26,HCNC,RT, | Performed by: RADIOLOGY

## 2022-02-11 PROCEDURE — 77061 MAMMO DIGITAL DIAGNOSTIC RIGHT WITH TOMO: ICD-10-PCS | Mod: 26,HCNC,RT, | Performed by: RADIOLOGY

## 2022-02-11 PROCEDURE — 71046 X-RAY EXAM CHEST 2 VIEWS: CPT | Mod: TC,HCNC,FY,PO

## 2022-02-11 PROCEDURE — 77065 DX MAMMO INCL CAD UNI: CPT | Mod: 26,HCNC,RT, | Performed by: RADIOLOGY

## 2022-02-11 PROCEDURE — 76642 US BREAST RIGHT LIMITED: ICD-10-PCS | Mod: 26,HCNC,RT, | Performed by: RADIOLOGY

## 2022-02-11 PROCEDURE — 77065 DX MAMMO INCL CAD UNI: CPT | Mod: TC,HCNC,PO,RT

## 2022-02-11 PROCEDURE — 76642 ULTRASOUND BREAST LIMITED: CPT | Mod: 26,HCNC,RT, | Performed by: RADIOLOGY

## 2022-02-11 PROCEDURE — 76642 ULTRASOUND BREAST LIMITED: CPT | Mod: TC,HCNC,PO,RT

## 2022-02-14 ENCOUNTER — LAB VISIT (OUTPATIENT)
Dept: LAB | Facility: HOSPITAL | Age: 77
End: 2022-02-14
Attending: FAMILY MEDICINE
Payer: MEDICARE

## 2022-02-14 ENCOUNTER — TELEPHONE (OUTPATIENT)
Dept: UROLOGY | Facility: CLINIC | Age: 77
End: 2022-02-14
Payer: MEDICARE

## 2022-02-14 DIAGNOSIS — R35.0 URINARY FREQUENCY: ICD-10-CM

## 2022-02-14 DIAGNOSIS — R35.0 URINARY FREQUENCY: Primary | ICD-10-CM

## 2022-02-14 PROCEDURE — 87086 URINE CULTURE/COLONY COUNT: CPT | Mod: HCNC | Performed by: UROLOGY

## 2022-02-14 NOTE — TELEPHONE ENCOUNTER
----- Message from Tono Galicia sent at 2/14/2022  8:44 AM CST -----  Contact: Self  Type: Needs Medical Advice  Who Called:  Patient  Symptoms (please be specific):   UTI  Best Call Back Number: [phone]   Additional Information:  Called to speak with office regarding UTI.   States she has specimen in clean-catch bottle. Would like to know if she can bring in.   States she would like to know if Dr LUTHER would like to see her. Symptoms are still persisting.

## 2022-02-14 NOTE — TELEPHONE ENCOUNTER
Spoke with patient, c/o symptoms of uti patient will drop off urine sample today to the l;ab. Patient will keep scheduled f/u with Dr Sylvester. Patient expressed understanding.

## 2022-02-14 NOTE — TELEPHONE ENCOUNTER
----- Message from Tono Galicia sent at 2/14/2022  8:44 AM CST -----  Contact: Self  Type: Needs Medical Advice  Who Called:  Patient  Symptoms (please be specific):   UTI  Best Call Back Number: [phone]   Additional Information:  Called to speak with office regarding UTI.   States she has specimen in clean-catch bottle. Would like to know if she can bring in.   States she would like to know if Dr LUTHER would like to see her. Symptoms are still persisting.

## 2022-02-15 ENCOUNTER — OFFICE VISIT (OUTPATIENT)
Dept: HEMATOLOGY/ONCOLOGY | Facility: CLINIC | Age: 77
End: 2022-02-15
Attending: NURSE PRACTITIONER
Payer: MEDICARE

## 2022-02-15 VITALS
HEART RATE: 86 BPM | DIASTOLIC BLOOD PRESSURE: 84 MMHG | TEMPERATURE: 98 F | HEIGHT: 64 IN | SYSTOLIC BLOOD PRESSURE: 122 MMHG | WEIGHT: 183.56 LBS | BODY MASS INDEX: 31.34 KG/M2

## 2022-02-15 DIAGNOSIS — M81.0 OSTEOPOROSIS, POST-MENOPAUSAL: ICD-10-CM

## 2022-02-15 DIAGNOSIS — Z85.3 HISTORY OF BREAST CANCER: Primary | ICD-10-CM

## 2022-02-15 DIAGNOSIS — R92.8 ABNORMAL MAMMOGRAM OF RIGHT BREAST: ICD-10-CM

## 2022-02-15 PROCEDURE — 3288F FALL RISK ASSESSMENT DOCD: CPT | Mod: HCNC,CPTII,S$GLB, | Performed by: NURSE PRACTITIONER

## 2022-02-15 PROCEDURE — 1101F PR PT FALLS ASSESS DOC 0-1 FALLS W/OUT INJ PAST YR: ICD-10-PCS | Mod: HCNC,CPTII,S$GLB, | Performed by: NURSE PRACTITIONER

## 2022-02-15 PROCEDURE — 3074F PR MOST RECENT SYSTOLIC BLOOD PRESSURE < 130 MM HG: ICD-10-PCS | Mod: HCNC,CPTII,S$GLB, | Performed by: NURSE PRACTITIONER

## 2022-02-15 PROCEDURE — 3008F PR BODY MASS INDEX (BMI) DOCUMENTED: ICD-10-PCS | Mod: HCNC,CPTII,S$GLB, | Performed by: NURSE PRACTITIONER

## 2022-02-15 PROCEDURE — 1159F PR MEDICATION LIST DOCUMENTED IN MEDICAL RECORD: ICD-10-PCS | Mod: HCNC,CPTII,S$GLB, | Performed by: NURSE PRACTITIONER

## 2022-02-15 PROCEDURE — 3008F BODY MASS INDEX DOCD: CPT | Mod: HCNC,CPTII,S$GLB, | Performed by: NURSE PRACTITIONER

## 2022-02-15 PROCEDURE — 99213 PR OFFICE/OUTPT VISIT, EST, LEVL III, 20-29 MIN: ICD-10-PCS | Mod: HCNC,S$GLB,, | Performed by: NURSE PRACTITIONER

## 2022-02-15 PROCEDURE — 1126F AMNT PAIN NOTED NONE PRSNT: CPT | Mod: HCNC,CPTII,S$GLB, | Performed by: NURSE PRACTITIONER

## 2022-02-15 PROCEDURE — 1160F RVW MEDS BY RX/DR IN RCRD: CPT | Mod: HCNC,CPTII,S$GLB, | Performed by: NURSE PRACTITIONER

## 2022-02-15 PROCEDURE — 1126F PR PAIN SEVERITY QUANTIFIED, NO PAIN PRESENT: ICD-10-PCS | Mod: HCNC,CPTII,S$GLB, | Performed by: NURSE PRACTITIONER

## 2022-02-15 PROCEDURE — 99999 PR PBB SHADOW E&M-EST. PATIENT-LVL IV: ICD-10-PCS | Mod: PBBFAC,HCNC,, | Performed by: NURSE PRACTITIONER

## 2022-02-15 PROCEDURE — 3079F DIAST BP 80-89 MM HG: CPT | Mod: HCNC,CPTII,S$GLB, | Performed by: NURSE PRACTITIONER

## 2022-02-15 PROCEDURE — 99499 RISK ADDL DX/OHS AUDIT: ICD-10-PCS | Mod: HCNC,S$GLB,, | Performed by: NURSE PRACTITIONER

## 2022-02-15 PROCEDURE — 3074F SYST BP LT 130 MM HG: CPT | Mod: HCNC,CPTII,S$GLB, | Performed by: NURSE PRACTITIONER

## 2022-02-15 PROCEDURE — 1101F PT FALLS ASSESS-DOCD LE1/YR: CPT | Mod: HCNC,CPTII,S$GLB, | Performed by: NURSE PRACTITIONER

## 2022-02-15 PROCEDURE — 1159F MED LIST DOCD IN RCRD: CPT | Mod: HCNC,CPTII,S$GLB, | Performed by: NURSE PRACTITIONER

## 2022-02-15 PROCEDURE — 3079F PR MOST RECENT DIASTOLIC BLOOD PRESSURE 80-89 MM HG: ICD-10-PCS | Mod: HCNC,CPTII,S$GLB, | Performed by: NURSE PRACTITIONER

## 2022-02-15 PROCEDURE — 99213 OFFICE O/P EST LOW 20 MIN: CPT | Mod: HCNC,S$GLB,, | Performed by: NURSE PRACTITIONER

## 2022-02-15 PROCEDURE — 1160F PR REVIEW ALL MEDS BY PRESCRIBER/CLIN PHARMACIST DOCUMENTED: ICD-10-PCS | Mod: HCNC,CPTII,S$GLB, | Performed by: NURSE PRACTITIONER

## 2022-02-15 PROCEDURE — 99999 PR PBB SHADOW E&M-EST. PATIENT-LVL IV: CPT | Mod: PBBFAC,HCNC,, | Performed by: NURSE PRACTITIONER

## 2022-02-15 PROCEDURE — 3288F PR FALLS RISK ASSESSMENT DOCUMENTED: ICD-10-PCS | Mod: HCNC,CPTII,S$GLB, | Performed by: NURSE PRACTITIONER

## 2022-02-15 PROCEDURE — 99499 UNLISTED E&M SERVICE: CPT | Mod: HCNC,S$GLB,, | Performed by: NURSE PRACTITIONER

## 2022-02-15 NOTE — PROGRESS NOTES
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      She was diagnosed in 1995 and treated with lumpectomy followed by radiation.  In 1998, she was diagnosed with Stage I infiltrating left breast carcinoma, which was high-grade, ER positive and MT negative.  She then underwent a left mastectomy with immediate reconstruction, 4 cycles of A/C as well as 60 months of adjuvant Tamoxifen/Arimidex.  She also carries a diagnosis of osteoporosis that is now osteopenic.  Dr. Palumbo is managing this with calcium supplementation as well as weightbearing exercises.  To note: The patient had one injection of Prolia, but declined further injections.      04/28/21:  Patchy, reddened area to left breast; punch biopsy in Dermatology = dermatitis    She presents to the clinic today for her annual breast evaluation (approx 23 yrs).  She continues to have hearing loss and is presently on steroids.  She is following Dr. Hughes for Psoriatic arthritis (et other autoimmune) and has been placed on Otezla.  She reports this has cut her appetite down.  She is concerned over "the mass" in her right breast.  Reviewed Mammo with her & will repeat in 6 months.  She denies any new breast complaints, bone pain, fevers, chills, drenching night sweats, lymphadenopathy, irregular heartbeat, chest pain, abdominal discomfort, nausea, vomiting, constipation, diarrhea, postmenopausal bleeding, difficulties with hot flashes, unexplained weight loss, etc.  No other new complaints or pertinent findings on a 14-point review of systems.    To note:  Patient requested Digital Diagnostic Mammograms each year.  Patient reports that dentist informed her the she has a tooth with good roots, but (2) large pockets in the jawline.  The tooth will die.  To note:  The patient was on an oral bisphosphate Boniva:  07 - 08; Actonel 12/08 - 01/2010.      Past Medical History:   Diagnosis Date    Allergic " rhinitis     Anticoagulant long-term use     ASPIRIN ONLY - (stops it when she presents a hemorrhagic cystitis)    Bladder cancer     Blood transfusion     Breast cancer     CAD (coronary artery disease), native coronary artery     40% non obstructive    Cholelithiasis     Endometriosis     Headache(784.0)     HEARING LOSS     Hemorrhoids     HLD (hyperlipidemia)     Hypertension     Iritis     Left wrist fracture 2009    traumatic    Malignant neoplasm of other specified sites of bladder 12/3/2009    Osteoporosis, postmenopausal     PONV (postoperative nausea and vomiting)     Rash     Rosacea     UTI (urinary tract infection)     Vaginal delivery     x 2     Past Surgical History:   Procedure Laterality Date    APPENDECTOMY      BLADDER TUMOR EXCISION  1979    [facility], dr garica - no recurrences since    BREAST BIOPSY Right     4 core bx negative    BREAST SURGERY Left 1998    HYSTERECTOMY      MASTECTOMY, PARTIAL  1995    left side - cancer - had radiotherapy 1995, 1998 had chemotherapy    oophrect      pelvic mass      benign    TONSILLECTOMY      TONSILLECTOMY, ADENOIDECTOMY, BILATERAL MYRINGOTOMY AND TUBES      tram flap Left 1998     Current Outpatient Medications on File Prior to Visit   Medication Sig Dispense Refill    ALPRAZolam (XANAX) 0.25 MG tablet Take 1 tablet (0.25 mg total) by mouth 3 (three) times daily as needed for Anxiety. 45 tablet 0    apremilast (OTEZLA) 30 mg Tab Take 1 tablet (30 mg total) by mouth 2 (two) times daily. 60 tablet 12    betamethasone valerate 0.1% (VALISONE) 0.1 % Lotn Apply topically 2 (two) times daily. 60 Bottle 3    fluocinolone acetonide oiL 0.01 % Drop Place in ear(s).      lisinopriL (PRINIVIL,ZESTRIL) 20 MG tablet Take 1 tablet (20 mg total) by mouth once daily. 30 tablet 0    lisinopriL-hydrochlorothiazide (PRINZIDE,ZESTORETIC) 20-12.5 mg per tablet Take 1 tablet by mouth once daily. 90 tablet 3    methylPREDNISolone  (MEDROL DOSEPACK) 4 mg tablet use as directed 21 tablet 0    omeprazole (PRILOSEC) 40 MG capsule Take 1 capsule (40 mg total) by mouth once daily. 90 capsule 0    RESTASIS 0.05 % ophthalmic emulsion INT 1 GTT IN OU BID      tretinoin (RETIN-A) 0.025 % cream Apply a pea-sized amount to entire face at bedtime, start every other night and increase as tolerated. Take night off if irritation develops. 45 g 3    vitamin D (VITAMIN D3) 1000 units Tab Take 1,000 Units by mouth once daily.      [DISCONTINUED] pilocarpine HCl (SALAGEN) 7.5 mg tablet Take 1 tablet (7.5 mg total) by mouth 3 (three) times daily. 90 tablet 11     No current facility-administered medications on file prior to visit.     PHYSICAL EXAMINATION:  GENERAL:  Well-developed, well-nourished white female in no acute distress.  Alert and oriented x3.  VITAL SIGNS:  Weight:  Loss of 10 1/2 pounds in 1 year   Wt Readings from Last 3 Encounters:   02/15/22 83.3 kg (183 lb 8.5 oz)   02/11/22 85.1 kg (187 lb 9.8 oz)   01/31/22 85.1 kg (187 lb 9.8 oz)     Temp Readings from Last 3 Encounters:   02/15/22 97.6 °F (36.4 °C) (Temporal)   11/29/21 99.2 °F (37.3 °C) (Oral)   04/28/21 97.8 °F (36.6 °C) (Temporal)     BP Readings from Last 3 Encounters:   02/15/22 122/84   11/29/21 126/70   10/12/21 138/84     Pulse Readings from Last 3 Encounters:   02/15/22 86   11/29/21 70   10/12/21 76     HEENT:  Normocephalic, atraumatic.  Oral mucosa pink and moist.  Lips without lesions.  Tongue midline.  Oropharynx clear.  Nonicteric sclerae.  NECK:  Supple, no adenopathy.  No carotid bruits, thyromegaly or thyroid nodule.  HEART:  Regular rate and rhythm without murmur, gallop or rub.  LUNGS:  Clear to auscultation bilaterally.  Normal respiratory effort.  ABDOMEN:  Soft, nontender, nondistended with positive normoactive bowel sounds, no hepatosplenomegaly.  EXTREMITIES:  No cyanosis, clubbing or edema.  Distal pulses are intact.  AXILLAE AND GROIN:  No palpable pathologic  lymphadenopathy is appreciated.  SKIN:  Intact/turgor normal.  NEUROLOGIC:  Cranial nerves II-XII grossly intact.  Motor:  Good muscle bulk and tone.  Strength/sensory 5/5 throughout.  Gait stable.  BREASTS:  Right breast remains soft; free of masses, nipple discharge or inversion; tenderness at 12:00 o'clock position.  Left breast with noted reconstruction with no signs of local reoccurrence.    LABORATORY:    Lab Results   Component Value Date    WBC 8.97 02/11/2022    RBC 4.64 02/11/2022    HGB 13.6 02/11/2022    HCT 40.4 02/11/2022    MCV 87 02/11/2022    MCH 29.3 02/11/2022    MCHC 33.7 02/11/2022    RDW 13.2 02/11/2022     02/11/2022    MPV 9.1 (L) 02/11/2022    GRAN 6.8 02/11/2022    GRAN 75.7 (H) 02/11/2022    LYMPH 1.1 02/11/2022    LYMPH 12.4 (L) 02/11/2022    MONO 0.7 02/11/2022    MONO 8.1 02/11/2022    EOS 0.2 02/11/2022    BASO 0.09 02/11/2022    EOSINOPHIL 2.0 02/11/2022    BASOPHIL 1.0 02/11/2022     CMP  Sodium   Date Value Ref Range Status   02/11/2022 135 (L) 136 - 145 mmol/L Final     Potassium   Date Value Ref Range Status   02/11/2022 3.6 3.5 - 5.1 mmol/L Final     Chloride   Date Value Ref Range Status   02/11/2022 99 95 - 110 mmol/L Final     CO2   Date Value Ref Range Status   02/11/2022 25 23 - 29 mmol/L Final     Glucose   Date Value Ref Range Status   02/11/2022 122 (H) 70 - 110 mg/dL Final     BUN   Date Value Ref Range Status   02/11/2022 15 8 - 23 mg/dL Final     Creatinine   Date Value Ref Range Status   02/11/2022 0.9 0.5 - 1.4 mg/dL Final     Calcium   Date Value Ref Range Status   02/11/2022 9.8 8.7 - 10.5 mg/dL Final     Total Protein   Date Value Ref Range Status   02/11/2022 7.2 6.0 - 8.4 g/dL Final     Albumin   Date Value Ref Range Status   02/11/2022 3.7 3.5 - 5.2 g/dL Final     Total Bilirubin   Date Value Ref Range Status   02/11/2022 0.3 0.1 - 1.0 mg/dL Final     Comment:     For infants and newborns, interpretation of results should be based  on gestational age,  weight and in agreement with clinical  observations.    Premature Infant recommended reference ranges:  Up to 24 hours.............<8.0 mg/dL  Up to 48 hours............<12.0 mg/dL  3-5 days..................<15.0 mg/dL  6-29 days.................<15.0 mg/dL       Alkaline Phosphatase   Date Value Ref Range Status   02/11/2022 147 (H) 55 - 135 U/L Final     AST   Date Value Ref Range Status   02/11/2022 19 10 - 40 U/L Final     ALT   Date Value Ref Range Status   02/11/2022 25 10 - 44 U/L Final     Anion Gap   Date Value Ref Range Status   02/11/2022 11 8 - 16 mmol/L Final     eGFR if    Date Value Ref Range Status   02/11/2022 >60 >60 mL/min/1.73 m^2 Final     eGFR if non    Date Value Ref Range Status   02/11/2022 >60 >60 mL/min/1.73 m^2 Final     Comment:     Calculation used to obtain the estimated glomerular filtration  rate (eGFR) is the CKD-EPI equation.            RADIOLOGY:  Chest x-ray dated 02/11/2022, Impression No radiographic evidence of active chest disease.     Mammogram (digital screening) dated 02/11/2022 , Impression:    Right  Mass: Right breast 6 mm mass at the retroareolar position. Assessment: 3 - Probably benign. Short Interval Follow-Up in 6 Months is recommended.   BI-RADS Category: Overall: 3 - Probably Benign  Recommendation:  Short interval follow-up is recommended in 6 Months.    BMD 02/12/2021:  Osteoporosis - increased density in lumbar spine; decreased density in femoral neck    IMPRESSION:  1.  Stage I infiltrating left breast carcinoma with recurrent DCIS -- abnormal mammo; reimage in 6 months  2.  Oval cyst in right breast - benign  3.  Osteopenia, followed by Dr. Palumbo - now Osteoporosis  4.  Vitamin D deficiency - stable; continue OTC Vitamin D3 2,000 I.u./day  5.  Left mastectomy with reconstruction  6.  Psoriatic arthritis - on Otezla, follows with Dr. Spady     PLAN:  1.  Mammogram, digital, right with US in 6 months; phone  review.  2.  In regards to #1, return in one year with interval CBC, CMP, LDH, CXR and Digital Diagnostic mammogram, right prior to appointment.  2.  In regards to osteoporosis, the patient will follow with Dr. Hughes; continue calcium & vitamin D supplementation daily along with weight bearing exercises/walking.      Assessment/plan reviewed and approved by Dr. Corral            Answers for HPI/ROS submitted by the patient on 2/12/2022  appetite change : No  unexpected weight change: No  mouth sores: No  visual disturbance: No  cough: Yes  shortness of breath: Yes  chest pain: No  abdominal pain: No  diarrhea: No  frequency: No  back pain: No  rash: No  headaches: No  adenopathy: No  nervous/ anxious: No

## 2022-02-15 NOTE — Clinical Note
6 month follow up with Diagnostic Digital Right breast Mammo with US; phone review; otherwise f/u FOLLOW up in 1 year with CBC, CMP, LDH, CXR,

## 2022-02-16 LAB — BACTERIA UR CULT: NO GROWTH

## 2022-02-23 DIAGNOSIS — H91.90 HEARING LOSS, UNSPECIFIED HEARING LOSS TYPE, UNSPECIFIED LATERALITY: Primary | ICD-10-CM

## 2022-03-02 ENCOUNTER — HOSPITAL ENCOUNTER (OUTPATIENT)
Dept: RADIOLOGY | Facility: HOSPITAL | Age: 77
Discharge: HOME | End: 2022-03-02
Attending: NURSE PRACTITIONER
Payer: MEDICARE

## 2022-03-02 ENCOUNTER — CLINICAL SUPPORT (OUTPATIENT)
Dept: AUDIOLOGY | Facility: CLINIC | Age: 77
End: 2022-03-02
Payer: MEDICARE

## 2022-03-02 ENCOUNTER — OFFICE VISIT (OUTPATIENT)
Dept: OTOLARYNGOLOGY | Facility: CLINIC | Age: 77
End: 2022-03-02
Payer: MEDICARE

## 2022-03-02 VITALS — HEIGHT: 64 IN | BODY MASS INDEX: 31.24 KG/M2 | TEMPERATURE: 99 F | WEIGHT: 183 LBS

## 2022-03-02 DIAGNOSIS — H81.02 COCHLEAR HYDROPS OF LEFT EAR: ICD-10-CM

## 2022-03-02 DIAGNOSIS — L29.9 ITCHING OF EAR: ICD-10-CM

## 2022-03-02 DIAGNOSIS — R09.82 PND (POST-NASAL DRIP): Primary | ICD-10-CM

## 2022-03-02 DIAGNOSIS — R09.82 PND (POST-NASAL DRIP): ICD-10-CM

## 2022-03-02 DIAGNOSIS — H90.72 MIXED CONDUCTIVE AND SENSORINEURAL HEARING LOSS OF LEFT EAR WITH UNRESTRICTED HEARING OF RIGHT EAR: ICD-10-CM

## 2022-03-02 DIAGNOSIS — H90.3 ASYMMETRICAL SENSORINEURAL HEARING LOSS: ICD-10-CM

## 2022-03-02 PROCEDURE — 3288F PR FALLS RISK ASSESSMENT DOCUMENTED: ICD-10-PCS | Mod: HCNC,CPTII,S$GLB, | Performed by: NURSE PRACTITIONER

## 2022-03-02 PROCEDURE — 1159F MED LIST DOCD IN RCRD: CPT | Mod: HCNC,CPTII,S$GLB, | Performed by: NURSE PRACTITIONER

## 2022-03-02 PROCEDURE — 70220 X-RAY EXAM OF SINUSES: CPT | Mod: 26,HCNC,, | Performed by: RADIOLOGY

## 2022-03-02 PROCEDURE — 3008F BODY MASS INDEX DOCD: CPT | Mod: HCNC,CPTII,S$GLB, | Performed by: NURSE PRACTITIONER

## 2022-03-02 PROCEDURE — 99999 PR PBB SHADOW E&M-EST. PATIENT-LVL IV: CPT | Mod: PBBFAC,HCNC,, | Performed by: NURSE PRACTITIONER

## 2022-03-02 PROCEDURE — 1159F PR MEDICATION LIST DOCUMENTED IN MEDICAL RECORD: ICD-10-PCS | Mod: HCNC,CPTII,S$GLB, | Performed by: NURSE PRACTITIONER

## 2022-03-02 PROCEDURE — 99999 PR PBB SHADOW E&M-EST. PATIENT-LVL II: CPT | Mod: PBBFAC,HCNC,,

## 2022-03-02 PROCEDURE — 99214 OFFICE O/P EST MOD 30 MIN: CPT | Mod: HCNC,S$GLB,, | Performed by: NURSE PRACTITIONER

## 2022-03-02 PROCEDURE — 99214 PR OFFICE/OUTPT VISIT, EST, LEVL IV, 30-39 MIN: ICD-10-PCS | Mod: HCNC,S$GLB,, | Performed by: NURSE PRACTITIONER

## 2022-03-02 PROCEDURE — 70220 X-RAY EXAM OF SINUSES: CPT | Mod: TC,HCNC,FY,PO

## 2022-03-02 PROCEDURE — 70220 XR SINUSES MIN 3 VIEWS: ICD-10-PCS | Mod: 26,HCNC,, | Performed by: RADIOLOGY

## 2022-03-02 PROCEDURE — 3288F FALL RISK ASSESSMENT DOCD: CPT | Mod: HCNC,CPTII,S$GLB, | Performed by: NURSE PRACTITIONER

## 2022-03-02 PROCEDURE — 92557 PR COMPREHENSIVE HEARING TEST: ICD-10-PCS | Mod: HCNC,S$GLB,, | Performed by: AUDIOLOGIST-HEARING AID FITTER

## 2022-03-02 PROCEDURE — 92557 COMPREHENSIVE HEARING TEST: CPT | Mod: HCNC,S$GLB,, | Performed by: AUDIOLOGIST-HEARING AID FITTER

## 2022-03-02 PROCEDURE — 1101F PR PT FALLS ASSESS DOC 0-1 FALLS W/OUT INJ PAST YR: ICD-10-PCS | Mod: HCNC,CPTII,S$GLB, | Performed by: NURSE PRACTITIONER

## 2022-03-02 PROCEDURE — 99999 PR PBB SHADOW E&M-EST. PATIENT-LVL IV: ICD-10-PCS | Mod: PBBFAC,HCNC,, | Performed by: NURSE PRACTITIONER

## 2022-03-02 PROCEDURE — 1101F PT FALLS ASSESS-DOCD LE1/YR: CPT | Mod: HCNC,CPTII,S$GLB, | Performed by: NURSE PRACTITIONER

## 2022-03-02 PROCEDURE — 3008F PR BODY MASS INDEX (BMI) DOCUMENTED: ICD-10-PCS | Mod: HCNC,CPTII,S$GLB, | Performed by: NURSE PRACTITIONER

## 2022-03-02 PROCEDURE — 99999 PR PBB SHADOW E&M-EST. PATIENT-LVL II: ICD-10-PCS | Mod: PBBFAC,HCNC,,

## 2022-03-02 RX ORDER — FLUOCINOLONE ACETONIDE 0.11 MG/ML
1 OIL AURICULAR (OTIC) DAILY PRN
Qty: 20 ML | Refills: 1 | Status: SHIPPED | OUTPATIENT
Start: 2022-03-02 | End: 2023-03-02

## 2022-03-02 NOTE — PATIENT INSTRUCTIONS
"Mucous (Post nasal drip) Management     A fullness sensation in the back of the throat is called "globus."   Many people attribute this fullness to a sensation of increased mucous, because they can feel a sticky material in the throat that they will occasionally cough up.     Nasal  / Throat Mucous  Our body NORMALLY makes 1 liter or more of saliva (spit) and nasal mucous every day. These body fluids are important for breaking down the food we eat, protecting our teeth from cavities, and clearing out the pollen and irritants that we breathe in our nose. As our body makes these fluids, we swallow them throughout the day, where they are recycled back through the body. This process is happening all our life without us thinking about it. When an adjustment to this process causes the mucous to be increased or thicker, is when we notice it.      Some problems cause an INCREASE in these body fluids, which we perceive as irritating, excess phlegm in the throat.   However, it is not always an increase. Many times there is a change in CONSISTENCY of the mucous to make it thicker. This will cause the mucous to be held up in the throat instead of being swallowed easily.     Several factors can cause these problems:  Dryness of throat and nose which increases the mucous sticking - Causes include inadequate hydration, excess caffeine / soda / sugary drinks, medication side effects.  Acid reflux  Increased mucous production from allergies or chronic sinus drainage.      We will evaluate the cause(s) of increased or thickened mucous and consider different treatment strategies:     MANY COMMON medications cause dryness of the throat, including medications for allergy, depression/anxiety, heart, and urination issues.   There is some evidence that added sugars or processed sugars in the diet (not the kind that occur naturally in honey or ripe fruit) can increase mucus, as well as too much dairy. To avoid these refined carbohydrates, " on food labels, watch out for wheat flour (also called white, refined or enriched flour) on the ingredients list.      Recommendations for Increased Mucous Sensation     Water, water, water - this cannot be understated. Most people are not drinking enough water regularly to keep up with body's demand. Recommend AT LEAST 64 oz of water per day, and more for men (up to 100 oz.) unless a medical issue prevents this.  Reduce intake of caffeine / tea / sugary drinks or soda, which all will cause increased mucous.  If your nose is the source of mucous, nasal medications discussed by your doctor as well as nasal saline can be effective to wash away the mucous.  Prevention of acid reflux by avoiding late-night eating (nothing 3 hours before laying down at night), greasy and spicy foods, alcohol, and acidic foods  Humidifier in the bedroom if you find dryness increases at night.  Smoking cessation if you use tobacco  Examination your medication list with your doctor to determine medications that may be contributing     There are NUMEROUS over the counter mucous thinning agents. Here are some suggestions that can be purchased online:  Christopher's Breezer's (Sugar Free), Rajeev's black currant pastilles, and Entertainer's Secret Throat Relief can all help dry mouth and thickened mucous.   Biotene spray and mouthwash can help lubricate a dry mouth  Mucinex can be helpful in some cases, but stop taking if you don't notice improvement after a few weeks.           For post-nasal drip, let's talk through some of the top considerations one-at-a-time:     1. Nasal allergies -- Typical constellation of symptoms seen with nasal allergies: itchy, red, watery eyes; itchy, red, watery nose; excessive sneezing; excessive stuffiness. If this is the one that best describes your daily states, then consider seeing an allergist next to come up with a more aggressive anti-allergy regimen.       2. Silent reflux -- Typical constellation of  symptoms seen with silent reflux: post-nasal drip sensation with absence of significant runny nose or nasal congestion, sensation of thick or too much mucus in the back of throat, raspy voice, frequent throat clearing, lump in the back of throat, frequent sore throats. If this one best describes your current state, see a gastroenterologist and continue your daily anti-reflux regimen, raise the head of your bed, avoid trigger foods, avoid eating anything 3 hours before going to bed, and shedding a few pounds may help improve reflux.     3. Sinus Infection -- Typical constellation of symptoms seen with acute bacterial sinus infection are:  Green-gold, foul-smelling, foul-tasting mucus from nose and throat, inability to breathe through nose, inability to smell or taste well, facial pain and swelling, dental pain, headaches around eyes, sore throat and productive cough. You should let me know if this one best describes your current state, and I will order sinus imaging. If x-rays show sinusitis, we will treat it accordingly.          Coughing is a fundamental protective reflux in response to airway irritation. When cough lasts for more than 8 weeks, it is considered chronic. The protective cough reflex is important for clearing the airway of inhaled particles. Chemical irritants or inflammatory mediators can stimulate the vagus nerve resulting in cough.     Some of the Top Considerations for Chronic Cough:     1. Nasal allergies -- Typical constellation of symptoms seen with nasal allergies: itchy, red, watery eyes; itchy, red, watery nose; excessive sneezing; excessive stuffiness. If this one best describes your current state, then discuss with your primary care provider whether you should see an allergist or take daily allergy medications.     2. Sinus Infection -- Typical constellation of symptoms seen with acute bacterial sinus infection are:  Green-gold, foul-smelling, foul-tasting mucus from nose and throat,  inability to breathe through nose, inability to smell or taste well, facial pain and swelling, dental pain, headaches around eyes, sore throat and productive cough. If this one best describes your current state, then let's get sinus imaging to rule out infection.     3.  Silent reflux -- Typical constellation of symptoms seen with silent reflux: post-nasal drip sensation with absence of significant runny nose or nasal congestion, sensation of thick or too much mucus in the back of throat, raspy voice, frequent throat clearing, lump in the back of throat, frequent sore throats. If this one best describes your current state, discuss with your primary care provider whether you should see a gastroenterologist or take daily reflux medications. Your GI doctor may want to do an Upper GI or obtain a barium swallow or pH monitoring test.     4. Irritable Larynx Syndrome -- Otherwise known as Laryngeal Sensory Neuropathy. Typical constellation of symptoms:  a dry persistent cough for months or even years, coughing fits last seconds to minutes and sometimes longer, frequent throat clearing, abrupt onset that may follow a viral illness or surgery. Diagnostic tests for asthma, allergy, and reflux are all normal, and patient has not responded to maximal therapy for those conditions. This usually responds to either low-dose Elavil or Gabapentin.     5. Asthma/Pulmonary (Lung) issue -- Typical constellation of symptoms: wheezing, shortness of breath. Discuss with your primary care provider whether you should see a pulmonologist (lung specialist) and have Pulmonary Function Testing (PFTs) done.     6. Certain blood pressure medications known as ACE-inhibitors, such as lisinopril, can cause chronic cough. Though estimates vary in literature, 20% or more of patients taking lisinopril (or other ACE-inhibitor for blood pressure) will develop a chronic dry cough.     7. Post-Viral Cough Syndrome -- Occasionally a cough can persist for  4-8 weeks after an acute upper respiratory viral illness, due to hyperactive sensitivity of the airway nerves. A steroid inhaler and night-time cough suppressant can often help.     8. Pertussis -- Pertussis is a under-recognized cause of persistent cough in adults. A blood test can check for Bordetella.

## 2022-03-02 NOTE — PROGRESS NOTES
"Yuliana Mattson was seen 03/02/2022 for an audiological evaluation. Pertinent complaints today include hearing loss. Pt reports her hearing has returned AS and the "whooshing" AD has resolved. Otoscopy revealed no cerumen in both ears. The tympanic membrane was visualized AU prior to proceeding with the hearing testing.     Results reveal a normal hearing AU with the exception of a mild SNHL at 1K Hz, AS.    Speech Reception Thresholds were  10 dBHL for the right ear and 10 dBHL for the left ear.    Word recognition scores were excellent for the right ear and excellent for the left ear.  Significant improvement was noted with the exception of the continued asymmetry at 1K Hz, AS>AD when compared to previous hearing testing from 1/31/22.     Audiogram results were reviewed in detail with patient and all questions were answered. Results will be reviewed by ENT at the completion of this note. Recommend further medical evaluation with an ENT provider for the continued asymmetry at 1K Hz, AS>AD and bilateral hearing protection with either muffs or in-ear protection in loud noises.            "

## 2022-03-02 NOTE — PROGRESS NOTES
Subjective:       Patient ID: Yuliana Mattson is a 76 y.o. female.    Chief Complaint: No chief complaint on file.    HPI   Patient was seen by me 6 weeks ago for intermittent sudden hearing loss AS suspicious for left cochlear hydrops (no vertigo); given medrol dose pack (high dose prednisone was deferred by pt d/t intolerable side effects with past episodes). Patient was also counseled on PND management. Sinus imaging was deferred by pt.  Nasal sprays were deferred by pt d/t intolerable side effects.  Patient had an episode 9 years ago of unilateral aural fullness, roaring tinnitus and asymmetrical hearing loss suspicious for endolymphatic hydrops. Negative MRI-IAC in April 2013. Treated with high-dose prednisone. A month later, her asymmetrical hearing loss and unilateral aural symptoms had resolved, further confirming suspicion for Meniere's. She has experienced intermittent episodes since then. Patient reports still having some aural fullness/pressure AS.     Review of Systems   Constitutional: Negative.    HENT: Positive for hearing loss, postnasal drip and sinus pressure/congestion.    Eyes: Negative.    Respiratory: Positive for cough.    Cardiovascular: Negative.    Gastrointestinal: Positive for diarrhea.   Endocrine: Positive for cold intolerance.   Genitourinary: Negative.    Musculoskeletal: Positive for back pain and neck pain.   Integumentary:  Negative.   Neurological: Positive for light-headedness and headaches.   Hematological: Negative.    Psychiatric/Behavioral: Negative.          Objective:      Physical Exam  Vitals and nursing note reviewed.   Constitutional:       General: She is not in acute distress.     Appearance: She is well-developed. She is not ill-appearing or diaphoretic.   HENT:      Head: Normocephalic and atraumatic.      Right Ear: Hearing, tympanic membrane, ear canal and external ear normal. No middle ear effusion. Tympanic membrane is not erythematous.      Left Ear:  Hearing, tympanic membrane, ear canal and external ear normal.  No middle ear effusion. Tympanic membrane is not erythematous.      Nose: Nose normal.      Mouth/Throat:      Pharynx: Uvula midline.   Eyes:      General: Lids are normal. No scleral icterus.        Right eye: No discharge.         Left eye: No discharge.   Neck:      Trachea: Trachea normal. No tracheal deviation.   Cardiovascular:      Rate and Rhythm: Normal rate.   Pulmonary:      Effort: Pulmonary effort is normal. No respiratory distress.      Breath sounds: No stridor. No wheezing.   Musculoskeletal:         General: Normal range of motion.      Cervical back: Normal range of motion and neck supple.   Skin:     General: Skin is warm and dry.      Coloration: Skin is not pale.   Neurological:      Mental Status: She is alert and oriented to person, place, and time.      Coordination: Coordination normal.      Gait: Gait normal.   Psychiatric:         Speech: Speech normal.         Behavior: Behavior normal. Behavior is cooperative.         Thought Content: Thought content normal.         Judgment: Judgment normal.         Assessment:       Problem List Items Addressed This Visit    None     Visit Diagnoses     PND (post-nasal drip)    -  Primary    Relevant Orders    X-Ray Sinuses Min 3 Views    Itching of ear        Relevant Medications    fluocinolone acetonide oiL 0.01 % Drop    Asymmetrical sensorineural hearing loss              Plan:     Asymmetry has significantly improved since audiogram last month. 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 Discussed Maxzide and low-salt diet; however she already takes 12.5 mg of HCTZ in her antiHTN med. She will discuss with PCP whether HCTZ ingredient can be increased from 12.5 mg to 25 mg, and will comply with low-salt Meniere's diet.     DermOtic refilled per pt request for chronic aural pruritis.   Lengthy discussion regarding PND which pt attributes to her sinuses; therefore  sinus imaging ordered today -- will call pt with results as soon as available.   Saline rinsing. Discussed reflux measures. PND/Mucus management handout given and reviewed in detail.   Patient encouraged to return to clinic if symptoms worsen/persist and as needed for further ENT symptoms or concerns.       Answers for HPI/ROS submitted by the patient on 3/1/2022  Acid Reflux?: Yes  Seasonal Allergies?: Yes

## 2022-03-07 DIAGNOSIS — K21.9 GASTROESOPHAGEAL REFLUX DISEASE WITHOUT ESOPHAGITIS: ICD-10-CM

## 2022-03-07 RX ORDER — OMEPRAZOLE 40 MG/1
CAPSULE, DELAYED RELEASE ORAL
Qty: 90 CAPSULE | Refills: 3 | Status: SHIPPED | OUTPATIENT
Start: 2022-03-07 | End: 2022-04-13

## 2022-03-07 NOTE — TELEPHONE ENCOUNTER
No new care gaps identified.  Powered by [employer]. Reference number: [id number].   3/07/2022 8:12:12 AM CST

## 2022-03-10 ENCOUNTER — PATIENT MESSAGE (OUTPATIENT)
Dept: RHEUMATOLOGY | Facility: CLINIC | Age: 77
End: 2022-03-10
Payer: MEDICARE

## 2022-03-18 ENCOUNTER — OFFICE VISIT (OUTPATIENT)
Dept: FAMILY MEDICINE | Facility: CLINIC | Age: 77
End: 2022-03-18
Payer: MEDICARE

## 2022-03-18 VITALS
HEIGHT: 64 IN | DIASTOLIC BLOOD PRESSURE: 78 MMHG | HEART RATE: 96 BPM | WEIGHT: 183 LBS | SYSTOLIC BLOOD PRESSURE: 128 MMHG | BODY MASS INDEX: 31.24 KG/M2 | OXYGEN SATURATION: 98 %

## 2022-03-18 DIAGNOSIS — C80.1 MALIGNANT (PRIMARY) NEOPLASM, UNSPECIFIED: ICD-10-CM

## 2022-03-18 DIAGNOSIS — I10 ESSENTIAL HYPERTENSION: ICD-10-CM

## 2022-03-18 DIAGNOSIS — K21.9 GASTROESOPHAGEAL REFLUX DISEASE WITHOUT ESOPHAGITIS: Primary | ICD-10-CM

## 2022-03-18 PROCEDURE — 99214 PR OFFICE/OUTPT VISIT, EST, LEVL IV, 30-39 MIN: ICD-10-PCS | Mod: S$GLB,,, | Performed by: FAMILY MEDICINE

## 2022-03-18 PROCEDURE — 1159F PR MEDICATION LIST DOCUMENTED IN MEDICAL RECORD: ICD-10-PCS | Mod: CPTII,S$GLB,, | Performed by: FAMILY MEDICINE

## 2022-03-18 PROCEDURE — 99214 OFFICE O/P EST MOD 30 MIN: CPT | Mod: S$GLB,,, | Performed by: FAMILY MEDICINE

## 2022-03-18 PROCEDURE — 3288F PR FALLS RISK ASSESSMENT DOCUMENTED: ICD-10-PCS | Mod: CPTII,S$GLB,, | Performed by: FAMILY MEDICINE

## 2022-03-18 PROCEDURE — 1159F MED LIST DOCD IN RCRD: CPT | Mod: CPTII,S$GLB,, | Performed by: FAMILY MEDICINE

## 2022-03-18 PROCEDURE — 3008F PR BODY MASS INDEX (BMI) DOCUMENTED: ICD-10-PCS | Mod: CPTII,S$GLB,, | Performed by: FAMILY MEDICINE

## 2022-03-18 PROCEDURE — 99499 UNLISTED E&M SERVICE: CPT | Mod: S$GLB,,, | Performed by: FAMILY MEDICINE

## 2022-03-18 PROCEDURE — 3074F PR MOST RECENT SYSTOLIC BLOOD PRESSURE < 130 MM HG: ICD-10-PCS | Mod: CPTII,S$GLB,, | Performed by: FAMILY MEDICINE

## 2022-03-18 PROCEDURE — 3288F FALL RISK ASSESSMENT DOCD: CPT | Mod: CPTII,S$GLB,, | Performed by: FAMILY MEDICINE

## 2022-03-18 PROCEDURE — 99999 PR PBB SHADOW E&M-EST. PATIENT-LVL III: ICD-10-PCS | Mod: PBBFAC,,, | Performed by: FAMILY MEDICINE

## 2022-03-18 PROCEDURE — 1126F AMNT PAIN NOTED NONE PRSNT: CPT | Mod: CPTII,S$GLB,, | Performed by: FAMILY MEDICINE

## 2022-03-18 PROCEDURE — 3078F PR MOST RECENT DIASTOLIC BLOOD PRESSURE < 80 MM HG: ICD-10-PCS | Mod: CPTII,S$GLB,, | Performed by: FAMILY MEDICINE

## 2022-03-18 PROCEDURE — 3078F DIAST BP <80 MM HG: CPT | Mod: CPTII,S$GLB,, | Performed by: FAMILY MEDICINE

## 2022-03-18 PROCEDURE — 99499 RISK ADDL DX/OHS AUDIT: ICD-10-PCS | Mod: S$GLB,,, | Performed by: FAMILY MEDICINE

## 2022-03-18 PROCEDURE — 1101F PR PT FALLS ASSESS DOC 0-1 FALLS W/OUT INJ PAST YR: ICD-10-PCS | Mod: CPTII,S$GLB,, | Performed by: FAMILY MEDICINE

## 2022-03-18 PROCEDURE — 3008F BODY MASS INDEX DOCD: CPT | Mod: CPTII,S$GLB,, | Performed by: FAMILY MEDICINE

## 2022-03-18 PROCEDURE — 99999 PR PBB SHADOW E&M-EST. PATIENT-LVL III: CPT | Mod: PBBFAC,,, | Performed by: FAMILY MEDICINE

## 2022-03-18 PROCEDURE — 1126F PR PAIN SEVERITY QUANTIFIED, NO PAIN PRESENT: ICD-10-PCS | Mod: CPTII,S$GLB,, | Performed by: FAMILY MEDICINE

## 2022-03-18 PROCEDURE — 1101F PT FALLS ASSESS-DOCD LE1/YR: CPT | Mod: CPTII,S$GLB,, | Performed by: FAMILY MEDICINE

## 2022-03-18 PROCEDURE — 3074F SYST BP LT 130 MM HG: CPT | Mod: CPTII,S$GLB,, | Performed by: FAMILY MEDICINE

## 2022-03-18 NOTE — PROGRESS NOTES
Chief Complaint   Patient presents with    Cough    Gastroesophageal Reflux     HISTORY OF PRESENT ILLNESS:   Here with c/o persistent reflux symptoms.  She has been taking omeprazole 40mg for the past 3 months.  Coughing regularly and after meals.  Still with persistent reflux after meals  tums help mildly  Symptoms started before Otezla    HTN - tolerating Lisinopril HCT  HLD - following a low-fat diet  Allergic rhinitis - Tolerating flonase as needed during fall season  Osteoporosis - BMD 12/10/12 showed Osteoporosis -- there is a 12% risk of a major osteoporotic fracture in the next 10 years (FRAX). She took Prolia and feels like this caused a number of symptoms (aching in back, generalized itching, dizziness episodes).  Managing calcium intake with diet  Breast cancer, bladder cancer - following with Dr. 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 to 25mg if dizziness becomes more frequent      Past Medical History:   Diagnosis Date    Allergic rhinitis     Anticoagulant long-term use     ASPIRIN ONLY - (stops it when she presents a hemorrhagic cystitis)    Bladder cancer     Blood transfusion     Breast cancer     CAD (coronary artery disease), native coronary artery     40% non obstructive    Cholelithiasis     Endometriosis     Headache(784.0)     HEARING LOSS     Hemorrhoids     HLD (hyperlipidemia)     Hypertension     Iritis     Left wrist fracture 2009    traumatic    Malignant neoplasm of other specified sites of bladder 12/3/2009    Osteoporosis, postmenopausal     PONV (postoperative nausea and vomiting)     Rash     Rosacea     UTI (urinary tract infection)     Vaginal delivery     x 2       Past Surgical History:   Procedure Laterality Date     APPENDECTOMY      BLADDER TUMOR EXCISION  1979    [facility] [place] [facility], dr garcia - no recurrences since    BREAST BIOPSY Right     4 core bx negative    BREAST SURGERY Left 1998    HYSTERECTOMY      MASTECTOMY, PARTIAL  1995    left side - cancer - had radiotherapy 1995, 1998 had chemotherapy    oophrect      pelvic mass      benign    TONSILLECTOMY      TONSILLECTOMY, ADENOIDECTOMY, BILATERAL MYRINGOTOMY AND TUBES      tram flap Left 1998       Review of patient's allergies indicates:   Allergen Reactions    Compazine [prochlorperazine edisylate] Anaphylaxis       Social History     Socioeconomic History    Marital status:    Occupational History    Occupation: retired accounting   Tobacco Use    Smoking status: Never Smoker    Smokeless tobacco: Never Used   Substance and Sexual Activity    Alcohol use: No    Drug use: No     Social Determinants of Health     Financial Resource Strain: Low Risk     Difficulty of Paying Living Expenses: Not hard at all   Food Insecurity: No Food Insecurity    Worried About Running Out of Food in the Last Year: Never true    Ran Out of Food in the Last Year: Never true   Transportation Needs: No Transportation Needs    Lack of Transportation (Medical): No    Lack of Transportation (Non-Medical): No   Physical Activity: Inactive    Days of Exercise per Week: 0 days    Minutes of Exercise per Session: 20 min   Stress: Stress Concern Present    Feeling of Stress : To some extent   Social Connections: Unknown    Frequency of Communication with Friends and Family: More than three times a week    Frequency of Social Gatherings with Friends and Family: Three times a week    Active Member of Clubs or Organizations: No    Attends Club or Organization Meetings: 1 to 4 times per year    Marital Status:    Housing Stability: Low Risk     Unable to Pay for Housing in the Last Year: No    Number of Places Lived in the Last Year: 1    Unstable Housing  in the Last Year: No       Current Outpatient Medications on File Prior to Visit   Medication Sig Dispense Refill    ALPRAZolam (XANAX) 0.25 MG tablet Take 1 tablet (0.25 mg total) by mouth 3 (three) times daily as needed for Anxiety. 45 tablet 0    apremilast (OTEZLA) 30 mg Tab Take 1 tablet (30 mg total) by mouth 2 (two) times daily. 60 tablet 12    fluocinolone acetonide oiL 0.01 % Drop Place 1 drop in ear(s) daily as needed (itching). 20 mL 1    lisinopriL-hydrochlorothiazide (PRINZIDE,ZESTORETIC) 20-12.5 mg per tablet Take 1 tablet by mouth once daily. 90 tablet 3    RESTASIS 0.05 % ophthalmic emulsion INT 1 GTT IN OU BID      tretinoin (RETIN-A) 0.025 % cream Apply a pea-sized amount to entire face at bedtime, start every other night and increase as tolerated. Take night off if irritation develops. 45 g 3    vitamin D (VITAMIN D3) 1000 units Tab Take 1,000 Units by mouth once daily.      betamethasone valerate 0.1% (VALISONE) 0.1 % Lotn Apply topically 2 (two) times daily. (Patient not taking: No sig reported) 60 Bottle 3    lisinopriL (PRINIVIL,ZESTRIL) 20 MG tablet Take 1 tablet (20 mg total) by mouth once daily. (Patient not taking: Reported on 3/18/2022) 30 tablet 0    methylPREDNISolone (MEDROL DOSEPACK) 4 mg tablet use as directed (Patient not taking: No sig reported) 21 tablet 0    omeprazole (PRILOSEC) 40 MG capsule TAKE 1 CAPSULE(40 MG) BY MOUTH EVERY DAY (Patient not taking: Reported on 3/18/2022) 90 capsule 3     No current facility-administered medications on file prior to visit.       Family History   Problem Relation Age of Onset    Glaucoma Father     Glaucoma Paternal Aunt     Glaucoma Paternal Grandmother     Cancer Cousin         1st, ovarian    Amblyopia Neg Hx     Blindness Neg Hx     Cataracts Neg Hx     Hypertension Neg Hx     Macular degeneration Neg Hx     Retinal detachment Neg Hx     Strabismus Neg Hx     Stroke Neg Hx     Thyroid disease Neg Hx     Melanoma  "Neg Hx        REVIEW OF SYSTEMS:   GENERAL: No fever, chills, or weight changes.  EARS: Denies ear pain, discharge, tinnitus or vertigo. Denies hearing loss.   MOUTH & THROAT: No hoarseness, change in voice, swallowing difficulty. No excessive gum bleeding.   CARDIOVASCULAR: Denies dyspnea, orthopnea, or palpitations.  GI/ABDOMEN: Appetite fine. No weight loss. Denies nausea, vomiting, constipation, abdominal pain, hematemesis or blood in stool.  URINARY: No dysuria,hematuria, nocturia, incontinence, flank pain, urgency, or urinary difficulty    Answers for HPI/ROS submitted by the patient on 3/18/2022  Chronicity: chronic  Onset: more than 1 month ago  Progression since onset: waxing and waning  Frequency: every few hours  Cough characteristics: non-productive  chills: No  ear congestion: No  ear pain: No  fever: No  headaches: Yes  heartburn: Yes  hemoptysis: No  myalgias: No  postnasal drip: Yes  shortness of breath: No  sore throat: No  sweats: No  wheezing: No  asthma: No  bronchiectasis: No  bronchitis: No  COPD: No  emphysema: No  environmental allergies: Yes  pneumonia: No  Treatments tried: OTC cough suppressant, body position changes, cool air, leukotriene antagonists, rest  Improvement on treatment: no relief          PHYSICAL EXAM:   /78   Pulse 96   Ht 5' 4" (1.626 m)   Wt 83 kg (182 lb 15.7 oz)   SpO2 98%   BMI 31.41 kg/m²   GENERAL: This is a healthy-appearing white female, sitting   upright, in no apparent distress. Alert and oriented x4.   TMs clear  Oropharynx clear  NECK: Supple. There is no lymphadenopathy, thyromegaly or JVD.  CV: S1, S2, RRR; 2/6 SHENA at RSB   CHEST: Clear to auscultation bilaterally with good respiratory movement.  ABD: soft, NT/ND + BS no HSM   EXTREMITIES: Showed no cyanosis, clubbing. Trace edema     Results for orders placed or performed in visit on 02/14/22   Urine culture    Specimen: Urine, Clean Catch   Result Value Ref Range    Urine Culture, Routine No " growth          A/P:  Gastroesophageal reflux disease without esophagitis  -     Case Request Endoscopy: ESOPHAGOGASTRODUODENOSCOPY (EGD)    Malignant (primary) neoplasm, unspecified    Essential hypertension       Suspect GERD with development of chronic cough  EGD  Increase omeprazole to 40mg BID  Continue present meds  Continue low-fat diet  Continue regular weight bearing exrcise  Continue follow-up with oncology, rheumatology  Counseled on regular exercise, maintenance of a healthy weight, balanced diet rich in fruits/vegetables and lean protein, and avoidance of unhealthy habits like smoking and excessive alcohol intake.  Continue present HTN regimen  F/u PRN

## 2022-03-19 PROBLEM — C80.1 MALIGNANT (PRIMARY) NEOPLASM, UNSPECIFIED: Status: ACTIVE | Noted: 2022-03-19

## 2022-03-21 ENCOUNTER — TELEPHONE (OUTPATIENT)
Dept: GASTROENTEROLOGY | Facility: CLINIC | Age: 77
End: 2022-03-21
Payer: MEDICARE

## 2022-04-13 ENCOUNTER — OFFICE VISIT (OUTPATIENT)
Dept: FAMILY MEDICINE | Facility: CLINIC | Age: 77
End: 2022-04-13
Payer: MEDICARE

## 2022-04-13 ENCOUNTER — HOSPITAL ENCOUNTER (OUTPATIENT)
Dept: RADIOLOGY | Facility: HOSPITAL | Age: 77
Discharge: HOME | End: 2022-04-13
Attending: NURSE PRACTITIONER
Payer: MEDICARE

## 2022-04-13 VITALS
BODY MASS INDEX: 31.02 KG/M2 | SYSTOLIC BLOOD PRESSURE: 124 MMHG | OXYGEN SATURATION: 96 % | WEIGHT: 181.69 LBS | HEIGHT: 64 IN | DIASTOLIC BLOOD PRESSURE: 70 MMHG | TEMPERATURE: 99 F | HEART RATE: 98 BPM

## 2022-04-13 DIAGNOSIS — J18.9 PNEUMONIA OF RIGHT LOWER LOBE DUE TO INFECTIOUS ORGANISM: ICD-10-CM

## 2022-04-13 DIAGNOSIS — R05.9 COUGH: Primary | ICD-10-CM

## 2022-04-13 DIAGNOSIS — R05.9 COUGH: ICD-10-CM

## 2022-04-13 LAB
INFLUENZA A, MOLECULAR: NEGATIVE
INFLUENZA B, MOLECULAR: NEGATIVE
SPECIMEN SOURCE: NORMAL

## 2022-04-13 PROCEDURE — 96372 PR INJECTION,THERAP/PROPH/DIAG2ST, IM OR SUBCUT: ICD-10-PCS | Mod: S$GLB,,, | Performed by: NURSE PRACTITIONER

## 2022-04-13 PROCEDURE — 99214 OFFICE O/P EST MOD 30 MIN: CPT | Mod: 25,S$GLB,, | Performed by: NURSE PRACTITIONER

## 2022-04-13 PROCEDURE — 4010F PR ACE/ARB THEARPY RXD/TAKEN: ICD-10-PCS | Mod: CPTII,S$GLB,, | Performed by: NURSE PRACTITIONER

## 2022-04-13 PROCEDURE — 3078F DIAST BP <80 MM HG: CPT | Mod: CPTII,S$GLB,, | Performed by: NURSE PRACTITIONER

## 2022-04-13 PROCEDURE — 99214 PR OFFICE/OUTPT VISIT, EST, LEVL IV, 30-39 MIN: ICD-10-PCS | Mod: 25,S$GLB,, | Performed by: NURSE PRACTITIONER

## 2022-04-13 PROCEDURE — 3008F BODY MASS INDEX DOCD: CPT | Mod: CPTII,S$GLB,, | Performed by: NURSE PRACTITIONER

## 2022-04-13 PROCEDURE — 1101F PT FALLS ASSESS-DOCD LE1/YR: CPT | Mod: CPTII,S$GLB,, | Performed by: NURSE PRACTITIONER

## 2022-04-13 PROCEDURE — 1101F PR PT FALLS ASSESS DOC 0-1 FALLS W/OUT INJ PAST YR: ICD-10-PCS | Mod: CPTII,S$GLB,, | Performed by: NURSE PRACTITIONER

## 2022-04-13 PROCEDURE — 99999 PR PBB SHADOW E&M-EST. PATIENT-LVL IV: ICD-10-PCS | Mod: PBBFAC,,, | Performed by: NURSE PRACTITIONER

## 2022-04-13 PROCEDURE — 3288F PR FALLS RISK ASSESSMENT DOCUMENTED: ICD-10-PCS | Mod: CPTII,S$GLB,, | Performed by: NURSE PRACTITIONER

## 2022-04-13 PROCEDURE — 1159F PR MEDICATION LIST DOCUMENTED IN MEDICAL RECORD: ICD-10-PCS | Mod: CPTII,S$GLB,, | Performed by: NURSE PRACTITIONER

## 2022-04-13 PROCEDURE — 1160F PR REVIEW ALL MEDS BY PRESCRIBER/CLIN PHARMACIST DOCUMENTED: ICD-10-PCS | Mod: CPTII,S$GLB,, | Performed by: NURSE PRACTITIONER

## 2022-04-13 PROCEDURE — 1160F RVW MEDS BY RX/DR IN RCRD: CPT | Mod: CPTII,S$GLB,, | Performed by: NURSE PRACTITIONER

## 2022-04-13 PROCEDURE — 71046 X-RAY EXAM CHEST 2 VIEWS: CPT | Mod: 26,,, | Performed by: RADIOLOGY

## 2022-04-13 PROCEDURE — 1159F MED LIST DOCD IN RCRD: CPT | Mod: CPTII,S$GLB,, | Performed by: NURSE PRACTITIONER

## 2022-04-13 PROCEDURE — 3078F PR MOST RECENT DIASTOLIC BLOOD PRESSURE < 80 MM HG: ICD-10-PCS | Mod: CPTII,S$GLB,, | Performed by: NURSE PRACTITIONER

## 2022-04-13 PROCEDURE — 1126F PR PAIN SEVERITY QUANTIFIED, NO PAIN PRESENT: ICD-10-PCS | Mod: CPTII,S$GLB,, | Performed by: NURSE PRACTITIONER

## 2022-04-13 PROCEDURE — 3288F FALL RISK ASSESSMENT DOCD: CPT | Mod: CPTII,S$GLB,, | Performed by: NURSE PRACTITIONER

## 2022-04-13 PROCEDURE — 71046 X-RAY EXAM CHEST 2 VIEWS: CPT | Mod: TC,FY,PO

## 2022-04-13 PROCEDURE — 3008F PR BODY MASS INDEX (BMI) DOCUMENTED: ICD-10-PCS | Mod: CPTII,S$GLB,, | Performed by: NURSE PRACTITIONER

## 2022-04-13 PROCEDURE — 3074F PR MOST RECENT SYSTOLIC BLOOD PRESSURE < 130 MM HG: ICD-10-PCS | Mod: CPTII,S$GLB,, | Performed by: NURSE PRACTITIONER

## 2022-04-13 PROCEDURE — 1126F AMNT PAIN NOTED NONE PRSNT: CPT | Mod: CPTII,S$GLB,, | Performed by: NURSE PRACTITIONER

## 2022-04-13 PROCEDURE — 96372 THER/PROPH/DIAG INJ SC/IM: CPT | Mod: S$GLB,,, | Performed by: NURSE PRACTITIONER

## 2022-04-13 PROCEDURE — 3074F SYST BP LT 130 MM HG: CPT | Mod: CPTII,S$GLB,, | Performed by: NURSE PRACTITIONER

## 2022-04-13 PROCEDURE — 87502 INFLUENZA DNA AMP PROBE: CPT | Mod: PO | Performed by: NURSE PRACTITIONER

## 2022-04-13 PROCEDURE — 71046 XR CHEST PA AND LATERAL: ICD-10-PCS | Mod: 26,,, | Performed by: RADIOLOGY

## 2022-04-13 PROCEDURE — 99999 PR PBB SHADOW E&M-EST. PATIENT-LVL IV: CPT | Mod: PBBFAC,,, | Performed by: NURSE PRACTITIONER

## 2022-04-13 PROCEDURE — 4010F ACE/ARB THERAPY RXD/TAKEN: CPT | Mod: CPTII,S$GLB,, | Performed by: NURSE PRACTITIONER

## 2022-04-13 RX ORDER — GUAIFENESIN 1200 MG/1
1200 TABLET, EXTENDED RELEASE ORAL 2 TIMES DAILY
Qty: 20 TABLET | Refills: 0 | Status: SHIPPED | OUTPATIENT
Start: 2022-04-13 | End: 2022-04-23

## 2022-04-13 RX ORDER — ALBUTEROL SULFATE 90 UG/1
AEROSOL, METERED RESPIRATORY (INHALATION)
Qty: 54 G | Refills: 0 | Status: SHIPPED | OUTPATIENT
Start: 2022-04-13 | End: 2022-05-19 | Stop reason: SDUPTHER

## 2022-04-13 RX ORDER — AZITHROMYCIN 250 MG/1
TABLET, FILM COATED ORAL
Qty: 6 TABLET | Refills: 0 | Status: SHIPPED | OUTPATIENT
Start: 2022-04-13 | End: 2022-04-18

## 2022-04-13 RX ORDER — ALBUTEROL SULFATE 90 UG/1
2 AEROSOL, METERED RESPIRATORY (INHALATION) EVERY 6 HOURS PRN
Qty: 18 G | Refills: 0 | Status: SHIPPED | OUTPATIENT
Start: 2022-04-13 | End: 2022-04-13

## 2022-04-13 RX ORDER — CODEINE PHOSPHATE AND GUAIFENESIN 10; 100 MG/5ML; MG/5ML
5 SOLUTION ORAL 3 TIMES DAILY PRN
Qty: 105 ML | Refills: 0 | Status: SHIPPED | OUTPATIENT
Start: 2022-04-13 | End: 2022-04-20

## 2022-04-13 RX ORDER — CEFTRIAXONE 1 G/1
1 INJECTION, POWDER, FOR SOLUTION INTRAMUSCULAR; INTRAVENOUS
Status: COMPLETED | OUTPATIENT
Start: 2022-04-13 | End: 2022-04-13

## 2022-04-13 RX ADMIN — CEFTRIAXONE 1 G: 1 INJECTION, POWDER, FOR SOLUTION INTRAMUSCULAR; INTRAVENOUS at 03:04

## 2022-04-13 NOTE — PROGRESS NOTES
Subjective:       Patient ID: Yuliana Mattson is a 76 y.o. female.    Chief Complaint: Cough, Sore Throat, and Sinus Problem    Cough since January, has been getting progressively worse. Waiting on EGD, having this next week. Heartburn has resolved since treatment began, but cough is not improved.   Sunday started to not feel good, cough attack on Monday, has had sinus congestion and drainage since then. Had low grade fever, 100.5 today. Throat feels irritated, top of throat felt like it was burning yesterday. Last night has chest congestion ut that is better today. Took benadryl and used nasal spray last night and has tried nyquil and theraflu which have not helped. Short of breath with walking, extremely fatigued.     Past Medical History:  No date: Allergic rhinitis  No date: Anticoagulant long-term use      Comment:  ASPIRIN ONLY - (stops it when she presents a hemorrhagic               cystitis)  No date: Bladder cancer  No date: Blood transfusion  No date: Breast cancer  No date: CAD (coronary artery disease), native coronary artery      Comment:  40% non obstructive  No date: Cholelithiasis  No date: Endometriosis  No date: Headache(784.0)  No date: HEARING LOSS  No date: Hemorrhoids  No date: HLD (hyperlipidemia)  No date: Hypertension  No date: Iritis  2009: Left wrist fracture      Comment:  traumatic  12/3/2009: Malignant neoplasm of other specified sites of bladder  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, postmenopausal  No date: PONV (postoperative nausea and vomiting)  No date: Rash  No date: Rosacea  No date: UTI (urinary tract infection)  No date: Vaginal delivery      Comment:  x 2    Past Surgical History:  No date: APPENDECTOMY  1979: BLADDER TUMOR EXCISION      Comment:  [facility], dr garcia - no recurrences since  No date: BREAST BIOPSY; Right      Comment:  4 core bx negative  1998: BREAST SURGERY; Left  No date: HYSTERECTOMY  1995: MASTECTOMY, PARTIAL      Comment:  left side - cancer - had  radiotherapy 1995, 1998 had                chemotherapy  No date: oophrect  No date: pelvic mass      Comment:  benign  No date: TONSILLECTOMY  No date: TONSILLECTOMY, ADENOIDECTOMY, BILATERAL MYRINGOTOMY AND TUBES  1998: tram flap; Left    Review of patient's family history indicates:  Problem: Glaucoma      Relation: Father          Age of Onset: (Not Specified)  Problem: Glaucoma      Relation: Paternal Aunt          Age of Onset: (Not Specified)  Problem: Glaucoma      Relation: Paternal Grandmother          Age of Onset: (Not Specified)  Problem: Cancer      Relation: Cousin          Age of Onset: (Not Specified)          Comment: 1st, ovarian  Problem: Amblyopia      Relation: Neg Hx          Age of Onset: (Not Specified)  Problem: Blindness      Relation: Neg Hx          Age of Onset: (Not Specified)  Problem: Cataracts      Relation: Neg Hx          Age of Onset: (Not Specified)  Problem: Hypertension      Relation: Neg Hx          Age of Onset: (Not Specified)  Problem: Macular degeneration      Relation: Neg Hx          Age of Onset: (Not Specified)  Problem: Retinal detachment      Relation: Neg Hx          Age of Onset: (Not Specified)  Problem: Strabismus      Relation: Neg Hx          Age of Onset: (Not Specified)  Problem: Stroke      Relation: Neg Hx          Age of Onset: (Not Specified)  Problem: Thyroid disease      Relation: Neg Hx          Age of Onset: (Not Specified)  Problem: Melanoma      Relation: Neg Hx          Age of Onset: (Not Specified)      Social History    Socioeconomic History      Marital status:     Occupational History      Occupation: retired accounting    Tobacco Use      Smoking status: Never Smoker      Smokeless tobacco: Never Used    Substance and Sexual Activity      Alcohol use: No      Drug use: No    Social Determinants of Health  Financial Resource Strain: Low Risk       Difficulty of Paying Living Expenses: Not hard at all  Food Insecurity: No Food  Insecurity      Worried About Running Out of Food in the Last Year: Never true      Ran Out of Food in the Last Year: Never true  Transportation Needs: No Transportation Needs      Lack of Transportation (Medical): No      Lack of Transportation (Non-Medical): No  Physical Activity: Inactive      Days of Exercise per Week: 0 days      Minutes of Exercise per Session: 20 min  Stress: Stress Concern Present      Feeling of Stress : To some extent  Social Connections: Unknown      Frequency of Communication with Friends and Family: More than three times a week      Frequency of Social Gatherings with Friends and Family: Three times a week      Active Member of Clubs or Organizations: No      Attends Club or Organization Meetings: 1 to 4 times per year      Marital Status:   Housing Stability: Low Risk       Unable to Pay for Housing in the Last Year: No      Number of Places Lived in the Last Year: 1      Unstable Housing in the Last Year: No    Current Outpatient Medications:  fluocinolone acetonide oiL 0.01 % Drop, Place 1 drop in ear(s) daily as needed (itching)., Disp: 20 mL, Rfl: 1  lisinopriL-hydrochlorothiazide (PRINZIDE,ZESTORETIC) 20-12.5 mg per tablet, Take 1 tablet by mouth once daily., Disp: 90 tablet, Rfl: 3  omeprazole (PRILOSEC) 40 MG capsule, TAKE 1 CAPSULE(40 MG) BY MOUTH EVERY DAY (Patient taking differently: 40 mg 2 (two) times daily before meals.), Disp: 90 capsule, Rfl: 3  RESTASIS 0.05 % ophthalmic emulsion, INT 1 GTT IN OU BID, Disp: , Rfl:   tretinoin (RETIN-A) 0.025 % cream, Apply a pea-sized amount to entire face at bedtime, start every other night and increase as tolerated. Take night off if irritation develops., Disp: 45 g, Rfl: 3  vitamin D (VITAMIN D3) 1000 units Tab, Take 1,000 Units by mouth once daily., Disp: , Rfl:   ALPRAZolam (XANAX) 0.25 MG tablet, Take 1 tablet (0.25 mg total) by mouth 3 (three) times daily as needed for Anxiety., Disp: 45 tablet, Rfl: 0  apremilast (OTEZLA)  30 mg Tab, Take 1 tablet (30 mg total) by mouth 2 (two) times daily., Disp: 60 tablet, Rfl: 12  betamethasone valerate 0.1% (VALISONE) 0.1 % Lotn, Apply topically 2 (two) times daily. (Patient not taking: No sig reported), Disp: 60 Bottle, Rfl: 3  lisinopriL (PRINIVIL,ZESTRIL) 20 MG tablet, Take 1 tablet (20 mg total) by mouth once daily. (Patient not taking: No sig reported), Disp: 30 tablet, Rfl: 0  methylPREDNISolone (MEDROL DOSEPACK) 4 mg tablet, use as directed (Patient not taking: No sig reported), Disp: 21 tablet, Rfl: 0    No current facility-administered medications for this visit.      Review of patient's allergies indicates:   -- Prochlorperazine edisylate -- Anaphylaxis    --  compazine    Review of Systems   Constitutional: Positive for fatigue. Negative for chills, diaphoresis and fever.   HENT: Positive for nasal congestion, postnasal drip, rhinorrhea and sore throat.    Respiratory: Positive for cough, chest tightness and shortness of breath. Negative for wheezing and stridor.    Cardiovascular: Negative.  Negative for chest pain and leg swelling.   Gastrointestinal: Negative.  Negative for abdominal pain, constipation and diarrhea.   Neurological: Positive for headaches. Negative for dizziness and light-headedness.         Objective:      Physical Exam  Constitutional:       General: She is not in acute distress.     Appearance: She is ill-appearing. She is not toxic-appearing.   HENT:      Head: Normocephalic and atraumatic.      Right Ear: Tympanic membrane normal.      Left Ear: Tympanic membrane normal.      Nose: Rhinorrhea present.      Mouth/Throat:      Pharynx: Posterior oropharyngeal erythema present.   Eyes:      Pupils: Pupils are equal, round, and reactive to light.   Cardiovascular:      Rate and Rhythm: Normal rate and regular rhythm.      Heart sounds: No murmur heard.  Pulmonary:      Breath sounds: Normal breath sounds. No wheezing or rhonchi.      Comments: Breathing is somewhat  laborded with exertion and talking, O2 sats remain stable  Musculoskeletal:      Right lower leg: No edema.      Left lower leg: No edema.   Neurological:      General: No focal deficit present.      Mental Status: She is alert and oriented to person, place, and time.   Psychiatric:         Mood and Affect: Mood normal.         Behavior: Behavior normal.         Assessment:       Problem List Items Addressed This Visit    None     Visit Diagnoses     Cough    -  Primary    Relevant Medications    cefTRIAXone injection 1 g    azithromycin (Z-OLE) 250 MG tablet    albuterol (VENTOLIN HFA) 90 mcg/actuation inhaler    guaiFENesin (MUCINEX) 1,200 mg Ta12    guaiFENesin-codeine 100-10 mg/5 ml (TUSSI-ORGANIDIN NR)  mg/5 mL syrup    Other Relevant Orders    Influenza A & B by Molecular (Completed)    POCT Rapid Strep A    COVID-19 Routine Screening    X-Ray Chest PA And Lateral (Completed)    Pneumonia of right lower lobe due to infectious organism        Relevant Medications    cefTRIAXone injection 1 g    azithromycin (Z-OLE) 250 MG tablet    albuterol (VENTOLIN HFA) 90 mcg/actuation inhaler    guaiFENesin (MUCINEX) 1,200 mg Ta12    guaiFENesin-codeine 100-10 mg/5 ml (TUSSI-ORGANIDIN NR)  mg/5 mL syrup          Plan:     Xray reviewed by Dr Palumbo, he advises looks like a developing right lower lobe pneumonia, treat with rocephin and zithromax, albuterol, recheck on Monday, ED precautions reviewed.   1. Cough  EGD once pneumonia has resolved, if egd not indicative of reason for ongoing cough then will need pulmonary work up.   - Influenza A & B by Molecular  - POCT Rapid Strep A  - COVID-19 Routine Screening; Future  - X-Ray Chest PA And Lateral; Future  - cefTRIAXone injection 1 g  - azithromycin (Z-OLE) 250 MG tablet; Take 2 tablets by mouth on day 1; Take 1 tablet by mouth on days 2-5  Dispense: 6 tablet; Refill: 0  - albuterol (VENTOLIN HFA) 90 mcg/actuation inhaler; Inhale 2 puffs into the lungs every  6 (six) hours as needed for Wheezing or Shortness of Breath. Rescue  Dispense: 18 g; Refill: 0  - guaiFENesin (MUCINEX) 1,200 mg Ta12; Take 1,200 mg by mouth 2 (two) times a day. for 10 days  Dispense: 20 tablet; Refill: 0  - guaiFENesin-codeine 100-10 mg/5 ml (TUSSI-ORGANIDIN NR)  mg/5 mL syrup; Take 5 mLs by mouth 3 (three) times daily as needed for Cough or Congestion.  Dispense: 105 mL; Refill: 0    2. Pneumonia of right lower lobe due to infectious organism  Recheck in clinic Monday, repeat CXR in 3 weeks to ensure clearing.   - cefTRIAXone injection 1 g  - azithromycin (Z-OLE) 250 MG tablet; Take 2 tablets by mouth on day 1; Take 1 tablet by mouth on days 2-5  Dispense: 6 tablet; Refill: 0  - albuterol (VENTOLIN HFA) 90 mcg/actuation inhaler; Inhale 2 puffs into the lungs every 6 (six) hours as needed for Wheezing or Shortness of Breath. Rescue  Dispense: 18 g; Refill: 0  - guaiFENesin (MUCINEX) 1,200 mg Ta12; Take 1,200 mg by mouth 2 (two) times a day. for 10 days  Dispense: 20 tablet; Refill: 0  - guaiFENesin-codeine 100-10 mg/5 ml (TUSSI-ORGANIDIN NR)  mg/5 mL syrup; Take 5 mLs by mouth 3 (three) times daily as needed for Cough or Congestion.  Dispense: 105 mL; Refill: 0

## 2022-04-14 ENCOUNTER — TELEPHONE (OUTPATIENT)
Dept: GASTROENTEROLOGY | Facility: CLINIC | Age: 77
End: 2022-04-14
Payer: MEDICARE

## 2022-04-14 ENCOUNTER — PATIENT MESSAGE (OUTPATIENT)
Dept: FAMILY MEDICINE | Facility: CLINIC | Age: 77
End: 2022-04-14
Payer: MEDICARE

## 2022-04-14 ENCOUNTER — TELEPHONE (OUTPATIENT)
Dept: FAMILY MEDICINE | Facility: CLINIC | Age: 77
End: 2022-04-14
Payer: MEDICARE

## 2022-04-14 NOTE — TELEPHONE ENCOUNTER
Spoke to pt confirming that omeprazole has been discontinued by Daisy Hargrove NP. Told pt when she comes to her office visit on 04/18/2022 to ask when she should restart the rx. Pt verbalized understanding.

## 2022-04-14 NOTE — TELEPHONE ENCOUNTER
Spoke with pt. Verified pt still has prep instructions for upcoming procedure with Dr. Maddox. Pt stated they do. Pt informed surgery center will call day or two prior with arrival time. Pt verbalized understanding to all.         Pt states she was just diagnosed with pneumonia but has been having a cough since January & was told this was from reflux. Pt on abx & feeling better. Pt does not want to cancel EGD & would like to know if she can still have procedure.

## 2022-04-14 NOTE — TELEPHONE ENCOUNTER
Pt notified & verbalized understanding. Pt stated if anything changes after her appointment on Monday she will call office to reschedule procedure

## 2022-04-18 ENCOUNTER — HOSPITAL ENCOUNTER (OUTPATIENT)
Dept: RADIOLOGY | Facility: HOSPITAL | Age: 77
Discharge: HOME | End: 2022-04-18
Attending: PHYSICIAN ASSISTANT
Payer: MEDICARE

## 2022-04-18 ENCOUNTER — OFFICE VISIT (OUTPATIENT)
Dept: FAMILY MEDICINE | Facility: CLINIC | Age: 77
End: 2022-04-18
Payer: MEDICARE

## 2022-04-18 VITALS
HEART RATE: 84 BPM | DIASTOLIC BLOOD PRESSURE: 78 MMHG | WEIGHT: 179.44 LBS | SYSTOLIC BLOOD PRESSURE: 122 MMHG | BODY MASS INDEX: 30.63 KG/M2 | HEIGHT: 64 IN | OXYGEN SATURATION: 98 %

## 2022-04-18 DIAGNOSIS — R09.89 SCATTERED RHONCHI OF RIGHT LUNG: ICD-10-CM

## 2022-04-18 DIAGNOSIS — R09.89 SCATTERED RHONCHI OF RIGHT LUNG: Primary | ICD-10-CM

## 2022-04-18 PROCEDURE — 99213 OFFICE O/P EST LOW 20 MIN: CPT | Mod: S$GLB,,, | Performed by: PHYSICIAN ASSISTANT

## 2022-04-18 PROCEDURE — 71046 X-RAY EXAM CHEST 2 VIEWS: CPT | Mod: 26,,, | Performed by: RADIOLOGY

## 2022-04-18 PROCEDURE — 71046 XR CHEST PA AND LATERAL: ICD-10-PCS | Mod: 26,,, | Performed by: RADIOLOGY

## 2022-04-18 PROCEDURE — 1160F RVW MEDS BY RX/DR IN RCRD: CPT | Mod: CPTII,S$GLB,, | Performed by: PHYSICIAN ASSISTANT

## 2022-04-18 PROCEDURE — 1126F PR PAIN SEVERITY QUANTIFIED, NO PAIN PRESENT: ICD-10-PCS | Mod: CPTII,S$GLB,, | Performed by: PHYSICIAN ASSISTANT

## 2022-04-18 PROCEDURE — 3008F BODY MASS INDEX DOCD: CPT | Mod: CPTII,S$GLB,, | Performed by: PHYSICIAN ASSISTANT

## 2022-04-18 PROCEDURE — 3288F FALL RISK ASSESSMENT DOCD: CPT | Mod: CPTII,S$GLB,, | Performed by: PHYSICIAN ASSISTANT

## 2022-04-18 PROCEDURE — 3008F PR BODY MASS INDEX (BMI) DOCUMENTED: ICD-10-PCS | Mod: CPTII,S$GLB,, | Performed by: PHYSICIAN ASSISTANT

## 2022-04-18 PROCEDURE — 99999 PR PBB SHADOW E&M-EST. PATIENT-LVL IV: CPT | Mod: PBBFAC,,, | Performed by: PHYSICIAN ASSISTANT

## 2022-04-18 PROCEDURE — 4010F PR ACE/ARB THEARPY RXD/TAKEN: ICD-10-PCS | Mod: CPTII,S$GLB,, | Performed by: PHYSICIAN ASSISTANT

## 2022-04-18 PROCEDURE — 3288F PR FALLS RISK ASSESSMENT DOCUMENTED: ICD-10-PCS | Mod: CPTII,S$GLB,, | Performed by: PHYSICIAN ASSISTANT

## 2022-04-18 PROCEDURE — 3078F PR MOST RECENT DIASTOLIC BLOOD PRESSURE < 80 MM HG: ICD-10-PCS | Mod: CPTII,S$GLB,, | Performed by: PHYSICIAN ASSISTANT

## 2022-04-18 PROCEDURE — 1159F MED LIST DOCD IN RCRD: CPT | Mod: CPTII,S$GLB,, | Performed by: PHYSICIAN ASSISTANT

## 2022-04-18 PROCEDURE — 1160F PR REVIEW ALL MEDS BY PRESCRIBER/CLIN PHARMACIST DOCUMENTED: ICD-10-PCS | Mod: CPTII,S$GLB,, | Performed by: PHYSICIAN ASSISTANT

## 2022-04-18 PROCEDURE — 1101F PT FALLS ASSESS-DOCD LE1/YR: CPT | Mod: CPTII,S$GLB,, | Performed by: PHYSICIAN ASSISTANT

## 2022-04-18 PROCEDURE — 1101F PR PT FALLS ASSESS DOC 0-1 FALLS W/OUT INJ PAST YR: ICD-10-PCS | Mod: CPTII,S$GLB,, | Performed by: PHYSICIAN ASSISTANT

## 2022-04-18 PROCEDURE — 71046 X-RAY EXAM CHEST 2 VIEWS: CPT | Mod: TC,FY,PO

## 2022-04-18 PROCEDURE — 1159F PR MEDICATION LIST DOCUMENTED IN MEDICAL RECORD: ICD-10-PCS | Mod: CPTII,S$GLB,, | Performed by: PHYSICIAN ASSISTANT

## 2022-04-18 PROCEDURE — 3078F DIAST BP <80 MM HG: CPT | Mod: CPTII,S$GLB,, | Performed by: PHYSICIAN ASSISTANT

## 2022-04-18 PROCEDURE — 4010F ACE/ARB THERAPY RXD/TAKEN: CPT | Mod: CPTII,S$GLB,, | Performed by: PHYSICIAN ASSISTANT

## 2022-04-18 PROCEDURE — 3074F SYST BP LT 130 MM HG: CPT | Mod: CPTII,S$GLB,, | Performed by: PHYSICIAN ASSISTANT

## 2022-04-18 PROCEDURE — 3074F PR MOST RECENT SYSTOLIC BLOOD PRESSURE < 130 MM HG: ICD-10-PCS | Mod: CPTII,S$GLB,, | Performed by: PHYSICIAN ASSISTANT

## 2022-04-18 PROCEDURE — 99213 PR OFFICE/OUTPT VISIT, EST, LEVL III, 20-29 MIN: ICD-10-PCS | Mod: S$GLB,,, | Performed by: PHYSICIAN ASSISTANT

## 2022-04-18 PROCEDURE — 99999 PR PBB SHADOW E&M-EST. PATIENT-LVL IV: ICD-10-PCS | Mod: PBBFAC,,, | Performed by: PHYSICIAN ASSISTANT

## 2022-04-18 PROCEDURE — 1126F AMNT PAIN NOTED NONE PRSNT: CPT | Mod: CPTII,S$GLB,, | Performed by: PHYSICIAN ASSISTANT

## 2022-04-18 NOTE — PROGRESS NOTES
"Subjective:      Patient ID: Yuliana Mattson is a 76 y.o. female.    Chief Complaint: Follow-up (Cough follow up; improved since last visit)  Patient is new to me.    HPI   Patient saw NP Delvin 4/13/2022 with chest xray having possible early pneumonia in right lower lobe.  Treated with rocephin and Zpak.    Patient reports decreased cough with some wheezing now.  Denies fever, shortness of breath without exertion, or chest pain.    Going for EGD on Thursday for GERD.    Review of Systems   Constitutional: Negative for chills and fever.   HENT: Negative for ear pain.    Eyes: Negative for pain.   Respiratory: Positive for cough (improved), shortness of breath (dyspnea on exertion) and wheezing.    Cardiovascular: Negative for chest pain.   Gastrointestinal: Negative for abdominal pain, constipation, diarrhea, nausea and vomiting.   Neurological: Negative for dizziness, light-headedness and headaches.       Objective:   /78   Pulse 84   Ht 5' 4" (1.626 m)   Wt 81.4 kg (179 lb 7.3 oz)   SpO2 98%   BMI 30.80 kg/m²      Physical Exam  Vitals reviewed.   Constitutional:       General: She is not in acute distress.     Appearance: Normal appearance. She is well-developed.   HENT:      Head: Normocephalic and atraumatic.      Right Ear: External ear normal.      Left Ear: External ear normal.   Eyes:      Conjunctiva/sclera: Conjunctivae normal.   Cardiovascular:      Rate and Rhythm: Normal rate and regular rhythm.      Heart sounds: Normal heart sounds. No murmur heard.    No friction rub. No gallop.   Pulmonary:      Effort: Pulmonary effort is normal. No respiratory distress.      Breath sounds: Examination of the right-upper field reveals rhonchi. Examination of the right-middle field reveals rhonchi. Examination of the right-lower field reveals rhonchi. Rhonchi present. No decreased breath sounds, wheezing or rales.   Musculoskeletal:         General: Normal range of motion.      Cervical back: Normal " range of motion.   Skin:     General: Skin is warm and dry.      Findings: No rash.   Neurological:      General: No focal deficit present.      Mental Status: She is alert and oriented to person, place, and time.   Psychiatric:         Mood and Affect: Mood normal.         Behavior: Behavior normal.         Judgment: Judgment normal.        Assessment:      1. Scattered rhonchi of right lung       Plan:   1. Scattered rhonchi of right lung  Use albuterol inhaler twice a day with Mucinex twice a day.    - X-Ray Chest PA And Lateral; Future    Follow up with any worsening symptoms.  Patient agreed with plan and expressed understanding.    Thank you for allowing me to serve you,

## 2022-04-18 NOTE — PATIENT INSTRUCTIONS
Use albuterol inhaler twice a day with Mucinex twice a day.    Thanks for seeing me,  Sylvie Ventura PA-C

## 2022-04-21 ENCOUNTER — PES CALL (OUTPATIENT)
Dept: ADMINISTRATIVE | Facility: CLINIC | Age: 77
End: 2022-04-21
Payer: MEDICARE

## 2022-04-27 ENCOUNTER — PATIENT MESSAGE (OUTPATIENT)
Dept: RHEUMATOLOGY | Facility: CLINIC | Age: 77
End: 2022-04-27
Payer: MEDICARE

## 2022-04-28 ENCOUNTER — OFFICE VISIT (OUTPATIENT)
Dept: FAMILY MEDICINE | Facility: CLINIC | Age: 77
End: 2022-04-28
Payer: MEDICARE

## 2022-04-28 ENCOUNTER — LAB VISIT (OUTPATIENT)
Dept: LAB | Facility: HOSPITAL | Age: 77
End: 2022-04-28
Attending: INTERNAL MEDICINE
Payer: MEDICARE

## 2022-04-28 VITALS
TEMPERATURE: 98 F | HEART RATE: 89 BPM | SYSTOLIC BLOOD PRESSURE: 126 MMHG | DIASTOLIC BLOOD PRESSURE: 78 MMHG | HEIGHT: 64 IN | BODY MASS INDEX: 30.71 KG/M2 | OXYGEN SATURATION: 96 % | WEIGHT: 179.88 LBS

## 2022-04-28 DIAGNOSIS — Z85.3 HISTORY OF BREAST CANCER: ICD-10-CM

## 2022-04-28 DIAGNOSIS — H81.03 MENIERE DISEASE, BILATERAL: ICD-10-CM

## 2022-04-28 DIAGNOSIS — M46.92 CERVICAL SPONDYLITIS: ICD-10-CM

## 2022-04-28 DIAGNOSIS — R05.8 PAROXYSMAL COUGH: ICD-10-CM

## 2022-04-28 DIAGNOSIS — L40.9 PSORIASIS OF SCALP: ICD-10-CM

## 2022-04-28 DIAGNOSIS — I73.00 RAYNAUD'S PHENOMENON WITHOUT GANGRENE: ICD-10-CM

## 2022-04-28 DIAGNOSIS — I10 PRIMARY HYPERTENSION: Primary | ICD-10-CM

## 2022-04-28 DIAGNOSIS — L40.50 PSA (PSORIATIC ARTHRITIS): ICD-10-CM

## 2022-04-28 DIAGNOSIS — Z85.51 HISTORY OF BLADDER CANCER: ICD-10-CM

## 2022-04-28 DIAGNOSIS — H20.9 UVEITIS OF BOTH EYES: ICD-10-CM

## 2022-04-28 LAB
BASOPHILS # BLD AUTO: 0.09 K/UL (ref 0–0.2)
BASOPHILS NFR BLD: 1.4 % (ref 0–1.9)
DIFFERENTIAL METHOD: ABNORMAL
EOSINOPHIL # BLD AUTO: 0.3 K/UL (ref 0–0.5)
EOSINOPHIL NFR BLD: 4 % (ref 0–8)
ERYTHROCYTE [DISTWIDTH] IN BLOOD BY AUTOMATED COUNT: 13.8 % (ref 11.5–14.5)
ERYTHROCYTE [SEDIMENTATION RATE] IN BLOOD BY WESTERGREN METHOD: 35 MM/HR (ref 0–36)
HCT VFR BLD AUTO: 42.2 % (ref 37–48.5)
HGB BLD-MCNC: 13.5 G/DL (ref 12–16)
IMM GRANULOCYTES # BLD AUTO: 0.03 K/UL (ref 0–0.04)
IMM GRANULOCYTES NFR BLD AUTO: 0.5 % (ref 0–0.5)
LYMPHOCYTES # BLD AUTO: 1.1 K/UL (ref 1–4.8)
LYMPHOCYTES NFR BLD: 16.2 % (ref 18–48)
MCH RBC QN AUTO: 28.2 PG (ref 27–31)
MCHC RBC AUTO-ENTMCNC: 32 G/DL (ref 32–36)
MCV RBC AUTO: 88 FL (ref 82–98)
MONOCYTES # BLD AUTO: 0.6 K/UL (ref 0.3–1)
MONOCYTES NFR BLD: 9.8 % (ref 4–15)
NEUTROPHILS # BLD AUTO: 4.4 K/UL (ref 1.8–7.7)
NEUTROPHILS NFR BLD: 68.1 % (ref 38–73)
NRBC BLD-RTO: 0 /100 WBC
PLATELET # BLD AUTO: 376 K/UL (ref 150–450)
PMV BLD AUTO: 10 FL (ref 9.2–12.9)
RBC # BLD AUTO: 4.78 M/UL (ref 4–5.4)
WBC # BLD AUTO: 6.5 K/UL (ref 3.9–12.7)

## 2022-04-28 PROCEDURE — 1159F MED LIST DOCD IN RCRD: CPT | Mod: CPTII,S$GLB,, | Performed by: NURSE PRACTITIONER

## 2022-04-28 PROCEDURE — 86038 ANTINUCLEAR ANTIBODIES: CPT | Performed by: INTERNAL MEDICINE

## 2022-04-28 PROCEDURE — 1160F RVW MEDS BY RX/DR IN RCRD: CPT | Mod: CPTII,S$GLB,, | Performed by: NURSE PRACTITIONER

## 2022-04-28 PROCEDURE — 99999 PR PBB SHADOW E&M-EST. PATIENT-LVL III: CPT | Mod: PBBFAC,,, | Performed by: NURSE PRACTITIONER

## 2022-04-28 PROCEDURE — 1126F PR PAIN SEVERITY QUANTIFIED, NO PAIN PRESENT: ICD-10-PCS | Mod: CPTII,S$GLB,, | Performed by: NURSE PRACTITIONER

## 2022-04-28 PROCEDURE — 1101F PT FALLS ASSESS-DOCD LE1/YR: CPT | Mod: CPTII,S$GLB,, | Performed by: NURSE PRACTITIONER

## 2022-04-28 PROCEDURE — 4010F PR ACE/ARB THEARPY RXD/TAKEN: ICD-10-PCS | Mod: CPTII,S$GLB,, | Performed by: NURSE PRACTITIONER

## 2022-04-28 PROCEDURE — 4010F ACE/ARB THERAPY RXD/TAKEN: CPT | Mod: CPTII,S$GLB,, | Performed by: NURSE PRACTITIONER

## 2022-04-28 PROCEDURE — 1101F PR PT FALLS ASSESS DOC 0-1 FALLS W/OUT INJ PAST YR: ICD-10-PCS | Mod: CPTII,S$GLB,, | Performed by: NURSE PRACTITIONER

## 2022-04-28 PROCEDURE — 86235 NUCLEAR ANTIGEN ANTIBODY: CPT | Performed by: INTERNAL MEDICINE

## 2022-04-28 PROCEDURE — 3078F PR MOST RECENT DIASTOLIC BLOOD PRESSURE < 80 MM HG: ICD-10-PCS | Mod: CPTII,S$GLB,, | Performed by: NURSE PRACTITIONER

## 2022-04-28 PROCEDURE — 36415 COLL VENOUS BLD VENIPUNCTURE: CPT | Mod: PO | Performed by: INTERNAL MEDICINE

## 2022-04-28 PROCEDURE — 85025 COMPLETE CBC W/AUTO DIFF WBC: CPT | Performed by: INTERNAL MEDICINE

## 2022-04-28 PROCEDURE — 86140 C-REACTIVE PROTEIN: CPT | Performed by: INTERNAL MEDICINE

## 2022-04-28 PROCEDURE — 3074F PR MOST RECENT SYSTOLIC BLOOD PRESSURE < 130 MM HG: ICD-10-PCS | Mod: CPTII,S$GLB,, | Performed by: NURSE PRACTITIONER

## 2022-04-28 PROCEDURE — 86039 ANTINUCLEAR ANTIBODIES (ANA): CPT | Performed by: INTERNAL MEDICINE

## 2022-04-28 PROCEDURE — 3008F BODY MASS INDEX DOCD: CPT | Mod: CPTII,S$GLB,, | Performed by: NURSE PRACTITIONER

## 2022-04-28 PROCEDURE — 1126F AMNT PAIN NOTED NONE PRSNT: CPT | Mod: CPTII,S$GLB,, | Performed by: NURSE PRACTITIONER

## 2022-04-28 PROCEDURE — 3288F PR FALLS RISK ASSESSMENT DOCUMENTED: ICD-10-PCS | Mod: CPTII,S$GLB,, | Performed by: NURSE PRACTITIONER

## 2022-04-28 PROCEDURE — 1160F PR REVIEW ALL MEDS BY PRESCRIBER/CLIN PHARMACIST DOCUMENTED: ICD-10-PCS | Mod: CPTII,S$GLB,, | Performed by: NURSE PRACTITIONER

## 2022-04-28 PROCEDURE — 3008F PR BODY MASS INDEX (BMI) DOCUMENTED: ICD-10-PCS | Mod: CPTII,S$GLB,, | Performed by: NURSE PRACTITIONER

## 2022-04-28 PROCEDURE — 3288F FALL RISK ASSESSMENT DOCD: CPT | Mod: CPTII,S$GLB,, | Performed by: NURSE PRACTITIONER

## 2022-04-28 PROCEDURE — 80053 COMPREHEN METABOLIC PANEL: CPT | Performed by: INTERNAL MEDICINE

## 2022-04-28 PROCEDURE — 99999 PR PBB SHADOW E&M-EST. PATIENT-LVL III: ICD-10-PCS | Mod: PBBFAC,,, | Performed by: NURSE PRACTITIONER

## 2022-04-28 PROCEDURE — 85652 RBC SED RATE AUTOMATED: CPT | Performed by: INTERNAL MEDICINE

## 2022-04-28 PROCEDURE — 1159F PR MEDICATION LIST DOCUMENTED IN MEDICAL RECORD: ICD-10-PCS | Mod: CPTII,S$GLB,, | Performed by: NURSE PRACTITIONER

## 2022-04-28 PROCEDURE — 3074F SYST BP LT 130 MM HG: CPT | Mod: CPTII,S$GLB,, | Performed by: NURSE PRACTITIONER

## 2022-04-28 PROCEDURE — 99214 OFFICE O/P EST MOD 30 MIN: CPT | Mod: S$GLB,,, | Performed by: NURSE PRACTITIONER

## 2022-04-28 PROCEDURE — 3078F DIAST BP <80 MM HG: CPT | Mod: CPTII,S$GLB,, | Performed by: NURSE PRACTITIONER

## 2022-04-28 PROCEDURE — 99214 PR OFFICE/OUTPT VISIT, EST, LEVL IV, 30-39 MIN: ICD-10-PCS | Mod: S$GLB,,, | Performed by: NURSE PRACTITIONER

## 2022-04-28 RX ORDER — LOSARTAN POTASSIUM 25 MG/1
25 TABLET ORAL DAILY
Qty: 90 TABLET | Refills: 3 | Status: SHIPPED | OUTPATIENT
Start: 2022-04-28 | End: 2022-05-05

## 2022-04-28 RX ORDER — PANTOPRAZOLE SODIUM 40 MG/1
40 TABLET, DELAYED RELEASE ORAL DAILY
Qty: 30 TABLET | Refills: 11 | Status: SHIPPED | OUTPATIENT
Start: 2022-04-28 | End: 2023-04-25 | Stop reason: SDUPTHER

## 2022-04-28 NOTE — PROGRESS NOTES
Subjective:       Patient ID: Yuliana Mattson is a 76 y.o. female.    Chief Complaint: Cough    Patient has had paroxysmal cough episodes since around October or November 2021. She has seen ENT, treated with multiple antihistamines, had sinus imaging. Treated with medications for GERD, which helped GERD symptoms but did not stop coughing episodes. Saw Dr Palumbo in March for worsening symptoms, he increased her omeprazole at that time, no improvement in cough. EGD advised at that visit.  In April she came in due to worsening coughing spells that had become productive and more constant, xray reviewed by PCP showed possible developing pneumonia, treated with rocephin and zpack, symptoms improved but the ongoing dry coughing attacks have continued and are becoming more frequent. CXR  4/18/22 clear. She had an EGD on 4/21/22, generally normal findings. She says the coughing spells are becoming debilitating. She says they worsen when trying to talk, she says she sometimes notices she feels like she has some difficulty taking a deep breath when exerting herself. Says she feels relatively normal until the episodes occur. They sometimes wake her from sleep, sometimes occur after talking a lot, can be random. Has had episodes that have lasted as long as an hour. Can not control cough or speak during episodes. She is concerned regarding her lisinopril and wonders if cough is related, she has been on lisinopril for about 15 years. Newest medication is Otezla but says cough began prior to the otezla being started. She says albuterol inhaler helps during the episodes minimally.   Past Medical History:  No date: Allergic rhinitis  No date: Anticoagulant long-term use      Comment:  ASPIRIN ONLY - (stops it when she presents a hemorrhagic               cystitis)  No date: Bladder cancer  No date: Blood transfusion  No date: Breast cancer  No date: CAD (coronary artery disease), native coronary artery      Comment:  40% non  obstructive  No date: Cholelithiasis  No date: Endometriosis  No date: Headache(784.0)  No date: HEARING LOSS  No date: Hemorrhoids  No date: HLD (hyperlipidemia)  No date: Hypertension  No date: Iritis  2009: Left wrist fracture      Comment:  traumatic  12/3/2009: Malignant neoplasm of other specified sites of bladder  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, postmenopausal  No date: PONV (postoperative nausea and vomiting)  No date: Rash  No date: Rosacea  No date: UTI (urinary tract infection)  No date: Vaginal delivery      Comment:  x 2    Past Surgical History:  No date: APPENDECTOMY  1979: BLADDER TUMOR EXCISION      Comment:  [facility], dr garcia - no recurrences since  No date: BREAST BIOPSY; Right      Comment:  4 core bx negative  1998: BREAST SURGERY; Left  4/21/2022: ESOPHAGOGASTRODUODENOSCOPY; N/A      Comment:  Procedure: ESOPHAGOGASTRODUODENOSCOPY (EGD);  Surgeon:                Guru Maddox MD;  Location: [facility];  Service:                Endoscopy;  Laterality: N/A;  No date: HYSTERECTOMY  1995: MASTECTOMY, PARTIAL      Comment:  left side - cancer - had radiotherapy 1995, 1998 had                chemotherapy  No date: oophrect  No date: pelvic mass      Comment:  benign  No date: TONSILLECTOMY  No date: TONSILLECTOMY, ADENOIDECTOMY, BILATERAL MYRINGOTOMY AND TUBES  1998: tram flap; Left    Review of patient's family history indicates:  Problem: Glaucoma      Relation: Father          Age of Onset: (Not Specified)  Problem: Glaucoma      Relation: Paternal Aunt          Age of Onset: (Not Specified)  Problem: Glaucoma      Relation: Paternal Grandmother          Age of Onset: (Not Specified)  Problem: Cancer      Relation: Cousin          Age of Onset: (Not Specified)          Comment: 1st, ovarian  Problem: Amblyopia      Relation: Neg Hx          Age of Onset: (Not Specified)  Problem: Blindness      Relation: Neg Hx          Age of Onset: (Not Specified)  Problem: Cataracts      Relation: Neg  Hx          Age of Onset: (Not Specified)  Problem: Hypertension      Relation: Neg Hx          Age of Onset: (Not Specified)  Problem: Macular degeneration      Relation: Neg Hx          Age of Onset: (Not Specified)  Problem: Retinal detachment      Relation: Neg Hx          Age of Onset: (Not Specified)  Problem: Strabismus      Relation: Neg Hx          Age of Onset: (Not Specified)  Problem: Stroke      Relation: Neg Hx          Age of Onset: (Not Specified)  Problem: Thyroid disease      Relation: Neg Hx          Age of Onset: (Not Specified)  Problem: Melanoma      Relation: Neg Hx          Age of Onset: (Not Specified)      Social History    Socioeconomic History      Marital status:     Occupational History      Occupation: retired accounting    Tobacco Use      Smoking status: Never Smoker      Smokeless tobacco: Never Used    Substance and Sexual Activity      Alcohol use: No      Drug use: No    Social Determinants of Health  Financial Resource Strain: Low Risk       Difficulty of Paying Living Expenses: Not hard at all  Food Insecurity: No Food Insecurity      Worried About Running Out of Food in the Last Year: Never true      Ran Out of Food in the Last Year: Never true  Transportation Needs: No Transportation Needs      Lack of Transportation (Medical): No      Lack of Transportation (Non-Medical): No  Physical Activity: Inactive      Days of Exercise per Week: 0 days      Minutes of Exercise per Session: 20 min  Stress: Stress Concern Present      Feeling of Stress : To some extent  Social Connections: Unknown      Frequency of Communication with Friends and Family: More than three times a week      Frequency of Social Gatherings with Friends and Family: Three times a week      Active Member of Clubs or Organizations: No      Attends Club or Organization Meetings: 1 to 4 times per year      Marital Status:   Housing Stability: Low Risk       Unable to Pay for Housing in the Last Year:  No      Number of Places Lived in the Last Year: 1      Unstable Housing in the Last Year: No    Current Outpatient Medications:  albuterol (PROVENTIL/VENTOLIN HFA) 90 mcg/actuation inhaler, INHALE 2 PUFFS INTO THE LUNGS EVERY 6 HOURS AS NEEDED FOR WHEEZING OR SHORTNESS OF BREATH, Disp: 54 g, Rfl: 0  apremilast (OTEZLA) 30 mg Tab, Take 1 tablet (30 mg total) by mouth 2 (two) times daily., Disp: 60 tablet, Rfl: 12  fluocinolone acetonide oiL 0.01 % Drop, Place 1 drop in ear(s) daily as needed (itching)., Disp: 20 mL, Rfl: 1  RESTASIS 0.05 % ophthalmic emulsion, INT 1 GTT IN OU BID, Disp: , Rfl:   tretinoin (RETIN-A) 0.025 % cream, Apply a pea-sized amount to entire face at bedtime, start every other night and increase as tolerated. Take night off if irritation develops., Disp: 45 g, Rfl: 3  vitamin D (VITAMIN D3) 1000 units Tab, Take 1,000 Units by mouth once daily., Disp: , Rfl:   ALPRAZolam (XANAX) 0.25 MG tablet, Take 1 tablet (0.25 mg total) by mouth 3 (three) times daily as needed for Anxiety., Disp: 45 tablet, Rfl: 0  Lisinopril hctz 20/12.5 daily.  No current facility-administered medications for this visit.      Review of patient's allergies indicates:   -- Prochlorperazine edisylate -- Anaphylaxis    --  compazine     Cough  This is a recurrent problem. Associated symptoms include postnasal drip. Pertinent negatives include no chest pain, chills, fever, sore throat, shortness of breath or wheezing. The treatment provided no relief. Her past medical history is significant for environmental allergies and pneumonia. There is no history of asthma, bronchiectasis, COPD or emphysema.     Review of Systems   Constitutional: Negative for chills, diaphoresis, fatigue and fever.   HENT: Positive for postnasal drip and voice change. Negative for sore throat and trouble swallowing.    Respiratory: Positive for cough and chest tightness. Negative for shortness of breath and wheezing.    Cardiovascular: Negative for chest  pain and leg swelling.   Gastrointestinal: Negative.    Integumentary:  Negative.   Allergic/Immunologic: Positive for environmental allergies.   Neurological: Negative.          Objective:      Physical Exam  Constitutional:       General: She is not in acute distress.     Appearance: Normal appearance. She is not toxic-appearing.   HENT:      Head: Normocephalic and atraumatic.      Right Ear: Tympanic membrane normal.      Left Ear: Tympanic membrane normal.      Nose: Rhinorrhea present.      Mouth/Throat:      Pharynx: No posterior oropharyngeal erythema.   Eyes:      Pupils: Pupils are equal, round, and reactive to light.   Cardiovascular:      Rate and Rhythm: Normal rate and regular rhythm.      Heart sounds: No murmur heard.  Pulmonary:      Effort: Pulmonary effort is normal. No respiratory distress.      Breath sounds: Normal breath sounds.   Neurological:      General: No focal deficit present.      Mental Status: She is alert and oriented to person, place, and time.   Psychiatric:         Mood and Affect: Mood normal.         Behavior: Behavior normal.         Assessment:       Problem List Items Addressed This Visit        Unprioritized    Hypertension - Primary    Relevant Medications    losartan (COZAAR) 25 MG tablet      Other Visit Diagnoses     Paroxysmal cough              Plan:       1. Primary hypertension  Although unlikely related to ACE inhibitor due to duration of treatment, will stop lisinopril hct. Start losartan 25 mg daily, BP recheck in 1 week, will add in hct if needed.   - losartan (COZAAR) 25 MG tablet; Take 1 tablet (25 mg total) by mouth once daily.  Dispense: 90 tablet; Refill: 3    2. Paroxysmal cough  Although unlikely related to ACE inhibitor due to duration of treatment, will stop lisinopril hct. Start losartan 25 mg daily, BP recheck in 1 week, will add in hct if needed. Start protonix daily, omeprazole stopped prior to egd. Discuss Otezla with rheumatology, patient believes  cough began before, but she will discuss symptoms with them to see if they recommend stopping medication.  If cough does not resolve with stopping the ACE Inhibitor in 1 week, then at recheck will discuss PFT and CT of chest to begin evaluating respiratory cause of cough.

## 2022-04-28 NOTE — PROGRESS NOTES
Answers for HPI/ROS submitted by the patient on 4/28/2022  Chronicity: recurrent  asthma: No  bronchiectasis: No  COPD: No  emphysema: No  environmental allergies: Yes  pneumonia: Yes  Improvement on treatment: no relief

## 2022-04-29 LAB
ALBUMIN SERPL BCP-MCNC: 3.9 G/DL (ref 3.5–5.2)
ALP SERPL-CCNC: 132 U/L (ref 55–135)
ALT SERPL W/O P-5'-P-CCNC: 25 U/L (ref 10–44)
ANA PATTERN 1: NORMAL
ANA SER QL IF: POSITIVE
ANA TITR SER IF: NORMAL {TITER}
ANION GAP SERPL CALC-SCNC: 13 MMOL/L (ref 8–16)
AST SERPL-CCNC: 24 U/L (ref 10–40)
BILIRUB SERPL-MCNC: 0.4 MG/DL (ref 0.1–1)
BUN SERPL-MCNC: 12 MG/DL (ref 8–23)
CALCIUM SERPL-MCNC: 10.5 MG/DL (ref 8.7–10.5)
CHLORIDE SERPL-SCNC: 98 MMOL/L (ref 95–110)
CO2 SERPL-SCNC: 26 MMOL/L (ref 23–29)
CREAT SERPL-MCNC: 0.8 MG/DL (ref 0.5–1.4)
CRP SERPL-MCNC: 20.6 MG/L (ref 0–8.2)
EST. GFR  (AFRICAN AMERICAN): >60 ML/MIN/1.73 M^2
EST. GFR  (NON AFRICAN AMERICAN): >60 ML/MIN/1.73 M^2
GLUCOSE SERPL-MCNC: 112 MG/DL (ref 70–110)
POTASSIUM SERPL-SCNC: 4.5 MMOL/L (ref 3.5–5.1)
PROT SERPL-MCNC: 7.4 G/DL (ref 6–8.4)
SODIUM SERPL-SCNC: 137 MMOL/L (ref 136–145)

## 2022-05-02 ENCOUNTER — OFFICE VISIT (OUTPATIENT)
Dept: RHEUMATOLOGY | Facility: CLINIC | Age: 77
End: 2022-05-02
Payer: MEDICARE

## 2022-05-02 VITALS
WEIGHT: 181.69 LBS | HEIGHT: 64 IN | HEART RATE: 81 BPM | BODY MASS INDEX: 31.02 KG/M2 | DIASTOLIC BLOOD PRESSURE: 84 MMHG | SYSTOLIC BLOOD PRESSURE: 151 MMHG

## 2022-05-02 DIAGNOSIS — M35.00 SICCA SYNDROME: Primary | ICD-10-CM

## 2022-05-02 DIAGNOSIS — L40.50 PSA (PSORIATIC ARTHRITIS): ICD-10-CM

## 2022-05-02 DIAGNOSIS — H81.03 MENIERE DISEASE, BILATERAL: ICD-10-CM

## 2022-05-02 DIAGNOSIS — L40.9 PSORIASIS OF SCALP: ICD-10-CM

## 2022-05-02 DIAGNOSIS — R76.8 ANA POSITIVE: ICD-10-CM

## 2022-05-02 DIAGNOSIS — R05.9 COUGH: ICD-10-CM

## 2022-05-02 DIAGNOSIS — H20.9 UVEITIS OF BOTH EYES: ICD-10-CM

## 2022-05-02 DIAGNOSIS — T78.40XS ALLERGY, SEQUELA: ICD-10-CM

## 2022-05-02 LAB
ANTI SM ANTIBODY: 0.06 RATIO (ref 0–0.99)
ANTI SM/RNP ANTIBODY: 0.08 RATIO (ref 0–0.99)
ANTI-SM INTERPRETATION: NEGATIVE
ANTI-SM/RNP INTERPRETATION: NEGATIVE
ANTI-SSA ANTIBODY: 0.05 RATIO (ref 0–0.99)
ANTI-SSA INTERPRETATION: NEGATIVE
ANTI-SSB ANTIBODY: 0.06 RATIO (ref 0–0.99)
ANTI-SSB INTERPRETATION: NEGATIVE
DSDNA AB SER-ACNC: NORMAL [IU]/ML

## 2022-05-02 PROCEDURE — 4010F PR ACE/ARB THEARPY RXD/TAKEN: ICD-10-PCS | Mod: CPTII,S$GLB,, | Performed by: INTERNAL MEDICINE

## 2022-05-02 PROCEDURE — 1101F PT FALLS ASSESS-DOCD LE1/YR: CPT | Mod: CPTII,S$GLB,, | Performed by: INTERNAL MEDICINE

## 2022-05-02 PROCEDURE — 99215 OFFICE O/P EST HI 40 MIN: CPT | Mod: S$GLB,,, | Performed by: INTERNAL MEDICINE

## 2022-05-02 PROCEDURE — 1101F PR PT FALLS ASSESS DOC 0-1 FALLS W/OUT INJ PAST YR: ICD-10-PCS | Mod: CPTII,S$GLB,, | Performed by: INTERNAL MEDICINE

## 2022-05-02 PROCEDURE — 3079F DIAST BP 80-89 MM HG: CPT | Mod: CPTII,S$GLB,, | Performed by: INTERNAL MEDICINE

## 2022-05-02 PROCEDURE — 3288F FALL RISK ASSESSMENT DOCD: CPT | Mod: CPTII,S$GLB,, | Performed by: INTERNAL MEDICINE

## 2022-05-02 PROCEDURE — 99215 PR OFFICE/OUTPT VISIT, EST, LEVL V, 40-54 MIN: ICD-10-PCS | Mod: S$GLB,,, | Performed by: INTERNAL MEDICINE

## 2022-05-02 PROCEDURE — 3077F PR MOST RECENT SYSTOLIC BLOOD PRESSURE >= 140 MM HG: ICD-10-PCS | Mod: CPTII,S$GLB,, | Performed by: INTERNAL MEDICINE

## 2022-05-02 PROCEDURE — 4010F ACE/ARB THERAPY RXD/TAKEN: CPT | Mod: CPTII,S$GLB,, | Performed by: INTERNAL MEDICINE

## 2022-05-02 PROCEDURE — 1125F PR PAIN SEVERITY QUANTIFIED, PAIN PRESENT: ICD-10-PCS | Mod: CPTII,S$GLB,, | Performed by: INTERNAL MEDICINE

## 2022-05-02 PROCEDURE — 99999 PR PBB SHADOW E&M-EST. PATIENT-LVL III: CPT | Mod: PBBFAC,,, | Performed by: INTERNAL MEDICINE

## 2022-05-02 PROCEDURE — 1125F AMNT PAIN NOTED PAIN PRSNT: CPT | Mod: CPTII,S$GLB,, | Performed by: INTERNAL MEDICINE

## 2022-05-02 PROCEDURE — 3288F PR FALLS RISK ASSESSMENT DOCUMENTED: ICD-10-PCS | Mod: CPTII,S$GLB,, | Performed by: INTERNAL MEDICINE

## 2022-05-02 PROCEDURE — 3079F PR MOST RECENT DIASTOLIC BLOOD PRESSURE 80-89 MM HG: ICD-10-PCS | Mod: CPTII,S$GLB,, | Performed by: INTERNAL MEDICINE

## 2022-05-02 PROCEDURE — 3008F PR BODY MASS INDEX (BMI) DOCUMENTED: ICD-10-PCS | Mod: CPTII,S$GLB,, | Performed by: INTERNAL MEDICINE

## 2022-05-02 PROCEDURE — 3008F BODY MASS INDEX DOCD: CPT | Mod: CPTII,S$GLB,, | Performed by: INTERNAL MEDICINE

## 2022-05-02 PROCEDURE — 99999 PR PBB SHADOW E&M-EST. PATIENT-LVL III: ICD-10-PCS | Mod: PBBFAC,,, | Performed by: INTERNAL MEDICINE

## 2022-05-02 PROCEDURE — 3077F SYST BP >= 140 MM HG: CPT | Mod: CPTII,S$GLB,, | Performed by: INTERNAL MEDICINE

## 2022-05-02 RX ORDER — MONTELUKAST SODIUM 10 MG/1
10 TABLET ORAL NIGHTLY
Qty: 30 TABLET | Refills: 5 | Status: SHIPPED | OUTPATIENT
Start: 2022-05-02 | End: 2022-06-01

## 2022-05-02 RX ORDER — BENZONATATE 200 MG/1
200 CAPSULE ORAL 2 TIMES DAILY PRN
Qty: 60 CAPSULE | Refills: 4 | Status: SHIPPED | OUTPATIENT
Start: 2022-05-02 | End: 2022-06-01

## 2022-05-02 NOTE — PROGRESS NOTES
Subjective:       Patient ID: Yuliana Mattson is a 76 y.o. female.    Chief Complaint: Disease Management    Follow up: 76 yo with a h/o uveitis, iritis on  and restasis, she was dx with meniere's dz, she started otezla and her skin, nails psoriasis and back improved but she had a cough and it worsened she has had breast ca x 2, two different type prior to that she had bladder cancer. . Age 37 had a hysterectomy,  Immune defiency but was not treated. Family hx ovarian ca Patient complains of arthralgias and myalgias for which has been present for a few years. She had some PMR like symptoms no GCA like symptoms. She had stopped ace and add protonix , consider if allergic add singular/ pepcid as option        Current tx otezla            She complains of joint swelling. Pertinent negatives include no dysuria, fever, trouble swallowing or headaches.         Review of Systems   Constitutional: Positive for activity change. Negative for appetite change, chills, diaphoresis, fever and unexpected weight change.   HENT: Negative for congestion, dental problem, ear discharge, ear pain, facial swelling, mouth sores, nosebleeds, postnasal drip, rhinorrhea, sinus pressure, sneezing, sore throat, tinnitus, trouble swallowing and voice change.    Eyes: Negative for photophobia, pain, discharge, redness and itching.   Respiratory: Negative for apnea, cough, chest tightness, shortness of breath and wheezing.    Cardiovascular: Negative for chest pain, palpitations and leg swelling.   Gastrointestinal: Negative for abdominal distention, abdominal pain, constipation, diarrhea, nausea and vomiting.   Endocrine: Negative for cold intolerance, heat intolerance, polydipsia and polyuria.   Genitourinary: Negative for decreased urine volume, difficulty urinating, dysuria, flank pain, frequency, genital sores, hematuria and urgency.   Musculoskeletal: Positive for arthralgias, back pain, joint swelling, neck pain and neck stiffness.  "Negative for gait problem.   Skin: Negative for pallor, rash and wound.   Allergic/Immunologic: Negative for immunocompromised state.   Neurological: Negative for dizziness, tremors, weakness, numbness and headaches.   Hematological: Negative for adenopathy. Does not bruise/bleed easily.   Psychiatric/Behavioral: Negative for sleep disturbance. The patient is not nervous/anxious.          Objective:   BP (!) 151/84   Pulse 81   Ht 5' 4.02" (1.626 m)   Wt 82.4 kg (181 lb 10.5 oz)   BMI 31.17 kg/m²      Physical Exam   Constitutional: She is oriented to person, place, and time.   HENT:   Head: Normocephalic and atraumatic.   Mouth/Throat: Oropharynx is clear and moist.   Eyes: Pupils are equal, round, and reactive to light.   Neck: No thyromegaly present.   Cardiovascular: Normal rate, regular rhythm and normal heart sounds. Exam reveals no gallop and no friction rub.   No murmur heard.  Pulmonary/Chest: Breath sounds normal. She has no wheezes. She has no rales. She exhibits no tenderness.   Abdominal: There is no abdominal tenderness. There is no rebound and no guarding.   Musculoskeletal:         General: Deformity present. No tenderness.      Right elbow: Normal.      Left elbow: Normal.      Cervical back: Neck supple.      Right knee: Swelling and effusion present.      Left knee: Normal.   Lymphadenopathy:     She has no cervical adenopathy.   Neurological: She is alert and oriented to person, place, and time. She has normal sensation. Gait normal.   Reflex Scores:       Patellar reflexes are 3+ on the right side and 3+ on the left side.  Skin: No rash noted. No erythema. No pallor.   Psychiatric: Mood and affect normal.   Vitals reviewed.      Right Side Rheumatological Exam     Examination finds the elbow, 1st MCP, 2nd MCP, 3rd MCP, 4th MCP and 5th MCP normal.    She has swelling of the knee    The patient has an enlarged wrist, 1st PIP, 2nd PIP, 3rd PIP, 4th PIP and 5th PIP    Shoulder Exam   Tenderness " Location: acromion    Range of Motion   Active abduction: abnormal   Adduction: abnormal  Sensation: normal    Knee Exam   Tenderness Location: medial joint line  Patellofemoral Crepitus: positive  Effusion: positive  Sensation: normal    Hip Exam   Tenderness Location: no tenderness  Sensation: normal    Elbow/Wrist Exam   Tenderness Location: no tenderness  Sensation: normal    Left Side Rheumatological Exam     Examination finds the elbow, knee, 1st MCP, 2nd MCP, 3rd MCP, 4th MCP and 5th MCP normal.    The patient has an enlarged wrist, 1st PIP, 2nd PIP, 3rd PIP, 4th PIP and 5th PIP.    Shoulder Exam   Tenderness Location: acromion    Range of Motion   Active abduction: abnormal   Sensation: normal    Knee Exam   Tenderness Location: lateral joint line and medial joint line    Patellofemoral Crepitus: positive  Sensation: normal    Hip Exam   Tenderness Location: no tenderness  Sensation: normal    Elbow/Wrist Exam   Sensation: normal      Back/Neck Exam   General Inspection   Gait: normal       Tenderness Right paramedian tenderness of the Lower C-Spine and Lower L-Spine.Left paramedian tenderness of the Upper C-Spine and Lower L-Spine.             Order: [id number]  Status:  Final result   Visible to patient:  No (not released) Next appt:  01/08/2021 at 10:00 AM in Radiology (Mammogram) Dx:  Acute iridocyclitis; Sjogren's syndro...  Component 7mo ago   HLA B27 Interpretation TAQ: [id number]   SAPE: 202    B27 Testing Date 05/18/2020 12:59 PM    HLA B27 Result Negative                Results for orders placed or performed in visit on 04/28/22   LISS Screen w/Reflex   Result Value Ref Range    LISS Screen Positive (A) Negative <1:80   CBC Auto Differential   Result Value Ref Range    WBC 6.50 3.90 - 12.70 K/uL    RBC 4.78 4.00 - 5.40 M/uL    Hemoglobin 13.5 12.0 - 16.0 g/dL    Hematocrit 42.2 37.0 - 48.5 %    MCV 88 82 - 98 fL    MCH 28.2 27.0 - 31.0 pg    MCHC 32.0 32.0 - 36.0 g/dL    RDW 13.8 11.5 - 14.5 %     Platelets 376 150 - 450 K/uL    MPV 10.0 9.2 - 12.9 fL    Immature Granulocytes 0.5 0.0 - 0.5 %    Gran # (ANC) 4.4 1.8 - 7.7 K/uL    Immature Grans (Abs) 0.03 0.00 - 0.04 K/uL    Lymph # 1.1 1.0 - 4.8 K/uL    Mono # 0.6 0.3 - 1.0 K/uL    Eos # 0.3 0.0 - 0.5 K/uL    Baso # 0.09 0.00 - 0.20 K/uL    nRBC 0 0 /100 WBC    Gran % 68.1 38.0 - 73.0 %    Lymph % 16.2 (L) 18.0 - 48.0 %    Mono % 9.8 4.0 - 15.0 %    Eosinophil % 4.0 0.0 - 8.0 %    Basophil % 1.4 0.0 - 1.9 %    Differential Method Automated    Comprehensive Metabolic Panel   Result Value Ref Range    Sodium 137 136 - 145 mmol/L    Potassium 4.5 3.5 - 5.1 mmol/L    Chloride 98 95 - 110 mmol/L    CO2 26 23 - 29 mmol/L    Glucose 112 (H) 70 - 110 mg/dL    BUN 12 8 - 23 mg/dL    Creatinine 0.8 0.5 - 1.4 mg/dL    Calcium 10.5 8.7 - 10.5 mg/dL    Total Protein 7.4 6.0 - 8.4 g/dL    Albumin 3.9 3.5 - 5.2 g/dL    Total Bilirubin 0.4 0.1 - 1.0 mg/dL    Alkaline Phosphatase 132 55 - 135 U/L    AST 24 10 - 40 U/L    ALT 25 10 - 44 U/L    Anion Gap 13 8 - 16 mmol/L    eGFR if African American >60.0 >60 mL/min/1.73 m^2    eGFR if non African American >60.0 >60 mL/min/1.73 m^2   Sedimentation rate   Result Value Ref Range    Sed Rate 35 0 - 36 mm/Hr   C-Reactive Protein   Result Value Ref Range    CRP 20.6 (H) 0.0 - 8.2 mg/L   LISS Pattern 1   Result Value Ref Range    LISS PATTERN 1 Speckled    LISS Profile   Result Value Ref Range    Anti Sm Antibody 0.06 0.00 - 0.99 Ratio    Anti-Sm Interpretation Negative Negative    Anti-SSA Antibody 0.05 0.00 - 0.99 Ratio    Anti-SSA Interpretation Negative Negative    Anti-SSB Antibody 0.06 0.00 - 0.99 Ratio    Anti-SSB Interpretation Negative Negative    ds DNA Ab Negative 1:10 Negative 1:10    Anti Sm/RNP Antibody 0.08 0.00 - 0.99 Ratio    Anti-Sm/RNP Interpretation Negative Negative   LISS Titer 1   Result Value Ref Range    LISS Titer 1 1:320      Component Ref Range & Units 4 d ago   (4/28/22) 8 mo ago   (8/9/21) 1 yr ago    (12/23/20) 1 yr ago   (12/23/20) 1 yr ago   (12/23/20) 1 yr ago   (12/23/20) 1 yr ago   (12/23/20)   Anti Sm Antibody 0.00 - 0.99 Ratio 0.06  0.06    0.14      Anti-Sm Interpretation Negative Negative  Negative    Negative      Anti-SSA Antibody 0.00 - 0.99 Ratio 0.05  0.05  0.04        Anti-SSA Interpretation Negative Negative  Negative  Negative        Anti-SSB Antibody 0.00 - 0.99 Ratio 0.06  0.07   0.06       Anti-SSB Interpretation Negative Negative  Negative   Negative       ds DNA Ab Negative 1:10 Negative 1:10  Negative 1:10 CM      Negative 1:10 CM    Comment: Performed by fluorescent crithidia assay.   Anti Sm/RNP Antibody 0.00 - 0.99 Ratio 0.08  0.09     0.05     Anti-Sm/RNP Interpretation Negative Negative  Negative     Negative     Resulting Agency  [facility]          Assessment:       1. Sicca syndrome    2. Cough    3. Uveitis of both eyes    4. Psoriasis of scalp    5. Meniere disease, bilateral    6. LISS positive    7. Allergy, sequela    8. PSA (psoriatic arthritis)            Plan:         Yuliana was seen today for disease management.    Diagnoses and all orders for this visit:    Sicca syndrome  -     benzonatate (TESSALON) 200 MG capsule; Take 1 capsule (200 mg total) by mouth 2 (two) times daily as needed for Cough.  -     Sedimentation rate; Future  -     C-Reactive Protein; Future  -     COVID-19 (SARS CoV-2) IgG Antibody Quant; Future  -     Cancel: Comprehensive Metabolic Panel; Future  -     Cancel: CBC Auto Differential; Future    Cough  -     CT Chest Without Contrast; Future  -     benzonatate (TESSALON) 200 MG capsule; Take 1 capsule (200 mg total) by mouth 2 (two) times daily as needed for Cough.  -     Sedimentation rate; Future  -     C-Reactive Protein; Future  -     COVID-19 (SARS CoV-2) IgG Antibody Quant; Future  -     Cancel: Comprehensive Metabolic Panel; Future  -     Cancel: CBC Auto Differential; Future    Uveitis of both eyes  -      Sedimentation rate; Future  -     C-Reactive Protein; Future  -     COVID-19 (SARS CoV-2) IgG Antibody Quant; Future  -     Cancel: Comprehensive Metabolic Panel; Future  -     Cancel: CBC Auto Differential; Future    Psoriasis of scalp  -     Sedimentation rate; Future  -     C-Reactive Protein; Future  -     COVID-19 (SARS CoV-2) IgG Antibody Quant; Future  -     Cancel: Comprehensive Metabolic Panel; Future  -     Cancel: CBC Auto Differential; Future    Meniere disease, bilateral  -     Sedimentation rate; Future  -     C-Reactive Protein; Future  -     COVID-19 (SARS CoV-2) IgG Antibody Quant; Future  -     Cancel: Comprehensive Metabolic Panel; Future  -     Cancel: CBC Auto Differential; Future    LISS positive  -     Sedimentation rate; Future  -     C-Reactive Protein; Future  -     COVID-19 (SARS CoV-2) IgG Antibody Quant; Future  -     Cancel: Comprehensive Metabolic Panel; Future  -     Cancel: CBC Auto Differential; Future    Allergy, sequela  -     montelukast (SINGULAIR) 10 mg tablet; Take 1 tablet (10 mg total) by mouth every evening.  -     Sedimentation rate; Future  -     C-Reactive Protein; Future  -     COVID-19 (SARS CoV-2) IgG Antibody Quant; Future  -     Cancel: Comprehensive Metabolic Panel; Future  -     Cancel: CBC Auto Differential; Future    PSA (psoriatic arthritis)  -     Sedimentation rate; Future  -     C-Reactive Protein; Future  -     COVID-19 (SARS CoV-2) IgG Antibody Quant; Future  -     Cancel: Comprehensive Metabolic Panel; Future  -     Cancel: CBC Auto Differential; Future      1.add tesslon perles consider adding salagen in the am only  2. Order a CT if abnormal add doxy     More than 50% of the  40 minute encounter was spent face to face counseling the patient regarding current status and future plan of care as well as side of the medications. All questions were answered to patient's satisfaction

## 2022-05-05 ENCOUNTER — OFFICE VISIT (OUTPATIENT)
Dept: FAMILY MEDICINE | Facility: CLINIC | Age: 77
End: 2022-05-05
Payer: MEDICARE

## 2022-05-05 VITALS
TEMPERATURE: 99 F | OXYGEN SATURATION: 100 % | WEIGHT: 183 LBS | SYSTOLIC BLOOD PRESSURE: 150 MMHG | DIASTOLIC BLOOD PRESSURE: 86 MMHG | HEART RATE: 86 BPM | BODY MASS INDEX: 31.24 KG/M2 | HEIGHT: 64 IN

## 2022-05-05 DIAGNOSIS — I10 PRIMARY HYPERTENSION: ICD-10-CM

## 2022-05-05 DIAGNOSIS — R05.9 COUGH: Primary | ICD-10-CM

## 2022-05-05 PROCEDURE — 1126F AMNT PAIN NOTED NONE PRSNT: CPT | Mod: CPTII,S$GLB,, | Performed by: NURSE PRACTITIONER

## 2022-05-05 PROCEDURE — 1159F PR MEDICATION LIST DOCUMENTED IN MEDICAL RECORD: ICD-10-PCS | Mod: CPTII,S$GLB,, | Performed by: NURSE PRACTITIONER

## 2022-05-05 PROCEDURE — 3288F PR FALLS RISK ASSESSMENT DOCUMENTED: ICD-10-PCS | Mod: CPTII,S$GLB,, | Performed by: NURSE PRACTITIONER

## 2022-05-05 PROCEDURE — 1160F PR REVIEW ALL MEDS BY PRESCRIBER/CLIN PHARMACIST DOCUMENTED: ICD-10-PCS | Mod: CPTII,S$GLB,, | Performed by: NURSE PRACTITIONER

## 2022-05-05 PROCEDURE — 3288F FALL RISK ASSESSMENT DOCD: CPT | Mod: CPTII,S$GLB,, | Performed by: NURSE PRACTITIONER

## 2022-05-05 PROCEDURE — 3079F DIAST BP 80-89 MM HG: CPT | Mod: CPTII,S$GLB,, | Performed by: NURSE PRACTITIONER

## 2022-05-05 PROCEDURE — 1101F PT FALLS ASSESS-DOCD LE1/YR: CPT | Mod: CPTII,S$GLB,, | Performed by: NURSE PRACTITIONER

## 2022-05-05 PROCEDURE — 99999 PR PBB SHADOW E&M-EST. PATIENT-LVL IV: CPT | Mod: PBBFAC,,, | Performed by: NURSE PRACTITIONER

## 2022-05-05 PROCEDURE — 1126F PR PAIN SEVERITY QUANTIFIED, NO PAIN PRESENT: ICD-10-PCS | Mod: CPTII,S$GLB,, | Performed by: NURSE PRACTITIONER

## 2022-05-05 PROCEDURE — 99214 PR OFFICE/OUTPT VISIT, EST, LEVL IV, 30-39 MIN: ICD-10-PCS | Mod: S$GLB,,, | Performed by: NURSE PRACTITIONER

## 2022-05-05 PROCEDURE — 99214 OFFICE O/P EST MOD 30 MIN: CPT | Mod: S$GLB,,, | Performed by: NURSE PRACTITIONER

## 2022-05-05 PROCEDURE — 3079F PR MOST RECENT DIASTOLIC BLOOD PRESSURE 80-89 MM HG: ICD-10-PCS | Mod: CPTII,S$GLB,, | Performed by: NURSE PRACTITIONER

## 2022-05-05 PROCEDURE — 1159F MED LIST DOCD IN RCRD: CPT | Mod: CPTII,S$GLB,, | Performed by: NURSE PRACTITIONER

## 2022-05-05 PROCEDURE — 4010F ACE/ARB THERAPY RXD/TAKEN: CPT | Mod: CPTII,S$GLB,, | Performed by: NURSE PRACTITIONER

## 2022-05-05 PROCEDURE — 1160F RVW MEDS BY RX/DR IN RCRD: CPT | Mod: CPTII,S$GLB,, | Performed by: NURSE PRACTITIONER

## 2022-05-05 PROCEDURE — 3077F SYST BP >= 140 MM HG: CPT | Mod: CPTII,S$GLB,, | Performed by: NURSE PRACTITIONER

## 2022-05-05 PROCEDURE — 4010F PR ACE/ARB THEARPY RXD/TAKEN: ICD-10-PCS | Mod: CPTII,S$GLB,, | Performed by: NURSE PRACTITIONER

## 2022-05-05 PROCEDURE — 3077F PR MOST RECENT SYSTOLIC BLOOD PRESSURE >= 140 MM HG: ICD-10-PCS | Mod: CPTII,S$GLB,, | Performed by: NURSE PRACTITIONER

## 2022-05-05 PROCEDURE — 3008F BODY MASS INDEX DOCD: CPT | Mod: CPTII,S$GLB,, | Performed by: NURSE PRACTITIONER

## 2022-05-05 PROCEDURE — 99999 PR PBB SHADOW E&M-EST. PATIENT-LVL IV: ICD-10-PCS | Mod: PBBFAC,,, | Performed by: NURSE PRACTITIONER

## 2022-05-05 PROCEDURE — 3008F PR BODY MASS INDEX (BMI) DOCUMENTED: ICD-10-PCS | Mod: CPTII,S$GLB,, | Performed by: NURSE PRACTITIONER

## 2022-05-05 PROCEDURE — 1101F PR PT FALLS ASSESS DOC 0-1 FALLS W/OUT INJ PAST YR: ICD-10-PCS | Mod: CPTII,S$GLB,, | Performed by: NURSE PRACTITIONER

## 2022-05-05 RX ORDER — LOSARTAN POTASSIUM 50 MG/1
50 TABLET ORAL DAILY
Qty: 90 TABLET | Refills: 3 | Status: SHIPPED | OUTPATIENT
Start: 2022-05-05 | End: 2022-06-07

## 2022-05-05 RX ORDER — FLUTICASONE PROPIONATE 110 UG/1
1 AEROSOL, METERED RESPIRATORY (INHALATION) 2 TIMES DAILY
Qty: 12 G | Refills: 3 | Status: SHIPPED | OUTPATIENT
Start: 2022-05-05 | End: 2022-06-23

## 2022-05-05 NOTE — PATIENT INSTRUCTIONS
Increase losartan to 50 mg. Restart singulair daily, add flovent inhaler twice daily, may continue to use albuterol as needed for cough. Rinse mouth after using flovent.  Proceed with the Ct tomorrow.   Recheck in a week.

## 2022-05-05 NOTE — PROGRESS NOTES
Subjective:       Patient ID: Yuliana Mattson is a 76 y.o. female.    Chief Complaint: Blood pressure F/u    She says she has noticed a minor improvement in the cough since stopping the lisinopril, Bp mildly elevated.    Ct ordered by rheumatology, scheduled tomorrow.   Out of Singulair since December. Usually only takes it in the fall.     Past Medical History:  No date: Allergic rhinitis  No date: Anticoagulant long-term use      Comment:  ASPIRIN ONLY - (stops it when she presents a hemorrhagic               cystitis)  No date: Bladder cancer  No date: Blood transfusion  No date: Breast cancer  No date: CAD (coronary artery disease), native coronary artery      Comment:  40% non obstructive  No date: Cholelithiasis  No date: Endometriosis  No date: Headache(784.0)  No date: HEARING LOSS  No date: Hemorrhoids  No date: HLD (hyperlipidemia)  No date: Hypertension  No date: Iritis  2009: Left wrist fracture      Comment:  traumatic  12/3/2009: Malignant neoplasm of other specified sites of bladder  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, postmenopausal  No date: PONV (postoperative nausea and vomiting)  No date: Rash  No date: Rosacea  No date: UTI (urinary tract infection)  No date: Vaginal delivery      Comment:  x 2    Past Surgical History:  No date: APPENDECTOMY  1979: BLADDER TUMOR EXCISION      Comment:  [facility] [place], dr garcia - no recurrences since  No date: BREAST BIOPSY; Right      Comment:  4 core bx negative  1998: BREAST SURGERY; Left  4/21/2022: ESOPHAGOGASTRODUODENOSCOPY; N/A      Comment:  Procedure: ESOPHAGOGASTRODUODENOSCOPY (EGD);  Surgeon:                Guru Maddox MD;  Location: [facility];  Service:                Endoscopy;  Laterality: N/A;  No date: HYSTERECTOMY  1995: MASTECTOMY, PARTIAL      Comment:  left side - cancer - had radiotherapy 1995, 1998 had                chemotherapy  No date: oophrect  No date: pelvic mass      Comment:  benign  No date: TONSILLECTOMY  No date:  TONSILLECTOMY, ADENOIDECTOMY, BILATERAL MYRINGOTOMY AND TUBES  1998: tram flap; Left    Review of patient's family history indicates:  Problem: Glaucoma      Relation: Father          Age of Onset: (Not Specified)  Problem: Glaucoma      Relation: Paternal Aunt          Age of Onset: (Not Specified)  Problem: Glaucoma      Relation: Paternal Grandmother          Age of Onset: (Not Specified)  Problem: Cancer      Relation: Cousin          Age of Onset: (Not Specified)          Comment: 1st, ovarian  Problem: Amblyopia      Relation: Neg Hx          Age of Onset: (Not Specified)  Problem: Blindness      Relation: Neg Hx          Age of Onset: (Not Specified)  Problem: Cataracts      Relation: Neg Hx          Age of Onset: (Not Specified)  Problem: Hypertension      Relation: Neg Hx          Age of Onset: (Not Specified)  Problem: Macular degeneration      Relation: Neg Hx          Age of Onset: (Not Specified)  Problem: Retinal detachment      Relation: Neg Hx          Age of Onset: (Not Specified)  Problem: Strabismus      Relation: Neg Hx          Age of Onset: (Not Specified)  Problem: Stroke      Relation: Neg Hx          Age of Onset: (Not Specified)  Problem: Thyroid disease      Relation: Neg Hx          Age of Onset: (Not Specified)  Problem: Melanoma      Relation: Neg Hx          Age of Onset: (Not Specified)      Social History    Socioeconomic History      Marital status:     Occupational History      Occupation: retired accounting    Tobacco Use      Smoking status: Never Smoker      Smokeless tobacco: Never Used    Substance and Sexual Activity      Alcohol use: No      Drug use: No    Social Determinants of Health  Financial Resource Strain: Low Risk       Difficulty of Paying Living Expenses: Not hard at all  Food Insecurity: No Food Insecurity      Worried About Running Out of Food in the Last Year: Never true      Ran Out of Food in the Last Year: Never true  Transportation Needs: No  Transportation Needs      Lack of Transportation (Medical): No      Lack of Transportation (Non-Medical): No  Physical Activity: Inactive      Days of Exercise per Week: 0 days      Minutes of Exercise per Session: 20 min  Stress: Stress Concern Present      Feeling of Stress : To some extent  Social Connections: Unknown      Frequency of Communication with Friends and Family: More than three times a week      Frequency of Social Gatherings with Friends and Family: Three times a week      Active Member of Clubs or Organizations: No      Attends Club or Organization Meetings: 1 to 4 times per year      Marital Status:   Housing Stability: Low Risk       Unable to Pay for Housing in the Last Year: No      Number of Places Lived in the Last Year: 1      Unstable Housing in the Last Year: No    Current Outpatient Medications:  albuterol (PROVENTIL/VENTOLIN HFA) 90 mcg/actuation inhaler, INHALE 2 PUFFS INTO THE LUNGS EVERY 6 HOURS AS NEEDED FOR WHEEZING OR SHORTNESS OF BREATH, Disp: 54 g, Rfl: 0  apremilast (OTEZLA) 30 mg Tab, Take 1 tablet (30 mg total) by mouth 2 (two) times daily., Disp: 60 tablet, Rfl: 12  benzonatate (TESSALON) 200 MG capsule, Take 1 capsule (200 mg total) by mouth 2 (two) times daily as needed for Cough., Disp: 60 capsule, Rfl: 4  fluocinolone acetonide oiL 0.01 % Drop, Place 1 drop in ear(s) daily as needed (itching)., Disp: 20 mL, Rfl: 1  montelukast (SINGULAIR) 10 mg tablet, Take 1 tablet (10 mg total) by mouth every evening., Disp: 30 tablet, Rfl: 5  pantoprazole (PROTONIX) 40 MG tablet, Take 1 tablet (40 mg total) by mouth once daily., Disp: 30 tablet, Rfl: 11  RESTASIS 0.05 % ophthalmic emulsion, INT 1 GTT IN OU BID, Disp: , Rfl:   tretinoin (RETIN-A) 0.025 % cream, Apply a pea-sized amount to entire face at bedtime, start every other night and increase as tolerated. Take night off if irritation develops., Disp: 45 g, Rfl: 3  vitamin D (VITAMIN D3) 1000 units Tab, Take 1,000 Units by  mouth once daily., Disp: , Rfl:   ALPRAZolam (XANAX) 0.25 MG tablet, Take 1 tablet (0.25 mg total) by mouth 3 (three) times daily as needed for Anxiety., Disp: 45 tablet, Rfl: 0  losartan (COZAAR) 50 MG tablet, Take 1 tablet (50 mg total) by mouth once daily., Disp: 90 tablet, Rfl: 3    No current facility-administered medications for this visit.      Review of patient's allergies indicates:   -- Prochlorperazine edisylate -- Anaphylaxis    --  compazine    Review of Systems   Constitutional: Negative for chills, diaphoresis, fatigue and fever.   Respiratory: Positive for cough. Negative for shortness of breath and wheezing.    Cardiovascular: Negative.    Gastrointestinal: Negative.    Neurological: Negative.          Objective:      Physical Exam  Constitutional:       Appearance: Normal appearance.   HENT:      Head: Normocephalic.   Cardiovascular:      Rate and Rhythm: Normal rate.   Pulmonary:      Effort: Pulmonary effort is normal.   Musculoskeletal:      Right lower leg: No edema.      Left lower leg: No edema.   Neurological:      General: No focal deficit present.      Mental Status: She is alert and oriented to person, place, and time.   Psychiatric:         Mood and Affect: Mood normal.         Behavior: Behavior normal.         Assessment:       Problem List Items Addressed This Visit        Unprioritized    Hypertension    Relevant Medications    losartan (COZAAR) 50 MG tablet      Other Visit Diagnoses     Cough    -  Primary    Relevant Medications    fluticasone propionate (FLOVENT HFA) 110 mcg/actuation inhaler          Plan:     Increase losartan to 50 mg. Restart singulair daily, add flovent inhaler twice daily, may continue to use albuterol as needed for cough. Rinse mouth after using flovent.  Proceed with the Ct tomorrow.   Recheck in a week.   1. Primary hypertension    - losartan (COZAAR) 50 MG tablet; Take 1 tablet (50 mg total) by mouth once daily.  Dispense: 90 tablet; Refill: 3    2.  Cough    - fluticasone propionate (FLOVENT HFA) 110 mcg/actuation inhaler; Inhale 1 puff into the lungs 2 (two) times daily. Controller  Dispense: 12 g; Refill: 3

## 2022-05-06 ENCOUNTER — HOSPITAL ENCOUNTER (OUTPATIENT)
Dept: RADIOLOGY | Facility: HOSPITAL | Age: 77
Discharge: HOME | End: 2022-05-06
Attending: INTERNAL MEDICINE
Payer: MEDICARE

## 2022-05-06 DIAGNOSIS — R05.9 COUGH: ICD-10-CM

## 2022-05-06 PROCEDURE — 71250 CT THORAX DX C-: CPT | Mod: TC,PO

## 2022-05-06 PROCEDURE — 71250 CT THORAX DX C-: CPT | Mod: 26,,, | Performed by: RADIOLOGY

## 2022-05-06 PROCEDURE — 71250 CT CHEST WITHOUT CONTRAST: ICD-10-PCS | Mod: 26,,, | Performed by: RADIOLOGY

## 2022-05-09 ENCOUNTER — OFFICE VISIT (OUTPATIENT)
Dept: FAMILY MEDICINE | Facility: CLINIC | Age: 77
End: 2022-05-09
Payer: MEDICARE

## 2022-05-09 ENCOUNTER — PATIENT MESSAGE (OUTPATIENT)
Dept: SMOKING CESSATION | Facility: CLINIC | Age: 77
End: 2022-05-09
Payer: MEDICARE

## 2022-05-09 VITALS
HEIGHT: 64 IN | BODY MASS INDEX: 31.24 KG/M2 | TEMPERATURE: 99 F | OXYGEN SATURATION: 98 % | HEART RATE: 103 BPM | DIASTOLIC BLOOD PRESSURE: 90 MMHG | SYSTOLIC BLOOD PRESSURE: 164 MMHG | WEIGHT: 183 LBS

## 2022-05-09 DIAGNOSIS — M79.662 PAIN AND SWELLING OF LEFT LOWER LEG: Primary | ICD-10-CM

## 2022-05-09 DIAGNOSIS — M79.89 PAIN AND SWELLING OF LEFT LOWER LEG: Primary | ICD-10-CM

## 2022-05-09 PROCEDURE — 4010F PR ACE/ARB THEARPY RXD/TAKEN: ICD-10-PCS | Mod: CPTII,S$GLB,, | Performed by: NURSE PRACTITIONER

## 2022-05-09 PROCEDURE — 4010F ACE/ARB THERAPY RXD/TAKEN: CPT | Mod: CPTII,S$GLB,, | Performed by: NURSE PRACTITIONER

## 2022-05-09 PROCEDURE — 99213 PR OFFICE/OUTPT VISIT, EST, LEVL III, 20-29 MIN: ICD-10-PCS | Mod: S$GLB,,, | Performed by: NURSE PRACTITIONER

## 2022-05-09 PROCEDURE — 99213 OFFICE O/P EST LOW 20 MIN: CPT | Mod: S$GLB,,, | Performed by: NURSE PRACTITIONER

## 2022-05-09 PROCEDURE — 3077F SYST BP >= 140 MM HG: CPT | Mod: CPTII,S$GLB,, | Performed by: NURSE PRACTITIONER

## 2022-05-09 PROCEDURE — 99999 PR PBB SHADOW E&M-EST. PATIENT-LVL IV: CPT | Mod: PBBFAC,,, | Performed by: NURSE PRACTITIONER

## 2022-05-09 PROCEDURE — 1160F RVW MEDS BY RX/DR IN RCRD: CPT | Mod: CPTII,S$GLB,, | Performed by: NURSE PRACTITIONER

## 2022-05-09 PROCEDURE — 99999 PR PBB SHADOW E&M-EST. PATIENT-LVL IV: ICD-10-PCS | Mod: PBBFAC,,, | Performed by: NURSE PRACTITIONER

## 2022-05-09 PROCEDURE — 3080F DIAST BP >= 90 MM HG: CPT | Mod: CPTII,S$GLB,, | Performed by: NURSE PRACTITIONER

## 2022-05-09 PROCEDURE — 1160F PR REVIEW ALL MEDS BY PRESCRIBER/CLIN PHARMACIST DOCUMENTED: ICD-10-PCS | Mod: CPTII,S$GLB,, | Performed by: NURSE PRACTITIONER

## 2022-05-09 PROCEDURE — 3077F PR MOST RECENT SYSTOLIC BLOOD PRESSURE >= 140 MM HG: ICD-10-PCS | Mod: CPTII,S$GLB,, | Performed by: NURSE PRACTITIONER

## 2022-05-09 PROCEDURE — 1159F MED LIST DOCD IN RCRD: CPT | Mod: CPTII,S$GLB,, | Performed by: NURSE PRACTITIONER

## 2022-05-09 PROCEDURE — 1125F PR PAIN SEVERITY QUANTIFIED, PAIN PRESENT: ICD-10-PCS | Mod: CPTII,S$GLB,, | Performed by: NURSE PRACTITIONER

## 2022-05-09 PROCEDURE — 1125F AMNT PAIN NOTED PAIN PRSNT: CPT | Mod: CPTII,S$GLB,, | Performed by: NURSE PRACTITIONER

## 2022-05-09 PROCEDURE — 1159F PR MEDICATION LIST DOCUMENTED IN MEDICAL RECORD: ICD-10-PCS | Mod: CPTII,S$GLB,, | Performed by: NURSE PRACTITIONER

## 2022-05-09 PROCEDURE — 3008F PR BODY MASS INDEX (BMI) DOCUMENTED: ICD-10-PCS | Mod: CPTII,S$GLB,, | Performed by: NURSE PRACTITIONER

## 2022-05-09 PROCEDURE — 3080F PR MOST RECENT DIASTOLIC BLOOD PRESSURE >= 90 MM HG: ICD-10-PCS | Mod: CPTII,S$GLB,, | Performed by: NURSE PRACTITIONER

## 2022-05-09 PROCEDURE — 3008F BODY MASS INDEX DOCD: CPT | Mod: CPTII,S$GLB,, | Performed by: NURSE PRACTITIONER

## 2022-05-09 NOTE — PROGRESS NOTES
Subjective:       Patient ID: Yuliana Mattson is a 76 y.o. female.    Chief Complaint: Pain (L calf , and swelling)    Patient says she noticed some spasming pain beginning in her lower left leg last night, this morning pain has increased, there is an area that feels warm and slightly red and left leg measures 1 inch bigger than right leg. No injury. Has done some grocery shopping and cooking, but nothing significantly strenuous, no fall.     Past Medical History:  No date: Allergic rhinitis  No date: Anticoagulant long-term use      Comment:  ASPIRIN ONLY - (stops it when she presents a hemorrhagic               cystitis)  No date: Bladder cancer  No date: Blood transfusion  No date: Breast cancer  No date: CAD (coronary artery disease), native coronary artery      Comment:  40% non obstructive  No date: Cholelithiasis  No date: Endometriosis  No date: Headache(784.0)  No date: HEARING LOSS  No date: Hemorrhoids  No date: HLD (hyperlipidemia)  No date: Hypertension  No date: Iritis  2009: Left wrist fracture      Comment:  traumatic  12/3/2009: Malignant neoplasm of other specified sites of bladder  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, postmenopausal  No date: PONV (postoperative nausea and vomiting)  No date: Rash  No date: Rosacea  No date: UTI (urinary tract infection)  No date: Vaginal delivery      Comment:  x 2    Past Surgical History:  No date: APPENDECTOMY  1979: BLADDER TUMOR EXCISION      Comment:  [facility], dr garcia - no recurrences since  No date: BREAST BIOPSY; Right      Comment:  4 core bx negative  1998: BREAST SURGERY; Left  4/21/2022: ESOPHAGOGASTRODUODENOSCOPY; N/A      Comment:  Procedure: ESOPHAGOGASTRODUODENOSCOPY (EGD);  Surgeon:                Guru Maddox MD;  Location: [facility];  Service:                Endoscopy;  Laterality: N/A;  No date: HYSTERECTOMY  1995: MASTECTOMY, PARTIAL      Comment:  left side - cancer - had radiotherapy 1995, 1998 had                chemotherapy  No  date: oophrect  No date: pelvic mass      Comment:  benign  No date: TONSILLECTOMY  No date: TONSILLECTOMY, ADENOIDECTOMY, BILATERAL MYRINGOTOMY AND TUBES  1998: tram flap; Left    Review of patient's family history indicates:  Problem: Glaucoma      Relation: Father          Age of Onset: (Not Specified)  Problem: Glaucoma      Relation: Paternal Aunt          Age of Onset: (Not Specified)  Problem: Glaucoma      Relation: Paternal Grandmother          Age of Onset: (Not Specified)  Problem: Cancer      Relation: Cousin          Age of Onset: (Not Specified)          Comment: 1st, ovarian  Problem: Amblyopia      Relation: Neg Hx          Age of Onset: (Not Specified)  Problem: Blindness      Relation: Neg Hx          Age of Onset: (Not Specified)  Problem: Cataracts      Relation: Neg Hx          Age of Onset: (Not Specified)  Problem: Hypertension      Relation: Neg Hx          Age of Onset: (Not Specified)  Problem: Macular degeneration      Relation: Neg Hx          Age of Onset: (Not Specified)  Problem: Retinal detachment      Relation: Neg Hx          Age of Onset: (Not Specified)  Problem: Strabismus      Relation: Neg Hx          Age of Onset: (Not Specified)  Problem: Stroke      Relation: Neg Hx          Age of Onset: (Not Specified)  Problem: Thyroid disease      Relation: Neg Hx          Age of Onset: (Not Specified)  Problem: Melanoma      Relation: Neg Hx          Age of Onset: (Not Specified)      Social History    Socioeconomic History      Marital status:     Occupational History      Occupation: retired accounting    Tobacco Use      Smoking status: Never Smoker      Smokeless tobacco: Never Used    Substance and Sexual Activity      Alcohol use: No      Drug use: No    Social Determinants of Health  Financial Resource Strain: Low Risk       Difficulty of Paying Living Expenses: Not hard at all  Food Insecurity: No Food Insecurity      Worried About Running Out of Food in the Last Year:  Never true      Ran Out of Food in the Last Year: Never true  Transportation Needs: No Transportation Needs      Lack of Transportation (Medical): No      Lack of Transportation (Non-Medical): No  Physical Activity: Inactive      Days of Exercise per Week: 0 days      Minutes of Exercise per Session: 20 min  Stress: Stress Concern Present      Feeling of Stress : To some extent  Social Connections: Unknown      Frequency of Communication with Friends and Family: More than three times a week      Frequency of Social Gatherings with Friends and Family: Three times a week      Active Member of Clubs or Organizations: No      Attends Club or Organization Meetings: 1 to 4 times per year      Marital Status:   Housing Stability: Low Risk       Unable to Pay for Housing in the Last Year: No      Number of Places Lived in the Last Year: 1      Unstable Housing in the Last Year: No    Current Outpatient Medications:  albuterol (PROVENTIL/VENTOLIN HFA) 90 mcg/actuation inhaler, INHALE 2 PUFFS INTO THE LUNGS EVERY 6 HOURS AS NEEDED FOR WHEEZING OR SHORTNESS OF BREATH, Disp: 54 g, Rfl: 0  apremilast (OTEZLA) 30 mg Tab, Take 1 tablet (30 mg total) by mouth 2 (two) times daily., Disp: 60 tablet, Rfl: 12  benzonatate (TESSALON) 200 MG capsule, Take 1 capsule (200 mg total) by mouth 2 (two) times daily as needed for Cough., Disp: 60 capsule, Rfl: 4  fluocinolone acetonide oiL 0.01 % Drop, Place 1 drop in ear(s) daily as needed (itching)., Disp: 20 mL, Rfl: 1  fluticasone propionate (FLOVENT HFA) 110 mcg/actuation inhaler, Inhale 1 puff into the lungs 2 (two) times daily. Controller, Disp: 12 g, Rfl: 3  losartan (COZAAR) 50 MG tablet, Take 1 tablet (50 mg total) by mouth once daily., Disp: 90 tablet, Rfl: 3  montelukast (SINGULAIR) 10 mg tablet, Take 1 tablet (10 mg total) by mouth every evening., Disp: 30 tablet, Rfl: 5  pantoprazole (PROTONIX) 40 MG tablet, Take 1 tablet (40 mg total) by mouth once daily., Disp: 30 tablet,  Rfl: 11  RESTASIS 0.05 % ophthalmic emulsion, INT 1 GTT IN OU BID, Disp: , Rfl:   tretinoin (RETIN-A) 0.025 % cream, Apply a pea-sized amount to entire face at bedtime, start every other night and increase as tolerated. Take night off if irritation develops., Disp: 45 g, Rfl: 3  vitamin D (VITAMIN D3) 1000 units Tab, Take 1,000 Units by mouth once daily., Disp: , Rfl:   ALPRAZolam (XANAX) 0.25 MG tablet, Take 1 tablet (0.25 mg total) by mouth 3 (three) times daily as needed for Anxiety., Disp: 45 tablet, Rfl: 0    No current facility-administered medications for this visit.      Review of patient's allergies indicates:   -- Prochlorperazine edisylate -- Anaphylaxis    --  compazine    Review of Systems   Constitutional: Negative for activity change and unexpected weight change.   HENT: Negative for hearing loss, rhinorrhea and trouble swallowing.    Eyes: Negative for discharge and visual disturbance.   Respiratory: Negative for chest tightness and wheezing.    Cardiovascular: Positive for leg swelling. Negative for chest pain and palpitations.   Gastrointestinal: Negative for blood in stool, constipation, diarrhea and vomiting.   Endocrine: Positive for polyuria. Negative for polydipsia.   Genitourinary: Negative for difficulty urinating, dysuria, hematuria and menstrual problem.   Musculoskeletal: Positive for leg pain. Negative for arthralgias, joint swelling and neck pain.   Neurological: Negative for weakness and headaches.   Psychiatric/Behavioral: Negative for confusion and dysphoric mood.         Objective:      Physical Exam  Constitutional:       Appearance: Normal appearance.   HENT:      Head: Normocephalic and atraumatic.   Pulmonary:      Effort: No respiratory distress.   Musculoskeletal:         General: Tenderness present.      Left lower leg: Edema present.        Legs:    Skin:     Findings: Erythema present.   Neurological:      Mental Status: She is alert.   Psychiatric:         Mood and Affect:  Mood normal.         Behavior: Behavior normal.         Assessment:       Problem List Items Addressed This Visit    None     Visit Diagnoses     Pain and swelling of left lower leg    -  Primary    Relevant Orders    US Lower Extremity Veins Left          Plan:       1. Pain and swelling of left lower leg  Unable to schedule patient for outpatient us today, as symptoms have worsened and she is having significant pain with weight bearing and to palpation, directed to ED at [facility] for immediate evaluation to r/o DVt.  will drive her, he is present at visit. Has scheduled follow up in clinic Thursday for repeat evaluation of BP.   - US Lower Extremity Veins Left; Future

## 2022-05-09 NOTE — PROGRESS NOTES
Answers for HPI/ROS submitted by the patient on 5/9/2022  activity change: No  unexpected weight change: No  neck pain: No  hearing loss: No  rhinorrhea: No  trouble swallowing: No  eye discharge: No  visual disturbance: No  chest tightness: No  wheezing: No  chest pain: No  palpitations: No  blood in stool: No  constipation: No  vomiting: No  diarrhea: No  polydipsia: No  polyuria: Yes  difficulty urinating: No  hematuria: No  menstrual problem: No  dysuria: No  joint swelling: No  arthralgias: No  headaches: No  weakness: No  confusion: No  dysphoric mood: No

## 2022-05-12 ENCOUNTER — PATIENT MESSAGE (OUTPATIENT)
Dept: FAMILY MEDICINE | Facility: CLINIC | Age: 77
End: 2022-05-12

## 2022-05-12 ENCOUNTER — OFFICE VISIT (OUTPATIENT)
Dept: FAMILY MEDICINE | Facility: CLINIC | Age: 77
End: 2022-05-12
Payer: MEDICARE

## 2022-05-12 VITALS
WEIGHT: 181.88 LBS | OXYGEN SATURATION: 98 % | SYSTOLIC BLOOD PRESSURE: 142 MMHG | HEART RATE: 81 BPM | TEMPERATURE: 98 F | BODY MASS INDEX: 31.05 KG/M2 | DIASTOLIC BLOOD PRESSURE: 88 MMHG | HEIGHT: 64 IN

## 2022-05-12 DIAGNOSIS — I10 PRIMARY HYPERTENSION: Primary | ICD-10-CM

## 2022-05-12 DIAGNOSIS — L03.116 CELLULITIS OF LEFT LOWER EXTREMITY: ICD-10-CM

## 2022-05-12 DIAGNOSIS — R05.9 COUGH: ICD-10-CM

## 2022-05-12 PROCEDURE — 1101F PT FALLS ASSESS-DOCD LE1/YR: CPT | Mod: CPTII,S$GLB,, | Performed by: NURSE PRACTITIONER

## 2022-05-12 PROCEDURE — 99214 PR OFFICE/OUTPT VISIT, EST, LEVL IV, 30-39 MIN: ICD-10-PCS | Mod: S$GLB,,, | Performed by: NURSE PRACTITIONER

## 2022-05-12 PROCEDURE — 3288F FALL RISK ASSESSMENT DOCD: CPT | Mod: CPTII,S$GLB,, | Performed by: NURSE PRACTITIONER

## 2022-05-12 PROCEDURE — 99999 PR PBB SHADOW E&M-EST. PATIENT-LVL IV: ICD-10-PCS | Mod: PBBFAC,,, | Performed by: NURSE PRACTITIONER

## 2022-05-12 PROCEDURE — 1125F PR PAIN SEVERITY QUANTIFIED, PAIN PRESENT: ICD-10-PCS | Mod: CPTII,S$GLB,, | Performed by: NURSE PRACTITIONER

## 2022-05-12 PROCEDURE — 4010F ACE/ARB THERAPY RXD/TAKEN: CPT | Mod: CPTII,S$GLB,, | Performed by: NURSE PRACTITIONER

## 2022-05-12 PROCEDURE — 3288F PR FALLS RISK ASSESSMENT DOCUMENTED: ICD-10-PCS | Mod: CPTII,S$GLB,, | Performed by: NURSE PRACTITIONER

## 2022-05-12 PROCEDURE — 3077F PR MOST RECENT SYSTOLIC BLOOD PRESSURE >= 140 MM HG: ICD-10-PCS | Mod: CPTII,S$GLB,, | Performed by: NURSE PRACTITIONER

## 2022-05-12 PROCEDURE — 1125F AMNT PAIN NOTED PAIN PRSNT: CPT | Mod: CPTII,S$GLB,, | Performed by: NURSE PRACTITIONER

## 2022-05-12 PROCEDURE — 99999 PR PBB SHADOW E&M-EST. PATIENT-LVL IV: CPT | Mod: PBBFAC,,, | Performed by: NURSE PRACTITIONER

## 2022-05-12 PROCEDURE — 99214 OFFICE O/P EST MOD 30 MIN: CPT | Mod: S$GLB,,, | Performed by: NURSE PRACTITIONER

## 2022-05-12 PROCEDURE — 3008F PR BODY MASS INDEX (BMI) DOCUMENTED: ICD-10-PCS | Mod: CPTII,S$GLB,, | Performed by: NURSE PRACTITIONER

## 2022-05-12 PROCEDURE — 3077F SYST BP >= 140 MM HG: CPT | Mod: CPTII,S$GLB,, | Performed by: NURSE PRACTITIONER

## 2022-05-12 PROCEDURE — 3079F PR MOST RECENT DIASTOLIC BLOOD PRESSURE 80-89 MM HG: ICD-10-PCS | Mod: CPTII,S$GLB,, | Performed by: NURSE PRACTITIONER

## 2022-05-12 PROCEDURE — 3079F DIAST BP 80-89 MM HG: CPT | Mod: CPTII,S$GLB,, | Performed by: NURSE PRACTITIONER

## 2022-05-12 PROCEDURE — 4010F PR ACE/ARB THEARPY RXD/TAKEN: ICD-10-PCS | Mod: CPTII,S$GLB,, | Performed by: NURSE PRACTITIONER

## 2022-05-12 PROCEDURE — 1101F PR PT FALLS ASSESS DOC 0-1 FALLS W/OUT INJ PAST YR: ICD-10-PCS | Mod: CPTII,S$GLB,, | Performed by: NURSE PRACTITIONER

## 2022-05-12 PROCEDURE — 3008F BODY MASS INDEX DOCD: CPT | Mod: CPTII,S$GLB,, | Performed by: NURSE PRACTITIONER

## 2022-05-12 RX ORDER — HYDROCHLOROTHIAZIDE 12.5 MG/1
12.5 TABLET ORAL DAILY
Qty: 30 TABLET | Refills: 4 | Status: SHIPPED | OUTPATIENT
Start: 2022-05-12 | End: 2022-06-07

## 2022-05-12 NOTE — PROGRESS NOTES
Subjective:       Patient ID: Yuliana Mattson is a 76 y.o. female.    Chief Complaint: Hypertension (F/U)    Seen in ED 2 days ago for leg pain, redness and swelling. No DVT, keflex for cellulitis left leg, improving.     HTN- Bp mildly elevated, losartan 50, was previously on lisinopril hct, stopped to see if contributing to the cough. BPs minimally elevated on the losratan 50 mg.     Cough- Much improved since starting the steroid inhaler, still a mild cough, but the paroxysmal coughing has improved.     Past Medical History:  No date: Allergic rhinitis  No date: Anticoagulant long-term use      Comment:  ASPIRIN ONLY - (stops it when she presents a hemorrhagic               cystitis)  No date: Bladder cancer  No date: Blood transfusion  No date: Breast cancer  No date: CAD (coronary artery disease), native coronary artery      Comment:  40% non obstructive  No date: Cholelithiasis  No date: Endometriosis  No date: Headache(784.0)  No date: HEARING LOSS  No date: Hemorrhoids  No date: HLD (hyperlipidemia)  No date: Hypertension  No date: Iritis  2009: Left wrist fracture      Comment:  traumatic  12/3/2009: Malignant neoplasm of other specified sites of bladder  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, postmenopausal  No date: PONV (postoperative nausea and vomiting)  No date: Rash  No date: Rosacea  No date: UTI (urinary tract infection)  No date: Vaginal delivery      Comment:  x 2    Past Surgical History:  No date: APPENDECTOMY  1979: BLADDER TUMOR EXCISION      Comment:  [facility], dr garcia - no recurrences since  No date: BREAST BIOPSY; Right      Comment:  4 core bx negative  1998: BREAST SURGERY; Left  4/21/2022: ESOPHAGOGASTRODUODENOSCOPY; N/A      Comment:  Procedure: ESOPHAGOGASTRODUODENOSCOPY (EGD);  Surgeon:                Guru Maddox MD;  Location: [facility];  Service:                Endoscopy;  Laterality: N/A;  No date: HYSTERECTOMY  1995: MASTECTOMY, PARTIAL      Comment:  left side - cancer -  had radiotherapy 1995, 1998 had                chemotherapy  No date: oophrect  No date: pelvic mass      Comment:  benign  No date: TONSILLECTOMY  No date: TONSILLECTOMY, ADENOIDECTOMY, BILATERAL MYRINGOTOMY AND TUBES  1998: tram flap; Left    Review of patient's family history indicates:  Problem: Glaucoma      Relation: Father          Age of Onset: (Not Specified)  Problem: Glaucoma      Relation: Paternal Aunt          Age of Onset: (Not Specified)  Problem: Glaucoma      Relation: Paternal Grandmother          Age of Onset: (Not Specified)  Problem: Cancer      Relation: Cousin          Age of Onset: (Not Specified)          Comment: 1st, ovarian  Problem: Amblyopia      Relation: Neg Hx          Age of Onset: (Not Specified)  Problem: Blindness      Relation: Neg Hx          Age of Onset: (Not Specified)  Problem: Cataracts      Relation: Neg Hx          Age of Onset: (Not Specified)  Problem: Hypertension      Relation: Neg Hx          Age of Onset: (Not Specified)  Problem: Macular degeneration      Relation: Neg Hx          Age of Onset: (Not Specified)  Problem: Retinal detachment      Relation: Neg Hx          Age of Onset: (Not Specified)  Problem: Strabismus      Relation: Neg Hx          Age of Onset: (Not Specified)  Problem: Stroke      Relation: Neg Hx          Age of Onset: (Not Specified)  Problem: Thyroid disease      Relation: Neg Hx          Age of Onset: (Not Specified)  Problem: Melanoma      Relation: Neg Hx          Age of Onset: (Not Specified)      Social History    Socioeconomic History      Marital status:     Occupational History      Occupation: retired accounting    Tobacco Use      Smoking status: Never Smoker      Smokeless tobacco: Never Used    Substance and Sexual Activity      Alcohol use: No      Drug use: No    Social Determinants of Health  Financial Resource Strain: Low Risk       Difficulty of Paying Living Expenses: Not hard at all  Food Insecurity: No Food  Insecurity      Worried About Running Out of Food in the Last Year: Never true      Ran Out of Food in the Last Year: Never true  Transportation Needs: No Transportation Needs      Lack of Transportation (Medical): No      Lack of Transportation (Non-Medical): No  Physical Activity: Inactive      Days of Exercise per Week: 0 days      Minutes of Exercise per Session: 20 min  Stress: Stress Concern Present      Feeling of Stress : To some extent  Social Connections: Unknown      Frequency of Communication with Friends and Family: More than three times a week      Frequency of Social Gatherings with Friends and Family: Three times a week      Active Member of Clubs or Organizations: No      Attends Club or Organization Meetings: 1 to 4 times per year      Marital Status:   Housing Stability: Low Risk       Unable to Pay for Housing in the Last Year: No      Number of Places Lived in the Last Year: 1      Unstable Housing in the Last Year: No    Current Outpatient Medications:  apremilast (OTEZLA) 30 mg Tab, Take 1 tablet (30 mg total) by mouth 2 (two) times daily., Disp: 60 tablet, Rfl: 12  benzonatate (TESSALON) 200 MG capsule, Take 1 capsule (200 mg total) by mouth 2 (two) times daily as needed for Cough. (Patient not taking: Reported on 5/19/2022), Disp: 60 capsule, Rfl: 4  fluocinolone acetonide oiL 0.01 % Drop, Place 1 drop in ear(s) daily as needed (itching)., Disp: 20 mL, Rfl: 1  fluticasone propionate (FLOVENT HFA) 110 mcg/actuation inhaler, Inhale 1 puff into the lungs 2 (two) times daily. Controller, Disp: 12 g, Rfl: 3  losartan (COZAAR) 50 MG tablet, Take 1 tablet (50 mg total) by mouth once daily., Disp: 90 tablet, Rfl: 3  montelukast (SINGULAIR) 10 mg tablet, Take 1 tablet (10 mg total) by mouth every evening., Disp: 30 tablet, Rfl: 5  pantoprazole (PROTONIX) 40 MG tablet, Take 1 tablet (40 mg total) by mouth once daily., Disp: 30 tablet, Rfl: 11  RESTASIS 0.05 % ophthalmic emulsion, INT 1 GTT IN  OU BID, Disp: , Rfl:   tretinoin (RETIN-A) 0.025 % cream, Apply a pea-sized amount to entire face at bedtime, start every other night and increase as tolerated. Take night off if irritation develops., Disp: 45 g, Rfl: 3  vitamin D (VITAMIN D3) 1000 units Tab, Take 1,000 Units by mouth once daily., Disp: , Rfl:   albuterol (PROVENTIL/VENTOLIN HFA) 90 mcg/actuation inhaler, Inhale 2 puffs into the lungs every 6 (six) hours as needed for Wheezing or Shortness of Breath. Rescue, Disp: 54 g, Rfl: 0  ALPRAZolam (XANAX) 0.25 MG tablet, Take 1 tablet (0.25 mg total) by mouth 3 (three) times daily as needed for Anxiety. (Patient not taking: Reported on 5/19/2022), Disp: 45 tablet, Rfl: 0  hydroCHLOROthiazide (HYDRODIURIL) 12.5 MG Tab, Take 1 tablet (12.5 mg total) by mouth once daily., Disp: 30 tablet, Rfl: 4    No current facility-administered medications for this visit.      Review of patient's allergies indicates:   -- Prochlorperazine edisylate -- Anaphylaxis    --  compazine    Review of Systems   Constitutional: Negative for diaphoresis, fatigue and fever.   Respiratory: Positive for cough. Negative for shortness of breath and wheezing.    Cardiovascular: Positive for leg swelling. Negative for chest pain.   Gastrointestinal: Negative.    Musculoskeletal: Negative.    Integumentary:  Positive for color change.   Neurological: Negative.          Objective:      Physical Exam  Constitutional:       General: She is not in acute distress.     Appearance: Normal appearance. She is not toxic-appearing.   HENT:      Head: Normocephalic and atraumatic.   Eyes:      Pupils: Pupils are equal, round, and reactive to light.   Cardiovascular:      Rate and Rhythm: Normal rate.   Pulmonary:      Effort: Pulmonary effort is normal. No respiratory distress.   Musculoskeletal:         General: Tenderness present.      Right lower leg: No edema.      Left lower leg: Edema present.   Skin:     Findings: Erythema (faint, no swelling, left  anterior lower leg) present.   Neurological:      General: No focal deficit present.      Mental Status: She is alert and oriented to person, place, and time.   Psychiatric:         Mood and Affect: Mood normal.         Behavior: Behavior normal.         Assessment:       Problem List Items Addressed This Visit        Unprioritized    Hypertension - Primary      Other Visit Diagnoses     Cough        Cellulitis of left lower extremity              Plan:       1. Primary hypertension  Add hctz 12.5 mg, continue losratan 50 mg, BP and BMP recheck in 2 weeks. Monitor Bp daily.     2. Cough  Improving with steroid inhaler. Continue.     3. Cellulitis of left lower extremity  Improving with treatment, continue.

## 2022-05-19 ENCOUNTER — OFFICE VISIT (OUTPATIENT)
Dept: FAMILY MEDICINE | Facility: CLINIC | Age: 77
End: 2022-05-19
Payer: MEDICARE

## 2022-05-19 VITALS
HEIGHT: 64 IN | HEART RATE: 93 BPM | DIASTOLIC BLOOD PRESSURE: 78 MMHG | TEMPERATURE: 99 F | WEIGHT: 179.44 LBS | BODY MASS INDEX: 30.63 KG/M2 | OXYGEN SATURATION: 97 % | SYSTOLIC BLOOD PRESSURE: 110 MMHG

## 2022-05-19 DIAGNOSIS — J18.9 PNEUMONIA OF RIGHT LOWER LOBE DUE TO INFECTIOUS ORGANISM: ICD-10-CM

## 2022-05-19 DIAGNOSIS — J45.40 MODERATE PERSISTENT REACTIVE AIRWAY DISEASE WITHOUT COMPLICATION: Primary | ICD-10-CM

## 2022-05-19 DIAGNOSIS — R05.9 COUGH: ICD-10-CM

## 2022-05-19 PROCEDURE — 3008F BODY MASS INDEX DOCD: CPT | Mod: CPTII,S$GLB,, | Performed by: FAMILY MEDICINE

## 2022-05-19 PROCEDURE — 99213 PR OFFICE/OUTPT VISIT, EST, LEVL III, 20-29 MIN: ICD-10-PCS | Mod: S$GLB,,, | Performed by: FAMILY MEDICINE

## 2022-05-19 PROCEDURE — 3074F SYST BP LT 130 MM HG: CPT | Mod: CPTII,S$GLB,, | Performed by: FAMILY MEDICINE

## 2022-05-19 PROCEDURE — 1126F AMNT PAIN NOTED NONE PRSNT: CPT | Mod: CPTII,S$GLB,, | Performed by: FAMILY MEDICINE

## 2022-05-19 PROCEDURE — 3008F PR BODY MASS INDEX (BMI) DOCUMENTED: ICD-10-PCS | Mod: CPTII,S$GLB,, | Performed by: FAMILY MEDICINE

## 2022-05-19 PROCEDURE — 3078F PR MOST RECENT DIASTOLIC BLOOD PRESSURE < 80 MM HG: ICD-10-PCS | Mod: CPTII,S$GLB,, | Performed by: FAMILY MEDICINE

## 2022-05-19 PROCEDURE — 99999 PR PBB SHADOW E&M-EST. PATIENT-LVL V: CPT | Mod: PBBFAC,,, | Performed by: FAMILY MEDICINE

## 2022-05-19 PROCEDURE — 4010F PR ACE/ARB THEARPY RXD/TAKEN: ICD-10-PCS | Mod: CPTII,S$GLB,, | Performed by: FAMILY MEDICINE

## 2022-05-19 PROCEDURE — 1159F MED LIST DOCD IN RCRD: CPT | Mod: CPTII,S$GLB,, | Performed by: FAMILY MEDICINE

## 2022-05-19 PROCEDURE — 3288F FALL RISK ASSESSMENT DOCD: CPT | Mod: CPTII,S$GLB,, | Performed by: FAMILY MEDICINE

## 2022-05-19 PROCEDURE — 1126F PR PAIN SEVERITY QUANTIFIED, NO PAIN PRESENT: ICD-10-PCS | Mod: CPTII,S$GLB,, | Performed by: FAMILY MEDICINE

## 2022-05-19 PROCEDURE — 1159F PR MEDICATION LIST DOCUMENTED IN MEDICAL RECORD: ICD-10-PCS | Mod: CPTII,S$GLB,, | Performed by: FAMILY MEDICINE

## 2022-05-19 PROCEDURE — 4010F ACE/ARB THERAPY RXD/TAKEN: CPT | Mod: CPTII,S$GLB,, | Performed by: FAMILY MEDICINE

## 2022-05-19 PROCEDURE — 3288F PR FALLS RISK ASSESSMENT DOCUMENTED: ICD-10-PCS | Mod: CPTII,S$GLB,, | Performed by: FAMILY MEDICINE

## 2022-05-19 PROCEDURE — 3074F PR MOST RECENT SYSTOLIC BLOOD PRESSURE < 130 MM HG: ICD-10-PCS | Mod: CPTII,S$GLB,, | Performed by: FAMILY MEDICINE

## 2022-05-19 PROCEDURE — 99213 OFFICE O/P EST LOW 20 MIN: CPT | Mod: S$GLB,,, | Performed by: FAMILY MEDICINE

## 2022-05-19 PROCEDURE — 3078F DIAST BP <80 MM HG: CPT | Mod: CPTII,S$GLB,, | Performed by: FAMILY MEDICINE

## 2022-05-19 PROCEDURE — 99999 PR PBB SHADOW E&M-EST. PATIENT-LVL V: ICD-10-PCS | Mod: PBBFAC,,, | Performed by: FAMILY MEDICINE

## 2022-05-19 PROCEDURE — 1101F PT FALLS ASSESS-DOCD LE1/YR: CPT | Mod: CPTII,S$GLB,, | Performed by: FAMILY MEDICINE

## 2022-05-19 PROCEDURE — 1101F PR PT FALLS ASSESS DOC 0-1 FALLS W/OUT INJ PAST YR: ICD-10-PCS | Mod: CPTII,S$GLB,, | Performed by: FAMILY MEDICINE

## 2022-05-19 RX ORDER — ALBUTEROL SULFATE 90 UG/1
2 AEROSOL, METERED RESPIRATORY (INHALATION) EVERY 6 HOURS PRN
Qty: 54 G | Refills: 0 | Status: SHIPPED | OUTPATIENT
Start: 2022-05-19 | End: 2022-09-22 | Stop reason: SDUPTHER

## 2022-05-19 NOTE — TELEPHONE ENCOUNTER
No new care gaps identified.  [facility] Embedded Care Gaps. Reference number: [id number]. 5/19/2022   10:00:39 AM CDT

## 2022-05-20 ENCOUNTER — PATIENT MESSAGE (OUTPATIENT)
Dept: FAMILY MEDICINE | Facility: CLINIC | Age: 77
End: 2022-05-20
Payer: MEDICARE

## 2022-06-03 ENCOUNTER — TELEPHONE (OUTPATIENT)
Dept: PULMONOLOGY | Facility: CLINIC | Age: 77
End: 2022-06-03
Payer: MEDICARE

## 2022-06-03 NOTE — TELEPHONE ENCOUNTER
Patient appointment moved up to 6/23 with Dr. Zeng.    ----- Message from Delmy Callahan sent at 6/3/2022 12:37 PM CDT -----  Contact: Self  Type:  Sooner Appointment Request    Caller is requesting a sooner appointment.  Caller declined first available appointment listed below.  Caller will not accept being placed on the waitlist and is requesting a message be sent to doctor.    Name of Caller:  Patient  When is the first available appointment?  8/18, which she scheduled  Symptoms:  Moderate persistent reactive airway disease without complication, pt is currently taking steroid inhaler, albuteral and another inhaler that her primary care prescribed and she is unsure if she is to continue on this med until she gets in to see a pulmonary doctor. Worried she may not need it.  Best Call Back Number:  [phone]  Additional Information:  Please call pt back to advise and to see if we can possibly get her in sooner than August. Thank You.

## 2022-06-07 ENCOUNTER — PATIENT MESSAGE (OUTPATIENT)
Dept: FAMILY MEDICINE | Facility: CLINIC | Age: 77
End: 2022-06-07
Payer: MEDICARE

## 2022-06-07 RX ORDER — LOSARTAN POTASSIUM AND HYDROCHLOROTHIAZIDE 12.5; 5 MG/1; MG/1
1 TABLET ORAL DAILY
Qty: 90 TABLET | Refills: 3 | Status: SHIPPED | OUTPATIENT
Start: 2022-06-07 | End: 2023-06-07 | Stop reason: SDUPTHER

## 2022-06-14 ENCOUNTER — LAB VISIT (OUTPATIENT)
Dept: LAB | Facility: HOSPITAL | Age: 77
End: 2022-06-14
Attending: INTERNAL MEDICINE
Payer: MEDICARE

## 2022-06-14 DIAGNOSIS — L40.9 PSORIASIS OF SCALP: ICD-10-CM

## 2022-06-14 DIAGNOSIS — T78.40XS ALLERGY, SEQUELA: ICD-10-CM

## 2022-06-14 DIAGNOSIS — H20.9 UVEITIS OF BOTH EYES: ICD-10-CM

## 2022-06-14 DIAGNOSIS — H81.03 MENIERE DISEASE, BILATERAL: ICD-10-CM

## 2022-06-14 DIAGNOSIS — M35.00 SICCA SYNDROME: ICD-10-CM

## 2022-06-14 DIAGNOSIS — R76.8 ANA POSITIVE: ICD-10-CM

## 2022-06-14 DIAGNOSIS — L40.50 PSA (PSORIATIC ARTHRITIS): ICD-10-CM

## 2022-06-14 DIAGNOSIS — R05.9 COUGH: ICD-10-CM

## 2022-06-14 LAB
CRP SERPL-MCNC: 19.9 MG/L (ref 0–8.2)
ERYTHROCYTE [SEDIMENTATION RATE] IN BLOOD BY WESTERGREN METHOD: 30 MM/HR (ref 0–36)
SARS-COV-2 IGG SERPL IA-ACNC: 616 AU/ML
SARS-COV-2 IGG SERPL QL IA: POSITIVE

## 2022-06-14 PROCEDURE — 36415 COLL VENOUS BLD VENIPUNCTURE: CPT | Mod: PO | Performed by: INTERNAL MEDICINE

## 2022-06-14 PROCEDURE — 85652 RBC SED RATE AUTOMATED: CPT | Performed by: INTERNAL MEDICINE

## 2022-06-14 PROCEDURE — 86140 C-REACTIVE PROTEIN: CPT | Performed by: INTERNAL MEDICINE

## 2022-06-14 PROCEDURE — 86769 SARS-COV-2 COVID-19 ANTIBODY: CPT | Performed by: INTERNAL MEDICINE

## 2022-06-23 ENCOUNTER — HOSPITAL ENCOUNTER (OUTPATIENT)
Dept: PULMONOLOGY | Facility: HOSPITAL | Age: 77
Discharge: HOME | End: 2022-06-23
Attending: INTERNAL MEDICINE
Payer: MEDICARE

## 2022-06-23 ENCOUNTER — OFFICE VISIT (OUTPATIENT)
Dept: PULMONOLOGY | Facility: CLINIC | Age: 77
End: 2022-06-23
Payer: MEDICARE

## 2022-06-23 VITALS
HEIGHT: 64 IN | HEART RATE: 74 BPM | BODY MASS INDEX: 30.19 KG/M2 | OXYGEN SATURATION: 99 % | RESPIRATION RATE: 16 BRPM | SYSTOLIC BLOOD PRESSURE: 132 MMHG | DIASTOLIC BLOOD PRESSURE: 81 MMHG | WEIGHT: 176.81 LBS

## 2022-06-23 DIAGNOSIS — R05.3 CHRONIC COUGH: ICD-10-CM

## 2022-06-23 DIAGNOSIS — J45.40 MODERATE PERSISTENT REACTIVE AIRWAY DISEASE WITHOUT COMPLICATION: ICD-10-CM

## 2022-06-23 DIAGNOSIS — I70.0 AORTIC CALCIFICATION: Primary | ICD-10-CM

## 2022-06-23 PROCEDURE — 3008F PR BODY MASS INDEX (BMI) DOCUMENTED: ICD-10-PCS | Mod: CPTII,S$GLB,, | Performed by: INTERNAL MEDICINE

## 2022-06-23 PROCEDURE — 1101F PR PT FALLS ASSESS DOC 0-1 FALLS W/OUT INJ PAST YR: ICD-10-PCS | Mod: CPTII,S$GLB,, | Performed by: INTERNAL MEDICINE

## 2022-06-23 PROCEDURE — 94729 PR C02/MEMBANE DIFFUSE CAPACITY: ICD-10-PCS | Mod: 26,,, | Performed by: INTERNAL MEDICINE

## 2022-06-23 PROCEDURE — 99499 RISK ADDL DX/OHS AUDIT: ICD-10-PCS | Mod: S$GLB,,, | Performed by: INTERNAL MEDICINE

## 2022-06-23 PROCEDURE — 3075F PR MOST RECENT SYSTOLIC BLOOD PRESS GE 130-139MM HG: ICD-10-PCS | Mod: CPTII,S$GLB,, | Performed by: INTERNAL MEDICINE

## 2022-06-23 PROCEDURE — 99999 PR PBB SHADOW E&M-EST. PATIENT-LVL V: CPT | Mod: PBBFAC,,, | Performed by: INTERNAL MEDICINE

## 2022-06-23 PROCEDURE — 99499 UNLISTED E&M SERVICE: CPT | Mod: S$GLB,,, | Performed by: INTERNAL MEDICINE

## 2022-06-23 PROCEDURE — 3079F PR MOST RECENT DIASTOLIC BLOOD PRESSURE 80-89 MM HG: ICD-10-PCS | Mod: CPTII,S$GLB,, | Performed by: INTERNAL MEDICINE

## 2022-06-23 PROCEDURE — 94727 PR PULM FUNCTION TEST BY GAS: ICD-10-PCS | Mod: 26,,, | Performed by: INTERNAL MEDICINE

## 2022-06-23 PROCEDURE — 3288F FALL RISK ASSESSMENT DOCD: CPT | Mod: CPTII,S$GLB,, | Performed by: INTERNAL MEDICINE

## 2022-06-23 PROCEDURE — 94729 DIFFUSING CAPACITY: CPT | Mod: 26,,, | Performed by: INTERNAL MEDICINE

## 2022-06-23 PROCEDURE — 94060 PR EVAL OF BRONCHOSPASM: ICD-10-PCS | Mod: 26,,, | Performed by: INTERNAL MEDICINE

## 2022-06-23 PROCEDURE — 3079F DIAST BP 80-89 MM HG: CPT | Mod: CPTII,S$GLB,, | Performed by: INTERNAL MEDICINE

## 2022-06-23 PROCEDURE — 94729 DIFFUSING CAPACITY: CPT

## 2022-06-23 PROCEDURE — 1101F PT FALLS ASSESS-DOCD LE1/YR: CPT | Mod: CPTII,S$GLB,, | Performed by: INTERNAL MEDICINE

## 2022-06-23 PROCEDURE — 99204 OFFICE O/P NEW MOD 45 MIN: CPT | Mod: 25,S$GLB,, | Performed by: INTERNAL MEDICINE

## 2022-06-23 PROCEDURE — 94060 EVALUATION OF WHEEZING: CPT | Mod: 26,,, | Performed by: INTERNAL MEDICINE

## 2022-06-23 PROCEDURE — 4010F ACE/ARB THERAPY RXD/TAKEN: CPT | Mod: CPTII,S$GLB,, | Performed by: INTERNAL MEDICINE

## 2022-06-23 PROCEDURE — 3075F SYST BP GE 130 - 139MM HG: CPT | Mod: CPTII,S$GLB,, | Performed by: INTERNAL MEDICINE

## 2022-06-23 PROCEDURE — 99204 PR OFFICE/OUTPT VISIT, NEW, LEVL IV, 45-59 MIN: ICD-10-PCS | Mod: 25,S$GLB,, | Performed by: INTERNAL MEDICINE

## 2022-06-23 PROCEDURE — 4010F PR ACE/ARB THEARPY RXD/TAKEN: ICD-10-PCS | Mod: CPTII,S$GLB,, | Performed by: INTERNAL MEDICINE

## 2022-06-23 PROCEDURE — 1159F PR MEDICATION LIST DOCUMENTED IN MEDICAL RECORD: ICD-10-PCS | Mod: CPTII,S$GLB,, | Performed by: INTERNAL MEDICINE

## 2022-06-23 PROCEDURE — 3008F BODY MASS INDEX DOCD: CPT | Mod: CPTII,S$GLB,, | Performed by: INTERNAL MEDICINE

## 2022-06-23 PROCEDURE — 1159F MED LIST DOCD IN RCRD: CPT | Mod: CPTII,S$GLB,, | Performed by: INTERNAL MEDICINE

## 2022-06-23 PROCEDURE — 94727 GAS DIL/WSHOT DETER LNG VOL: CPT | Mod: 26,,, | Performed by: INTERNAL MEDICINE

## 2022-06-23 PROCEDURE — 94060 EVALUATION OF WHEEZING: CPT

## 2022-06-23 PROCEDURE — 3288F PR FALLS RISK ASSESSMENT DOCUMENTED: ICD-10-PCS | Mod: CPTII,S$GLB,, | Performed by: INTERNAL MEDICINE

## 2022-06-23 PROCEDURE — 1126F AMNT PAIN NOTED NONE PRSNT: CPT | Mod: CPTII,S$GLB,, | Performed by: INTERNAL MEDICINE

## 2022-06-23 PROCEDURE — 99999 PR PBB SHADOW E&M-EST. PATIENT-LVL V: ICD-10-PCS | Mod: PBBFAC,,, | Performed by: INTERNAL MEDICINE

## 2022-06-23 PROCEDURE — 94727 GAS DIL/WSHOT DETER LNG VOL: CPT

## 2022-06-23 PROCEDURE — 1126F PR PAIN SEVERITY QUANTIFIED, NO PAIN PRESENT: ICD-10-PCS | Mod: CPTII,S$GLB,, | Performed by: INTERNAL MEDICINE

## 2022-06-23 RX ORDER — FLUTICASONE PROPIONATE 220 UG/1
1 AEROSOL, METERED RESPIRATORY (INHALATION) 2 TIMES DAILY
Qty: 12 G | Refills: 11 | Status: SHIPPED | OUTPATIENT
Start: 2022-06-23 | End: 2022-09-22

## 2022-06-23 NOTE — PATIENT INSTRUCTIONS
Cough suspected due to asthma    Continue flovent inhaler - if cough worsens could increase to high dose  Continue albuterol as needed  Continue rheumatology follow up and treatment  Lungs look good on chest CT  Get lung function testing

## 2022-06-23 NOTE — PROGRESS NOTES
"6/23/2022    Yuliana Mattson  New Patient Consult    Chief Complaint   Patient presents with    [facility]     New patient; never smoker; never seen pulmo before; never dx of asthma, COPD, etc.    Reactive Airway Disease     Possibly; not sure on dx         HPI: Pt is a 75 yo female with HTN, HLD, bladder cancer age 34, breast ca x2 age 50 and 53- did receive radiation 1995, CAD, allergic rhinitis presenting for new evaluation.  Pt has had dry cough since Aug 2021,   c/o coughing spells 15-20 min occasionally. Coughs up mucous then feels better. She used to wake 2-3x/night with cough which is now improved.  Cough seemed to worsen 1/2022- became productive and had fever, sob, weakness, tested neg for COVID with 2 home tests and a PCR but thinks she had covid- sick and bedridden 7d. She was tx with antibiotics 4/13/22- keflex for "possible beginnings of pneumonia" RLL.  Has had cellulitis left leg 5/2022, UTI. Gets recurrent UTIs    Has had some workup per PCP for sinuses, tx for reflux- 40mg omeprazole bid. She did have EGD 4/2022 which was normal but reports coughed the whole time.  She has stopped ACE inhibitor 4/2022 but didn't immediately improve  Cough is now improved on flovent inhaler. Also uses albuterol qhs and prn- uses maybe once/d  Sometimes short of breath w/ coughing spells, with heat seems worse  Symptoms assoc with wheezing  Triggers: cleaning products, wears N95 when cleaning  Since January she feels winded w/ exertion, limits to going in one store when going out due to MEJÍA, fatigue. SOB got much worse now getting better.  Gets allergies- improved on singulair, has restarted since 5/2.  Per Dr Hughes's note she had uveitis, iritis, hearing loss resolved w/ high dose steroids. 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    The chief complaint problem is new to me    PFSH:  Past Medical History:   Diagnosis Date    Allergic rhinitis     Anticoagulant long-term use     ASPIRIN ONLY - (stops it when she presents a hemorrhagic cystitis)    Bladder cancer     Blood transfusion     Breast cancer     CAD (coronary artery disease), native coronary artery     40% non obstructive    Cholelithiasis     Endometriosis     Headache(784.0)     HEARING LOSS     Hemorrhoids     HLD (hyperlipidemia)     Hypertension     Iritis     Left wrist fracture 2009    traumatic    Malignant neoplasm of other specified sites of bladder 12/3/2009    Osteoporosis, postmenopausal     PONV (postoperative nausea and vomiting)     Rash     Rosacea     UTI (urinary tract infection)     Vaginal delivery     x 2         Past Surgical History:   Procedure Laterality Date    APPENDECTOMY      BLADDER TUMOR EXCISION  1979    [facility], dr garcia - no recurrences since    BREAST BIOPSY Right     4 core bx negative    BREAST SURGERY Left 1998    ESOPHAGOGASTRODUODENOSCOPY N/A 4/21/2022    Procedure: ESOPHAGOGASTRODUODENOSCOPY (EGD);  Surgeon: uGru Maddox MD;  Location: [facility];  Service: Endoscopy;  Laterality: N/A;    HYSTERECTOMY      MASTECTOMY, PARTIAL  1995    left side - cancer - had radiotherapy 1995, 1998 had chemotherapy    oophrect      pelvic mass      benign    TONSILLECTOMY      TONSILLECTOMY, ADENOIDECTOMY, BILATERAL MYRINGOTOMY AND TUBES      tram flap Left 1998     Social History     Tobacco Use    Smoking status: Never Smoker    Smokeless tobacco: Never Used   Substance Use Topics    Alcohol use: No    Drug use: No     Family History   Problem Relation Age of Onset    Glaucoma Father     Glaucoma Paternal Aunt     Glaucoma Paternal Grandmother     Cancer Cousin         1st, ovarian    Amblyopia Neg Hx     Blindness Neg Hx   "   Cataracts Neg Hx     Hypertension Neg Hx     Macular degeneration Neg Hx     Retinal detachment Neg Hx     Strabismus Neg Hx     Stroke Neg Hx     Thyroid disease Neg Hx     Melanoma Neg Hx      Review of patient's allergies indicates:   Allergen Reactions    Prochlorperazine edisylate Anaphylaxis     compazine       Performance Status:The patient's activity level is functions out of house.      Review of Systems:  a review of eleven systems covering constitutional, Eye, HEENT, Psych, Respiratory, Cardiac, GI, , Musculoskeletal, Endocrine, Dermatologic was negative except for pertinent findings as listed ABOVE and below:  Dry eyes, dry mouth  rashes   Has lost 19# since starting otezla, has been trying to eat healthy for upcoming daughter's wedding    Exam:Comprehensive exam done. /81 (BP Location: Left arm, Patient Position: Sitting, BP Method: Large (Automatic))   Pulse 74   Resp 16   Ht 5' 4" (1.626 m)   Wt 80.2 kg (176 lb 12.9 oz)   SpO2 99% Comment: on room air at rest  BMI 30.35 kg/m²   Exam included Vitals as listed, and patient's appearance and affect and alertness and mood, oral exam for yeast and hygiene and pharynx lesions and Mallapatti (M) score, neck with inspection for jvd and masses and thyroid abnormalities and lymph nodes (supraclavicular and infraclavicular nodes and axillary also examined and noted if abn), chest exam included symmetry and effort and fremitus and percussion and auscultation, cardiac exam included rhythm and gallops and murmur and rubs and jvd and edema, abdominal exam for mass and hepatosplenomegaly and tenderness and hernias and bowel sounds, Musculoskeletal exam with muscle tone and posture and mobility/gait and  strength, and skin for rashes and cyanosis and pallor and turgor, extremity for clubbing.  Findings were normal except for pertinent findings listed below:  M2, oropharynx clear  HR regular  Breath sounds clear bilaterally  Occasional " cough w deep inspiration  No edema/clubbing/rashes    Radiographs (ct chest and cxr) reviewed: view by direct vision   CT chest 5/6/22- normal lung fields. Aortic calcification. I do not appreciate any lung nodules. On chest wall left side there are some calcified nodules.  Per radiologist read: A few old calcified pulmonary granulomas are present.    CXR 4/18/22- clear  CXR 4/13/22- possible mild RML infiltrate    Labs reviewed     Latest Reference Range & Units 04/28/22 11:55   LISS Screen Negative <1:80  Positive !   LISS Titer 1  1:320   LISS PATTERN 1  Speckled   ds DNA Ab Negative 1:10  Negative 1:10   Anti-SSA Antibody 0.00 - 0.99 Ratio 0.05   Anti-SSA Interpretation Negative  Negative   Anti-SSB Antibody 0.00 - 0.99 Ratio 0.06   Anti-SSB Interpretation Negative  Negative   Anti Sm Antibody 0.00 - 0.99 Ratio 0.06   Anti-Sm Interpretation Negative  Negative   Anti Sm/RNP Antibody 0.00 - 0.99 Ratio 0.08   Anti-Sm/RNP Interpretation Negative  Negative      Latest Reference Range & Units 12/23/20 12:09   CCP Antibodies <5.0 U/mL <0.5   Rheumatoid Factor 0.0 - 15.0 IU/mL <10.0      Latest Reference Range & Units 12/23/20 12:09   IgG 4 4 - 86 mg/dL 3 (L)        Latest Reference Range & Units 02/11/22 11:39 04/28/22 11:55   Eos # 0.0 - 0.5 K/uL 0.2 0.3        PFT will be done and results to be reviewed      Plan:  Clinical impression is resonably certain and repeated evaluation prn +/- follow up will be needed as below. Chronic cough worsened post URI, suspect reactive airways disease. If persists/worsens consider workup for airway only sarcoid given uveitis/iritis in past. Lungs clear on CT    Yuliana was seen today for establish care and reactive airway disease.    Diagnoses and all orders for this visit:    Aortic calcification    Moderate persistent reactive airway disease without complication  -     Ambulatory referral/consult to Pulmonology  -     ANGIOTENSIN CONVERTING ENZYME; Future  -     Complete PFT with  bronchodilator; Future  -     fluticasone propionate (FLOVENT HFA) 220 mcg/actuation inhaler; Inhale 1 puff into the lungs 2 (two) times daily. Controller    Chronic cough        Follow up in about 3 months (around 9/23/2022).    Discussed with patient above for education the following:      Patient Instructions   Cough suspected due to asthma    Continue flovent inhaler - if cough worsens could increase to high dose  Continue albuterol as needed  Continue rheumatology follow up and treatment  Lungs look good on chest CT  Get lung function testing

## 2022-06-27 LAB
BRPFT: NORMAL
DLCO ADJ PRE: 18.27 ML/(MIN*MMHG)
DLCO SINGLE BREATH LLN: 15.07
DLCO SINGLE BREATH PRE REF: 87.8 %
DLCO SINGLE BREATH REF: 20.8
DLCOC SBVA LLN: 2.76
DLCOC SBVA PRE REF: 93.5 %
DLCOC SBVA REF: 4.19
DLCOC SINGLE BREATH LLN: 15.07
DLCOC SINGLE BREATH PRE REF: 87.8 %
DLCOC SINGLE BREATH REF: 20.8
DLCOVA LLN: 2.76
DLCOVA PRE REF: 93.5 %
DLCOVA PRE: 3.92 ML/(MIN*MMHG*L)
DLCOVA REF: 4.19
DLVAADJ PRE: 3.92 ML/(MIN*MMHG*L)
ERVN2 LLN: -16449.41
ERVN2 PRE REF: 27.1 %
ERVN2 PRE: 0.16 L
ERVN2 REF: 0.59
FEF 25 75 CHG: 22.5 %
FEF 25 75 LLN: 0.76
FEF 25 75 POST REF: 144 %
FEF 25 75 PRE REF: 117.6 %
FEF 25 75 REF: 1.71
FET100 CHG: -0.8 %
FEV1 CHG: 4.2 %
FEV1 FVC CHG: 3.6 %
FEV1 FVC LLN: 64
FEV1 FVC POST REF: 103.8 %
FEV1 FVC PRE REF: 100.2 %
FEV1 FVC REF: 78
FEV1 LLN: 1.49
FEV1 POST REF: 113.6 %
FEV1 PRE REF: 109 %
FEV1 REF: 2.08
FRCN2 LLN: 1.9
FRCN2 PRE REF: 60.5 %
FRCN2 REF: 2.73
FVC CHG: 0.5 %
FVC LLN: 1.94
FVC POST REF: 108.3 %
FVC PRE REF: 107.7 %
FVC REF: 2.71
IVC PRE: 2.81 L
IVC SINGLE BREATH LLN: 1.94
IVC SINGLE BREATH PRE REF: 103.4 %
IVC SINGLE BREATH REF: 2.71
MVV LLN: 68
MVV PRE REF: 94.3 %
MVV REF: 80
PEF CHG: -0.8 %
PEF LLN: 3.64
PEF POST REF: 108.3 %
PEF PRE REF: 109.1 %
PEF REF: 5.36
POST FEF 25 75: 2.47 L/S
POST FET 100: 7.66 SEC
POST FEV1 FVC: 80.47 %
POST FEV1: 2.37 L
POST FVC: 2.94 L
POST PEF: 5.8 L/S
PRE DLCO: 18.27 ML/(MIN*MMHG)
PRE FEF 25 75: 2.02 L/S
PRE FET 100: 7.73 SEC
PRE FEV1 FVC: 77.66 %
PRE FEV1: 2.27 L
PRE FRC N2: 1.65 L
PRE FVC: 2.92 L
PRE MVV: 75 L/MIN
PRE PEF: 5.84 L/S
RVN2 LLN: 1.56
RVN2 PRE REF: 71.4 %
RVN2 PRE: 1.52 L
RVN2 REF: 2.13
RVN2TLCN2 LLN: 34.53
RVN2TLCN2 PRE REF: 75.1 %
RVN2TLCN2 PRE: 33.14 %
RVN2TLCN2 REF: 44.12
TLCN2 LLN: 3.98
TLCN2 PRE REF: 92.6 %
TLCN2 PRE: 4.6 L
TLCN2 REF: 4.97
VA PRE: 4.67 L
VA SINGLE BREATH LLN: 4.82
VA SINGLE BREATH PRE REF: 96.9 %
VA SINGLE BREATH REF: 4.82
VCMAXN2 LLN: 1.94
VCMAXN2 PRE REF: 113.3 %
VCMAXN2 PRE: 3.07 L
VCMAXN2 REF: 2.71

## 2022-07-07 ENCOUNTER — NURSE TRIAGE (OUTPATIENT)
Dept: ADMINISTRATIVE | Facility: CLINIC | Age: 77
End: 2022-07-07
Payer: MEDICARE

## 2022-07-07 NOTE — TELEPHONE ENCOUNTER
Pt wants to know if she should get 4 th booster.she just wants to take precautions. She is not having any covid symptoms. Instructed pt to call if she develops any symptoms

## 2022-07-07 NOTE — TELEPHONE ENCOUNTER
[place]    PCP:  Dr. Tommy Palumbo    She reports that she had a Covid-19 IgG Antibody Quant drawn on 6/14/22.  She is wanting to know if that means that she has Covid or if she just has antibodies to Covid from the vaccine.  She reports that her Son in law was recently diagnosed with Covid.  She is trying to see if she needs to get her 4th booster vaccine.  Per protocol, care advised is discuss with PCP and callback by nurse today.  Instructed pt that NOC cannot interpret lab test results.  Instructed that NOC would send a message to PCP and Rheumatology to reach back out to her to discuss the results.  Pt states that she can also send a message via the pt portal.  Instructed to call for questions/concerns.  VU.    Reason for Disposition   Nursing judgment    Additional Information   Negative: Nursing judgment   Negative: Nursing judgment   Negative: Nursing judgment    Protocols used: INFORMATION ONLY CALL - NO TRIAGE-A-[place]

## 2022-08-01 ENCOUNTER — PES CALL (OUTPATIENT)
Dept: ADMINISTRATIVE | Facility: CLINIC | Age: 77
End: 2022-08-01
Payer: MEDICARE

## 2022-08-11 ENCOUNTER — HOSPITAL ENCOUNTER (OUTPATIENT)
Dept: RADIOLOGY | Facility: HOSPITAL | Age: 77
Discharge: HOME | End: 2022-08-11
Attending: NURSE PRACTITIONER
Payer: MEDICARE

## 2022-08-11 DIAGNOSIS — R92.8 ABNORMAL MAMMOGRAM OF RIGHT BREAST: ICD-10-CM

## 2022-08-11 PROCEDURE — 76642 ULTRASOUND BREAST LIMITED: CPT | Mod: TC,PO,RT

## 2022-08-11 PROCEDURE — 77061 BREAST TOMOSYNTHESIS UNI: CPT | Mod: 26,RT,, | Performed by: RADIOLOGY

## 2022-08-11 PROCEDURE — 77065 DX MAMMO INCL CAD UNI: CPT | Mod: 26,RT,, | Performed by: RADIOLOGY

## 2022-08-11 PROCEDURE — 76642 ULTRASOUND BREAST LIMITED: CPT | Mod: 26,RT,, | Performed by: RADIOLOGY

## 2022-08-11 PROCEDURE — 76642 US BREAST RIGHT LIMITED: ICD-10-PCS | Mod: 26,RT,, | Performed by: RADIOLOGY

## 2022-08-11 PROCEDURE — 77065 DX MAMMO INCL CAD UNI: CPT | Mod: TC,PO,RT

## 2022-08-11 PROCEDURE — 77061 MAMMO DIGITAL DIAGNOSTIC RIGHT WITH TOMO: ICD-10-PCS | Mod: 26,RT,, | Performed by: RADIOLOGY

## 2022-08-11 PROCEDURE — 77065 MAMMO DIGITAL DIAGNOSTIC RIGHT WITH TOMO: ICD-10-PCS | Mod: 26,RT,, | Performed by: RADIOLOGY

## 2022-09-22 ENCOUNTER — OFFICE VISIT (OUTPATIENT)
Dept: PULMONOLOGY | Facility: CLINIC | Age: 77
End: 2022-09-22
Payer: MEDICARE

## 2022-09-22 VITALS
DIASTOLIC BLOOD PRESSURE: 76 MMHG | HEIGHT: 64 IN | SYSTOLIC BLOOD PRESSURE: 135 MMHG | OXYGEN SATURATION: 100 % | BODY MASS INDEX: 28.98 KG/M2 | WEIGHT: 169.75 LBS | HEART RATE: 69 BPM

## 2022-09-22 DIAGNOSIS — R05.9 COUGH: ICD-10-CM

## 2022-09-22 DIAGNOSIS — J45.40 MODERATE PERSISTENT ASTHMA WITHOUT COMPLICATION: ICD-10-CM

## 2022-09-22 DIAGNOSIS — J18.9 PNEUMONIA OF RIGHT LOWER LOBE DUE TO INFECTIOUS ORGANISM: ICD-10-CM

## 2022-09-22 DIAGNOSIS — J30.9 ALLERGIC RHINITIS, UNSPECIFIED SEASONALITY, UNSPECIFIED TRIGGER: Primary | ICD-10-CM

## 2022-09-22 PROCEDURE — 1159F MED LIST DOCD IN RCRD: CPT | Mod: CPTII,S$GLB,, | Performed by: INTERNAL MEDICINE

## 2022-09-22 PROCEDURE — 3078F PR MOST RECENT DIASTOLIC BLOOD PRESSURE < 80 MM HG: ICD-10-PCS | Mod: CPTII,S$GLB,, | Performed by: INTERNAL MEDICINE

## 2022-09-22 PROCEDURE — 99999 PR PBB SHADOW E&M-EST. PATIENT-LVL III: ICD-10-PCS | Mod: PBBFAC,,, | Performed by: INTERNAL MEDICINE

## 2022-09-22 PROCEDURE — 3075F SYST BP GE 130 - 139MM HG: CPT | Mod: CPTII,S$GLB,, | Performed by: INTERNAL MEDICINE

## 2022-09-22 PROCEDURE — 3078F DIAST BP <80 MM HG: CPT | Mod: CPTII,S$GLB,, | Performed by: INTERNAL MEDICINE

## 2022-09-22 PROCEDURE — 99499 UNLISTED E&M SERVICE: CPT | Mod: S$GLB,,, | Performed by: INTERNAL MEDICINE

## 2022-09-22 PROCEDURE — 99214 PR OFFICE/OUTPT VISIT, EST, LEVL IV, 30-39 MIN: ICD-10-PCS | Mod: S$GLB,,, | Performed by: INTERNAL MEDICINE

## 2022-09-22 PROCEDURE — 3075F PR MOST RECENT SYSTOLIC BLOOD PRESS GE 130-139MM HG: ICD-10-PCS | Mod: CPTII,S$GLB,, | Performed by: INTERNAL MEDICINE

## 2022-09-22 PROCEDURE — 4010F PR ACE/ARB THEARPY RXD/TAKEN: ICD-10-PCS | Mod: CPTII,S$GLB,, | Performed by: INTERNAL MEDICINE

## 2022-09-22 PROCEDURE — 99499 RISK ADDL DX/OHS AUDIT: ICD-10-PCS | Mod: S$GLB,,, | Performed by: INTERNAL MEDICINE

## 2022-09-22 PROCEDURE — 99214 OFFICE O/P EST MOD 30 MIN: CPT | Mod: S$GLB,,, | Performed by: INTERNAL MEDICINE

## 2022-09-22 PROCEDURE — 4010F ACE/ARB THERAPY RXD/TAKEN: CPT | Mod: CPTII,S$GLB,, | Performed by: INTERNAL MEDICINE

## 2022-09-22 PROCEDURE — 99999 PR PBB SHADOW E&M-EST. PATIENT-LVL III: CPT | Mod: PBBFAC,,, | Performed by: INTERNAL MEDICINE

## 2022-09-22 PROCEDURE — 1159F PR MEDICATION LIST DOCUMENTED IN MEDICAL RECORD: ICD-10-PCS | Mod: CPTII,S$GLB,, | Performed by: INTERNAL MEDICINE

## 2022-09-22 RX ORDER — FLUTICASONE FUROATE AND VILANTEROL 200; 25 UG/1; UG/1
1 POWDER RESPIRATORY (INHALATION) DAILY
Qty: 60 EACH | Refills: 11 | Status: SHIPPED | OUTPATIENT
Start: 2022-09-22 | End: 2023-03-22

## 2022-09-22 RX ORDER — ALBUTEROL SULFATE 90 UG/1
2 AEROSOL, METERED RESPIRATORY (INHALATION) EVERY 6 HOURS PRN
Qty: 18 G | Refills: 11 | Status: SHIPPED | OUTPATIENT
Start: 2022-09-22 | End: 2023-03-22 | Stop reason: SDUPTHER

## 2022-09-22 RX ORDER — MONTELUKAST SODIUM 10 MG/1
TABLET ORAL
COMMUNITY
Start: 2022-07-25 | End: 2022-11-02

## 2022-09-22 NOTE — PATIENT INSTRUCTIONS
Use flonase 2 sprays in each nostril daily  Consider adding zyrtec of claritin in addition to singulair to dry up nose mucous  Stop flovent and start breo inhaler one puff daily  Use albuterol inhaler as needed

## 2022-09-22 NOTE — PROGRESS NOTES
"9/22/2022    Yuliana Mattson  Follow up    Chief Complaint   Patient presents with    moderate reactive airway disease         HPI:   09/22/2022- pt had worsened cough, switched to flovent 220 then was better. 8d now she has noticed worse cough, sticky clear mucous filling up in sinuses she usually gets w/ season change. She has been taking tylenol PM which helps dry up mucous.  Continues on singulair, not sure if helping but she has taken a long time.  She has intermittent coughing spell 10 min long- will get better if expectorates clear mucous.  Inhalers: uses flovent 220 bid, albuterol rarely    6/23/22-   Cough suspected due to asthma  Continue flovent inhaler - if cough worsens could increase to high dose  Continue albuterol as needed  Continue rheumatology follow up and treatment  Lungs look good on chest CT  Get lung function testing  Pt is a 75 yo female with HTN, HLD, bladder cancer age 34, breast ca x2 age 50 and 53- did receive radiation 1995, CAD, allergic rhinitis presenting for new evaluation.  Pt has had dry cough since Aug 2021,   c/o coughing spells 15-20 min occasionally. Coughs up mucous then feels better. She used to wake 2-3x/night with cough which is now improved.  Cough seemed to worsen 1/2022- became productive and had fever, sob, weakness, tested neg for COVID with 2 home tests and a PCR but thinks she had covid- sick and bedridden 7d. She was tx with antibiotics 4/13/22- keflex for "possible beginnings of pneumonia" RLL.  Has had cellulitis left leg 5/2022, UTI. Gets recurrent UTIs    Has had some workup per PCP for sinuses, tx for reflux- 40mg omeprazole bid. She did have EGD 4/2022 which was normal but reports coughed the whole time.  She has stopped ACE inhibitor 4/2022 but didn't immediately improve  Cough is now improved on flovent inhaler. Also uses albuterol qhs and prn- uses maybe once/d  Sometimes short of breath w/ coughing spells, with heat seems worse  Symptoms assoc with " wheezing  Triggers: cleaning products, wears N95 when cleaning  Since January she feels winded w/ exertion, limits to going in one store when going out due to MEJÍA, fatigue. SOB got much worse now getting better.  Gets allergies- improved on singulair, has restarted since 5/2.  Per Dr Hughes's note she had uveitis, iritis, hearing loss resolved w/ high dose steroids. 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    The chief complaint problem is varies with instability at times    PFSH:  Past Medical History:   Diagnosis Date    Allergic rhinitis     Anticoagulant long-term use     ASPIRIN ONLY - (stops it when she presents a hemorrhagic cystitis)    Bladder cancer     Blood transfusion     Breast cancer     CAD (coronary artery disease), native coronary artery     40% non obstructive    Cholelithiasis     Endometriosis     Headache(784.0)     HEARING LOSS     Hemorrhoids     HLD (hyperlipidemia)     Hypertension     Iritis     Left wrist fracture 2009    traumatic    Malignant neoplasm of other specified sites of bladder 12/3/2009    Osteoporosis, postmenopausal     PONV (postoperative nausea and vomiting)     Rash     Rosacea     UTI (urinary tract infection)     Vaginal delivery     x 2         Past Surgical History:   Procedure Laterality Date    APPENDECTOMY      BLADDER TUMOR EXCISION  1979    [facility], dr garcia - no recurrences since    BREAST BIOPSY Right     4 core bx negative    BREAST SURGERY Left 1998    ESOPHAGOGASTRODUODENOSCOPY N/A 4/21/2022    Procedure: ESOPHAGOGASTRODUODENOSCOPY (EGD);  Surgeon: Guru Maddox MD;  [facility];  Service: Endoscopy;  Laterality: N/A;    HYSTERECTOMY      MASTECTOMY, PARTIAL  1995    left side - cancer  "- had radiotherapy 1995, 1998 had chemotherapy    oophrect      pelvic mass      benign    TONSILLECTOMY      TONSILLECTOMY, ADENOIDECTOMY, BILATERAL MYRINGOTOMY AND TUBES      tram flap Left 1998     Social History     Tobacco Use    Smoking status: Never    Smokeless tobacco: Never   Substance Use Topics    Alcohol use: No    Drug use: No     Family History   Problem Relation Age of Onset    Glaucoma Father     Glaucoma Paternal Aunt     Glaucoma Paternal Grandmother     Cancer Cousin         1st, ovarian    Amblyopia Neg Hx     Blindness Neg Hx     Cataracts Neg Hx     Hypertension Neg Hx     Macular degeneration Neg Hx     Retinal detachment Neg Hx     Strabismus Neg Hx     Stroke Neg Hx     Thyroid disease Neg Hx     Melanoma Neg Hx      Review of patient's allergies indicates:   Allergen Reactions    Prochlorperazine edisylate Anaphylaxis     compazine       Performance Status:The patient's activity level is functions out of house.      Review of Systems:  a review of eleven systems covering constitutional, Eye, HEENT, Psych, Respiratory, Cardiac, GI, , Musculoskeletal, Endocrine, Dermatologic was negative except for pertinent findings as listed ABOVE and below:  All negative with pertinent positives as above       Exam:Comprehensive exam done. /76 (BP Location: Right arm, Patient Position: Sitting, BP Method: Medium (Automatic))   Pulse 69   Ht 5' 4" (1.626 m)   Wt 77 kg (169 lb 12.1 oz)   SpO2 100%   BMI 29.14 kg/m²   Exam included Vitals as listed, and patient's appearance and affect and alertness and mood, oral exam for yeast and hygiene and pharynx lesions and Mallapatti (M) score, neck with inspection for jvd and masses and thyroid abnormalities and lymph nodes (supraclavicular and infraclavicular nodes and axillary also examined and noted if abn), chest exam included symmetry and effort and fremitus and percussion and auscultation, cardiac exam included rhythm and gallops and murmur and " rubs and jvd and edema, abdominal exam for mass and hepatosplenomegaly and tenderness and hernias and bowel sounds, Musculoskeletal exam with muscle tone and posture and mobility/gait and  strength, and skin for rashes and cyanosis and pallor and turgor, extremity for clubbing.  Findings were normal except for pertinent findings listed below:  M2, oropharynx clear  HR regular  Breath sounds clear bilaterally  No edema/clubbing/rashes    Radiographs (ct chest and cxr) reviewed: view by direct vision   CT chest 5/6/22- normal lung fields. Aortic calcification. I do not appreciate any lung nodules. On chest wall left side there are some calcified nodules.  Per radiologist read: A few old calcified pulmonary granulomas are present.    CXR 4/18/22- clear  CXR 4/13/22- possible mild RML infiltrate    Labs reviewed     Latest Reference Range & Units 04/28/22 11:55   LISS Screen Negative <1:80  Positive !   LISS Titer 1  1:320   LISS PATTERN 1  Speckled   ds DNA Ab Negative 1:10  Negative 1:10   Anti-SSA Antibody 0.00 - 0.99 Ratio 0.05   Anti-SSA Interpretation Negative  Negative   Anti-SSB Antibody 0.00 - 0.99 Ratio 0.06   Anti-SSB Interpretation Negative  Negative   Anti Sm Antibody 0.00 - 0.99 Ratio 0.06   Anti-Sm Interpretation Negative  Negative   Anti Sm/RNP Antibody 0.00 - 0.99 Ratio 0.08   Anti-Sm/RNP Interpretation Negative  Negative      Latest Reference Range & Units 12/23/20 12:09   CCP Antibodies <5.0 U/mL <0.5   Rheumatoid Factor 0.0 - 15.0 IU/mL <10.0      Latest Reference Range & Units 12/23/20 12:09   IgG 4 4 - 86 mg/dL 3 (L)        Latest Reference Range & Units 02/11/22 11:39 04/28/22 11:55   Eos # 0.0 - 0.5 K/uL 0.2 0.3        PFT  reviewed  6/2022- normal        Plan:  Clinical impression is resonably certain and repeated evaluation prn +/- follow up will be needed as below. Chronic cough/reactive airways disease- improves w/ inhalers, seasonal worsening of symptoms.     Yuliana was seen today for  moderate reactive airway disease.    Diagnoses and all orders for this visit:    Allergic rhinitis, unspecified seasonality, unspecified trigger    Moderate persistent asthma without complication  -     fluticasone furoate-vilanteroL (BREO ELLIPTA) 200-25 mcg/dose DsDv diskus inhaler; Inhale 1 puff into the lungs once daily. Controller    Cough  -     albuterol (PROVENTIL/VENTOLIN HFA) 90 mcg/actuation inhaler; Inhale 2 puffs into the lungs every 6 (six) hours as needed for Wheezing or Shortness of Breath. Rescue    Pneumonia of right lower lobe due to infectious organism  -     albuterol (PROVENTIL/VENTOLIN HFA) 90 mcg/actuation inhaler; Inhale 2 puffs into the lungs every 6 (six) hours as needed for Wheezing or Shortness of Breath. Rescue        Follow up in about 6 months (around 3/22/2023).    Discussed with patient above for education the following:      Patient Instructions   Use flonase 2 sprays in each nostril daily  Consider adding zyrtec of claritin in addition to singulair to dry up nose mucous  Stop flovent and start breo inhaler one puff daily  Use albuterol inhaler as needed             Cimzia Pregnancy And Lactation Text: This medication crosses the placenta but can be considered safe in certain situations. Cimzia may be excreted in breast milk.

## 2022-10-16 ENCOUNTER — PATIENT MESSAGE (OUTPATIENT)
Dept: RHEUMATOLOGY | Facility: CLINIC | Age: 77
End: 2022-10-16
Payer: MEDICARE

## 2022-10-16 DIAGNOSIS — L40.50 PSA (PSORIATIC ARTHRITIS): Primary | ICD-10-CM

## 2022-10-25 ENCOUNTER — LAB VISIT (OUTPATIENT)
Dept: LAB | Facility: HOSPITAL | Age: 77
End: 2022-10-25
Attending: INTERNAL MEDICINE
Payer: MEDICARE

## 2022-10-25 DIAGNOSIS — L40.50 PSA (PSORIATIC ARTHRITIS): ICD-10-CM

## 2022-10-25 DIAGNOSIS — J45.40 MODERATE PERSISTENT REACTIVE AIRWAY DISEASE WITHOUT COMPLICATION: ICD-10-CM

## 2022-10-25 LAB
ALBUMIN SERPL BCP-MCNC: 3.6 G/DL (ref 3.5–5.2)
ALP SERPL-CCNC: 149 U/L (ref 55–135)
ALT SERPL W/O P-5'-P-CCNC: 19 U/L (ref 10–44)
ANION GAP SERPL CALC-SCNC: 9 MMOL/L (ref 8–16)
AST SERPL-CCNC: 17 U/L (ref 10–40)
BASOPHILS # BLD AUTO: 0.08 K/UL (ref 0–0.2)
BASOPHILS NFR BLD: 1 % (ref 0–1.9)
BILIRUB SERPL-MCNC: 0.4 MG/DL (ref 0.1–1)
BUN SERPL-MCNC: 12 MG/DL (ref 8–23)
CALCIUM SERPL-MCNC: 9.6 MG/DL (ref 8.7–10.5)
CHLORIDE SERPL-SCNC: 103 MMOL/L (ref 95–110)
CO2 SERPL-SCNC: 22 MMOL/L (ref 23–29)
CREAT SERPL-MCNC: 0.8 MG/DL (ref 0.5–1.4)
CRP SERPL-MCNC: 18.9 MG/L (ref 0–8.2)
DIFFERENTIAL METHOD: ABNORMAL
EOSINOPHIL # BLD AUTO: 0.2 K/UL (ref 0–0.5)
EOSINOPHIL NFR BLD: 1.9 % (ref 0–8)
ERYTHROCYTE [DISTWIDTH] IN BLOOD BY AUTOMATED COUNT: 13.7 % (ref 11.5–14.5)
ERYTHROCYTE [SEDIMENTATION RATE] IN BLOOD BY PHOTOMETRIC METHOD: 60 MM/HR (ref 0–36)
EST. GFR  (NO RACE VARIABLE): >60 ML/MIN/1.73 M^2
GLUCOSE SERPL-MCNC: 129 MG/DL (ref 70–110)
HCT VFR BLD AUTO: 37.7 % (ref 37–48.5)
HGB BLD-MCNC: 12.8 G/DL (ref 12–16)
IMM GRANULOCYTES # BLD AUTO: 0.05 K/UL (ref 0–0.04)
IMM GRANULOCYTES NFR BLD AUTO: 0.6 % (ref 0–0.5)
LYMPHOCYTES # BLD AUTO: 1.2 K/UL (ref 1–4.8)
LYMPHOCYTES NFR BLD: 13.7 % (ref 18–48)
MCH RBC QN AUTO: 28.8 PG (ref 27–31)
MCHC RBC AUTO-ENTMCNC: 34 G/DL (ref 32–36)
MCV RBC AUTO: 85 FL (ref 82–98)
MONOCYTES # BLD AUTO: 0.6 K/UL (ref 0.3–1)
MONOCYTES NFR BLD: 7.5 % (ref 4–15)
NEUTROPHILS # BLD AUTO: 6.4 K/UL (ref 1.8–7.7)
NEUTROPHILS NFR BLD: 75.3 % (ref 38–73)
NRBC BLD-RTO: 0 /100 WBC
PLATELET # BLD AUTO: 341 K/UL (ref 150–450)
PMV BLD AUTO: 10 FL (ref 9.2–12.9)
POTASSIUM SERPL-SCNC: 3.6 MMOL/L (ref 3.5–5.1)
PROT SERPL-MCNC: 7 G/DL (ref 6–8.4)
RBC # BLD AUTO: 4.44 M/UL (ref 4–5.4)
SODIUM SERPL-SCNC: 134 MMOL/L (ref 136–145)
WBC # BLD AUTO: 8.42 K/UL (ref 3.9–12.7)

## 2022-10-25 PROCEDURE — 85025 COMPLETE CBC W/AUTO DIFF WBC: CPT | Performed by: PHYSICIAN ASSISTANT

## 2022-10-25 PROCEDURE — 85652 RBC SED RATE AUTOMATED: CPT | Performed by: PHYSICIAN ASSISTANT

## 2022-10-25 PROCEDURE — 82164 ANGIOTENSIN I ENZYME TEST: CPT | Performed by: INTERNAL MEDICINE

## 2022-10-25 PROCEDURE — 86140 C-REACTIVE PROTEIN: CPT | Performed by: PHYSICIAN ASSISTANT

## 2022-10-25 PROCEDURE — 80053 COMPREHEN METABOLIC PANEL: CPT | Performed by: PHYSICIAN ASSISTANT

## 2022-10-25 PROCEDURE — 36415 COLL VENOUS BLD VENIPUNCTURE: CPT | Mod: PO | Performed by: INTERNAL MEDICINE

## 2022-10-26 ENCOUNTER — OFFICE VISIT (OUTPATIENT)
Dept: FAMILY MEDICINE | Facility: CLINIC | Age: 77
End: 2022-10-26
Payer: MEDICARE

## 2022-10-26 VITALS
DIASTOLIC BLOOD PRESSURE: 82 MMHG | RESPIRATION RATE: 12 BRPM | OXYGEN SATURATION: 100 % | HEART RATE: 76 BPM | SYSTOLIC BLOOD PRESSURE: 132 MMHG | TEMPERATURE: 98 F

## 2022-10-26 DIAGNOSIS — N39.0 URINARY TRACT INFECTION WITHOUT HEMATURIA, SITE UNSPECIFIED: Primary | ICD-10-CM

## 2022-10-26 LAB
BILIRUB UR QL STRIP: NEGATIVE
GLUCOSE UR QL STRIP: NEGATIVE
KETONES UR QL STRIP: NEGATIVE
LEUKOCYTE ESTERASE UR QL STRIP: NEGATIVE
PH, POC UA: 6
POC BLOOD, URINE: NEGATIVE
POC NITRATES, URINE: NEGATIVE
PROT UR QL STRIP: NEGATIVE
SP GR UR STRIP: 1.02 (ref 1–1.03)
UROBILINOGEN UR STRIP-ACNC: 0.2 (ref 0.1–1.1)

## 2022-10-26 PROCEDURE — 99214 PR OFFICE/OUTPT VISIT, EST, LEVL IV, 30-39 MIN: ICD-10-PCS | Mod: S$GLB,,, | Performed by: NURSE PRACTITIONER

## 2022-10-26 PROCEDURE — 99999 PR PBB SHADOW E&M-EST. PATIENT-LVL IV: ICD-10-PCS | Mod: PBBFAC,,, | Performed by: NURSE PRACTITIONER

## 2022-10-26 PROCEDURE — 4010F PR ACE/ARB THEARPY RXD/TAKEN: ICD-10-PCS | Mod: CPTII,S$GLB,, | Performed by: NURSE PRACTITIONER

## 2022-10-26 PROCEDURE — 4010F ACE/ARB THERAPY RXD/TAKEN: CPT | Mod: CPTII,S$GLB,, | Performed by: NURSE PRACTITIONER

## 2022-10-26 PROCEDURE — 81003 URINALYSIS AUTO W/O SCOPE: CPT | Mod: QW,S$GLB,, | Performed by: NURSE PRACTITIONER

## 2022-10-26 PROCEDURE — 1160F PR REVIEW ALL MEDS BY PRESCRIBER/CLIN PHARMACIST DOCUMENTED: ICD-10-PCS | Mod: CPTII,S$GLB,, | Performed by: NURSE PRACTITIONER

## 2022-10-26 PROCEDURE — 3075F PR MOST RECENT SYSTOLIC BLOOD PRESS GE 130-139MM HG: ICD-10-PCS | Mod: CPTII,S$GLB,, | Performed by: NURSE PRACTITIONER

## 2022-10-26 PROCEDURE — 99214 OFFICE O/P EST MOD 30 MIN: CPT | Mod: S$GLB,,, | Performed by: NURSE PRACTITIONER

## 2022-10-26 PROCEDURE — 87077 CULTURE AEROBIC IDENTIFY: CPT | Performed by: NURSE PRACTITIONER

## 2022-10-26 PROCEDURE — 87086 URINE CULTURE/COLONY COUNT: CPT | Performed by: NURSE PRACTITIONER

## 2022-10-26 PROCEDURE — 3079F PR MOST RECENT DIASTOLIC BLOOD PRESSURE 80-89 MM HG: ICD-10-PCS | Mod: CPTII,S$GLB,, | Performed by: NURSE PRACTITIONER

## 2022-10-26 PROCEDURE — 3079F DIAST BP 80-89 MM HG: CPT | Mod: CPTII,S$GLB,, | Performed by: NURSE PRACTITIONER

## 2022-10-26 PROCEDURE — 1159F PR MEDICATION LIST DOCUMENTED IN MEDICAL RECORD: ICD-10-PCS | Mod: CPTII,S$GLB,, | Performed by: NURSE PRACTITIONER

## 2022-10-26 PROCEDURE — 99999 PR PBB SHADOW E&M-EST. PATIENT-LVL IV: CPT | Mod: PBBFAC,,, | Performed by: NURSE PRACTITIONER

## 2022-10-26 PROCEDURE — 1160F RVW MEDS BY RX/DR IN RCRD: CPT | Mod: CPTII,S$GLB,, | Performed by: NURSE PRACTITIONER

## 2022-10-26 PROCEDURE — 3075F SYST BP GE 130 - 139MM HG: CPT | Mod: CPTII,S$GLB,, | Performed by: NURSE PRACTITIONER

## 2022-10-26 PROCEDURE — 87186 SC STD MICRODIL/AGAR DIL: CPT | Performed by: NURSE PRACTITIONER

## 2022-10-26 PROCEDURE — 81003 POCT URINALYSIS, DIPSTICK, AUTOMATED, W/O SCOPE: ICD-10-PCS | Mod: QW,S$GLB,, | Performed by: NURSE PRACTITIONER

## 2022-10-26 PROCEDURE — 87088 URINE BACTERIA CULTURE: CPT | Performed by: NURSE PRACTITIONER

## 2022-10-26 PROCEDURE — 1159F MED LIST DOCD IN RCRD: CPT | Mod: CPTII,S$GLB,, | Performed by: NURSE PRACTITIONER

## 2022-10-26 RX ORDER — NITROFURANTOIN 25; 75 MG/1; MG/1
100 CAPSULE ORAL 2 TIMES DAILY
Qty: 14 CAPSULE | Refills: 0 | Status: SHIPPED | OUTPATIENT
Start: 2022-10-26 | End: 2022-11-02

## 2022-10-26 NOTE — PROGRESS NOTES
Subjective:       Patient ID: Yuliana Mattson is a 77 y.o. female.    Chief Complaint: No chief complaint on file.    Urgency, frequency, dysuria intermittent x 2 weeks, has taken AZO.    Past Medical History:  No date: Allergic rhinitis  No date: Anticoagulant long-term use      Comment:  ASPIRIN ONLY - (stops it when she presents a hemorrhagic               cystitis)  No date: Bladder cancer  No date: Blood transfusion  No date: Breast cancer  No date: CAD (coronary artery disease), native coronary artery      Comment:  40% non obstructive  No date: Cholelithiasis  No date: Endometriosis  No date: Headache(784.0)  No date: HEARING LOSS  No date: Hemorrhoids  No date: HLD (hyperlipidemia)  No date: Hypertension  No date: Iritis  2009: Left wrist fracture      Comment:  traumatic  12/3/2009: Malignant neoplasm of other specified sites of bladder  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, postmenopausal  No date: PONV (postoperative nausea and vomiting)  No date: Rash  No date: Rosacea  No date: UTI (urinary tract infection)  No date: Vaginal delivery      Comment:  x 2    Past Surgical History:  No date: APPENDECTOMY  1979: BLADDER TUMOR EXCISION      Comment:  [facility], dr garcia - no recurrences since  No date: BREAST BIOPSY; Right      Comment:  4 core bx negative  1998: BREAST SURGERY; Left  4/21/2022: ESOPHAGOGASTRODUODENOSCOPY; N/A      Comment:  Procedure: ESOPHAGOGASTRODUODENOSCOPY (EGD);  Surgeon:                Guru Maddox MD;  Location: [facility];  Service:                Endoscopy;  Laterality: N/A;  No date: HYSTERECTOMY  1995: MASTECTOMY, PARTIAL      Comment:  left side - cancer - had radiotherapy 1995, 1998 had                chemotherapy  No date: oophrect  No date: pelvic mass      Comment:  benign  No date: TONSILLECTOMY  No date: TONSILLECTOMY, ADENOIDECTOMY, BILATERAL MYRINGOTOMY AND TUBES  1998: tram flap; Left    Review of patient's family history indicates:      Social History     Socioeconomic History      Marital status:     Occupational History      Occupation: retired accounting    Tobacco Use      Smoking status: Never      Smokeless tobacco: Never    Substance and Sexual Activity      Alcohol use: No      Drug use: No    Social Determinants of Health  Financial Resource Strain: Low Risk       Difficulty of Paying Living Expenses: Not hard at all  Food Insecurity: No Food Insecurity      Worried About Running Out of Food in the Last Year: Never true      Ran Out of Food in the Last Year: Never true  Transportation Needs: No Transportation Needs      Lack of Transportation (Medical): No      Lack of Transportation (Non-Medical): No  Physical Activity: Unknown      Days of Exercise per Week: 2 days  Stress: No Stress Concern Present      Feeling of Stress : Only a little  Social Connections: Unknown      Frequency of Communication with Friends and Family: More than three times a week      Frequency of Social Gatherings with Friends and Family: Three times a week      Active Member of Clubs or Organizations: Yes      Attends Club or Organization Meetings: More than 4 times per year      Marital Status:   Housing Stability: Low Risk       Unable to Pay for Housing in the Last Year: No      Number of Places Lived in the Last Year: 1      Unstable Housing in the Last Year: No    Current Outpatient Medications:  albuterol (PROVENTIL/VENTOLIN HFA) 90 mcg/actuation inhaler, Inhale 2 puffs into the lungs every 6 (six) hours as needed for Wheezing or Shortness of Breath. Rescue, Disp: 18 g, Rfl: 11  ALPRAZolam (XANAX) 0.25 MG tablet, Take 1 tablet (0.25 mg total) by mouth 3 (three) times daily as needed for Anxiety. (Patient not taking: No sig reported), Disp: 45 tablet, Rfl: 0  fluocinolone acetonide oiL 0.01 % Drop, Place 1 drop in ear(s) daily as needed (itching)., Disp: 20 mL, Rfl: 1  fluticasone furoate-vilanteroL (BREO ELLIPTA) 200-25 mcg/dose DsDv diskus inhaler, Inhale 1 puff  into the lungs once daily. Controller, Disp: 60 each, Rfl: 11  losartan-hydrochlorothiazide 50-12.5 mg (HYZAAR) 50-12.5 mg per tablet, Take 1 tablet by mouth once daily., Disp: 90 tablet, Rfl: 3  montelukast (SINGULAIR) 10 mg tablet, , Disp: , Rfl:   pantoprazole (PROTONIX) 40 MG tablet, Take 1 tablet (40 mg total) by mouth once daily., Disp: 30 tablet, Rfl: 11  tretinoin (RETIN-A) 0.025 % cream, Apply a pea-sized amount to entire face at bedtime, start every other night and increase as tolerated. Take night off if irritation develops., Disp: 45 g, Rfl: 3  vitamin D (VITAMIN D3) 1000 units Tab, Take 1,000 Units by mouth once daily., Disp: , Rfl:     No current facility-administered medications for this visit.      Review of patient's allergies indicates:   -- Prochlorperazine edisylate -- Anaphylaxis    --  compazine     Dysuria   This is a new problem. The current episode started in the past 7 days. The problem occurs every urination. The problem has been waxing and waning. The quality of the pain is described as burning. The pain is at a severity of 7/10. The pain is moderate. There has been no fever. She is Not sexually active. There is No history of pyelonephritis. Associated symptoms include frequency and urgency. Pertinent negatives include no behavior changes, chills, discharge, flank pain, hematuria, hesitancy, nausea, possible pregnancy, sweats, vomiting, weight loss, constipation, rash or withholding. She has tried home medications, increased fluids and sitz baths for the symptoms. The treatment provided mild relief. Her past medical history is significant for catheterization, hypertension, recurrent UTIs and a urological procedure. There is no history of diabetes insipidus, diabetes mellitus, genitourinary reflux, kidney stones, a single kidney, STD or urinary stasis.   Review of Systems   Constitutional:  Negative for chills and weight loss.   Gastrointestinal:  Negative for constipation, nausea and  vomiting.   Genitourinary:  Positive for dysuria, frequency and urgency. Negative for flank pain, hematuria and hesitancy.   Integumentary:  Negative for rash.       Objective:      Physical Exam  Constitutional:       Appearance: Normal appearance.   HENT:      Head: Normocephalic and atraumatic.   Cardiovascular:      Rate and Rhythm: Normal rate.   Pulmonary:      Effort: Pulmonary effort is normal. No respiratory distress.   Abdominal:      Tenderness: There is no right CVA tenderness or left CVA tenderness.   Neurological:      Mental Status: She is alert and oriented to person, place, and time.   Psychiatric:         Mood and Affect: Mood normal.         Behavior: Behavior normal.       Assessment:       Problem List Items Addressed This Visit    None  Visit Diagnoses       Urinary tract infection without hematuria, site unspecified    -  Primary    Relevant Medications    nitrofurantoin, macrocrystal-monohydrate, (MACROBID) 100 MG capsule    Other Relevant Orders    POCT Urinalysis, Dipstick, Automated, W/O Scope (Completed)    Urine culture    Ambulatory referral/consult to Urology              Plan:       1. Urinary tract infection without hematuria, site unspecified  Urine culture pending, treat with macrobid for now, urology follow up as scheduled, follow up immediately for new or worsening symptoms.   - POCT Urinalysis, Dipstick, Automated, W/O Scope  - Urine culture; Future  - nitrofurantoin, macrocrystal-monohydrate, (MACROBID) 100 MG capsule; Take 1 capsule (100 mg total) by mouth 2 (two) times daily. for 7 days  Dispense: 14 capsule; Refill: 0  - Ambulatory referral/consult to Urology; Future  - Urine culture

## 2022-10-26 NOTE — PROGRESS NOTES
Answers submitted by the patient for this visit:  Painful Urination Questionnaire (Submitted on 10/26/2022)  Chief Complaint: Dysuria  Chronicity: new  Onset: in the past 7 days  Frequency: every urination  Progression since onset: waxing and waning  Pain quality: burning  Pain - numeric: 7/10  Fever: no fever  Sexually active?: No  History of pyelonephritis?: No  chills: No  discharge: No  flank pain: No  frequency: Yes  hematuria: No  hesitancy: No  nausea: No  possible pregnancy: No  sweats: No  urgency: Yes  vomiting: No  constipation: No  rash: No  weight loss: No  withholding: No  behavior changes: No  Treatments tried: home medications, increased fluids, sitz baths  Improvement on treatment: mild  Pain severity: moderate  catheterization: Yes  diabetes insipidus: No  diabetes mellitus: No  genitourinary reflux: No  hypertension: Yes  recurrent UTIs: Yes  single kidney: No  STD: No  urinary stasis: No  urological procedure: Yes  kidney stones: No

## 2022-10-27 LAB — ACE SERPL-CCNC: 32 U/L (ref 16–85)

## 2022-10-30 LAB
BACTERIA UR CULT: ABNORMAL
BACTERIA UR CULT: ABNORMAL

## 2022-10-31 ENCOUNTER — PATIENT MESSAGE (OUTPATIENT)
Dept: FAMILY MEDICINE | Facility: CLINIC | Age: 77
End: 2022-10-31
Payer: MEDICARE

## 2022-11-01 RX ORDER — AMOXICILLIN AND CLAVULANATE POTASSIUM 875; 125 MG/1; MG/1
1 TABLET, FILM COATED ORAL 2 TIMES DAILY
Qty: 20 TABLET | Refills: 0 | Status: SHIPPED | OUTPATIENT
Start: 2022-11-01 | End: 2023-02-15 | Stop reason: ALTCHOICE

## 2022-11-02 ENCOUNTER — OFFICE VISIT (OUTPATIENT)
Dept: RHEUMATOLOGY | Facility: CLINIC | Age: 77
End: 2022-11-02
Payer: MEDICARE

## 2022-11-02 VITALS
HEART RATE: 76 BPM | HEIGHT: 64 IN | BODY MASS INDEX: 28.51 KG/M2 | SYSTOLIC BLOOD PRESSURE: 130 MMHG | DIASTOLIC BLOOD PRESSURE: 77 MMHG | WEIGHT: 167 LBS

## 2022-11-02 DIAGNOSIS — J45.909 REACTIVE AIRWAY DISEASE WITHOUT COMPLICATION, UNSPECIFIED ASTHMA SEVERITY, UNSPECIFIED WHETHER PERSISTENT: ICD-10-CM

## 2022-11-02 DIAGNOSIS — N39.0 URINARY TRACT INFECTION WITHOUT HEMATURIA, SITE UNSPECIFIED: ICD-10-CM

## 2022-11-02 DIAGNOSIS — H20.9 UVEITIS OF BOTH EYES: ICD-10-CM

## 2022-11-02 DIAGNOSIS — R76.8 ANA POSITIVE: ICD-10-CM

## 2022-11-02 DIAGNOSIS — M35.00 SICCA SYNDROME: ICD-10-CM

## 2022-11-02 DIAGNOSIS — M47.814 THORACIC ARTHRITIS: ICD-10-CM

## 2022-11-02 DIAGNOSIS — B37.7 CANDIDEMIA: ICD-10-CM

## 2022-11-02 DIAGNOSIS — L40.50 PSA (PSORIATIC ARTHRITIS): Primary | ICD-10-CM

## 2022-11-02 DIAGNOSIS — Z85.3 HISTORY OF BREAST CANCER: ICD-10-CM

## 2022-11-02 DIAGNOSIS — H81.03 MENIERE DISEASE, BILATERAL: ICD-10-CM

## 2022-11-02 DIAGNOSIS — Z85.51 HISTORY OF BLADDER CANCER: ICD-10-CM

## 2022-11-02 PROCEDURE — 3078F DIAST BP <80 MM HG: CPT | Mod: CPTII,S$GLB,, | Performed by: INTERNAL MEDICINE

## 2022-11-02 PROCEDURE — 4010F ACE/ARB THERAPY RXD/TAKEN: CPT | Mod: CPTII,S$GLB,, | Performed by: INTERNAL MEDICINE

## 2022-11-02 PROCEDURE — 1126F AMNT PAIN NOTED NONE PRSNT: CPT | Mod: CPTII,S$GLB,, | Performed by: INTERNAL MEDICINE

## 2022-11-02 PROCEDURE — 99999 PR PBB SHADOW E&M-EST. PATIENT-LVL III: CPT | Mod: PBBFAC,,, | Performed by: INTERNAL MEDICINE

## 2022-11-02 PROCEDURE — 3078F PR MOST RECENT DIASTOLIC BLOOD PRESSURE < 80 MM HG: ICD-10-PCS | Mod: CPTII,S$GLB,, | Performed by: INTERNAL MEDICINE

## 2022-11-02 PROCEDURE — 96372 THER/PROPH/DIAG INJ SC/IM: CPT | Mod: S$GLB,,, | Performed by: INTERNAL MEDICINE

## 2022-11-02 PROCEDURE — 3075F SYST BP GE 130 - 139MM HG: CPT | Mod: CPTII,S$GLB,, | Performed by: INTERNAL MEDICINE

## 2022-11-02 PROCEDURE — 3075F PR MOST RECENT SYSTOLIC BLOOD PRESS GE 130-139MM HG: ICD-10-PCS | Mod: CPTII,S$GLB,, | Performed by: INTERNAL MEDICINE

## 2022-11-02 PROCEDURE — 96372 PR INJECTION,THERAP/PROPH/DIAG2ST, IM OR SUBCUT: ICD-10-PCS | Mod: S$GLB,,, | Performed by: INTERNAL MEDICINE

## 2022-11-02 PROCEDURE — 99499 RISK ADDL DX/OHS AUDIT: ICD-10-PCS | Mod: HCNC,S$GLB,, | Performed by: INTERNAL MEDICINE

## 2022-11-02 PROCEDURE — 1126F PR PAIN SEVERITY QUANTIFIED, NO PAIN PRESENT: ICD-10-PCS | Mod: CPTII,S$GLB,, | Performed by: INTERNAL MEDICINE

## 2022-11-02 PROCEDURE — 99499 UNLISTED E&M SERVICE: CPT | Mod: HCNC,S$GLB,, | Performed by: INTERNAL MEDICINE

## 2022-11-02 PROCEDURE — 99214 PR OFFICE/OUTPT VISIT, EST, LEVL IV, 30-39 MIN: ICD-10-PCS | Mod: 25,S$GLB,, | Performed by: INTERNAL MEDICINE

## 2022-11-02 PROCEDURE — 4010F PR ACE/ARB THEARPY RXD/TAKEN: ICD-10-PCS | Mod: CPTII,S$GLB,, | Performed by: INTERNAL MEDICINE

## 2022-11-02 PROCEDURE — 99214 OFFICE O/P EST MOD 30 MIN: CPT | Mod: 25,S$GLB,, | Performed by: INTERNAL MEDICINE

## 2022-11-02 PROCEDURE — 99999 PR PBB SHADOW E&M-EST. PATIENT-LVL III: ICD-10-PCS | Mod: PBBFAC,,, | Performed by: INTERNAL MEDICINE

## 2022-11-02 RX ORDER — MONTELUKAST SODIUM 10 MG/1
10 TABLET ORAL NIGHTLY
Qty: 90 TABLET | Refills: 3 | Status: SHIPPED | OUTPATIENT
Start: 2022-11-02 | End: 2023-11-02

## 2022-11-02 RX ORDER — APREMILAST 30 MG/1
30 TABLET, FILM COATED ORAL 2 TIMES DAILY
Qty: 180 TABLET | Refills: 3 | Status: SHIPPED | OUTPATIENT
Start: 2022-11-02 | End: 2023-06-08

## 2022-11-02 RX ORDER — CLINDAMYCIN AND BENZOYL PEROXIDE 10; 50 MG/G; MG/G
GEL TOPICAL
COMMUNITY
Start: 2022-10-18 | End: 2023-06-08

## 2022-11-02 RX ORDER — KETOROLAC TROMETHAMINE 5 MG/ML
SOLUTION OPHTHALMIC
COMMUNITY
Start: 2022-09-22 | End: 2022-12-15

## 2022-11-02 RX ORDER — CEFTRIAXONE 1 G/1
1 INJECTION, POWDER, FOR SOLUTION INTRAMUSCULAR; INTRAVENOUS
Status: COMPLETED | OUTPATIENT
Start: 2022-11-02 | End: 2022-11-02

## 2022-11-02 RX ORDER — ALPRAZOLAM 0.25 MG/1
0.25 TABLET ORAL 4 TIMES DAILY PRN
Qty: 28 TABLET | Refills: 0 | Status: SHIPPED | OUTPATIENT
Start: 2022-11-02 | End: 2022-11-09

## 2022-11-02 RX ORDER — FLUCONAZOLE 150 MG/1
150 TABLET ORAL DAILY
Qty: 7 TABLET | Refills: 3 | Status: SHIPPED | OUTPATIENT
Start: 2022-11-02 | End: 2022-11-09

## 2022-11-02 RX ADMIN — CEFTRIAXONE 1 G: 1 INJECTION, POWDER, FOR SOLUTION INTRAMUSCULAR; INTRAVENOUS at 01:11

## 2022-11-02 NOTE — PROGRESS NOTES
2 pt identifiers used  Allergies reviewed    Administered 1 gram of rocephin mixed with 2.1 ml of lidocaine. Given in the right upper outer gluteal. Patient tolerated injections well. Informed of s/s to report verbalized understanding. No adverse reactions noted. left facility in stable condition    Answers submitted by the patient for this visit:  Rheumatology Questionnaire (Submitted on 10/31/2022)  fever: No  eye redness: No  mouth sores: No  headaches: Yes  shortness of breath: No  chest pain: No  trouble swallowing: No  diarrhea: No  constipation: No  unexpected weight change: No  genital sore: No  dysuria: Yes  During the last 3 days, have you had a skin rash?: No  Bruises or bleeds easily: No  cough: Yes

## 2022-11-02 NOTE — PROGRESS NOTES
Subjective:       Patient ID: Yuliana Mattson is a 77 y.o. female.    Chief Complaint: Disease Management    Follow up: 76 yo with a h/o uveitis, iritis on  and restasis, she was dx with meniere's dz, she started otezla and her skin, nails psoriasis and back improved but she had a cough and it worsened she has had breast ca x 2, two different type prior to that she had bladder cancer.  Pt was dx with restrictive airway disease.she is on breo and now has a uti grew Kleb, and proteaus. otezla    Age 37 had a hysterectomy,  Immune defiency but was not treated. Family hx ovarian ca Patient complains of arthralgias and myalgias for which has been present for a few years. S consider if allergic add singular/ pepcid as option        Current tx otezla    Review of Systems   Constitutional:  Positive for activity change. Negative for appetite change, chills, diaphoresis and unexpected weight change.   HENT:  Negative for congestion, dental problem, ear discharge, ear pain, facial swelling, mouth sores, nosebleeds, postnasal drip, rhinorrhea, sinus pressure, sneezing, sore throat, tinnitus and voice change.    Eyes:  Negative for photophobia, pain, discharge, redness and itching.   Respiratory:  Negative for apnea, cough, chest tightness, shortness of breath and wheezing.    Cardiovascular:  Negative for chest pain, palpitations and leg swelling.   Gastrointestinal:  Negative for abdominal distention, abdominal pain, constipation, diarrhea, nausea and vomiting.   Endocrine: Negative for cold intolerance, heat intolerance, polydipsia and polyuria.   Genitourinary:  Negative for decreased urine volume, difficulty urinating, flank pain, frequency, genital sores, hematuria and urgency.   Musculoskeletal:  Positive for arthralgias, back pain, neck pain and neck stiffness. Negative for gait problem.   Skin:  Negative for pallor, rash and wound.   Allergic/Immunologic: Negative for immunocompromised state.   Neurological:   "Negative for dizziness, tremors, weakness and numbness.   Hematological:  Negative for adenopathy. Does not bruise/bleed easily.   Psychiatric/Behavioral:  Negative for sleep disturbance. The patient is not nervous/anxious.        Objective:   /77   Pulse 76   Ht 5' 4" (1.626 m)   Wt 75.8 kg (167 lb)   BMI 28.67 kg/m²      Physical Exam   Constitutional: She is oriented to person, place, and time.   HENT:   Head: Normocephalic and atraumatic.   Mouth/Throat: Oropharynx is clear and moist.   Eyes: Pupils are equal, round, and reactive to light.   Neck: No thyromegaly present.   Cardiovascular: Normal rate, regular rhythm and normal heart sounds. Exam reveals no gallop and no friction rub.   No murmur heard.  Pulmonary/Chest: Breath sounds normal. She has no wheezes. She has no rales. She exhibits no tenderness.   Abdominal: There is no abdominal tenderness. There is no rebound and no guarding.   Musculoskeletal:         General: Deformity present. No tenderness.      Right elbow: Normal.      Left elbow: Normal.      Cervical back: Neck supple.      Right knee: Swelling and effusion present.      Left knee: Normal.   Lymphadenopathy:     She has no cervical adenopathy.   Neurological: She is alert and oriented to person, place, and time. She has normal sensation. Gait normal.   Reflex Scores:       Patellar reflexes are 3+ on the right side and 3+ on the left side.  Skin: No rash noted. No erythema. No pallor.   Psychiatric: Mood and affect normal.   Vitals reviewed.      Right Side Rheumatological Exam     Examination finds the elbow, 1st MCP, 2nd MCP, 3rd MCP, 4th MCP and 5th MCP normal.    She has swelling of the knee    The patient has an enlarged wrist, 1st PIP, 2nd PIP, 3rd PIP, 4th PIP and 5th PIP    Shoulder Exam   Tenderness Location: acromion    Range of Motion   Active abduction:  abnormal   Adduction: abnormal  Sensation: normal    Knee Exam   Tenderness Location: medial joint " line  Patellofemoral Crepitus: positive  Effusion: positive  Sensation: normal    Hip Exam   Tenderness Location: no tenderness  Sensation: normal    Elbow/Wrist Exam   Tenderness Location: no tenderness  Sensation: normal    Left Side Rheumatological Exam     Examination finds the elbow, knee, 1st MCP, 2nd MCP, 3rd MCP, 4th MCP and 5th MCP normal.    The patient has an enlarged wrist, 1st PIP, 2nd PIP, 3rd PIP, 4th PIP and 5th PIP.    Shoulder Exam   Tenderness Location: acromion    Range of Motion   Active abduction:  abnormal   Sensation: normal    Knee Exam   Tenderness Location: lateral joint line and medial joint line    Patellofemoral Crepitus: positive  Sensation: normal    Hip Exam   Tenderness Location: no tenderness  Sensation: normal    Elbow/Wrist Exam   Sensation: normal      Back/Neck Exam   General Inspection   Gait: normal       Tenderness Right paramedian tenderness of the Lower C-Spine and Lower L-Spine.Left paramedian tenderness of the Upper C-Spine and Lower L-Spine.         Order: [id number]  Status:  Final result   Visible to patient:  No (not released) Next appt:  01/08/2021 at 10:00 AM in Radiology (Mammogram) Dx:  Acute iridocyclitis; Sjogren's syndro...  Component 7mo ago   HLA B27 Interpretation TAQ: [id number]   SAPE: 202    B27 Testing Date 05/18/2020 12:59 PM    HLA B27 Result Negative                Results for orders placed or performed in visit on 10/26/22   Urine culture    Specimen: Urine, Clean Catch   Result Value Ref Range    Urine Culture, Routine KLEBSIELLA PNEUMONIAE  >100,000 cfu/ml   (A)     Urine Culture, Routine PROTEUS VULGARIS  > 100,000 cfu/ml   (A)        Susceptibility    Klebsiella pneumoniae - CULTURE, URINE     Amp/Sulbactam <=8/4 Sensitive mcg/mL     Amox/K Clav'ate <=8/4 Sensitive mcg/mL     Ceftriaxone <=1 Sensitive mcg/mL     Cefazolin <=2 Sensitive mcg/mL     Ciprofloxacin <=1 Sensitive mcg/mL     Cefepime <=2 Sensitive mcg/mL     Ertapenem <=0.5 Sensitive mcg/mL      Nitrofurantoin 64 Intermediate mcg/mL     Gentamicin <=4 Sensitive mcg/mL     Levofloxacin <=2 Sensitive mcg/mL     Meropenem <=1 Sensitive mcg/mL     Piperacillin/Tazo <=16 Sensitive mcg/mL     Trimeth/Sulfa <=2/38 Sensitive mcg/mL     Tobramycin <=4 Sensitive mcg/mL    Proteus vulgaris - CULTURE, URINE     Amp/Sulbactam 16/8 Intermediate mcg/mL     Amox/K Clav'ate <=8/4 Sensitive mcg/mL     Ceftriaxone 32 Resistant mcg/mL     Ciprofloxacin <=1 Sensitive mcg/mL     Cefepime <=2 Sensitive mcg/mL     Ertapenem <=0.5 Sensitive mcg/mL     Gentamicin <=4 Sensitive mcg/mL     Levofloxacin <=2 Sensitive mcg/mL     Meropenem <=1 Sensitive mcg/mL     Piperacillin/Tazo <=16 Sensitive mcg/mL     Trimeth/Sulfa <=2/38 Sensitive mcg/mL     Tobramycin <=4 Sensitive mcg/mL   POCT Urinalysis, Dipstick, Automated, W/O Scope   Result Value Ref Range    POC Blood, Urine Negative Negative    POC Bilirubin, Urine Negative Negative    POC Urobilinogen, Urine 0.2 0.1 - 1.1    POC Ketones, Urine Negative Negative    POC Protein, Urine Negative Negative    POC Nitrates, Urine Negative Negative    POC Glucose, Urine Negative Negative    pH, UA 6.0     POC Specific Gravity, Urine 1.020 1.003 - 1.029    POC Leukocytes, Urine Negative Negative     Component Ref Range & Units 4 d ago   (4/28/22) 8 mo ago   (8/9/21) 1 yr ago   (12/23/20) 1 yr ago   (12/23/20) 1 yr ago   (12/23/20) 1 yr ago   (12/23/20) 1 yr ago   (12/23/20)   Anti Sm Antibody 0.00 - 0.99 Ratio 0.06  0.06    0.14      Anti-Sm Interpretation Negative Negative  Negative    Negative      Anti-SSA Antibody 0.00 - 0.99 Ratio 0.05  0.05  0.04        Anti-SSA Interpretation Negative Negative  Negative  Negative        Anti-SSB Antibody 0.00 - 0.99 Ratio 0.06  0.07   0.06       Anti-SSB Interpretation Negative Negative  Negative   Negative       ds DNA Ab Negative 1:10 Negative 1:10  Negative 1:10 CM      Negative 1:10 CM    Comment: Performed by fluorescent crithidia assay.   Anti  Sm/RNP Antibody 0.00 - 0.99 Ratio 0.08  0.09     0.05     Anti-Sm/RNP Interpretation Negative Negative  Negative     Negative     Resulting Agency  [facility]          Assessment:       1. PSA (psoriatic arthritis)    2. History of bladder cancer    3. Sicca syndrome    4. Urinary tract infection without hematuria, site unspecified    5. Candidemia    6. Reactive airway disease without complication, unspecified asthma severity, unspecified whether persistent    7. LISS positive    8. Uveitis of both eyes    9. History of breast cancer    10. Thoracic arthritis    11. Meniere disease, bilateral              Plan:         Yuliana was seen today for disease management.    Diagnoses and all orders for this visit:    PSA (psoriatic arthritis)  -     apremilast (OTEZLA) 30 mg Tab; Take 1 tablet (30 mg total) by mouth 2 (two) times a day.  -     Urine culture; Standing  -     CBC Auto Differential; Future  -     Comprehensive Metabolic Panel; Future  -     Sedimentation rate; Future  -     C-Reactive Protein; Future    History of bladder cancer  -     ALPRAZolam (XANAX) 0.25 MG tablet; Take 1 tablet (0.25 mg total) by mouth 4 (four) times daily as needed for Anxiety.  -     Urine culture; Standing    Sicca syndrome  -     Urine culture; Standing  -     CBC Auto Differential; Future  -     Comprehensive Metabolic Panel; Future  -     Sedimentation rate; Future  -     C-Reactive Protein; Future    Urinary tract infection without hematuria, site unspecified  -     cefTRIAXone injection 1 g  -     Urine culture; Standing  -     Urinalysis; Standing  -     CBC Auto Differential; Future  -     Comprehensive Metabolic Panel; Future  -     Sedimentation rate; Future  -     C-Reactive Protein; Future  -     Urine culture; Standing    Candidemia  -     fluconazole (DIFLUCAN) 150 MG Tab; Take 1 tablet (150 mg total) by mouth once daily. for 7 days  -     Urine culture; Standing    Reactive airway disease  without complication, unspecified asthma severity, unspecified whether persistent  -     montelukast (SINGULAIR) 10 mg tablet; Take 1 tablet (10 mg total) by mouth every evening. (Patient not taking: Reported on 11/9/2022)  -     Urine culture; Standing  -     CBC Auto Differential; Future  -     Comprehensive Metabolic Panel; Future  -     Sedimentation rate; Future  -     C-Reactive Protein; Future    LISS positive  -     ALPRAZolam (XANAX) 0.25 MG tablet; Take 1 tablet (0.25 mg total) by mouth 4 (four) times daily as needed for Anxiety.  -     Urine culture; Standing    Uveitis of both eyes  -     ALPRAZolam (XANAX) 0.25 MG tablet; Take 1 tablet (0.25 mg total) by mouth 4 (four) times daily as needed for Anxiety.  -     Urine culture; Standing    History of breast cancer  -     ALPRAZolam (XANAX) 0.25 MG tablet; Take 1 tablet (0.25 mg total) by mouth 4 (four) times daily as needed for Anxiety.  -     Urine culture; Standing    Thoracic arthritis  -     ALPRAZolam (XANAX) 0.25 MG tablet; Take 1 tablet (0.25 mg total) by mouth 4 (four) times daily as needed for Anxiety.  -     Urine culture; Standing  -     CBC Auto Differential; Future  -     Comprehensive Metabolic Panel; Future  -     Sedimentation rate; Future  -     C-Reactive Protein; Future    Meniere disease, bilateral  -     ALPRAZolam (XANAX) 0.25 MG tablet; Take 1 tablet (0.25 mg total) by mouth 4 (four) times daily as needed for Anxiety.  -     Urine culture; Standing      1.add tesslon perles consider adding salagen in the am only  2. Order a CT if abnormal add doxy     More than 50% of the  40 minute encounter was spent face to face counseling the patient regarding current status and future plan of care as well as side of the medications. All questions were answered to patient's satisfaction

## 2022-11-09 ENCOUNTER — OFFICE VISIT (OUTPATIENT)
Dept: FAMILY MEDICINE | Facility: CLINIC | Age: 77
End: 2022-11-09
Payer: MEDICARE

## 2022-11-09 VITALS
BODY MASS INDEX: 28.71 KG/M2 | HEART RATE: 83 BPM | OXYGEN SATURATION: 99 % | SYSTOLIC BLOOD PRESSURE: 128 MMHG | WEIGHT: 168.19 LBS | DIASTOLIC BLOOD PRESSURE: 80 MMHG | HEIGHT: 64 IN

## 2022-11-09 DIAGNOSIS — J45.41 MODERATE PERSISTENT REACTIVE AIRWAY DISEASE WITH ACUTE EXACERBATION: Primary | ICD-10-CM

## 2022-11-09 DIAGNOSIS — J01.90 ACUTE SINUSITIS, RECURRENCE NOT SPECIFIED, UNSPECIFIED LOCATION: ICD-10-CM

## 2022-11-09 PROCEDURE — 99999 PR PBB SHADOW E&M-EST. PATIENT-LVL III: ICD-10-PCS | Mod: PBBFAC,,, | Performed by: INTERNAL MEDICINE

## 2022-11-09 PROCEDURE — 1101F PT FALLS ASSESS-DOCD LE1/YR: CPT | Mod: CPTII,S$GLB,, | Performed by: INTERNAL MEDICINE

## 2022-11-09 PROCEDURE — 1160F RVW MEDS BY RX/DR IN RCRD: CPT | Mod: CPTII,S$GLB,, | Performed by: INTERNAL MEDICINE

## 2022-11-09 PROCEDURE — 99213 PR OFFICE/OUTPT VISIT, EST, LEVL III, 20-29 MIN: ICD-10-PCS | Mod: S$GLB,,, | Performed by: INTERNAL MEDICINE

## 2022-11-09 PROCEDURE — 3074F PR MOST RECENT SYSTOLIC BLOOD PRESSURE < 130 MM HG: ICD-10-PCS | Mod: CPTII,S$GLB,, | Performed by: INTERNAL MEDICINE

## 2022-11-09 PROCEDURE — 99999 PR PBB SHADOW E&M-EST. PATIENT-LVL III: CPT | Mod: PBBFAC,,, | Performed by: INTERNAL MEDICINE

## 2022-11-09 PROCEDURE — 3074F SYST BP LT 130 MM HG: CPT | Mod: CPTII,S$GLB,, | Performed by: INTERNAL MEDICINE

## 2022-11-09 PROCEDURE — 1160F PR REVIEW ALL MEDS BY PRESCRIBER/CLIN PHARMACIST DOCUMENTED: ICD-10-PCS | Mod: CPTII,S$GLB,, | Performed by: INTERNAL MEDICINE

## 2022-11-09 PROCEDURE — 1159F MED LIST DOCD IN RCRD: CPT | Mod: CPTII,S$GLB,, | Performed by: INTERNAL MEDICINE

## 2022-11-09 PROCEDURE — 4010F ACE/ARB THERAPY RXD/TAKEN: CPT | Mod: CPTII,S$GLB,, | Performed by: INTERNAL MEDICINE

## 2022-11-09 PROCEDURE — 3288F PR FALLS RISK ASSESSMENT DOCUMENTED: ICD-10-PCS | Mod: CPTII,S$GLB,, | Performed by: INTERNAL MEDICINE

## 2022-11-09 PROCEDURE — 4010F PR ACE/ARB THEARPY RXD/TAKEN: ICD-10-PCS | Mod: CPTII,S$GLB,, | Performed by: INTERNAL MEDICINE

## 2022-11-09 PROCEDURE — 1159F PR MEDICATION LIST DOCUMENTED IN MEDICAL RECORD: ICD-10-PCS | Mod: CPTII,S$GLB,, | Performed by: INTERNAL MEDICINE

## 2022-11-09 PROCEDURE — 99213 OFFICE O/P EST LOW 20 MIN: CPT | Mod: S$GLB,,, | Performed by: INTERNAL MEDICINE

## 2022-11-09 PROCEDURE — 3288F FALL RISK ASSESSMENT DOCD: CPT | Mod: CPTII,S$GLB,, | Performed by: INTERNAL MEDICINE

## 2022-11-09 PROCEDURE — 1101F PR PT FALLS ASSESS DOC 0-1 FALLS W/OUT INJ PAST YR: ICD-10-PCS | Mod: CPTII,S$GLB,, | Performed by: INTERNAL MEDICINE

## 2022-11-09 PROCEDURE — 3079F PR MOST RECENT DIASTOLIC BLOOD PRESSURE 80-89 MM HG: ICD-10-PCS | Mod: CPTII,S$GLB,, | Performed by: INTERNAL MEDICINE

## 2022-11-09 PROCEDURE — 3079F DIAST BP 80-89 MM HG: CPT | Mod: CPTII,S$GLB,, | Performed by: INTERNAL MEDICINE

## 2022-11-09 RX ORDER — PREDNISONE 20 MG/1
40 TABLET ORAL DAILY
Qty: 14 TABLET | Refills: 0 | Status: SHIPPED | OUTPATIENT
Start: 2022-11-09 | End: 2022-11-16

## 2022-11-09 NOTE — PROGRESS NOTES
Subjective     Yuliana Mattson is a 77 y.o. old, female here for Cough and head congestion    Patient says she is confused because she is on multiple medications and under the care of several specialists. She has had chronic cough. She has had UTI's. She had a shot of rocephin and been taking augmentin. She started getting a cough that worsened a week ago. It seems like more of a croupy cough. She recently stopped her otezla. She stopped several allergy meds. She has a lot of sinus drainage. She is using her breo and recently started using the albuterol.    Review of Systems   Constitutional:  Negative for chills and fever.   HENT:  Positive for congestion. Negative for ear pain.         Ear fullness   Musculoskeletal:  Negative for myalgias.   Medications     Outpatient Medications Marked as Taking for the 11/9/22 encounter (Office Visit) with Umair Gold MD   Medication Sig Dispense Refill    albuterol (PROVENTIL/VENTOLIN HFA) 90 mcg/actuation inhaler Inhale 2 puffs into the lungs every 6 (six) hours as needed for Wheezing or Shortness of Breath. Rescue 18 g 11    ALPRAZolam (XANAX) 0.25 MG tablet Take 1 tablet (0.25 mg total) by mouth 4 (four) times daily as needed for Anxiety. 28 tablet 0    amoxicillin-clavulanate 875-125mg (AUGMENTIN) 875-125 mg per tablet Take 1 tablet by mouth 2 (two) times daily. 20 tablet 0    apremilast (OTEZLA) 30 mg Tab Take 1 tablet (30 mg total) by mouth 2 (two) times a day. 180 tablet 3    clindamycin-benzoyl peroxide (BENZACLIN) gel APPLY TO FACE DAILY AS DIRECTED      fluconazole (DIFLUCAN) 150 MG Tab Take 1 tablet (150 mg total) by mouth once daily. for 7 days 7 tablet 3    fluocinolone acetonide oiL 0.01 % Drop Place 1 drop in ear(s) daily as needed (itching). 20 mL 1    fluticasone furoate-vilanteroL (BREO ELLIPTA) 200-25 mcg/dose DsDv diskus inhaler Inhale 1 puff into the lungs once daily. Controller 60 each 11    ketorolac 0.5% (ACULAR) 0.5 % Drop INSTILL 1 DROP  "IN RIGHT EYE FOUR TIMES DAILY      losartan-hydrochlorothiazide 50-12.5 mg (HYZAAR) 50-12.5 mg per tablet Take 1 tablet by mouth once daily. 90 tablet 3    pantoprazole (PROTONIX) 40 MG tablet Take 1 tablet (40 mg total) by mouth once daily. 30 tablet 11    tretinoin (RETIN-A) 0.025 % cream Apply a pea-sized amount to entire face at bedtime, start every other night and increase as tolerated. Take night off if irritation develops. 45 g 3    vitamin D (VITAMIN D3) 1000 units Tab Take 1,000 Units by mouth once daily.       Objective     /80   Pulse 83   Ht 5' 4" (1.626 m)   Wt 76.3 kg (168 lb 3.4 oz)   SpO2 99%   BMI 28.87 kg/m²   Physical Exam  Constitutional:       General: She is not in acute distress.     Appearance: Normal appearance. She is well-developed.      Comments: Frequent cough   HENT:      Head: Normocephalic and atraumatic.      Right Ear: External ear normal.      Left Ear: External ear normal.      Mouth/Throat:      Pharynx: Posterior oropharyngeal erythema present.   Eyes:      General:         Right eye: No discharge.         Left eye: No discharge.      Conjunctiva/sclera: Conjunctivae normal.   Cardiovascular:      Rate and Rhythm: Normal rate and regular rhythm.      Heart sounds: No murmur heard.  Pulmonary:      Effort: Pulmonary effort is normal. No respiratory distress.      Breath sounds: Normal breath sounds.   Musculoskeletal:      Cervical back: Neck supple.   Lymphadenopathy:      Cervical: No cervical adenopathy.   Neurological:      Mental Status: She is alert.     Assessment and Plan     Moderate persistent reactive airway disease with acute exacerbation  -     predniSONE (DELTASONE) 20 MG tablet; Take 2 tablets (40 mg total) by mouth once daily. for 7 days  Dispense: 14 tablet; Refill: 0    Acute sinusitis, recurrence not specified, unspecified location  -     predniSONE (DELTASONE) 20 MG tablet; Take 2 tablets (40 mg total) by mouth once daily. for 7 days  Dispense: 14 " tablet; Refill: 0      No follow-ups on file.  ___________________  Umair Gold MD  Internal Medicine and Pediatrics

## 2022-11-14 ENCOUNTER — OFFICE VISIT (OUTPATIENT)
Dept: UROLOGY | Facility: CLINIC | Age: 77
End: 2022-11-14
Payer: MEDICARE

## 2022-11-14 VITALS — WEIGHT: 169.81 LBS | HEIGHT: 64 IN | BODY MASS INDEX: 28.99 KG/M2

## 2022-11-14 DIAGNOSIS — N39.0 URINARY TRACT INFECTION WITHOUT HEMATURIA, SITE UNSPECIFIED: ICD-10-CM

## 2022-11-14 DIAGNOSIS — N39.0 RECURRENT UTI: ICD-10-CM

## 2022-11-14 DIAGNOSIS — Z85.51 HISTORY OF BLADDER CANCER: Primary | ICD-10-CM

## 2022-11-14 PROCEDURE — 4010F ACE/ARB THERAPY RXD/TAKEN: CPT | Mod: CPTII,S$GLB,,

## 2022-11-14 PROCEDURE — 87086 URINE CULTURE/COLONY COUNT: CPT

## 2022-11-14 PROCEDURE — 1159F PR MEDICATION LIST DOCUMENTED IN MEDICAL RECORD: ICD-10-PCS | Mod: CPTII,S$GLB,,

## 2022-11-14 PROCEDURE — 1160F PR REVIEW ALL MEDS BY PRESCRIBER/CLIN PHARMACIST DOCUMENTED: ICD-10-PCS | Mod: CPTII,S$GLB,,

## 2022-11-14 PROCEDURE — 1125F AMNT PAIN NOTED PAIN PRSNT: CPT | Mod: CPTII,S$GLB,,

## 2022-11-14 PROCEDURE — 99999 PR PBB SHADOW E&M-EST. PATIENT-LVL III: ICD-10-PCS | Mod: PBBFAC,,,

## 2022-11-14 PROCEDURE — 3288F FALL RISK ASSESSMENT DOCD: CPT | Mod: CPTII,S$GLB,,

## 2022-11-14 PROCEDURE — 99214 PR OFFICE/OUTPT VISIT, EST, LEVL IV, 30-39 MIN: ICD-10-PCS | Mod: S$GLB,,,

## 2022-11-14 PROCEDURE — 1159F MED LIST DOCD IN RCRD: CPT | Mod: CPTII,S$GLB,,

## 2022-11-14 PROCEDURE — 99999 PR PBB SHADOW E&M-EST. PATIENT-LVL III: CPT | Mod: PBBFAC,,,

## 2022-11-14 PROCEDURE — 1101F PT FALLS ASSESS-DOCD LE1/YR: CPT | Mod: CPTII,S$GLB,,

## 2022-11-14 PROCEDURE — 4010F PR ACE/ARB THEARPY RXD/TAKEN: ICD-10-PCS | Mod: CPTII,S$GLB,,

## 2022-11-14 PROCEDURE — 1160F RVW MEDS BY RX/DR IN RCRD: CPT | Mod: CPTII,S$GLB,,

## 2022-11-14 PROCEDURE — 3288F PR FALLS RISK ASSESSMENT DOCUMENTED: ICD-10-PCS | Mod: CPTII,S$GLB,,

## 2022-11-14 PROCEDURE — 1101F PR PT FALLS ASSESS DOC 0-1 FALLS W/OUT INJ PAST YR: ICD-10-PCS | Mod: CPTII,S$GLB,,

## 2022-11-14 PROCEDURE — 1125F PR PAIN SEVERITY QUANTIFIED, PAIN PRESENT: ICD-10-PCS | Mod: CPTII,S$GLB,,

## 2022-11-14 PROCEDURE — 99214 OFFICE O/P EST MOD 30 MIN: CPT | Mod: S$GLB,,,

## 2022-11-14 NOTE — PROGRESS NOTES
[facility] Note  Staff: Ana Dunaway FNP-C    PCP: MD Deepali    Chief Complaint: Recurrent UTIs    Subjective:        HPI: Yuliana Mattson is a 77 y.o. female new patient to me presents today for evaluation of recurrent UTIs. She has been followed by urology for this problem over the years. She was last seen by Dr. Sylvester on 6/2/2020. She was given a prescription for cipro to be initiated at the first signs on a UTI (patient initiated antibiotic therapy). She recently had a urine culture on 10/26/22 which was positive for klebsiella pneumoniae and proteus vulgaris. She was initially prescribed a course of macrobid but due to sensitivity she was switched to augmentin. She just finished these antibiotics on 11/10/22. She is concerned about this last urine culture because she has never had 2 bacteria present on a culture and has never had these types of bacteria. She is being followed by Dr. Hughes whom recommended follow up with urology. She has a distant history of bladder cancer but has had multiple repeat cystoscopies all negative, the last in 2016. She denies dysuria, hematuria, frequency, fever, flank pain, and feeling of incomplete bladder emptying. She states she is still experiencing some urgency and slight abd pain/pressure. She would like to have a repeat urine culture. She denies a history of kidney stones. We discussed the use of estrogen vaginal cream as a barrier for recurrent UTIs, but she is resistant due to her history of breast cancer which is understood. We discussed the use of D-Mannose OTC urinary health supplementation.     Questions asked pt during office visit today:  Urgency:Yes - at times, incontinence with urgency? No;   DysuriaNo  Gross HematuriaNo  History of UTI: Yes     History of Kidney Stones?:  No    Constipation issues?:  No    REVIEW OF SYSTEMS:  Review of Systems   Constitutional: Negative.  Negative for chills and fever.   HENT: Negative.     Eyes:  Negative.    Respiratory:  Positive for cough.    Cardiovascular: Negative.    Gastrointestinal:  Positive for abdominal pain. Negative for nausea and vomiting.   Genitourinary:  Positive for urgency. Negative for dysuria, flank pain, frequency and hematuria.   Musculoskeletal: Negative.  Negative for back pain.   Skin: Negative.    Neurological: Negative.    Endo/Heme/Allergies: Negative.    Psychiatric/Behavioral: Negative.       PMHx:  Past Medical History:   Diagnosis Date    Allergic rhinitis     Anticoagulant long-term use     ASPIRIN ONLY - (stops it when she presents a hemorrhagic cystitis)    Bladder cancer     Blood transfusion     Breast cancer     CAD (coronary artery disease), native coronary artery     40% non obstructive    Cholelithiasis     Endometriosis     Headache(784.0)     HEARING LOSS     Hemorrhoids     HLD (hyperlipidemia)     Hypertension     Iritis     Left wrist fracture 2009    traumatic    Malignant neoplasm of other specified sites of bladder 12/3/2009    Osteoporosis, postmenopausal     PONV (postoperative nausea and vomiting)     Rash     Rosacea     UTI (urinary tract infection)     Vaginal delivery     x 2       PSHx:  Past Surgical History:   Procedure Laterality Date    ADENOIDECTOMY      APPENDECTOMY      BLADDER TUMOR EXCISION  1979    [facility], [place], operated by [facility], dr garcia - no recurrences since    BREAST BIOPSY Right     4 core bx negative    BREAST SURGERY Left 1998    ESOPHAGOGASTRODUODENOSCOPY N/A 04/21/2022    Procedure: ESOPHAGOGASTRODUODENOSCOPY (EGD);  Surgeon: Guru Maddox MD;  Location: [facility];  Service: Endoscopy;  Laterality: N/A;    EYE SURGERY  2019    HYSTERECTOMY      MASTECTOMY, PARTIAL  1995    left side - cancer - had radiotherapy 1995, 1998 had chemotherapy    oophrect      pelvic mass      benign    TONSILLECTOMY      TONSILLECTOMY, ADENOIDECTOMY, BILATERAL MYRINGOTOMY AND TUBES      tram flap Left 1998       Fam Hx:   malignancies: No , gyn  malignancies: Yes - cousin ovarian cancer   kidney stones: No     Soc Hx:  , lives in [place]    Allergies:  Prochlorperazine edisylate    Medications: reviewed     Objective:   There were no vitals filed for this visit.    Physical Exam  Constitutional:       Appearance: Normal appearance.   HENT:      Head: Normocephalic.      Mouth/Throat:      Mouth: Mucous membranes are moist.   Eyes:      Conjunctiva/sclera: Conjunctivae normal.   Pulmonary:      Effort: Pulmonary effort is normal.   Abdominal:      General: There is no distension.      Palpations: Abdomen is soft.      Tenderness: There is no abdominal tenderness. There is no right CVA tenderness or left CVA tenderness.   Musculoskeletal:         General: Normal range of motion.      Cervical back: Normal range of motion.   Skin:     General: Skin is dry.   Neurological:      Mental Status: She is alert and oriented to person, place, and time.   Psychiatric:         Mood and Affect: Mood normal.         Behavior: Behavior normal.         LABS REVIEW:  UA today:   Color:Clear, Yellow  Spec. Grav.  1.015  PH  6.0  Negative for leukocytes, nitrates, protein, glucose, ketones, urobili, bili, and blood.    Assessment:       1. History of bladder cancer    2. Urinary tract infection without hematuria, site unspecified    3. Recurrent UTI            Plan:      Urine sent for culture  For your recurrent UTIs:  Behavioral changes to help decrease UTI recurrences   -increase water intake (6 to 8 bottles water per day)   -don't hold urine, void every 2 to 3 hours   -prevent constipation (miralax capful daily if necessary) to have a bowel movement daily and keep stools soft  -cut down on caffeine,drink mainly water  -no douching   -void immediately after sexual intercourse  -wipe front to back     OTC meds to try (go to the pharmacist and ask where they are, they are over the counter)  -cranberry pills 500mg tabs TID, can buy over the counter  -take a probiotic  daily designed for vaginal and urinary health  -D-Mannose once daily over the counter    IMPORTANT: If you feel like you have a UTI coming on, call our office and talk to one of the nurses. We will have you drop off a urine here in clinic or at one of our [facility] facilities. Avoid going to urgent care. We need to start documenting your urine cultures here in our office to see if they are really recurrent UTIs or sx of UTIs.     If you have fever and it doesn't improve with tylenol or ibuprofen or if you have flank pain associated with the uti symptoms go to the ER.     If you do continue to have positive urine cultures then we may proceed with doing a cystoscopy (to look in your bladder) and an imaging study.     F/u As Needed    [facility]: Active    SHRADDHA Tomlin

## 2022-11-15 ENCOUNTER — PES CALL (OUTPATIENT)
Dept: ADMINISTRATIVE | Facility: CLINIC | Age: 77
End: 2022-11-15
Payer: MEDICARE

## 2022-11-16 LAB — BACTERIA UR CULT: NORMAL

## 2022-11-19 ENCOUNTER — LAB VISIT (OUTPATIENT)
Dept: LAB | Facility: HOSPITAL | Age: 77
End: 2022-11-19
Attending: INTERNAL MEDICINE
Payer: MEDICARE

## 2022-11-19 ENCOUNTER — PATIENT MESSAGE (OUTPATIENT)
Dept: UROLOGY | Facility: CLINIC | Age: 77
End: 2022-11-19
Payer: MEDICARE

## 2022-11-19 DIAGNOSIS — B37.7 CANDIDEMIA: ICD-10-CM

## 2022-11-19 DIAGNOSIS — L40.50 PSA (PSORIATIC ARTHRITIS): ICD-10-CM

## 2022-11-19 DIAGNOSIS — Z85.3 HISTORY OF BREAST CANCER: ICD-10-CM

## 2022-11-19 DIAGNOSIS — H20.9 UVEITIS OF BOTH EYES: ICD-10-CM

## 2022-11-19 DIAGNOSIS — Z85.51 HISTORY OF BLADDER CANCER: ICD-10-CM

## 2022-11-19 DIAGNOSIS — N39.0 URINARY TRACT INFECTION WITHOUT HEMATURIA, SITE UNSPECIFIED: ICD-10-CM

## 2022-11-19 DIAGNOSIS — M35.00 SICCA SYNDROME: ICD-10-CM

## 2022-11-19 DIAGNOSIS — M47.814 THORACIC ARTHRITIS: ICD-10-CM

## 2022-11-19 DIAGNOSIS — R76.8 ANA POSITIVE: ICD-10-CM

## 2022-11-19 DIAGNOSIS — J45.909 REACTIVE AIRWAY DISEASE WITHOUT COMPLICATION, UNSPECIFIED ASTHMA SEVERITY, UNSPECIFIED WHETHER PERSISTENT: ICD-10-CM

## 2022-11-19 DIAGNOSIS — H81.03 MENIERE DISEASE, BILATERAL: ICD-10-CM

## 2022-11-19 LAB
BACTERIA #/AREA URNS HPF: ABNORMAL /HPF
BILIRUB UR QL STRIP: NEGATIVE
CLARITY UR: ABNORMAL
COLOR UR: ABNORMAL
GLUCOSE UR QL STRIP: NEGATIVE
HGB UR QL STRIP: ABNORMAL
HYALINE CASTS #/AREA URNS LPF: 0 /LPF
KETONES UR QL STRIP: NEGATIVE
LEUKOCYTE ESTERASE UR QL STRIP: ABNORMAL
MICROSCOPIC COMMENT: ABNORMAL
NITRITE UR QL STRIP: NEGATIVE
PH UR STRIP: 7 [PH] (ref 5–8)
PROT UR QL STRIP: ABNORMAL
RBC #/AREA URNS HPF: ABNORMAL /HPF (ref 0–4)
SP GR UR STRIP: 1.01 (ref 1–1.03)
SQUAMOUS #/AREA URNS HPF: 1 /HPF
URN SPEC COLLECT METH UR: ABNORMAL
WBC #/AREA URNS HPF: >100 /HPF (ref 0–5)

## 2022-11-19 PROCEDURE — 87086 URINE CULTURE/COLONY COUNT: CPT | Performed by: INTERNAL MEDICINE

## 2022-11-19 PROCEDURE — 81000 URINALYSIS NONAUTO W/SCOPE: CPT | Mod: PO | Performed by: INTERNAL MEDICINE

## 2022-11-20 LAB — BACTERIA UR CULT: NORMAL

## 2022-11-21 ENCOUNTER — OFFICE VISIT (OUTPATIENT)
Dept: UROLOGY | Facility: CLINIC | Age: 77
End: 2022-11-21
Payer: MEDICARE

## 2022-11-21 VITALS — HEIGHT: 64 IN | WEIGHT: 172.81 LBS | BODY MASS INDEX: 29.5 KG/M2

## 2022-11-21 DIAGNOSIS — R35.0 URINARY FREQUENCY: ICD-10-CM

## 2022-11-21 DIAGNOSIS — R39.89 BLADDER PAIN: ICD-10-CM

## 2022-11-21 DIAGNOSIS — Z85.51 HISTORY OF BLADDER CANCER: ICD-10-CM

## 2022-11-21 DIAGNOSIS — R39.15 URINARY URGENCY: ICD-10-CM

## 2022-11-21 DIAGNOSIS — R31.0 GROSS HEMATURIA: Primary | ICD-10-CM

## 2022-11-21 PROCEDURE — 99213 OFFICE O/P EST LOW 20 MIN: CPT | Mod: S$GLB,,,

## 2022-11-21 PROCEDURE — 99999 PR PBB SHADOW E&M-EST. PATIENT-LVL III: CPT | Mod: PBBFAC,,,

## 2022-11-21 PROCEDURE — 1125F PR PAIN SEVERITY QUANTIFIED, PAIN PRESENT: ICD-10-PCS | Mod: CPTII,S$GLB,,

## 2022-11-21 PROCEDURE — 1160F RVW MEDS BY RX/DR IN RCRD: CPT | Mod: CPTII,S$GLB,,

## 2022-11-21 PROCEDURE — 4010F ACE/ARB THERAPY RXD/TAKEN: CPT | Mod: CPTII,S$GLB,,

## 2022-11-21 PROCEDURE — 99213 PR OFFICE/OUTPT VISIT, EST, LEVL III, 20-29 MIN: ICD-10-PCS | Mod: S$GLB,,,

## 2022-11-21 PROCEDURE — 3288F FALL RISK ASSESSMENT DOCD: CPT | Mod: CPTII,S$GLB,,

## 2022-11-21 PROCEDURE — 1159F PR MEDICATION LIST DOCUMENTED IN MEDICAL RECORD: ICD-10-PCS | Mod: CPTII,S$GLB,,

## 2022-11-21 PROCEDURE — 4010F PR ACE/ARB THEARPY RXD/TAKEN: ICD-10-PCS | Mod: CPTII,S$GLB,,

## 2022-11-21 PROCEDURE — 3288F PR FALLS RISK ASSESSMENT DOCUMENTED: ICD-10-PCS | Mod: CPTII,S$GLB,,

## 2022-11-21 PROCEDURE — 1125F AMNT PAIN NOTED PAIN PRSNT: CPT | Mod: CPTII,S$GLB,,

## 2022-11-21 PROCEDURE — 1101F PR PT FALLS ASSESS DOC 0-1 FALLS W/OUT INJ PAST YR: ICD-10-PCS | Mod: CPTII,S$GLB,,

## 2022-11-21 PROCEDURE — 1160F PR REVIEW ALL MEDS BY PRESCRIBER/CLIN PHARMACIST DOCUMENTED: ICD-10-PCS | Mod: CPTII,S$GLB,,

## 2022-11-21 PROCEDURE — 1101F PT FALLS ASSESS-DOCD LE1/YR: CPT | Mod: CPTII,S$GLB,,

## 2022-11-21 PROCEDURE — 1159F MED LIST DOCD IN RCRD: CPT | Mod: CPTII,S$GLB,,

## 2022-11-21 PROCEDURE — 99999 PR PBB SHADOW E&M-EST. PATIENT-LVL III: ICD-10-PCS | Mod: PBBFAC,,,

## 2022-11-21 RX ORDER — OXYBUTYNIN CHLORIDE 5 MG/1
5 TABLET, EXTENDED RELEASE ORAL DAILY
Qty: 30 TABLET | Refills: 3 | Status: SHIPPED | OUTPATIENT
Start: 2022-11-21 | End: 2024-02-15

## 2022-11-21 NOTE — PROGRESS NOTES
[facility] Note  Staff: Ana Dunaway FNP-C    PCP: MD Deepali    Chief Complaint: Gross Hematuria    Subjective:        HPI: Yuliana Mattson is a 77 y.o. female presents today for evaluation of gross hematuria. She was seen by me last week for recurrent UTIs. She since has developed worsening bladder pain and spasms. She states on Saturday she was passing blood and clots in her urine. She dropped off a urine test for culture which was negative for infection. Urine culture 11/14/22 negative and 11/19/22 negative. She is very concerned due to her history of bladder cancer. Her last cystoscopy was in 2016. She states she is also experiencing urgency and frequency and had an episode or urge incontinence prior to gross hematuria. She is currently not taking any bladder medications. We discussed a trial of anticholinergics such as oxybutynin for her symptoms. We will also proceed with CT Urogram and in office cystoscopy.     Questions asked pt during office visit today:  DTF: every 1-2 hours, NTF: 3 x night  Urgency:Yes , incontinence with urgency? Yes   Dysuria Yes, at times  Gross HematuriaYes   History of UTI: Yes     History of Kidney Stones?:  No    Constipation issues?:  No    REVIEW OF SYSTEMS:  Review of Systems   Constitutional: Negative.  Negative for chills and fever.   HENT: Negative.     Respiratory: Negative.     Cardiovascular: Negative.    Gastrointestinal:  Positive for abdominal pain. Negative for nausea and vomiting.   Genitourinary:  Positive for dysuria, frequency, hematuria and urgency. Negative for flank pain.   Musculoskeletal: Negative.  Negative for back pain.   Skin: Negative.    Neurological: Negative.    Endo/Heme/Allergies: Negative.    Psychiatric/Behavioral: Negative.       PMHx:  Past Medical History:   Diagnosis Date    Allergic rhinitis     Anticoagulant long-term use     ASPIRIN ONLY - (stops it when she presents a hemorrhagic cystitis)    Bladder cancer      Blood transfusion     Breast cancer     CAD (coronary artery disease), native coronary artery     40% non obstructive    Cholelithiasis     Endometriosis     Headache(784.0)     HEARING LOSS     Hemorrhoids     HLD (hyperlipidemia)     Hypertension     Iritis     Left wrist fracture 2009    traumatic    Malignant neoplasm of other specified sites of bladder 12/3/2009    Osteoporosis, postmenopausal     PONV (postoperative nausea and vomiting)     Rash     Rosacea     UTI (urinary tract infection)     Vaginal delivery     x 2       PSHx:  Past Surgical History:   Procedure Laterality Date    ADENOIDECTOMY      APPENDECTOMY      BLADDER TUMOR EXCISION  1979    [facility], [place], dr garcia - no recurrences since    BREAST BIOPSY Right     4 core bx negative    BREAST SURGERY Left 1998    ESOPHAGOGASTRODUODENOSCOPY N/A 04/21/2022    Procedure: ESOPHAGOGASTRODUODENOSCOPY (EGD);  Surgeon: Guru Maddox MD;  Location: [facility];  Service: Endoscopy;  Laterality: N/A;    EYE SURGERY  2019    HYSTERECTOMY      MASTECTOMY, PARTIAL  1995    left side - cancer - had radiotherapy 1995, 1998 had chemotherapy    oophrect      pelvic mass      benign    TONSILLECTOMY      TONSILLECTOMY, ADENOIDECTOMY, BILATERAL MYRINGOTOMY AND TUBES      tram flap Left 1998       Fam Hx:   malignancies: No , gyn malignancies: No   kidney stones: No     Soc Hx:  , lives in [place]    Allergies:  Prochlorperazine edisylate    Medications: reviewed     Objective:   There were no vitals filed for this visit.    Physical Exam  Constitutional:       Appearance: Normal appearance.   HENT:      Head: Normocephalic.      Mouth/Throat:      Mouth: Mucous membranes are moist.   Eyes:      Conjunctiva/sclera: Conjunctivae normal.   Pulmonary:      Effort: Pulmonary effort is normal.   Abdominal:      General: There is no distension.      Palpations: Abdomen is soft.      Tenderness: There is no abdominal tenderness. There is no right CVA  tenderness or left CVA tenderness.   Musculoskeletal:         General: Normal range of motion.      Cervical back: Normal range of motion.   Skin:     General: Skin is warm.   Neurological:      Mental Status: She is alert and oriented to person, place, and time.   Psychiatric:         Mood and Affect: Mood normal.         Behavior: Behavior normal.         LABS REVIEW:  UA today:   Color:Clear, Yellow  Spec. Grav.  1.015  PH  5.5  Negative for nitrates, protein, glucose, ketones, urobili, bili  Trace blood  Small leuks    Assessment:       1. Gross hematuria    2. Urinary urgency    3. Urinary frequency    4. Bladder pain    5. History of bladder cancer          Plan:      CT Urogram ordered and scheduled  In office cystoscopy ordered and scheduled  Oxybutynin 5mg XL prescribed to pt today as trial to see if med improves pt's current LUTS.  Benefits, risks and side affects were thoroughly explained to pt today in office with all questions answered.    F/u As Needed per Treatment Plan    [facility]: Active    SHRADDHA Tomlin

## 2022-11-28 ENCOUNTER — NURSE TRIAGE (OUTPATIENT)
Dept: ADMINISTRATIVE | Facility: CLINIC | Age: 77
End: 2022-11-28
Payer: MEDICARE

## 2022-11-29 ENCOUNTER — HOSPITAL ENCOUNTER (OUTPATIENT)
Dept: RADIOLOGY | Facility: HOSPITAL | Age: 77
Discharge: HOME | End: 2022-11-29
Payer: MEDICARE

## 2022-11-29 ENCOUNTER — TELEPHONE (OUTPATIENT)
Dept: UROLOGY | Facility: CLINIC | Age: 77
End: 2022-11-29
Payer: MEDICARE

## 2022-11-29 DIAGNOSIS — R39.89 BLADDER PAIN: ICD-10-CM

## 2022-11-29 DIAGNOSIS — R31.0 GROSS HEMATURIA: ICD-10-CM

## 2022-11-29 PROCEDURE — 74178 CT ABD&PLV WO CNTR FLWD CNTR: CPT | Mod: TC,PO

## 2022-11-29 PROCEDURE — 25500020 PHARM REV CODE 255: Mod: PO

## 2022-11-29 PROCEDURE — 74178 CT UROGRAM ABD PELVIS W WO: ICD-10-PCS | Mod: 26,,, | Performed by: RADIOLOGY

## 2022-11-29 PROCEDURE — 74178 CT ABD&PLV WO CNTR FLWD CNTR: CPT | Mod: 26,,, | Performed by: RADIOLOGY

## 2022-11-29 RX ADMIN — IOHEXOL 125 ML: 350 INJECTION, SOLUTION INTRAVENOUS at 04:11

## 2022-11-29 NOTE — TELEPHONE ENCOUNTER
Pt states scheduled for procedure, CT urogram tomorrow. Pt asking about fasting, states received an email about lab work tomorrow and fasting for 10 hours. Per pt chart, fasting lab, Creatinine level,  ordered for 3pm tomorrow with instructions to fast for 10 hours prior. Pt upset, states she will try and if she cannot she will cancel it. Pt advised to call back for any further questions or concerns.   Reason for Disposition   Question about upcoming scheduled test, no triage required and triager able to answer question    Protocols used: Information Only Call - No Triage-A-

## 2022-12-12 ENCOUNTER — IMMUNIZATION (OUTPATIENT)
Dept: FAMILY MEDICINE | Facility: CLINIC | Age: 77
End: 2022-12-12
Payer: MEDICARE

## 2022-12-12 DIAGNOSIS — Z23 NEED FOR VACCINATION: Primary | ICD-10-CM

## 2022-12-12 PROCEDURE — 91312 COVID-19, MRNA, LNP-S, BIVALENT BOOSTER, PF, 30 MCG/0.3 ML DOSE: CPT | Mod: S$GLB,,, | Performed by: FAMILY MEDICINE

## 2022-12-12 PROCEDURE — 91312 COVID-19, MRNA, LNP-S, BIVALENT BOOSTER, PF, 30 MCG/0.3 ML DOSE: ICD-10-PCS | Mod: S$GLB,,, | Performed by: FAMILY MEDICINE

## 2022-12-12 PROCEDURE — 0124A COVID-19, MRNA, LNP-S, BIVALENT BOOSTER, PF, 30 MCG/0.3 ML DOSE: CPT | Mod: PBBFAC | Performed by: FAMILY MEDICINE

## 2022-12-15 ENCOUNTER — PROCEDURE VISIT (OUTPATIENT)
Dept: UROLOGY | Facility: CLINIC | Age: 77
End: 2022-12-15
Payer: MEDICARE

## 2022-12-15 VITALS — HEIGHT: 64 IN | BODY MASS INDEX: 29.47 KG/M2 | WEIGHT: 172.63 LBS

## 2022-12-15 DIAGNOSIS — Z85.51 HISTORY OF BLADDER CANCER: ICD-10-CM

## 2022-12-15 DIAGNOSIS — R31.0 GROSS HEMATURIA: ICD-10-CM

## 2022-12-15 PROCEDURE — 52000 CYSTOURETHROSCOPY: CPT | Mod: S$GLB,,, | Performed by: UROLOGY

## 2022-12-15 PROCEDURE — 52000 CYSTOSCOPY: ICD-10-PCS | Mod: S$GLB,,, | Performed by: UROLOGY

## 2022-12-15 NOTE — PROCEDURES
Cystoscopy    Date/Time: 12/15/2022 9:00 AM  Performed by: MELI Daigle MD  Authorized by: Ana Dunaway NP     Consent Done?:  Yes (Written)  Timeout: prior to procedure the correct patient, procedure, and site was verified    Prep: patient was prepped and draped in usual sterile fashion    Anesthesia:  Lidocaine jelly  Indications: history bladder cancer    Position:  Other  Anesthesia:  Lidocaine jelly  Patient sedated?: No    Preparation: Patient was prepped and draped in usual sterile fashion    Scope type:  Flexible cystoscope   patient tolerated the procedure well with no immediate complications    Blood Loss:  None    77-year-old with a distant history of bladder cancer.  She was diagnosed over 30 years ago and has had no recurrences.  Her last cystoscopy was 2016.  She had gross hematuria last month.  Initially her cultures were positive for Klebsiella and Proteus.  She completed a course of antibiotics.  She is had no more episodes of hematuria.  CT urogram was obtained which was unremarkable.  She is here for cystoscopy.    The patient was placed in the frog-leg position.  The genitals were prepped and draped in a sterile fashion.  Viscous lidocaine jelly was instilled into the urethra.  Using a flexible scope, a careful cystoscopic exam was then performed.  The entire bladder mucosa was systematically visualized.  The mucosa appeared normal.  There were no lesions, masses foreign bodies or stones.   Each ureteral orifices were visualized and both had clear efflux of urine.  On retroflexion the bladder neck appeared normal.  The cystoscope was then removed and I examined the entire length of the urethra.  The urethra appeared normal.  She tolerated the procedure well.  There were no complications.    Impression:  Normal cystoscopy.    Plan:  Follow-up as needed

## 2023-02-01 ENCOUNTER — PATIENT MESSAGE (OUTPATIENT)
Dept: HEMATOLOGY/ONCOLOGY | Facility: CLINIC | Age: 78
End: 2023-02-01
Payer: MEDICARE

## 2023-02-07 DIAGNOSIS — Z00.00 ENCOUNTER FOR MEDICARE ANNUAL WELLNESS EXAM: ICD-10-CM

## 2023-02-09 DIAGNOSIS — Z00.00 ENCOUNTER FOR MEDICARE ANNUAL WELLNESS EXAM: ICD-10-CM

## 2023-02-13 ENCOUNTER — HOSPITAL ENCOUNTER (OUTPATIENT)
Dept: RADIOLOGY | Facility: HOSPITAL | Age: 78
Discharge: HOME | End: 2023-02-13
Attending: NURSE PRACTITIONER
Payer: MEDICARE

## 2023-02-13 DIAGNOSIS — Z85.3 HISTORY OF BREAST CANCER: ICD-10-CM

## 2023-02-13 PROCEDURE — 71046 X-RAY EXAM CHEST 2 VIEWS: CPT | Mod: 26,HCNC,, | Performed by: RADIOLOGY

## 2023-02-13 PROCEDURE — 71046 XR CHEST PA AND LATERAL: ICD-10-PCS | Mod: 26,HCNC,, | Performed by: RADIOLOGY

## 2023-02-13 PROCEDURE — 71046 X-RAY EXAM CHEST 2 VIEWS: CPT | Mod: TC,HCNC,FY,PO

## 2023-02-15 ENCOUNTER — TELEPHONE (OUTPATIENT)
Dept: RADIOLOGY | Facility: HOSPITAL | Age: 78
End: 2023-02-15
Payer: MEDICARE

## 2023-02-15 ENCOUNTER — OFFICE VISIT (OUTPATIENT)
Dept: HEMATOLOGY/ONCOLOGY | Facility: CLINIC | Age: 78
End: 2023-02-15
Payer: MEDICARE

## 2023-02-15 VITALS
HEART RATE: 84 BPM | DIASTOLIC BLOOD PRESSURE: 78 MMHG | RESPIRATION RATE: 16 BRPM | OXYGEN SATURATION: 97 % | TEMPERATURE: 98 F | SYSTOLIC BLOOD PRESSURE: 138 MMHG | HEIGHT: 64 IN | BODY MASS INDEX: 30.53 KG/M2 | WEIGHT: 178.81 LBS

## 2023-02-15 DIAGNOSIS — Z92.21 PERSONAL HISTORY OF ANTINEOPLASTIC CHEMOTHERAPY: ICD-10-CM

## 2023-02-15 DIAGNOSIS — Z85.3 HISTORY OF BREAST CANCER: Primary | ICD-10-CM

## 2023-02-15 DIAGNOSIS — M81.0 OSTEOPOROSIS, POST-MENOPAUSAL: ICD-10-CM

## 2023-02-15 DIAGNOSIS — Z90.12 HX OF LEFT MASTECTOMY: ICD-10-CM

## 2023-02-15 DIAGNOSIS — Z85.51 HISTORY OF BLADDER CANCER: ICD-10-CM

## 2023-02-15 PROCEDURE — 3078F DIAST BP <80 MM HG: CPT | Mod: HCNC,CPTII,S$GLB, | Performed by: NURSE PRACTITIONER

## 2023-02-15 PROCEDURE — 99999 PR PBB SHADOW E&M-EST. PATIENT-LVL IV: ICD-10-PCS | Mod: PBBFAC,HCNC,, | Performed by: NURSE PRACTITIONER

## 2023-02-15 PROCEDURE — 99213 OFFICE O/P EST LOW 20 MIN: CPT | Mod: HCNC,S$GLB,, | Performed by: NURSE PRACTITIONER

## 2023-02-15 PROCEDURE — 1126F AMNT PAIN NOTED NONE PRSNT: CPT | Mod: HCNC,CPTII,S$GLB, | Performed by: NURSE PRACTITIONER

## 2023-02-15 PROCEDURE — 99999 PR PBB SHADOW E&M-EST. PATIENT-LVL IV: CPT | Mod: PBBFAC,HCNC,, | Performed by: NURSE PRACTITIONER

## 2023-02-15 PROCEDURE — 3288F FALL RISK ASSESSMENT DOCD: CPT | Mod: HCNC,CPTII,S$GLB, | Performed by: NURSE PRACTITIONER

## 2023-02-15 PROCEDURE — 1160F RVW MEDS BY RX/DR IN RCRD: CPT | Mod: HCNC,CPTII,S$GLB, | Performed by: NURSE PRACTITIONER

## 2023-02-15 PROCEDURE — 1126F PR PAIN SEVERITY QUANTIFIED, NO PAIN PRESENT: ICD-10-PCS | Mod: HCNC,CPTII,S$GLB, | Performed by: NURSE PRACTITIONER

## 2023-02-15 PROCEDURE — 1160F PR REVIEW ALL MEDS BY PRESCRIBER/CLIN PHARMACIST DOCUMENTED: ICD-10-PCS | Mod: HCNC,CPTII,S$GLB, | Performed by: NURSE PRACTITIONER

## 2023-02-15 PROCEDURE — 99213 PR OFFICE/OUTPT VISIT, EST, LEVL III, 20-29 MIN: ICD-10-PCS | Mod: HCNC,S$GLB,, | Performed by: NURSE PRACTITIONER

## 2023-02-15 PROCEDURE — 3078F PR MOST RECENT DIASTOLIC BLOOD PRESSURE < 80 MM HG: ICD-10-PCS | Mod: HCNC,CPTII,S$GLB, | Performed by: NURSE PRACTITIONER

## 2023-02-15 PROCEDURE — 3075F PR MOST RECENT SYSTOLIC BLOOD PRESS GE 130-139MM HG: ICD-10-PCS | Mod: HCNC,CPTII,S$GLB, | Performed by: NURSE PRACTITIONER

## 2023-02-15 PROCEDURE — 1101F PR PT FALLS ASSESS DOC 0-1 FALLS W/OUT INJ PAST YR: ICD-10-PCS | Mod: HCNC,CPTII,S$GLB, | Performed by: NURSE PRACTITIONER

## 2023-02-15 PROCEDURE — 1101F PT FALLS ASSESS-DOCD LE1/YR: CPT | Mod: HCNC,CPTII,S$GLB, | Performed by: NURSE PRACTITIONER

## 2023-02-15 PROCEDURE — 1159F MED LIST DOCD IN RCRD: CPT | Mod: HCNC,CPTII,S$GLB, | Performed by: NURSE PRACTITIONER

## 2023-02-15 PROCEDURE — 3075F SYST BP GE 130 - 139MM HG: CPT | Mod: HCNC,CPTII,S$GLB, | Performed by: NURSE PRACTITIONER

## 2023-02-15 PROCEDURE — 3288F PR FALLS RISK ASSESSMENT DOCUMENTED: ICD-10-PCS | Mod: HCNC,CPTII,S$GLB, | Performed by: NURSE PRACTITIONER

## 2023-02-15 PROCEDURE — 1159F PR MEDICATION LIST DOCUMENTED IN MEDICAL RECORD: ICD-10-PCS | Mod: HCNC,CPTII,S$GLB, | Performed by: NURSE PRACTITIONER

## 2023-02-15 NOTE — TELEPHONE ENCOUNTER
I left a message for patient to call back to schedule right breast mammogram on or after 8-11-23 per ordering

## 2023-02-15 NOTE — PROGRESS NOTES
Subjective:      Name: Yuliana Mattson  : 1945  MRN: [id number]    CC:  Annual breast surveillance    HPI:   Yuliana Mattson is a 77 y.o. female presents for annual breast surveillance.    This is a 77-year-old white female known to Dr. Corral for DCIS of the left breast.    Hx of bladder cancer (30+ yrs ago), Osteoporosis (to be managed by Dr. Hughes), Psoriatic arthritis (managed by Dr. Hughes), CAD, Cholelithiasis, hearing loss, HTN, HLD, Iritis, Hemorrhoids, Endometriosis, Rosacea     Ms. Mattson was diagnosed in  and treated with lumpectomy followed by radiation.    In , she was diagnosed with Stage I infiltrating left breast carcinoma, which was high-grade, ER positive and ND negative.    She then underwent a left mastectomy with immediate reconstruction, 4 cycles of A/C as well as 60 months of adjuvant Tamoxifen/Arimidex.      21:  Patchy, reddened area to left breast; punch biopsy in Dermatology = dermatitis     Today:  02/15/23  She presents to the clinic today for her annual breast evaluation (approx 24 yrs).     She was sick over .  She can feel the cyst in her right breast.  Requesting a tattoo to areola.  She denies any new breast complaints, bone pain, fevers, chills, drenching night sweats, lymphadenopathy, irregular heartbeat, chest pain, abdominal discomfort, nausea, vomiting, constipation, diarrhea, postmenopausal bleeding, difficulties with hot flashes, unexplained weight loss, etc.  No other new complaints or pertinent findings on a 14-point review of systems.     To note:  Patient requested Digital Diagnostic Mammograms each year.  Patient reports that dentist informed her the she has a tooth with good roots, but (2) large pockets in the jawline.  The tooth will die.    To note:  The patient was on an oral bisphosphate Boniva:  ; Actonel  - 2010.         Past Medical History:   Diagnosis Date    Allergic rhinitis     Anticoagulant long-term use      ASPIRIN ONLY - (stops it when she presents a hemorrhagic cystitis)    Bladder cancer     Blood transfusion     Breast cancer     CAD (coronary artery disease), native coronary artery     40% non obstructive    Cholelithiasis     Endometriosis     Headache(784.0)     HEARING LOSS     Hemorrhoids     HLD (hyperlipidemia)     Hypertension     Iritis     Left wrist fracture 2009    traumatic    Malignant neoplasm of other specified sites of bladder 12/3/2009    Osteoporosis, postmenopausal     PONV (postoperative nausea and vomiting)     Rash     Rosacea     UTI (urinary tract infection)     Vaginal delivery     x 2       Past Surgical History:   Procedure Laterality Date    ADENOIDECTOMY      APPENDECTOMY      BLADDER TUMOR EXCISION  1979    [facility], dr garcia - no recurrences since    BREAST BIOPSY Right     4 core bx negative    BREAST SURGERY Left 1998    ESOPHAGOGASTRODUODENOSCOPY N/A 04/21/2022    Procedure: ESOPHAGOGASTRODUODENOSCOPY (EGD);  Surgeon: Guru Maddox MD;  Location: [facility];  Service: Endoscopy;  Laterality: N/A;    EYE SURGERY  2019    HYSTERECTOMY      MASTECTOMY, PARTIAL  1995    left side - cancer - had radiotherapy 1995, 1998 had chemotherapy    oophrect      pelvic mass      benign    TONSILLECTOMY      TONSILLECTOMY, ADENOIDECTOMY, BILATERAL MYRINGOTOMY AND TUBES      tram flap Left 1998       Family History   Problem Relation Age of Onset    Glaucoma Father     Glaucoma Paternal Aunt     Glaucoma Paternal Grandmother     Cancer Cousin         1st, ovarian    Amblyopia Neg Hx     Blindness Neg Hx     Cataracts Neg Hx     Hypertension Neg Hx     Macular degeneration Neg Hx     Retinal detachment Neg Hx     Strabismus Neg Hx     Stroke Neg Hx     Thyroid disease Neg Hx     Melanoma Neg Hx        Social History     Socioeconomic History    Marital status:    Occupational History    Occupation: retired accounting   Tobacco Use    Smoking status: Never    Smokeless  "tobacco: Never   Substance and Sexual Activity    Alcohol use: No    Drug use: No     Social Determinants of Health     Food Insecurity: No Food Insecurity    Worried About Running Out of Food in the Last Year: Never true    Ran Out of Food in the Last Year: Never true   Transportation Needs: No Transportation Needs    Lack of Transportation (Medical): No    Lack of Transportation (Non-Medical): No   Physical Activity: Unknown    Days of Exercise per Week: 0 days   Stress: Stress Concern Present    Feeling of Stress : To some extent   Social Connections: Unknown    Frequency of Communication with Friends and Family: More than three times a week    Frequency of Social Gatherings with Friends and Family: Three times a week    Active Member of Clubs or Organizations: Yes    Attends Club or Organization Meetings: More than 4 times per year    Marital Status:    Housing Stability: Low Risk     Unable to Pay for Housing in the Last Year: No    Number of Places Lived in the Last Year: 1    Unstable Housing in the Last Year: No       Review of patient's allergies indicates:   Allergen Reactions    Prochlorperazine edisylate Anaphylaxis     compazine       Review of Systems   Eyes:  Negative for visual disturbance.   Cardiovascular:  Negative for chest pain.   Respiratory:  Negative for shortness of breath.    Hematologic/Lymphatic: Negative for adenopathy.   Skin:  Negative for rash.   Gastrointestinal:  Negative for abdominal pain and diarrhea.   Psychiatric/Behavioral:  The patient is not nervous/anxious.           Objective:   Weight:  Loss of 4 1/2 pounds in 1 year  Vitals:    02/15/23 1002   Pulse: 84   Resp: 16   Temp: 97.7 °F (36.5 °C)   TempSrc: Temporal   SpO2: 97%   Weight: 81.1 kg (178 lb 12.7 oz)   Height: 5' 4" (1.626 m)        Physical Exam  Vitals reviewed.   Constitutional:       General: She is not in acute distress.  HENT:      Head: Normocephalic and atraumatic.   Eyes:      Conjunctiva/sclera: " Conjunctivae normal.   Cardiovascular:      Rate and Rhythm: Normal rate and regular rhythm.      Pulses: Normal pulses.      Heart sounds: Normal heart sounds.   Pulmonary:      Effort: Pulmonary effort is normal.      Breath sounds: Normal breath sounds. No wheezing.   Chest:      Comments: BREASTS:  Right breast remains soft; free of masses, nipple discharge or inversion; tenderness at 12:00 o'clock position.  Left breast with noted reconstruction with no signs of local reoccurrence.  Abdominal:      General: Bowel sounds are normal.      Palpations: Abdomen is soft.      Tenderness: There is no abdominal tenderness.   Musculoskeletal:         General: Normal range of motion.      Cervical back: Neck supple.      Right lower leg: No edema.      Left lower leg: No edema.   Lymphadenopathy:      Cervical: No cervical adenopathy.      Upper Body:      Right upper body: No supraclavicular or axillary adenopathy.      Left upper body: No supraclavicular or axillary adenopathy.      Lower Body: No right inguinal adenopathy. No left inguinal adenopathy.   Skin:     General: Skin is warm and dry.   Neurological:      Mental Status: She is alert and oriented to person, place, and time.   Psychiatric:         Behavior: Behavior normal.         Thought Content: Thought content normal.           Current Outpatient Medications on File Prior to Visit   Medication Sig    albuterol (PROVENTIL/VENTOLIN HFA) 90 mcg/actuation inhaler Inhale 2 puffs into the lungs every 6 (six) hours as needed for Wheezing or Shortness of Breath. Rescue    ALPRAZolam (XANAX) 0.25 MG tablet Take 1 tablet (0.25 mg total) by mouth 4 (four) times daily as needed for Anxiety.    amoxicillin-clavulanate 875-125mg (AUGMENTIN) 875-125 mg per tablet Take 1 tablet by mouth 2 (two) times daily. (Patient not taking: Reported on 11/21/2022)    apremilast (OTEZLA) 30 mg Tab Take 1 tablet (30 mg total) by mouth 2 (two) times a day. (Patient not taking: Reported on  11/21/2022)    clindamycin-benzoyl peroxide (BENZACLIN) gel APPLY TO FACE DAILY AS DIRECTED    fluocinolone acetonide oiL 0.01 % Drop Place 1 drop in ear(s) daily as needed (itching). (Patient not taking: Reported on 12/15/2022)    fluticasone furoate-vilanteroL (BREO ELLIPTA) 200-25 mcg/dose DsDv diskus inhaler Inhale 1 puff into the lungs once daily. Controller    losartan-hydrochlorothiazide 50-12.5 mg (HYZAAR) 50-12.5 mg per tablet Take 1 tablet by mouth once daily.    montelukast (SINGULAIR) 10 mg tablet Take 1 tablet (10 mg total) by mouth every evening.    oxybutynin (DITROPAN-XL) 5 MG TR24 Take 1 tablet (5 mg total) by mouth once daily.    pantoprazole (PROTONIX) 40 MG tablet Take 1 tablet (40 mg total) by mouth once daily.    tretinoin (RETIN-A) 0.025 % cream Apply a pea-sized amount to entire face at bedtime, start every other night and increase as tolerated. Take night off if irritation develops.    vitamin D (VITAMIN D3) 1000 units Tab Take 1,000 Units by mouth once daily.     No current facility-administered medications on file prior to visit.       CBC:  Lab Results   Component Value Date    WBC 6.71 02/13/2023    HGB 13.4 02/13/2023    HCT 40.0 02/13/2023    MCV 88 02/13/2023     02/13/2023     CMP:  Sodium   Date Value Ref Range Status   02/13/2023 138 136 - 145 mmol/L Final     Potassium   Date Value Ref Range Status   02/13/2023 4.0 3.5 - 5.1 mmol/L Final     Chloride   Date Value Ref Range Status   02/13/2023 102 95 - 110 mmol/L Final     CO2   Date Value Ref Range Status   02/13/2023 25 23 - 29 mmol/L Final     Glucose   Date Value Ref Range Status   02/13/2023 136 (H) 70 - 110 mg/dL Final     BUN   Date Value Ref Range Status   02/13/2023 18 8 - 23 mg/dL Final     Creatinine   Date Value Ref Range Status   02/13/2023 0.9 0.5 - 1.4 mg/dL Final     Calcium   Date Value Ref Range Status   02/13/2023 9.8 8.7 - 10.5 mg/dL Final     Total Protein   Date Value Ref Range Status   02/13/2023 7.1  6.0 - 8.4 g/dL Final     Albumin   Date Value Ref Range Status   02/13/2023 3.7 3.5 - 5.2 g/dL Final     Total Bilirubin   Date Value Ref Range Status   02/13/2023 0.3 0.1 - 1.0 mg/dL Final     Comment:     For infants and newborns, interpretation of results should be based  on gestational age, weight and in agreement with clinical  observations.    Premature Infant recommended reference ranges:  Up to 24 hours.............<8.0 mg/dL  Up to 48 hours............<12.0 mg/dL  3-5 days..................<15.0 mg/dL  6-29 days.................<15.0 mg/dL       Alkaline Phosphatase   Date Value Ref Range Status   02/13/2023 138 (H) 55 - 135 U/L Final     AST   Date Value Ref Range Status   02/13/2023 18 10 - 40 U/L Final     ALT   Date Value Ref Range Status   02/13/2023 27 10 - 44 U/L Final     Anion Gap   Date Value Ref Range Status   02/13/2023 11 8 - 16 mmol/L Final     eGFR if    Date Value Ref Range Status   04/28/2022 >60.0 >60 mL/min/1.73 m^2 Final     eGFR if non    Date Value Ref Range Status   04/28/2022 >60.0 >60 mL/min/1.73 m^2 Final     Comment:     Calculation used to obtain the estimated glomerular filtration  rate (eGFR) is the CKD-EPI equation.        LDH:  185    CXR  Narrative: EXAMINATION:  XR CHEST PA AND LATERAL    CLINICAL HISTORY:  Personal history of malignant neoplasm of breast    TECHNIQUE:  PA and lateral views of the chest were performed.    COMPARISON:  04/18/2022    FINDINGS:  Heart size and pulmonary vessels are normal.  The lungs are clear.  No pleural effusion or pneumothorax are identified.  Skeletal structures are intact.  There has been no significant change.  Impression: No radiographic evidence of active chest disease.    Electronically signed by: Jett Davalos MD  Date:    02/13/2023  Time:    13:33     08/11/2022:  Mammo, right;  Impression:  There is no mammographic or sonographic evidence of malignancy in the right breast.     BI-RADS Category:    Overall: 2 - Benign     Recommendation:  Routine screening mammogram in 1 year is recommended.  All pertinent labs and imaging reviewed.    US Breast Right Limited  Right  Cyst: There is a 5 mm oval simple cyst with circumscribed margins seen in the retroareolar region of the right breast. Compared to the previous study, there are no significant changes. There are multiple simple cysts noted in the upper right breast, most notably a 7 mm simple cyst at 1 o'clock 5 cm from the nipple.   There is no evidence of suspicious masses or other abnormal findings in the right breast.    Assessment:       1. History of breast cancer    2. Personal history of antineoplastic chemotherapy         Plan:     History of breast cancer    Personal history of antineoplastic chemotherapy    Hx of DCIS of left breast - STACY @ 24 yrs post; F/U in 1 year with CBC, CMP, Mammo (on or after 08/11/2023)  Osteoporosis - follow with Dr. Hughes  Psoriatic arthritis - follow in Rheumatology  Ambulatory referral to Breast Surgery for Areola Tattoo placed (Ivette Cardenas)       Route Chart for Scheduling    Med Onc Chart Routing      Follow up with physician    Follow up with TRISTIN 1 year. f/u in 1 year with CBC, CMP & Mammo (on or after 08/11/2023)   Infusion scheduling note    Injection scheduling note    Labs    Imaging    Pharmacy appointment    Other referrals                  Answers submitted by the patient for this visit:  Review of Systems Questionnaire (Submitted on 2/14/2023)  appetite change : No  unexpected weight change: No  mouth sores: No

## 2023-02-25 ENCOUNTER — PATIENT MESSAGE (OUTPATIENT)
Dept: ADMINISTRATIVE | Facility: OTHER | Age: 78
End: 2023-02-25
Payer: MEDICARE

## 2023-03-06 ENCOUNTER — TELEPHONE (OUTPATIENT)
Dept: SURGERY | Facility: CLINIC | Age: 78
End: 2023-03-06
Payer: MEDICARE

## 2023-03-06 NOTE — TELEPHONE ENCOUNTER
Spoke with patient regarding phone room message. Pt s/p bilateral mastectomy in 1998. Tattooed twice, but ink has now faded. Interested in getting nipple tattoos redone. Patient scheduled with Ivette Cardenas PA-C on 3/15 at 11:30. All questions answered at this time.

## 2023-03-15 ENCOUNTER — OFFICE VISIT (OUTPATIENT)
Dept: SURGERY | Facility: CLINIC | Age: 78
End: 2023-03-15
Payer: MEDICARE

## 2023-03-15 VITALS
SYSTOLIC BLOOD PRESSURE: 162 MMHG | WEIGHT: 178 LBS | DIASTOLIC BLOOD PRESSURE: 76 MMHG | HEART RATE: 78 BPM | BODY MASS INDEX: 30.39 KG/M2 | HEIGHT: 64 IN

## 2023-03-15 DIAGNOSIS — Z12.39 SCREENING BREAST EXAMINATION: ICD-10-CM

## 2023-03-15 DIAGNOSIS — Z42.8 ENCOUNTER FOR OTHER PLASTIC AND RECONSTRUCTIVE SURGERY FOLLOWING MEDICAL PROCEDURE OR HEALED INJURY: Primary | ICD-10-CM

## 2023-03-15 PROCEDURE — 1101F PR PT FALLS ASSESS DOC 0-1 FALLS W/OUT INJ PAST YR: ICD-10-PCS | Mod: HCNC,CPTII,S$GLB, | Performed by: PHYSICIAN ASSISTANT

## 2023-03-15 PROCEDURE — 3078F PR MOST RECENT DIASTOLIC BLOOD PRESSURE < 80 MM HG: ICD-10-PCS | Mod: HCNC,CPTII,S$GLB, | Performed by: PHYSICIAN ASSISTANT

## 2023-03-15 PROCEDURE — 3288F PR FALLS RISK ASSESSMENT DOCUMENTED: ICD-10-PCS | Mod: HCNC,CPTII,S$GLB, | Performed by: PHYSICIAN ASSISTANT

## 2023-03-15 PROCEDURE — 1126F PR PAIN SEVERITY QUANTIFIED, NO PAIN PRESENT: ICD-10-PCS | Mod: HCNC,CPTII,S$GLB, | Performed by: PHYSICIAN ASSISTANT

## 2023-03-15 PROCEDURE — 99203 PR OFFICE/OUTPT VISIT, NEW, LEVL III, 30-44 MIN: ICD-10-PCS | Mod: HCNC,S$GLB,, | Performed by: PHYSICIAN ASSISTANT

## 2023-03-15 PROCEDURE — 3288F FALL RISK ASSESSMENT DOCD: CPT | Mod: HCNC,CPTII,S$GLB, | Performed by: PHYSICIAN ASSISTANT

## 2023-03-15 PROCEDURE — 3078F DIAST BP <80 MM HG: CPT | Mod: HCNC,CPTII,S$GLB, | Performed by: PHYSICIAN ASSISTANT

## 2023-03-15 PROCEDURE — 99203 OFFICE O/P NEW LOW 30 MIN: CPT | Mod: HCNC,S$GLB,, | Performed by: PHYSICIAN ASSISTANT

## 2023-03-15 PROCEDURE — 99999 PR PBB SHADOW E&M-EST. PATIENT-LVL III: ICD-10-PCS | Mod: PBBFAC,HCNC,, | Performed by: PHYSICIAN ASSISTANT

## 2023-03-15 PROCEDURE — 1126F AMNT PAIN NOTED NONE PRSNT: CPT | Mod: HCNC,CPTII,S$GLB, | Performed by: PHYSICIAN ASSISTANT

## 2023-03-15 PROCEDURE — 3077F SYST BP >= 140 MM HG: CPT | Mod: HCNC,CPTII,S$GLB, | Performed by: PHYSICIAN ASSISTANT

## 2023-03-15 PROCEDURE — 1101F PT FALLS ASSESS-DOCD LE1/YR: CPT | Mod: HCNC,CPTII,S$GLB, | Performed by: PHYSICIAN ASSISTANT

## 2023-03-15 PROCEDURE — 3077F PR MOST RECENT SYSTOLIC BLOOD PRESSURE >= 140 MM HG: ICD-10-PCS | Mod: HCNC,CPTII,S$GLB, | Performed by: PHYSICIAN ASSISTANT

## 2023-03-15 PROCEDURE — 99999 PR PBB SHADOW E&M-EST. PATIENT-LVL III: CPT | Mod: PBBFAC,HCNC,, | Performed by: PHYSICIAN ASSISTANT

## 2023-03-15 NOTE — Clinical Note
Taran Avelar,   I wanted to thank you for sending this patient to my clinic for 3D areola tattooing. She is the sweetest and I am excited to offer her this service. We have her all set up for next week. I also wanted to ask how you heard about me - I am new to offering this service so just curious as I grow this part of my practice. There is some thought to offering a clinic day a month on the [facility] for our patients who may prefer to stay closer to home. Would you say from what you see in your clinic that there a good number of patients that might be interested in this?  Would love to hear your thoughts! Ivette

## 2023-03-16 ENCOUNTER — TELEPHONE (OUTPATIENT)
Dept: RADIOLOGY | Facility: HOSPITAL | Age: 78
End: 2023-03-16
Payer: MEDICARE

## 2023-03-22 ENCOUNTER — OFFICE VISIT (OUTPATIENT)
Dept: PULMONOLOGY | Facility: CLINIC | Age: 78
End: 2023-03-22
Payer: MEDICARE

## 2023-03-22 VITALS
WEIGHT: 177 LBS | HEART RATE: 75 BPM | BODY MASS INDEX: 30.22 KG/M2 | HEIGHT: 64 IN | DIASTOLIC BLOOD PRESSURE: 71 MMHG | OXYGEN SATURATION: 99 % | SYSTOLIC BLOOD PRESSURE: 132 MMHG

## 2023-03-22 DIAGNOSIS — J45.40 MODERATE PERSISTENT ASTHMA WITHOUT COMPLICATION: Primary | ICD-10-CM

## 2023-03-22 DIAGNOSIS — R05.3 CHRONIC COUGH: ICD-10-CM

## 2023-03-22 DIAGNOSIS — J30.2 SEASONAL ALLERGIC RHINITIS, UNSPECIFIED TRIGGER: ICD-10-CM

## 2023-03-22 PROCEDURE — 3288F PR FALLS RISK ASSESSMENT DOCUMENTED: ICD-10-PCS | Mod: HCNC,CPTII,S$GLB, | Performed by: INTERNAL MEDICINE

## 2023-03-22 PROCEDURE — 1101F PT FALLS ASSESS-DOCD LE1/YR: CPT | Mod: HCNC,CPTII,S$GLB, | Performed by: INTERNAL MEDICINE

## 2023-03-22 PROCEDURE — 3078F PR MOST RECENT DIASTOLIC BLOOD PRESSURE < 80 MM HG: ICD-10-PCS | Mod: HCNC,CPTII,S$GLB, | Performed by: INTERNAL MEDICINE

## 2023-03-22 PROCEDURE — 1101F PR PT FALLS ASSESS DOC 0-1 FALLS W/OUT INJ PAST YR: ICD-10-PCS | Mod: HCNC,CPTII,S$GLB, | Performed by: INTERNAL MEDICINE

## 2023-03-22 PROCEDURE — 1159F MED LIST DOCD IN RCRD: CPT | Mod: HCNC,CPTII,S$GLB, | Performed by: INTERNAL MEDICINE

## 2023-03-22 PROCEDURE — 99214 OFFICE O/P EST MOD 30 MIN: CPT | Mod: HCNC,S$GLB,, | Performed by: INTERNAL MEDICINE

## 2023-03-22 PROCEDURE — 99214 PR OFFICE/OUTPT VISIT, EST, LEVL IV, 30-39 MIN: ICD-10-PCS | Mod: HCNC,S$GLB,, | Performed by: INTERNAL MEDICINE

## 2023-03-22 PROCEDURE — 99999 PR PBB SHADOW E&M-EST. PATIENT-LVL III: ICD-10-PCS | Mod: PBBFAC,HCNC,, | Performed by: INTERNAL MEDICINE

## 2023-03-22 PROCEDURE — 1159F PR MEDICATION LIST DOCUMENTED IN MEDICAL RECORD: ICD-10-PCS | Mod: HCNC,CPTII,S$GLB, | Performed by: INTERNAL MEDICINE

## 2023-03-22 PROCEDURE — 1126F PR PAIN SEVERITY QUANTIFIED, NO PAIN PRESENT: ICD-10-PCS | Mod: HCNC,CPTII,S$GLB, | Performed by: INTERNAL MEDICINE

## 2023-03-22 PROCEDURE — 3075F SYST BP GE 130 - 139MM HG: CPT | Mod: HCNC,CPTII,S$GLB, | Performed by: INTERNAL MEDICINE

## 2023-03-22 PROCEDURE — 3075F PR MOST RECENT SYSTOLIC BLOOD PRESS GE 130-139MM HG: ICD-10-PCS | Mod: HCNC,CPTII,S$GLB, | Performed by: INTERNAL MEDICINE

## 2023-03-22 PROCEDURE — 3078F DIAST BP <80 MM HG: CPT | Mod: HCNC,CPTII,S$GLB, | Performed by: INTERNAL MEDICINE

## 2023-03-22 PROCEDURE — 99999 PR PBB SHADOW E&M-EST. PATIENT-LVL III: CPT | Mod: PBBFAC,HCNC,, | Performed by: INTERNAL MEDICINE

## 2023-03-22 PROCEDURE — 3288F FALL RISK ASSESSMENT DOCD: CPT | Mod: HCNC,CPTII,S$GLB, | Performed by: INTERNAL MEDICINE

## 2023-03-22 PROCEDURE — 1126F AMNT PAIN NOTED NONE PRSNT: CPT | Mod: HCNC,CPTII,S$GLB, | Performed by: INTERNAL MEDICINE

## 2023-03-22 RX ORDER — FLUTICASONE FUROATE AND VILANTEROL 100; 25 UG/1; UG/1
1 POWDER RESPIRATORY (INHALATION) DAILY
Qty: 60 EACH | Refills: 11 | Status: SHIPPED | OUTPATIENT
Start: 2023-03-22 | End: 2024-01-09

## 2023-03-22 RX ORDER — ALBUTEROL SULFATE 90 UG/1
2 AEROSOL, METERED RESPIRATORY (INHALATION) EVERY 6 HOURS PRN
Qty: 18 G | Refills: 11 | Status: SHIPPED | OUTPATIENT
Start: 2023-03-22

## 2023-03-22 NOTE — PROGRESS NOTES
"3/22/2023    Yuliana Mattson  Follow up    Chief Complaint   Patient presents with    Asthma         HPI:   03/22/2023- pt has much improved cough since December. She continues taking breo inhaler daily. She did notice slight flare of cough assoc with weed killer spray and pollen recently. She had to use albuterol once since last visit.  She is sensitive to cleaning products, wears N95 when cleaning    09/22/2022-   Use flonase 2 sprays in each nostril daily  Consider adding zyrtec of claritin in addition to singulair to dry up nose mucous  Stop flovent and start breo inhaler one puff daily  Use albuterol inhaler as needed  pt had worsened cough, switched to flovent 220 then was better. 8d now she has noticed worse cough, sticky clear mucous filling up in sinuses she usually gets w/ season change. She has been taking tylenol PM which helps dry up mucous.  Continues on singulair, not sure if helping but she has taken a long time.  She has intermittent coughing spell 10 min long- will get better if expectorates clear mucous.  Inhalers: uses flovent 220 bid, albuterol rarely      6/23/22-   Cough suspected due to asthma  Continue flovent inhaler - if cough worsens could increase to high dose  Continue albuterol as needed  Continue rheumatology follow up and treatment  Lungs look good on chest CT  Get lung function testing  Pt is a 75 yo female with HTN, HLD, bladder cancer age 34, breast ca x2 age 50 and 53- did receive radiation 1995, CAD, allergic rhinitis presenting for new evaluation.  Pt has had dry cough since Aug 2021,   c/o coughing spells 15-20 min occasionally. Coughs up mucous then feels better. She used to wake 2-3x/night with cough which is now improved.  Cough seemed to worsen 1/2022- became productive and had fever, sob, weakness, tested neg for COVID with 2 home tests and a PCR but thinks she had covid- sick and bedridden 7d. She was tx with antibiotics 4/13/22- keflex for "possible beginnings of " "pneumonia" RLL.  Has had cellulitis left leg 5/2022, UTI. Gets recurrent UTIs    Has had some workup per PCP for sinuses, tx for reflux- 40mg omeprazole bid. She did have EGD 4/2022 which was normal but reports coughed the whole time.  She has stopped ACE inhibitor 4/2022 but didn't immediately improve  Cough is now improved on flovent inhaler. Also uses albuterol qhs and prn- uses maybe once/d  Sometimes short of breath w/ coughing spells, with heat seems worse  Symptoms assoc with wheezing  Triggers: cleaning products, wears N95 when cleaning  Since January she feels winded w/ exertion, limits to going in one store when going out due to MEJÍA, fatigue. SOB got much worse now getting better.  Gets allergies- improved on singulair, has restarted since 5/2.  Per Dr Hughes's note she had uveitis, iritis, hearing loss resolved w/ high dose steroids. 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    The chief complaint problem is varies with instability at times    PFSH:  Past Medical History:   Diagnosis Date    Allergic rhinitis     Anticoagulant long-term use     ASPIRIN ONLY - (stops it when she presents a hemorrhagic cystitis)    Bladder cancer     Blood transfusion     Breast cancer     CAD (coronary artery disease), native coronary artery     40% non obstructive    Cholelithiasis     Endometriosis     Headache(784.0)     HEARING LOSS     Hemorrhoids     HLD (hyperlipidemia)     Hypertension     Iritis     Left wrist fracture 2009    traumatic    Malignant neoplasm of other specified sites of bladder 12/3/2009    Osteoporosis, postmenopausal     PONV (postoperative nausea and vomiting)     Rash     Rosacea     UTI (urinary tract infection)     Vaginal delivery     x 2 " "        Past Surgical History:   Procedure Laterality Date    ADENOIDECTOMY      APPENDECTOMY      BLADDER TUMOR EXCISION  1979    [facility], dr garcia - no recurrences since    BREAST BIOPSY Right     4 core bx negative    BREAST SURGERY Left 1998    ESOPHAGOGASTRODUODENOSCOPY N/A 04/21/2022    Procedure: ESOPHAGOGASTRODUODENOSCOPY (EGD);  Surgeon: Guru Maddox MD;  Location: [facility];  Service: Endoscopy;  Laterality: N/A;    EYE SURGERY  2019    HYSTERECTOMY      MASTECTOMY, PARTIAL  1995    left side - cancer - had radiotherapy 1995, 1998 had chemotherapy    oophrect      pelvic mass      benign    TONSILLECTOMY      TONSILLECTOMY, ADENOIDECTOMY, BILATERAL MYRINGOTOMY AND TUBES      tram flap Left 1998     Social History     Tobacco Use    Smoking status: Never    Smokeless tobacco: Never   Substance Use Topics    Alcohol use: No    Drug use: No     Family History   Problem Relation Age of Onset    Glaucoma Father     Glaucoma Paternal Aunt     Glaucoma Paternal Grandmother     Cancer Cousin         1st, ovarian    Amblyopia Neg Hx     Blindness Neg Hx     Cataracts Neg Hx     Hypertension Neg Hx     Macular degeneration Neg Hx     Retinal detachment Neg Hx     Strabismus Neg Hx     Stroke Neg Hx     Thyroid disease Neg Hx     Melanoma Neg Hx      Review of patient's allergies indicates:   Allergen Reactions    Prochlorperazine edisylate Anaphylaxis     compazine       Performance Status:The patient's activity level is functions out of house.      Review of Systems:  a review of eleven systems covering constitutional, Eye, HEENT, Psych, Respiratory, Cardiac, GI, , Musculoskeletal, Endocrine, Dermatologic was negative except for pertinent findings as listed ABOVE and below:  All negative with pertinent positives as above       Exam:Comprehensive exam done. /71 (BP Location: Right arm, Patient Position: Sitting, BP Method: Large (Automatic))   Pulse 75   Ht 5' 4" (1.626 m)   Wt 80.3 kg " (177 lb 0.5 oz)   SpO2 99%   BMI 30.39 kg/m²   Exam included Vitals as listed, and patient's appearance and affect and alertness and mood, oral exam for yeast and hygiene and pharynx lesions and Mallapatti (M) score, neck with inspection for jvd and masses and thyroid abnormalities and lymph nodes (supraclavicular and infraclavicular nodes and axillary also examined and noted if abn), chest exam included symmetry and effort and fremitus and percussion and auscultation, cardiac exam included rhythm and gallops and murmur and rubs and jvd and edema, abdominal exam for mass and hepatosplenomegaly and tenderness and hernias and bowel sounds, Musculoskeletal exam with muscle tone and posture and mobility/gait and  strength, and skin for rashes and cyanosis and pallor and turgor, extremity for clubbing.  Findings were normal except for pertinent findings listed below:  M2, oropharynx clear  HR regular  Breath sounds clear bilaterally  No edema/clubbing/rashes    Radiographs (ct chest and cxr) reviewed: view by direct vision   CT chest 5/6/22- normal lung fields. Aortic calcification. I do not appreciate any lung nodules. On chest wall left side there are some calcified nodules.  Per radiologist read: A few old calcified pulmonary granulomas are present.    CXR 4/18/22- clear  CXR 4/13/22- possible mild RML infiltrate    Labs reviewed     Latest Reference Range & Units 04/28/22 11:55   LISS Screen Negative <1:80  Positive !   LISS Titer 1  1:320   LISS PATTERN 1  Speckled   ds DNA Ab Negative 1:10  Negative 1:10   Anti-SSA Antibody 0.00 - 0.99 Ratio 0.05   Anti-SSA Interpretation Negative  Negative   Anti-SSB Antibody 0.00 - 0.99 Ratio 0.06   Anti-SSB Interpretation Negative  Negative   Anti Sm Antibody 0.00 - 0.99 Ratio 0.06   Anti-Sm Interpretation Negative  Negative   Anti Sm/RNP Antibody 0.00 - 0.99 Ratio 0.08   Anti-Sm/RNP Interpretation Negative  Negative      Latest Reference Range & Units 12/23/20 12:09   CCP  Antibodies <5.0 U/mL <0.5   Rheumatoid Factor 0.0 - 15.0 IU/mL <10.0      Latest Reference Range & Units 12/23/20 12:09   IgG 4 4 - 86 mg/dL 3 (L)        Latest Reference Range & Units 02/11/22 11:39 04/28/22 11:55   Eos # 0.0 - 0.5 K/uL 0.2 0.3        PFT  reviewed  6/2022- normal        Plan:  Clinical impression is resonably certain and repeated evaluation prn +/- follow up will be needed as below. Chronic cough/reactive airways disease- improves w/ inhalers, seasonal. Doing well, controlled on breo     Yuliana was seen today for asthma.    Diagnoses and all orders for this visit:    Moderate persistent asthma without complication  -     fluticasone furoate-vilanteroL (BREO ELLIPTA) 100-25 mcg/dose diskus inhaler; Inhale 1 puff into the lungs once daily. Controller    Seasonal allergic rhinitis, unspecified trigger    Cough  -     albuterol (PROVENTIL/VENTOLIN HFA) 90 mcg/actuation inhaler; Inhale 2 puffs into the lungs every 6 (six) hours as needed for Wheezing or Shortness of Breath. Rescue            Follow up in about 9 months (around 12/22/2023).    Discussed with patient above for education the following:      Patient Instructions   Stop breo 200 inhaler and start breo 100 once daily  Albuterol as needed  Would continue breo 100 through the rest of the year, then at next visit if doing well could consider switching to flovent.

## 2023-03-22 NOTE — PATIENT INSTRUCTIONS
Stop breo 200 inhaler and start breo 100 once daily  Albuterol as needed  Would continue breo 100 through the rest of the year, then at next visit if doing well could consider switching to flovent.

## 2023-04-03 ENCOUNTER — OFFICE VISIT (OUTPATIENT)
Dept: FAMILY MEDICINE | Facility: CLINIC | Age: 78
End: 2023-04-03
Payer: MEDICARE

## 2023-04-03 VITALS
BODY MASS INDEX: 30.6 KG/M2 | WEIGHT: 179.25 LBS | DIASTOLIC BLOOD PRESSURE: 78 MMHG | HEIGHT: 64 IN | SYSTOLIC BLOOD PRESSURE: 126 MMHG | OXYGEN SATURATION: 98 % | HEART RATE: 84 BPM

## 2023-04-03 DIAGNOSIS — D84.821 DRUG-INDUCED IMMUNODEFICIENCY: ICD-10-CM

## 2023-04-03 DIAGNOSIS — I10 PRIMARY HYPERTENSION: ICD-10-CM

## 2023-04-03 DIAGNOSIS — C80.1 MALIGNANT (PRIMARY) NEOPLASM, UNSPECIFIED: ICD-10-CM

## 2023-04-03 DIAGNOSIS — B07.0 PLANTAR WART OF RIGHT FOOT: ICD-10-CM

## 2023-04-03 DIAGNOSIS — M81.0 OSTEOPOROSIS, UNSPECIFIED OSTEOPOROSIS TYPE, UNSPECIFIED PATHOLOGICAL FRACTURE PRESENCE: ICD-10-CM

## 2023-04-03 DIAGNOSIS — E55.9 VITAMIN D DEFICIENCY: ICD-10-CM

## 2023-04-03 DIAGNOSIS — Z00.00 PREVENTATIVE HEALTH CARE: Primary | ICD-10-CM

## 2023-04-03 DIAGNOSIS — I70.0 AORTIC CALCIFICATION: ICD-10-CM

## 2023-04-03 DIAGNOSIS — Z79.899 DRUG-INDUCED IMMUNODEFICIENCY: ICD-10-CM

## 2023-04-03 PROCEDURE — 1159F MED LIST DOCD IN RCRD: CPT | Mod: HCNC,CPTII,S$GLB, | Performed by: FAMILY MEDICINE

## 2023-04-03 PROCEDURE — 99397 PR PREVENTIVE VISIT,EST,65 & OVER: ICD-10-PCS | Mod: HCNC,S$GLB,, | Performed by: FAMILY MEDICINE

## 2023-04-03 PROCEDURE — 3078F DIAST BP <80 MM HG: CPT | Mod: HCNC,CPTII,S$GLB, | Performed by: FAMILY MEDICINE

## 2023-04-03 PROCEDURE — 3078F PR MOST RECENT DIASTOLIC BLOOD PRESSURE < 80 MM HG: ICD-10-PCS | Mod: HCNC,CPTII,S$GLB, | Performed by: FAMILY MEDICINE

## 2023-04-03 PROCEDURE — 1160F RVW MEDS BY RX/DR IN RCRD: CPT | Mod: HCNC,CPTII,S$GLB, | Performed by: FAMILY MEDICINE

## 2023-04-03 PROCEDURE — 99999 PR PBB SHADOW E&M-EST. PATIENT-LVL IV: ICD-10-PCS | Mod: PBBFAC,HCNC,, | Performed by: FAMILY MEDICINE

## 2023-04-03 PROCEDURE — 1159F PR MEDICATION LIST DOCUMENTED IN MEDICAL RECORD: ICD-10-PCS | Mod: HCNC,CPTII,S$GLB, | Performed by: FAMILY MEDICINE

## 2023-04-03 PROCEDURE — 3074F SYST BP LT 130 MM HG: CPT | Mod: HCNC,CPTII,S$GLB, | Performed by: FAMILY MEDICINE

## 2023-04-03 PROCEDURE — 99397 PER PM REEVAL EST PAT 65+ YR: CPT | Mod: HCNC,S$GLB,, | Performed by: FAMILY MEDICINE

## 2023-04-03 PROCEDURE — 1160F PR REVIEW ALL MEDS BY PRESCRIBER/CLIN PHARMACIST DOCUMENTED: ICD-10-PCS | Mod: HCNC,CPTII,S$GLB, | Performed by: FAMILY MEDICINE

## 2023-04-03 PROCEDURE — 99999 PR PBB SHADOW E&M-EST. PATIENT-LVL IV: CPT | Mod: PBBFAC,HCNC,, | Performed by: FAMILY MEDICINE

## 2023-04-03 PROCEDURE — 1126F PR PAIN SEVERITY QUANTIFIED, NO PAIN PRESENT: ICD-10-PCS | Mod: HCNC,CPTII,S$GLB, | Performed by: FAMILY MEDICINE

## 2023-04-03 PROCEDURE — 1101F PR PT FALLS ASSESS DOC 0-1 FALLS W/OUT INJ PAST YR: ICD-10-PCS | Mod: HCNC,CPTII,S$GLB, | Performed by: FAMILY MEDICINE

## 2023-04-03 PROCEDURE — 3288F FALL RISK ASSESSMENT DOCD: CPT | Mod: HCNC,CPTII,S$GLB, | Performed by: FAMILY MEDICINE

## 2023-04-03 PROCEDURE — 3074F PR MOST RECENT SYSTOLIC BLOOD PRESSURE < 130 MM HG: ICD-10-PCS | Mod: HCNC,CPTII,S$GLB, | Performed by: FAMILY MEDICINE

## 2023-04-03 PROCEDURE — 1126F AMNT PAIN NOTED NONE PRSNT: CPT | Mod: HCNC,CPTII,S$GLB, | Performed by: FAMILY MEDICINE

## 2023-04-03 PROCEDURE — 3288F PR FALLS RISK ASSESSMENT DOCUMENTED: ICD-10-PCS | Mod: HCNC,CPTII,S$GLB, | Performed by: FAMILY MEDICINE

## 2023-04-03 PROCEDURE — 1101F PT FALLS ASSESS-DOCD LE1/YR: CPT | Mod: HCNC,CPTII,S$GLB, | Performed by: FAMILY MEDICINE

## 2023-04-03 NOTE — PROGRESS NOTES
Chief Complaint   Patient presents with    Follow-up    Annual Exam     HISTORY OF PRESENT ILLNESS:     HTN - tolerating Lisinopril HCT  HLD - following a low-fat diet  Allergic rhinitis - Tolerating flonase as needed during fall season; taking singulair and Xyzal  Osteoporosis - BMD 12/10/12 showed Osteoporosis -- She took Prolia and feels like this caused a number of symptoms (aching in back, generalized itching, dizziness episodes).  Managing calcium intake with diet. Taking Vitmain D 1,000 IU daily. She had taken Boniva and fosamax in the past that caused some upper GI symptoms.  Breast cancer, bladder cancer - following with Dr. 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    Doing some regular exercise    C/o 2 plantar warts on right foot      Past Medical History:   Diagnosis Date    Allergic rhinitis     Anticoagulant long-term use     ASPIRIN ONLY - (stops it when she presents a hemorrhagic cystitis)    Bladder cancer     Blood transfusion     Breast cancer     CAD (coronary artery disease), native coronary artery     40% non obstructive    Cholelithiasis     Endometriosis     Headache(784.0)     HEARING LOSS     Hemorrhoids     HLD (hyperlipidemia)     Hypertension     Iritis     Left wrist fracture 2009    traumatic    Malignant neoplasm of other specified sites of bladder 12/3/2009    Osteoporosis, postmenopausal     PONV (postoperative nausea and vomiting)     Rash     Rosacea     UTI (urinary tract infection)     Vaginal delivery     x 2       Past Surgical History:   Procedure Laterality Date    ADENOIDECTOMY      APPENDECTOMY      [facility]    BREAST BIOPSY Right     4 core bx  negative    BREAST SURGERY Left 1998    ESOPHAGOGASTRODUODENOSCOPY N/A 04/21/2022    Procedure: ESOPHAGOGASTRODUODENOSCOPY (EGD);  Surgeon: Guru Maddox MD;  Location: [facility];  Service: Endoscopy;  Laterality: N/A;    EYE SURGERY  2019    HYSTERECTOMY      MASTECTOMY, PARTIAL  1995    left side - cancer - had radiotherapy 1995, 1998 had chemotherapy    oophrect      pelvic mass      benign    TONSILLECTOMY      TONSILLECTOMY, ADENOIDECTOMY, BILATERAL MYRINGOTOMY AND TUBES      tram flap Left 1998       Review of patient's allergies indicates:   Allergen Reactions    Compazine [prochlorperazine edisylate] Anaphylaxis       Social History     Socioeconomic History    Marital status:    Occupational History    Occupation: retired accounting   Tobacco Use    Smoking status: Never    Smokeless tobacco: Never   Substance and Sexual Activity    Alcohol use: No    Drug use: No     Social Determinants of Health     Food Insecurity: No Food Insecurity    Worried About Running Out of Food in the Last Year: Never true    Ran Out of Food in the Last Year: Never true   Transportation Needs: No Transportation Needs    Lack of Transportation (Medical): No    Lack of Transportation (Non-Medical): No   Physical Activity: Unknown    Days of Exercise per Week: 2 days   Stress: Stress Concern Present    Feeling of Stress : To some extent   Social Connections: Unknown    Frequency of Communication with Friends and Family: More than three times a week    Frequency of Social Gatherings with Friends and Family: Three times a week    Active Member of Clubs or Organizations: Yes    Attends Club or Organization Meetings: More than 4 times per year    Marital Status:    Housing Stability: Low Risk     Unable to Pay for Housing in the Last Year: No    Number of Places Lived in the Last Year: 1    Unstable Housing in the Last Year: No       Current Outpatient Medications on File Prior to Visit   Medication Sig Dispense  Refill    albuterol (PROVENTIL/VENTOLIN HFA) 90 mcg/actuation inhaler Inhale 2 puffs into the lungs every 6 (six) hours as needed for Wheezing or Shortness of Breath. Rescue 18 g 11    apremilast (OTEZLA) 30 mg Tab Take 1 tablet (30 mg total) by mouth 2 (two) times a day. 180 tablet 3    clindamycin-benzoyl peroxide (BENZACLIN) gel APPLY TO FACE DAILY AS DIRECTED      fluticasone furoate-vilanteroL (BREO ELLIPTA) 100-25 mcg/dose diskus inhaler Inhale 1 puff into the lungs once daily. Controller 60 each 11    losartan-hydrochlorothiazide 50-12.5 mg (HYZAAR) 50-12.5 mg per tablet Take 1 tablet by mouth once daily. 90 tablet 3    montelukast (SINGULAIR) 10 mg tablet Take 1 tablet (10 mg total) by mouth every evening. 90 tablet 3    oxybutynin (DITROPAN-XL) 5 MG TR24 Take 1 tablet (5 mg total) by mouth once daily. 30 tablet 3    pantoprazole (PROTONIX) 40 MG tablet Take 1 tablet (40 mg total) by mouth once daily. 30 tablet 11    tretinoin (RETIN-A) 0.025 % cream Apply a pea-sized amount to entire face at bedtime, start every other night and increase as tolerated. Take night off if irritation develops. 45 g 3    vitamin D (VITAMIN D3) 1000 units Tab Take 1,000 Units by mouth once daily.      ALPRAZolam (XANAX) 0.25 MG tablet Take 1 tablet (0.25 mg total) by mouth 4 (four) times daily as needed for Anxiety. 28 tablet 0     No current facility-administered medications on file prior to visit.       Family History   Problem Relation Age of Onset    Glaucoma Father     Glaucoma Paternal Aunt     Glaucoma Paternal Grandmother     Cancer Cousin         1st, ovarian    Amblyopia Neg Hx     Blindness Neg Hx     Cataracts Neg Hx     Hypertension Neg Hx     Macular degeneration Neg Hx     Retinal detachment Neg Hx     Strabismus Neg Hx     Stroke Neg Hx     Thyroid disease Neg Hx     Melanoma Neg Hx        REVIEW OF SYSTEMS:   GENERAL: No fever, chills, or weight changes.  EARS: Denies ear pain, discharge, tinnitus or vertigo.  "Denies hearing loss.   MOUTH & THROAT: No hoarseness, change in voice, swallowing difficulty. No excessive gum bleeding.   CARDIOVASCULAR: Denies dyspnea, orthopnea, or palpitations.  GI/ABDOMEN: Appetite fine. No weight loss. Denies nausea, vomiting, constipation, abdominal pain, hematemesis or blood in stool.  URINARY: No dysuria,hematuria, nocturia, incontinence, flank pain, urgency, or urinary difficulty    PHYSICAL EXAM:   /78   Pulse 84   Ht 5' 4" (1.626 m)   Wt 81.3 kg (179 lb 3.7 oz)   SpO2 98%   BMI 30.77 kg/m²   GENERAL: This is a healthy-appearing white female, sitting   upright, in no apparent distress. Alert and oriented x4.   TMs clear  Oropharynx clear  NECK: Supple. There is no lymphadenopathy, thyromegaly or JVD.  CV: S1, S2, RRR; 2/6 SHENA at RSB   CHEST: Clear to auscultation bilaterally with good respiratory movement.  ABD: soft, NT/ND + BS no HSM   EXTREMITIES: Showed no cyanosis, clubbing. Trace edema     Results for orders placed or performed in visit on 02/13/23   CBC Auto Differential   Result Value Ref Range    WBC 6.71 3.90 - 12.70 K/uL    RBC 4.56 4.00 - 5.40 M/uL    Hemoglobin 13.4 12.0 - 16.0 g/dL    Hematocrit 40.0 37.0 - 48.5 %    MCV 88 82 - 98 fL    MCH 29.4 27.0 - 31.0 pg    MCHC 33.5 32.0 - 36.0 g/dL    RDW 13.6 11.5 - 14.5 %    Platelets 274 150 - 450 K/uL    MPV 8.8 (L) 9.2 - 12.9 fL    Immature Granulocytes 0.7 (H) 0.0 - 0.5 %    Gran # (ANC) 4.9 1.8 - 7.7 K/uL    Immature Grans (Abs) 0.05 (H) 0.00 - 0.04 K/uL    Lymph # 1.0 1.0 - 4.8 K/uL    Mono # 0.5 0.3 - 1.0 K/uL    Eos # 0.2 0.0 - 0.5 K/uL    Baso # 0.06 0.00 - 0.20 K/uL    nRBC 0 0 /100 WBC    Gran % 73.7 (H) 38.0 - 73.0 %    Lymph % 14.6 (L) 18.0 - 48.0 %    Mono % 6.7 4.0 - 15.0 %    Eosinophil % 3.4 0.0 - 8.0 %    Basophil % 0.9 0.0 - 1.9 %    Differential Method Automated    Comprehensive Metabolic Panel   Result Value Ref Range    Sodium 138 136 - 145 mmol/L    Potassium 4.0 3.5 - 5.1 mmol/L    Chloride 102 " 95 - 110 mmol/L    CO2 25 23 - 29 mmol/L    Glucose 136 (H) 70 - 110 mg/dL    BUN 18 8 - 23 mg/dL    Creatinine 0.9 0.5 - 1.4 mg/dL    Calcium 9.8 8.7 - 10.5 mg/dL    Total Protein 7.1 6.0 - 8.4 g/dL    Albumin 3.7 3.5 - 5.2 g/dL    Total Bilirubin 0.3 0.1 - 1.0 mg/dL    Alkaline Phosphatase 138 (H) 55 - 135 U/L    AST 18 10 - 40 U/L    ALT 27 10 - 44 U/L    Anion Gap 11 8 - 16 mmol/L    eGFR >60 >60 mL/min/1.73 m^2   Lactate Dehydrogenase   Result Value Ref Range     110 - 260 U/L         A/P:  Preventative health care    Osteoporosis, unspecified osteoporosis type, unspecified pathological fracture presence  -     DXA Bone Density Axial Skeleton 1 or more sites; Future; Expected date: 04/03/2023    Vitamin D deficiency  -     Vitamin D; Future; Expected date: 04/03/2023    Primary hypertension  -     Lipid Panel; Future; Expected date: 04/03/2023    Plantar wart of right foot  -     Ambulatory referral/consult to Podiatry; Future; Expected date: 04/10/2023    Drug-induced immunodeficiency    Aortic calcification    Malignant (primary) neoplasm, unspecified          Vitamin d level and BMD; consider Reclast if BMD significantly worse.  Continue present meds  Continue low-fat diet  Continue regular weight bearing exrcise  Continue follow-up with oncology, rheumatology  Counseled on regular exercise, maintenance of a healthy weight, balanced diet rich in fruits/vegetables and lean protein, and avoidance of unhealthy habits like smoking and excessive alcohol intake.  Continue present HTN regimen

## 2023-04-04 NOTE — PROGRESS NOTES
[facility] [place]  Department of Surgery      Subjective:     Yuliana Mattson presents the [facility] on 3/15/2023 for consultation regarding 3 dimensional nipple tattoo for breast reconstruction. Patient has a personal history of left breast cancer. Patient was initially diagnosed diagnosed in 1995 and treated with lumpectomy followed by radiation. In 1998, she was diagnosed with Stage I infiltrating left breast carcinoma, which was high-grade, ER positive and VT negative. She then underwent a left mastectomy with immediate reconstruction. She has a nice result and is pleased with the results of her breast reconstruction. She denies fever, chills, nausea, vomiting, chest pain or shortness of breath.     Review of patient's allergies indicates:   Allergen Reactions    Prochlorperazine edisylate Anaphylaxis     compazine     Current Outpatient Medications on File Prior to Visit   Medication Sig Dispense Refill    apremilast (OTEZLA) 30 mg Tab Take 1 tablet (30 mg total) by mouth 2 (two) times a day. 180 tablet 3    clindamycin-benzoyl peroxide (BENZACLIN) gel APPLY TO FACE DAILY AS DIRECTED      losartan-hydrochlorothiazide 50-12.5 mg (HYZAAR) 50-12.5 mg per tablet Take 1 tablet by mouth once daily. 90 tablet 3    montelukast (SINGULAIR) 10 mg tablet Take 1 tablet (10 mg total) by mouth every evening. 90 tablet 3    oxybutynin (DITROPAN-XL) 5 MG TR24 Take 1 tablet (5 mg total) by mouth once daily. 30 tablet 3    pantoprazole (PROTONIX) 40 MG tablet Take 1 tablet (40 mg total) by mouth once daily. 30 tablet 11    tretinoin (RETIN-A) 0.025 % cream Apply a pea-sized amount to entire face at bedtime, start every other night and increase as tolerated. Take night off if irritation develops. 45 g 3    vitamin D (VITAMIN D3) 1000 units Tab Take 1,000 Units by mouth once daily.      ALPRAZolam (XANAX) 0.25 MG tablet Take 1 tablet (0.25 mg total) by mouth 4 (four) times daily as needed for Anxiety. 28 tablet 0      No current facility-administered medications on file prior to visit.     Patient Active Problem List   Diagnosis    Family history of ischemic heart disease    Hypermetropia    Other disorders of vitreous    Personal history of antineoplastic chemotherapy    Personal history of irradiation, presenting hazards to health    Unspecified hearing loss    Osteoporosis, post-menopausal    History of breast cancer    Hypertension    Allergic rhinitis    HLD (hyperlipidemia)    History of bladder cancer    Neck pain    Vitamin D deficiency    Malignant (primary) neoplasm, unspecified    Moderate persistent asthma without complication     Past Surgical History:   Procedure Laterality Date    ADENOIDECTOMY      APPENDECTOMY      BLADDER TUMOR EXCISION  1979    [facility], dr garcia - no recurrences since    BREAST BIOPSY Right     4 core bx negative    BREAST SURGERY Left 1998    ESOPHAGOGASTRODUODENOSCOPY N/A 04/21/2022    Procedure: ESOPHAGOGASTRODUODENOSCOPY (EGD);  Surgeon: Guru Maddox MD;  Location: [facility];  Service: Endoscopy;  Laterality: N/A;    EYE SURGERY  2019    HYSTERECTOMY      MASTECTOMY, PARTIAL  1995    left side - cancer - had radiotherapy 1995, 1998 had chemotherapy    oophrect      pelvic mass      benign    TONSILLECTOMY      TONSILLECTOMY, ADENOIDECTOMY, BILATERAL MYRINGOTOMY AND TUBES      tram flap Left 1998     Social History     Socioeconomic History    Marital status:    Occupational History    Occupation: retired accounting   Tobacco Use    Smoking status: Never    Smokeless tobacco: Never   Substance and Sexual Activity    Alcohol use: No    Drug use: No     Social Determinants of Health     Food Insecurity: No Food Insecurity    Worried About Running Out of Food in the Last Year: Never true    Ran Out of Food in the Last Year: Never true   Transportation Needs: No Transportation Needs    Lack of Transportation (Medical): No    Lack of Transportation (Non-Medical): No    Physical Activity: Unknown    Days of Exercise per Week: 2 days   Stress: Stress Concern Present    Feeling of Stress : To some extent   Social Connections: Unknown    Frequency of Communication with Friends and Family: More than three times a week    Frequency of Social Gatherings with Friends and Family: Three times a week    Active Member of Clubs or Organizations: Yes    Attends Club or Organization Meetings: More than 4 times per year    Marital Status:    Housing Stability: Low Risk     Unable to Pay for Housing in the Last Year: No    Number of Places Lived in the Last Year: 1    Unstable Housing in the Last Year: No       ROS: status post breast reconstruction      Objective:     Vitals:    03/15/23 1143   BP: (!) 162/76   Pulse: 78       PHYSICAL EXAMINATION:   Physical Exam   Vitals reviewed.  Constitutional: She is oriented to person, place, and time.   HENT:   Head: Normocephalic and atraumatic.   Nose: Nose normal.   Eyes: Pupils are equal, round, and reactive to light. Right eye exhibits no discharge. Left eye exhibits no discharge.   Pulmonary/Chest: Effort normal and breath sounds normal. No stridor. No respiratory distress. She exhibits no mass, no tenderness and no edema. Right breast exhibits no inverted nipple, no mass, no nipple discharge, no skin change and no tenderness. Left breast exhibits no mass, no skin change and no tenderness. No breast swelling or bleeding. Breasts are symmetrical.       Abdominal: Normal appearance.   Genitourinary: No breast swelling or bleeding.   Neurological: She is alert and oriented to person, place, and time.   Skin: Skin is warm and dry.     Psychiatric: Her behavior is normal. Mood, judgment and thought content normal.      Assessment:     77 y.o. female status post left mastectomy here to discuss options for 3D nipple tattoo.     Plan:     - Patient is pleased with the outcome of her breast reconstruction and is now interested in 3D nipple  tattooing.   - Patient is a good candidate for medical tattooing. She is well healed from her last surgical procedure.   - Procedure discussed in detail with the patient as were the risks and possible complications. She understands that the risks include but are not limited to bleeding, scarring, infection, pain, numbness, asymmetry, deformity, allergic reaction, failure to take, infection, skin necrosis, wound dehiscence and need for second stage tattoo.   - Post op instructions were reviewed with verbal understanding. Print out provided.   - Patient would like to proceed, office staff to coordinate tattoo procedure date at patients convenience. All questions were answered. The patient was advised to call the clinic with any questions or concerns prior to their next visit.       Total time spent with the patient: 30.  20 minutes of face to face consultation and 10 minutes of chart review and coordination of care.

## 2023-04-08 PROBLEM — D84.821 DRUG-INDUCED IMMUNODEFICIENCY: Status: ACTIVE | Noted: 2023-04-08

## 2023-04-08 PROBLEM — I70.0 AORTIC CALCIFICATION: Status: ACTIVE | Noted: 2023-04-08

## 2023-04-08 PROBLEM — Z79.899 DRUG-INDUCED IMMUNODEFICIENCY: Status: ACTIVE | Noted: 2023-04-08

## 2023-04-12 ENCOUNTER — OFFICE VISIT (OUTPATIENT)
Dept: SURGERY | Facility: CLINIC | Age: 78
End: 2023-04-12
Payer: MEDICARE

## 2023-04-12 VITALS — WEIGHT: 179 LBS | BODY MASS INDEX: 30.56 KG/M2 | HEIGHT: 64 IN

## 2023-04-12 DIAGNOSIS — Z42.8 ENCOUNTER FOR OTHER PLASTIC AND RECONSTRUCTIVE SURGERY FOLLOWING MEDICAL PROCEDURE OR HEALED INJURY: Primary | ICD-10-CM

## 2023-04-12 DIAGNOSIS — L81.8 TATTOO: ICD-10-CM

## 2023-04-12 DIAGNOSIS — Z85.3 PERSONAL HISTORY OF BREAST CANCER: ICD-10-CM

## 2023-04-12 PROCEDURE — 1101F PT FALLS ASSESS-DOCD LE1/YR: CPT | Mod: HCNC,CPTII,S$GLB, | Performed by: PHYSICIAN ASSISTANT

## 2023-04-12 PROCEDURE — 99499 NO LOS: ICD-10-PCS | Mod: HCNC,S$GLB,, | Performed by: PHYSICIAN ASSISTANT

## 2023-04-12 PROCEDURE — 1160F PR REVIEW ALL MEDS BY PRESCRIBER/CLIN PHARMACIST DOCUMENTED: ICD-10-PCS | Mod: HCNC,CPTII,S$GLB, | Performed by: PHYSICIAN ASSISTANT

## 2023-04-12 PROCEDURE — 11920 PR CORRECT SKIN COLR DEFCT <6 SQCM: ICD-10-PCS | Mod: HCNC,S$GLB,, | Performed by: PHYSICIAN ASSISTANT

## 2023-04-12 PROCEDURE — 3288F PR FALLS RISK ASSESSMENT DOCUMENTED: ICD-10-PCS | Mod: HCNC,CPTII,S$GLB, | Performed by: PHYSICIAN ASSISTANT

## 2023-04-12 PROCEDURE — 3288F FALL RISK ASSESSMENT DOCD: CPT | Mod: HCNC,CPTII,S$GLB, | Performed by: PHYSICIAN ASSISTANT

## 2023-04-12 PROCEDURE — 11920 CORRECT SKIN COLOR 6.0 CM/<: CPT | Mod: HCNC,S$GLB,, | Performed by: PHYSICIAN ASSISTANT

## 2023-04-12 PROCEDURE — 1160F RVW MEDS BY RX/DR IN RCRD: CPT | Mod: HCNC,CPTII,S$GLB, | Performed by: PHYSICIAN ASSISTANT

## 2023-04-12 PROCEDURE — 99999 PR PBB SHADOW E&M-EST. PATIENT-LVL III: ICD-10-PCS | Mod: PBBFAC,HCNC,, | Performed by: PHYSICIAN ASSISTANT

## 2023-04-12 PROCEDURE — 99999 PR PBB SHADOW E&M-EST. PATIENT-LVL III: CPT | Mod: PBBFAC,HCNC,, | Performed by: PHYSICIAN ASSISTANT

## 2023-04-12 PROCEDURE — 1101F PR PT FALLS ASSESS DOC 0-1 FALLS W/OUT INJ PAST YR: ICD-10-PCS | Mod: HCNC,CPTII,S$GLB, | Performed by: PHYSICIAN ASSISTANT

## 2023-04-12 PROCEDURE — 1159F PR MEDICATION LIST DOCUMENTED IN MEDICAL RECORD: ICD-10-PCS | Mod: HCNC,CPTII,S$GLB, | Performed by: PHYSICIAN ASSISTANT

## 2023-04-12 PROCEDURE — 99499 UNLISTED E&M SERVICE: CPT | Mod: HCNC,S$GLB,, | Performed by: PHYSICIAN ASSISTANT

## 2023-04-12 PROCEDURE — 1159F MED LIST DOCD IN RCRD: CPT | Mod: HCNC,CPTII,S$GLB, | Performed by: PHYSICIAN ASSISTANT

## 2023-04-12 NOTE — PROGRESS NOTES
[facility]  Department of Surgery        REFERRING PROVIDER: No referring provider defined for this encounter.    Chief Complaint: No chief complaint on file.    Yuliana Mattson presents to Plastic Surgery Clinic on 4/12/2023 for left 3 dimensional nipple tattoo for breast reconstruction. She is doing well today with no issue since her last visit. All incisions well healed. She denies pain, fever, chills, Nausea, vomiting, or other systemic signs of infection. She is pleased with the outcome of her breast reconstruction and would like to proceed with tattoos.     Review of patient's allergies indicates:   Allergen Reactions    Prochlorperazine edisylate Anaphylaxis     compazine     Current Outpatient Medications on File Prior to Visit   Medication Sig Dispense Refill    albuterol (PROVENTIL/VENTOLIN HFA) 90 mcg/actuation inhaler Inhale 2 puffs into the lungs every 6 (six) hours as needed for Wheezing or Shortness of Breath. Rescue 18 g 11    apremilast (OTEZLA) 30 mg Tab Take 1 tablet (30 mg total) by mouth 2 (two) times a day. 180 tablet 3    clindamycin-benzoyl peroxide (BENZACLIN) gel APPLY TO FACE DAILY AS DIRECTED      fluticasone furoate-vilanteroL (BREO ELLIPTA) 100-25 mcg/dose diskus inhaler Inhale 1 puff into the lungs once daily. Controller 60 each 11    losartan-hydrochlorothiazide 50-12.5 mg (HYZAAR) 50-12.5 mg per tablet Take 1 tablet by mouth once daily. 90 tablet 3    montelukast (SINGULAIR) 10 mg tablet Take 1 tablet (10 mg total) by mouth every evening. 90 tablet 3    oxybutynin (DITROPAN-XL) 5 MG TR24 Take 1 tablet (5 mg total) by mouth once daily. 30 tablet 3    pantoprazole (PROTONIX) 40 MG tablet Take 1 tablet (40 mg total) by mouth once daily. 30 tablet 11    tretinoin (RETIN-A) 0.025 % cream Apply a pea-sized amount to entire face at bedtime, start every other night and increase as tolerated. Take night off if irritation develops. 45 g 3    vitamin D (VITAMIN D3) 1000 units Tab  Take 1,000 Units by mouth once daily.      ALPRAZolam (XANAX) 0.25 MG tablet Take 1 tablet (0.25 mg total) by mouth 4 (four) times daily as needed for Anxiety. 28 tablet 0     No current facility-administered medications on file prior to visit.     Patient Active Problem List   Diagnosis    Family history of ischemic heart disease    Hypermetropia    Other disorders of vitreous    Personal history of antineoplastic chemotherapy    Personal history of irradiation, presenting hazards to health    Unspecified hearing loss    Osteoporosis, post-menopausal    History of breast cancer    Hypertension    Allergic rhinitis    HLD (hyperlipidemia)    History of bladder cancer    Neck pain    Vitamin D deficiency    Malignant (primary) neoplasm, unspecified    Moderate persistent asthma without complication    Drug-induced immunodeficiency    Aortic calcification     [unfilled]  Social History     Socioeconomic History    Marital status:    Occupational History    Occupation: retired accounting   Tobacco Use    Smoking status: Never    Smokeless tobacco: Never   Substance and Sexual Activity    Alcohol use: No    Drug use: No     Social Determinants of Health     Food Insecurity: No Food Insecurity    Worried About Running Out of Food in the Last Year: Never true    Ran Out of Food in the Last Year: Never true   Transportation Needs: No Transportation Needs    Lack of Transportation (Medical): No    Lack of Transportation (Non-Medical): No   Physical Activity: Unknown    Days of Exercise per Week: 2 days   Stress: Stress Concern Present    Feeling of Stress : To some extent   Social Connections: Unknown    Frequency of Communication with Friends and Family: More than three times a week    Frequency of Social Gatherings with Friends and Family: Three times a week    Active Member of Clubs or Organizations: Yes    Attends Club or Organization Meetings: More than 4 times per year    Marital Status:    Housing  Stability: Low Risk     Unable to Pay for Housing in the Last Year: No    Number of Places Lived in the Last Year: 1    Unstable Housing in the Last Year: No         PROCEDURE NOTE     Procedure for 3 dimensional nipple tattooing was discussed in detail with the patient as were the risks and possible complications. She understands that the risks include but are not limited to bleeding, scarring, infection, pain, asymmetry, allergic reaction, failure to take and need for second stage tattoo. She understands that greater than 50% of patients require a second stage tattoo procedure for better saturation and 3 dimensional shading. After all questions were answered, the patient signed the consent form and that will be scanned into her medical record.     4.4 cm circular guide  was used for nipple areolar complex placement on left breast, patient visualized placement in exam room mirror and agreed to proceed with placement. Colors for nipple and areola were mixed and tested on skin of breast, patient agreed to proceed with colors tested. Breast was then prepped with chlora prep. Left nipple areola complex were then tattooed using 3 dimensional technique. There was positive punctate bleeding noted. Breast were cleaned and a tegaderm was applied to tattoo.   Patient tolerated the procedure well. There were no complications.     Post op instructions and care were discussed in detail with the patient. Tegaderm to remain in place for 3 days then removed and washed with soap and water then apply AquaPhor or Eucerin to tattoos as needed for moisturizer twice daily. Patient voiced understanding. A written copy of post op care was also provided. All questions were answered. She will return to clinic in 4 weeks.     The patient was advised to call the clinic with any questions or concerns prior to their next visit.       Ivette Cardenas PA-C  Breast Surgery

## 2023-05-22 ENCOUNTER — PATIENT MESSAGE (OUTPATIENT)
Dept: SURGERY | Facility: CLINIC | Age: 78
End: 2023-05-22
Payer: MEDICARE

## 2023-05-25 ENCOUNTER — OFFICE VISIT (OUTPATIENT)
Dept: PODIATRY | Facility: CLINIC | Age: 78
End: 2023-05-25
Payer: MEDICARE

## 2023-05-25 VITALS — HEIGHT: 64 IN | BODY MASS INDEX: 30.56 KG/M2 | WEIGHT: 179 LBS

## 2023-05-25 DIAGNOSIS — B07.0 PLANTAR WART OF RIGHT FOOT: Primary | ICD-10-CM

## 2023-05-25 PROCEDURE — 99203 PR OFFICE/OUTPT VISIT, NEW, LEVL III, 30-44 MIN: ICD-10-PCS | Mod: 25,S$GLB,, | Performed by: STUDENT IN AN ORGANIZED HEALTH CARE EDUCATION/TRAINING PROGRAM

## 2023-05-25 PROCEDURE — 3288F FALL RISK ASSESSMENT DOCD: CPT | Mod: CPTII,S$GLB,, | Performed by: STUDENT IN AN ORGANIZED HEALTH CARE EDUCATION/TRAINING PROGRAM

## 2023-05-25 PROCEDURE — 17110 PR DESTRUCTION BENIGN LESIONS UP TO 14: ICD-10-PCS | Mod: S$GLB,,, | Performed by: STUDENT IN AN ORGANIZED HEALTH CARE EDUCATION/TRAINING PROGRAM

## 2023-05-25 PROCEDURE — 1159F MED LIST DOCD IN RCRD: CPT | Mod: CPTII,S$GLB,, | Performed by: STUDENT IN AN ORGANIZED HEALTH CARE EDUCATION/TRAINING PROGRAM

## 2023-05-25 PROCEDURE — 1101F PR PT FALLS ASSESS DOC 0-1 FALLS W/OUT INJ PAST YR: ICD-10-PCS | Mod: CPTII,S$GLB,, | Performed by: STUDENT IN AN ORGANIZED HEALTH CARE EDUCATION/TRAINING PROGRAM

## 2023-05-25 PROCEDURE — 3288F PR FALLS RISK ASSESSMENT DOCUMENTED: ICD-10-PCS | Mod: CPTII,S$GLB,, | Performed by: STUDENT IN AN ORGANIZED HEALTH CARE EDUCATION/TRAINING PROGRAM

## 2023-05-25 PROCEDURE — 1159F PR MEDICATION LIST DOCUMENTED IN MEDICAL RECORD: ICD-10-PCS | Mod: CPTII,S$GLB,, | Performed by: STUDENT IN AN ORGANIZED HEALTH CARE EDUCATION/TRAINING PROGRAM

## 2023-05-25 PROCEDURE — 1160F RVW MEDS BY RX/DR IN RCRD: CPT | Mod: CPTII,S$GLB,, | Performed by: STUDENT IN AN ORGANIZED HEALTH CARE EDUCATION/TRAINING PROGRAM

## 2023-05-25 PROCEDURE — 17110 DESTRUCTION B9 LES UP TO 14: CPT | Mod: S$GLB,,, | Performed by: STUDENT IN AN ORGANIZED HEALTH CARE EDUCATION/TRAINING PROGRAM

## 2023-05-25 PROCEDURE — 99999 PR PBB SHADOW E&M-EST. PATIENT-LVL III: ICD-10-PCS | Mod: PBBFAC,,, | Performed by: STUDENT IN AN ORGANIZED HEALTH CARE EDUCATION/TRAINING PROGRAM

## 2023-05-25 PROCEDURE — 1160F PR REVIEW ALL MEDS BY PRESCRIBER/CLIN PHARMACIST DOCUMENTED: ICD-10-PCS | Mod: CPTII,S$GLB,, | Performed by: STUDENT IN AN ORGANIZED HEALTH CARE EDUCATION/TRAINING PROGRAM

## 2023-05-25 PROCEDURE — 99999 PR PBB SHADOW E&M-EST. PATIENT-LVL III: CPT | Mod: PBBFAC,,, | Performed by: STUDENT IN AN ORGANIZED HEALTH CARE EDUCATION/TRAINING PROGRAM

## 2023-05-25 PROCEDURE — 1125F PR PAIN SEVERITY QUANTIFIED, PAIN PRESENT: ICD-10-PCS | Mod: CPTII,S$GLB,, | Performed by: STUDENT IN AN ORGANIZED HEALTH CARE EDUCATION/TRAINING PROGRAM

## 2023-05-25 PROCEDURE — 99203 OFFICE O/P NEW LOW 30 MIN: CPT | Mod: 25,S$GLB,, | Performed by: STUDENT IN AN ORGANIZED HEALTH CARE EDUCATION/TRAINING PROGRAM

## 2023-05-25 PROCEDURE — 1125F AMNT PAIN NOTED PAIN PRSNT: CPT | Mod: CPTII,S$GLB,, | Performed by: STUDENT IN AN ORGANIZED HEALTH CARE EDUCATION/TRAINING PROGRAM

## 2023-05-25 PROCEDURE — 1101F PT FALLS ASSESS-DOCD LE1/YR: CPT | Mod: CPTII,S$GLB,, | Performed by: STUDENT IN AN ORGANIZED HEALTH CARE EDUCATION/TRAINING PROGRAM

## 2023-05-25 NOTE — PROGRESS NOTES
Chief Complaint   Patient presents with    Plantar Warts           HPI:   Yuliana Mattson is a 77 y.o. female with concerns of  painful warts at the bottom of the right foot, which appears to be present for years  Patient reports no acute injury to the area.    Patient states the pain occurs with direct pressure, walking , and weight bearing.      Treatment tried at home: nothing      Patient Active Problem List   Diagnosis    Family history of ischemic heart disease    Hypermetropia    Other disorders of vitreous    Personal history of antineoplastic chemotherapy    Personal history of irradiation, presenting hazards to health    Unspecified hearing loss    Osteoporosis, post-menopausal    History of breast cancer    Hypertension    Allergic rhinitis    HLD (hyperlipidemia)    History of bladder cancer    Neck pain    Vitamin D deficiency    Malignant (primary) neoplasm, unspecified    Moderate persistent asthma without complication    Drug-induced immunodeficiency    Aortic calcification         Current Outpatient Medications on File Prior to Visit   Medication Sig Dispense Refill    albuterol (PROVENTIL/VENTOLIN HFA) 90 mcg/actuation inhaler Inhale 2 puffs into the lungs every 6 (six) hours as needed for Wheezing or Shortness of Breath. Rescue 18 g 11    apremilast (OTEZLA) 30 mg Tab Take 1 tablet (30 mg total) by mouth 2 (two) times a day. 180 tablet 3    fluticasone furoate-vilanteroL (BREO ELLIPTA) 100-25 mcg/dose diskus inhaler Inhale 1 puff into the lungs once daily. Controller 60 each 11    losartan-hydrochlorothiazide 50-12.5 mg (HYZAAR) 50-12.5 mg per tablet Take 1 tablet by mouth once daily. 90 tablet 3    montelukast (SINGULAIR) 10 mg tablet Take 1 tablet (10 mg total) by mouth every evening. 90 tablet 3    oxybutynin (DITROPAN-XL) 5 MG TR24 Take 1 tablet (5 mg total) by mouth once daily. 30 tablet 3    pantoprazole (PROTONIX) 40 MG tablet Take 1 tablet (40 mg total) by mouth once daily. 30 tablet 11  "   tretinoin (RETIN-A) 0.025 % cream Apply a pea-sized amount to entire face at bedtime, start every other night and increase as tolerated. Take night off if irritation develops. 45 g 3    vitamin D (VITAMIN D3) 1000 units Tab Take 1,000 Units by mouth once daily.      ALPRAZolam (XANAX) 0.25 MG tablet Take 1 tablet (0.25 mg total) by mouth 4 (four) times daily as needed for Anxiety. 28 tablet 0    clindamycin-benzoyl peroxide (BENZACLIN) gel APPLY TO FACE DAILY AS DIRECTED       No current facility-administered medications on file prior to visit.         Review of patient's allergies indicates:   Allergen Reactions    Prochlorperazine edisylate Anaphylaxis     compazine             ROS:   General ROS: negative  Respiratory ROS: no cough, shortness of breath, or wheezing  Cardiovascular ROS: no chest pain or dyspnea on exertion  Musculoskeletal ROS: negative  Neurological ROS: no TIA or stroke symptoms  Dermatological ROS: negative        EXAM:     Vitals:    05/25/23 1042   Weight: 81.2 kg (179 lb 0.2 oz)   Height: 5' 4" (1.626 m)        General: Patient is WD/WN, alert and oriented x 3 and in no apparent distress.     Right  Lower extremity exam:      Vascular:   Dorsalis pedis and posterior tibial pulses are 2/4 .    3 seconds capillary refill time   Toes are warm to touch.     There is no edema.       Neurological:    Light touch, proprioception, and sharp/dull sensation are all intact.     No focal deficits.  Negative Mulders.   Negative Tinels.         Dermatological:    Location: submet 5 and 3  multiple, flat, plantar verrucous -appearing lesion  Petechiae is present.   No open wounds.  No bruising.  No redness.       Musculoskeletal:    Muscle strength is 5/5 in all groups .          ASSESSMENT/PLAN:    Problem List Items Addressed This Visit    None  Visit Diagnoses       Plantar wart of right foot    -  Primary              I counseled the patient on the patient's conditions, their implications and " medical management.       With patient's permission, trichloroacetic acid and salinocaine was applied to the two wart lesion on the plantar surface of the right using a sterile applicator, including a 1-mm rim around the lesion.  A non-porous tape was applied to the area.   Patient tolerated the procedure well without immediate complications.      Patient will leave the tape on for twenty four hours and remove the tape to gently wash the area with soap and water.  Patient is advised of blistering and pain and will modify shoes and activities as needed.  OTC NSAIDs are okay to take for pain.     Alpesh Desai DPM

## 2023-06-05 ENCOUNTER — LAB VISIT (OUTPATIENT)
Dept: LAB | Facility: HOSPITAL | Age: 78
End: 2023-06-05
Attending: INTERNAL MEDICINE
Payer: MEDICARE

## 2023-06-05 ENCOUNTER — OFFICE VISIT (OUTPATIENT)
Dept: PODIATRY | Facility: CLINIC | Age: 78
End: 2023-06-05
Payer: MEDICARE

## 2023-06-05 DIAGNOSIS — I10 PRIMARY HYPERTENSION: ICD-10-CM

## 2023-06-05 DIAGNOSIS — B07.0 PLANTAR WART OF RIGHT FOOT: Primary | ICD-10-CM

## 2023-06-05 DIAGNOSIS — N39.0 URINARY TRACT INFECTION WITHOUT HEMATURIA, SITE UNSPECIFIED: ICD-10-CM

## 2023-06-05 DIAGNOSIS — E55.9 VITAMIN D DEFICIENCY: ICD-10-CM

## 2023-06-05 LAB
25(OH)D3+25(OH)D2 SERPL-MCNC: 18 NG/ML (ref 30–96)
ALBUMIN SERPL BCP-MCNC: 3.6 G/DL (ref 3.5–5.2)
ALP SERPL-CCNC: 136 U/L (ref 55–135)
ALT SERPL W/O P-5'-P-CCNC: 23 U/L (ref 10–44)
ANION GAP SERPL CALC-SCNC: 7 MMOL/L (ref 8–16)
AST SERPL-CCNC: 21 U/L (ref 10–40)
BASOPHILS # BLD AUTO: 0.08 K/UL (ref 0–0.2)
BASOPHILS NFR BLD: 1.3 % (ref 0–1.9)
BILIRUB SERPL-MCNC: 0.5 MG/DL (ref 0.1–1)
BUN SERPL-MCNC: 17 MG/DL (ref 8–23)
CALCIUM SERPL-MCNC: 9.4 MG/DL (ref 8.7–10.5)
CHLORIDE SERPL-SCNC: 102 MMOL/L (ref 95–110)
CHOLEST SERPL-MCNC: 195 MG/DL (ref 120–199)
CHOLEST/HDLC SERPL: 3.3 {RATIO} (ref 2–5)
CO2 SERPL-SCNC: 27 MMOL/L (ref 23–29)
CREAT SERPL-MCNC: 1 MG/DL (ref 0.5–1.4)
CRP SERPL-MCNC: 11.3 MG/L (ref 0–8.2)
DIFFERENTIAL METHOD: ABNORMAL
EOSINOPHIL # BLD AUTO: 0.2 K/UL (ref 0–0.5)
EOSINOPHIL NFR BLD: 2.4 % (ref 0–8)
ERYTHROCYTE [DISTWIDTH] IN BLOOD BY AUTOMATED COUNT: 14 % (ref 11.5–14.5)
ERYTHROCYTE [SEDIMENTATION RATE] IN BLOOD BY PHOTOMETRIC METHOD: 35 MM/HR (ref 0–36)
EST. GFR  (NO RACE VARIABLE): 58.8 ML/MIN/1.73 M^2
GLUCOSE SERPL-MCNC: 114 MG/DL (ref 70–110)
HCT VFR BLD AUTO: 39.9 % (ref 37–48.5)
HDLC SERPL-MCNC: 60 MG/DL (ref 40–75)
HDLC SERPL: 30.8 % (ref 20–50)
HGB BLD-MCNC: 13 G/DL (ref 12–16)
IMM GRANULOCYTES # BLD AUTO: 0.06 K/UL (ref 0–0.04)
IMM GRANULOCYTES NFR BLD AUTO: 1 % (ref 0–0.5)
LDLC SERPL CALC-MCNC: 116.6 MG/DL (ref 63–159)
LYMPHOCYTES # BLD AUTO: 1 K/UL (ref 1–4.8)
LYMPHOCYTES NFR BLD: 15.4 % (ref 18–48)
MCH RBC QN AUTO: 29.3 PG (ref 27–31)
MCHC RBC AUTO-ENTMCNC: 32.6 G/DL (ref 32–36)
MCV RBC AUTO: 90 FL (ref 82–98)
MONOCYTES # BLD AUTO: 0.6 K/UL (ref 0.3–1)
MONOCYTES NFR BLD: 9.2 % (ref 4–15)
NEUTROPHILS # BLD AUTO: 4.5 K/UL (ref 1.8–7.7)
NEUTROPHILS NFR BLD: 70.7 % (ref 38–73)
NONHDLC SERPL-MCNC: 135 MG/DL
NRBC BLD-RTO: 0 /100 WBC
PLATELET # BLD AUTO: 269 K/UL (ref 150–450)
PMV BLD AUTO: 10.4 FL (ref 9.2–12.9)
POTASSIUM SERPL-SCNC: 3.8 MMOL/L (ref 3.5–5.1)
PROT SERPL-MCNC: 6.7 G/DL (ref 6–8.4)
RBC # BLD AUTO: 4.43 M/UL (ref 4–5.4)
SODIUM SERPL-SCNC: 136 MMOL/L (ref 136–145)
TRIGL SERPL-MCNC: 92 MG/DL (ref 30–150)
WBC # BLD AUTO: 6.3 K/UL (ref 3.9–12.7)

## 2023-06-05 PROCEDURE — 17110 DESTRUCTION B9 LES UP TO 14: CPT | Mod: S$GLB,,, | Performed by: STUDENT IN AN ORGANIZED HEALTH CARE EDUCATION/TRAINING PROGRAM

## 2023-06-05 PROCEDURE — 99999 PR PBB SHADOW E&M-EST. PATIENT-LVL III: ICD-10-PCS | Mod: PBBFAC,,, | Performed by: STUDENT IN AN ORGANIZED HEALTH CARE EDUCATION/TRAINING PROGRAM

## 2023-06-05 PROCEDURE — 85652 RBC SED RATE AUTOMATED: CPT | Performed by: INTERNAL MEDICINE

## 2023-06-05 PROCEDURE — 82306 VITAMIN D 25 HYDROXY: CPT | Performed by: FAMILY MEDICINE

## 2023-06-05 PROCEDURE — 99999 PR PBB SHADOW E&M-EST. PATIENT-LVL III: CPT | Mod: PBBFAC,,, | Performed by: STUDENT IN AN ORGANIZED HEALTH CARE EDUCATION/TRAINING PROGRAM

## 2023-06-05 PROCEDURE — 86140 C-REACTIVE PROTEIN: CPT | Performed by: INTERNAL MEDICINE

## 2023-06-05 PROCEDURE — 80053 COMPREHEN METABOLIC PANEL: CPT | Performed by: INTERNAL MEDICINE

## 2023-06-05 PROCEDURE — 36415 COLL VENOUS BLD VENIPUNCTURE: CPT | Mod: PO | Performed by: FAMILY MEDICINE

## 2023-06-05 PROCEDURE — 17110 PR DESTRUCTION BENIGN LESIONS UP TO 14: ICD-10-PCS | Mod: S$GLB,,, | Performed by: STUDENT IN AN ORGANIZED HEALTH CARE EDUCATION/TRAINING PROGRAM

## 2023-06-05 PROCEDURE — 80061 LIPID PANEL: CPT | Performed by: FAMILY MEDICINE

## 2023-06-05 PROCEDURE — 99499 NO LOS: ICD-10-PCS | Mod: S$GLB,,, | Performed by: STUDENT IN AN ORGANIZED HEALTH CARE EDUCATION/TRAINING PROGRAM

## 2023-06-05 PROCEDURE — 85025 COMPLETE CBC W/AUTO DIFF WBC: CPT | Performed by: INTERNAL MEDICINE

## 2023-06-05 PROCEDURE — 99499 UNLISTED E&M SERVICE: CPT | Mod: S$GLB,,, | Performed by: STUDENT IN AN ORGANIZED HEALTH CARE EDUCATION/TRAINING PROGRAM

## 2023-06-05 NOTE — PROGRESS NOTES
Chief Complaint   Patient presents with    Plantar Warts           HPI:   Yuliana Mattson is a 77 y.o. female with concerns of  painful warts at the bottom of the right foot, which appears to be present for years  Patient reports no acute injury to the area.    Patient states the pain occurs with direct pressure, walking , and weight bearing.      Treatment tried at home: nothing    6/5/23: returns for plantar R foot pain.    Patient Active Problem List   Diagnosis    Family history of ischemic heart disease    Hypermetropia    Other disorders of vitreous    Personal history of antineoplastic chemotherapy    Personal history of irradiation, presenting hazards to health    Unspecified hearing loss    Osteoporosis, post-menopausal    History of breast cancer    Hypertension    Allergic rhinitis    HLD (hyperlipidemia)    History of bladder cancer    Neck pain    Vitamin D deficiency    Malignant (primary) neoplasm, unspecified    Moderate persistent asthma without complication    Drug-induced immunodeficiency    Aortic calcification         Current Outpatient Medications on File Prior to Visit   Medication Sig Dispense Refill    albuterol (PROVENTIL/VENTOLIN HFA) 90 mcg/actuation inhaler Inhale 2 puffs into the lungs every 6 (six) hours as needed for Wheezing or Shortness of Breath. Rescue 18 g 11    apremilast (OTEZLA) 30 mg Tab Take 1 tablet (30 mg total) by mouth 2 (two) times a day. 180 tablet 3    fluticasone furoate-vilanteroL (BREO ELLIPTA) 100-25 mcg/dose diskus inhaler Inhale 1 puff into the lungs once daily. Controller 60 each 11    losartan-hydrochlorothiazide 50-12.5 mg (HYZAAR) 50-12.5 mg per tablet Take 1 tablet by mouth once daily. 90 tablet 3    montelukast (SINGULAIR) 10 mg tablet Take 1 tablet (10 mg total) by mouth every evening. 90 tablet 3    oxybutynin (DITROPAN-XL) 5 MG TR24 Take 1 tablet (5 mg total) by mouth once daily. 30 tablet 3    pantoprazole (PROTONIX) 40 MG tablet Take 1 tablet (40 mg  total) by mouth once daily. 30 tablet 11    tretinoin (RETIN-A) 0.025 % cream Apply a pea-sized amount to entire face at bedtime, start every other night and increase as tolerated. Take night off if irritation develops. 45 g 3    vitamin D (VITAMIN D3) 1000 units Tab Take 1,000 Units by mouth once daily.      ALPRAZolam (XANAX) 0.25 MG tablet Take 1 tablet (0.25 mg total) by mouth 4 (four) times daily as needed for Anxiety. 28 tablet 0    clindamycin-benzoyl peroxide (BENZACLIN) gel APPLY TO FACE DAILY AS DIRECTED       No current facility-administered medications on file prior to visit.         Review of patient's allergies indicates:   Allergen Reactions    Prochlorperazine edisylate Anaphylaxis     compazine             ROS:   General ROS: negative  Respiratory ROS: no cough, shortness of breath, or wheezing  Cardiovascular ROS: no chest pain or dyspnea on exertion  Musculoskeletal ROS: negative  Neurological ROS: no TIA or stroke symptoms  Dermatological ROS: negative        EXAM:     There were no vitals filed for this visit.       General: Patient is WD/WN, alert and oriented x 3 and in no apparent distress.     Right  Lower extremity exam:      Vascular:   Dorsalis pedis and posterior tibial pulses are 2/4 .    3 seconds capillary refill time   Toes are warm to touch.     There is no edema.       Neurological:    Light touch, proprioception, and sharp/dull sensation are all intact.     No focal deficits.  Negative Mulders.   Negative Tinels.         Dermatological:    Location: submet 5 and 3  multiple, flat, plantar verrucous -appearing lesion  Petechiae is present.   No open wounds.  No bruising.  No redness.       Musculoskeletal:    Muscle strength is 5/5 in all groups .          ASSESSMENT/PLAN:    Problem List Items Addressed This Visit    None  Visit Diagnoses       Plantar wart of right foot    -  Primary            I counseled the patient on the patient's conditions, their implications and medical  management.       With patient's permission, trichloroacetic acid and salinocaine was applied to the two wart lesion on the plantar surface of the right using a sterile applicator, including a 1-mm rim around the lesion.  A non-porous tape was applied to the area.   Patient tolerated the procedure well without immediate complications.      Patient will leave the tape on for twenty four hours and remove the tape to gently wash the area with soap and water.  Patient is advised of blistering and pain and will modify shoes and activities as needed.  OTC NSAIDs are okay to take for pain.     Alpesh Desai DPM

## 2023-06-07 RX ORDER — LOSARTAN POTASSIUM AND HYDROCHLOROTHIAZIDE 12.5; 5 MG/1; MG/1
1 TABLET ORAL DAILY
Qty: 90 TABLET | Refills: 0 | Status: SHIPPED | OUTPATIENT
Start: 2023-06-07 | End: 2023-08-31 | Stop reason: SDUPTHER

## 2023-06-08 ENCOUNTER — PATIENT MESSAGE (OUTPATIENT)
Dept: ADMINISTRATIVE | Facility: OTHER | Age: 78
End: 2023-06-08
Payer: MEDICARE

## 2023-06-08 ENCOUNTER — OFFICE VISIT (OUTPATIENT)
Dept: PRIMARY CARE CLINIC | Facility: CLINIC | Age: 78
End: 2023-06-08
Payer: MEDICARE

## 2023-06-08 ENCOUNTER — TELEPHONE (OUTPATIENT)
Dept: RHEUMATOLOGY | Facility: CLINIC | Age: 78
End: 2023-06-08
Payer: MEDICARE

## 2023-06-08 DIAGNOSIS — U07.1 COVID-19: Primary | ICD-10-CM

## 2023-06-08 PROCEDURE — 99213 OFFICE O/P EST LOW 20 MIN: CPT | Mod: 95,,, | Performed by: PHYSICIAN ASSISTANT

## 2023-06-08 PROCEDURE — 99213 PR OFFICE/OUTPT VISIT, EST, LEVL III, 20-29 MIN: ICD-10-PCS | Mod: 95,,, | Performed by: PHYSICIAN ASSISTANT

## 2023-06-08 RX ORDER — AZITHROMYCIN 250 MG/1
TABLET, FILM COATED ORAL
Qty: 6 TABLET | Refills: 0 | Status: SHIPPED | OUTPATIENT
Start: 2023-06-08 | End: 2023-08-31 | Stop reason: ALTCHOICE

## 2023-06-08 NOTE — PROGRESS NOTES
Subjective     Patient ID: Yuliana Mattson is a 77 y.o. female.    Chief Complaint: COVID-19 Concerns    The patient location is: [place], [place]  The chief complaint leading to consultation is: COVID-19    Visit type: audiovisual    Face to Face time with patient: 20 minutes of total time spent on the encounter, which includes face to face time and non-face to face time preparing to see the patient (eg, review of tests), Obtaining and/or reviewing separately obtained history, Documenting clinical information in the electronic or other health record, Independently interpreting results (not separately reported) and communicating results to the patient/family/caregiver, or Care coordination (not separately reported).         Each patient to whom he or she provides medical services by telemedicine is:  (1) informed of the relationship between the physician and patient and the respective role of any other health care provider with respect to management of the patient; and (2) notified that he or she may decline to receive medical services by telemedicine and may withdraw from such care at any time.    Notes: Contracted COVID-19 thru her granddaughter who recently got back from [place].        Sore Throat   This is a new problem. The current episode started today. The problem has been gradually worsening. Neither side of throat is experiencing more pain than the other. The fever has been present for Less than 1 day. The pain is at a severity of 3/10. The pain is moderate. Associated symptoms include congestion, ear pain and headaches. She has had no exposure to strep or mono. She has tried acetaminophen for the symptoms. The treatment provided no relief.   Review of Systems   HENT:  Positive for nasal congestion, ear pain and sore throat.    Neurological:  Positive for headaches.   Patient Active Problem List   Diagnosis    Family history of ischemic heart disease    Hypermetropia    Other disorders of vitreous     Personal history of antineoplastic chemotherapy    Personal history of irradiation, presenting hazards to health    Unspecified hearing loss    Osteoporosis, post-menopausal    History of breast cancer    Hypertension    Allergic rhinitis    HLD (hyperlipidemia)    History of bladder cancer    Neck pain    Vitamin D deficiency    Malignant (primary) neoplasm, unspecified    Moderate persistent asthma without complication    Drug-induced immunodeficiency    Aortic calcification          Objective     Physical Exam  Constitutional:       General: She is not in acute distress.     Appearance: Normal appearance. She is ill-appearing. She is not toxic-appearing or diaphoretic.   Pulmonary:      Effort: Pulmonary effort is normal.   Neurological:      Mental Status: She is alert.   Psychiatric:         Mood and Affect: Mood normal.          Assessment and Plan     1. COVID-19  -     nirmatrelvir-ritonavir 300 mg (150 mg x 2)-100 mg copackaged tablets (EUA); Take 3 tablets by mouth 2 (two) times daily for 5 days. Each dose contains 2 nirmatrelvir (pink tablets) and 1 ritonavir (white tablet). Take all 3 tablets together  Dispense: 30 tablet; Refill: 0  -     azithromycin (Z-OLE) 250 MG tablet; Follow instructions on pack.  Dispense: 6 tablet; Refill: 0        COVID-19  -     nirmatrelvir-ritonavir 300 mg (150 mg x 2)-100 mg copackaged tablets (EUA); Take 3 tablets by mouth 2 (two) times daily for 5 days. Each dose contains 2 nirmatrelvir (pink tablets) and 1 ritonavir (white tablet). Take all 3 tablets together  Dispense: 30 tablet; Refill: 0  -     azithromycin (Z-OLE) 250 MG tablet; Follow instructions on pack.  Dispense: 6 tablet; Refill: 0

## 2023-06-08 NOTE — PROGRESS NOTES
Answers submitted by the patient for this visit:  Sore Throat Questionnaire (Submitted on 6/8/2023)  Chief Complaint: Sore throat  Onset: today  Progression since onset: gradually worsening  Pain worse on: neither  Fever duration: less than 1 day  Pain - numeric: 3/10  congestion: Yes  ear pain: Yes  headaches: Yes  strep: No  mono: No  Treatments tried: acetaminophen  Improvement on treatment: no relief  Pain severity: moderate

## 2023-06-08 NOTE — TELEPHONE ENCOUNTER
S/w pt and discussed changing appt to virtual due to Covid.  Pt agreed.    Imani Martinez MA  [facility]  6/8/2023

## 2023-06-08 NOTE — TELEPHONE ENCOUNTER
----- Message from Lexus Barragan, Patient Care Assistant sent at 6/8/2023  9:53 AM CDT -----  Contact: self  Pt has covid and is calling to [facility] [phone]  thanks

## 2023-06-12 ENCOUNTER — OFFICE VISIT (OUTPATIENT)
Dept: RHEUMATOLOGY | Facility: CLINIC | Age: 78
End: 2023-06-12
Payer: MEDICARE

## 2023-06-12 DIAGNOSIS — M35.3 PMR (POLYMYALGIA RHEUMATICA): ICD-10-CM

## 2023-06-12 DIAGNOSIS — Z85.3 HISTORY OF BREAST CANCER: ICD-10-CM

## 2023-06-12 DIAGNOSIS — L40.50 PSA (PSORIATIC ARTHRITIS): Primary | ICD-10-CM

## 2023-06-12 DIAGNOSIS — Z85.51 HISTORY OF BLADDER CANCER: ICD-10-CM

## 2023-06-12 DIAGNOSIS — M81.0 OSTEOPOROSIS, POST-MENOPAUSAL: ICD-10-CM

## 2023-06-12 DIAGNOSIS — H90.72 MIXED CONDUCTIVE AND SENSORINEURAL HEARING LOSS OF LEFT EAR WITH UNRESTRICTED HEARING OF RIGHT EAR: ICD-10-CM

## 2023-06-12 PROCEDURE — 99215 OFFICE O/P EST HI 40 MIN: CPT | Mod: 95,,, | Performed by: INTERNAL MEDICINE

## 2023-06-12 PROCEDURE — 99215 PR OFFICE/OUTPT VISIT, EST, LEVL V, 40-54 MIN: ICD-10-PCS | Mod: 95,,, | Performed by: INTERNAL MEDICINE

## 2023-06-12 NOTE — PROGRESS NOTES
Subjective:       Patient ID: Yuliana Mattson is a 77 y.o. female.    Chief Complaint: No chief complaint on file.    Follow up: 76 yo with a h/o uveitis, iritis, she  dc her restasis. She recently dx with otezla she took paxlovid  she had a covid likely in April 2022 she had cough, cellulitis, had a rocephin she restarted otezla  she had a episode hematuria.  she was dx with meniere's dz, she started otezla and her skin, nails psoriasis and back improved but she had a cough and it worsened she has had breast ca x 2, two different type prior to that she had bladder cancer.  Pt was dx with restrictive airway disease.she is on breo and now has a uti grew Kleb, and proteaus. otezla      Current tx otezla    Review of Systems   Constitutional:  Positive for activity change. Negative for appetite change, chills, diaphoresis, fever and unexpected weight change.   HENT:  Negative for congestion, dental problem, ear discharge, ear pain, facial swelling, mouth sores, nosebleeds, postnasal drip, rhinorrhea, sinus pressure, sneezing, sore throat, tinnitus, trouble swallowing and voice change.    Eyes:  Negative for photophobia, pain, discharge, redness and itching.   Respiratory:  Negative for apnea, cough, chest tightness, shortness of breath and wheezing.    Cardiovascular:  Negative for chest pain, palpitations and leg swelling.   Gastrointestinal:  Negative for abdominal distention, abdominal pain, constipation, diarrhea, nausea and vomiting.   Endocrine: Negative for cold intolerance, heat intolerance, polydipsia and polyuria.   Genitourinary:  Negative for decreased urine volume, difficulty urinating, dysuria, flank pain, frequency, genital sores, hematuria and urgency.   Musculoskeletal:  Positive for arthralgias, back pain, neck pain and neck stiffness. Negative for gait problem.   Skin:  Negative for pallor, rash and wound.   Allergic/Immunologic: Negative for immunocompromised state.   Neurological:  Negative for  dizziness, tremors, weakness, numbness and headaches.   Hematological:  Negative for adenopathy. Does not bruise/bleed easily.   Psychiatric/Behavioral:  Negative for sleep disturbance. The patient is not nervous/anxious.        Objective:   There were no vitals taken for this visit.     Physical Exam   Constitutional: She is oriented to person, place, and time.   Neurological: She is alert and oriented to person, place, and time.   Psychiatric: Mood, affect and judgment normal.        Order: [id number]  Status:  Final result   Visible to patient:  No (not released) Next appt:  01/08/2021 at 10:00 AM in Radiology (Mammogram) Dx:  Acute iridocyclitis; Sjogren's syndro...  Component 7mo ago   HLA B27 Interpretation TAQ: [id number]   SAPE: 202    B27 Testing Date 05/18/2020 12:59 PM    HLA B27 Result Negative                Results for orders placed or performed in visit on 06/05/23   CBC Auto Differential   Result Value Ref Range    WBC 6.30 3.90 - 12.70 K/uL    RBC 4.43 4.00 - 5.40 M/uL    Hemoglobin 13.0 12.0 - 16.0 g/dL    Hematocrit 39.9 37.0 - 48.5 %    MCV 90 82 - 98 fL    MCH 29.3 27.0 - 31.0 pg    MCHC 32.6 32.0 - 36.0 g/dL    RDW 14.0 11.5 - 14.5 %    Platelets 269 150 - 450 K/uL    MPV 10.4 9.2 - 12.9 fL    Immature Granulocytes 1.0 (H) 0.0 - 0.5 %    Gran # (ANC) 4.5 1.8 - 7.7 K/uL    Immature Grans (Abs) 0.06 (H) 0.00 - 0.04 K/uL    Lymph # 1.0 1.0 - 4.8 K/uL    Mono # 0.6 0.3 - 1.0 K/uL    Eos # 0.2 0.0 - 0.5 K/uL    Baso # 0.08 0.00 - 0.20 K/uL    nRBC 0 0 /100 WBC    Gran % 70.7 38.0 - 73.0 %    Lymph % 15.4 (L) 18.0 - 48.0 %    Mono % 9.2 4.0 - 15.0 %    Eosinophil % 2.4 0.0 - 8.0 %    Basophil % 1.3 0.0 - 1.9 %    Differential Method Automated    Comprehensive Metabolic Panel   Result Value Ref Range    Sodium 136 136 - 145 mmol/L    Potassium 3.8 3.5 - 5.1 mmol/L    Chloride 102 95 - 110 mmol/L    CO2 27 23 - 29 mmol/L    Glucose 114 (H) 70 - 110 mg/dL    BUN 17 8 - 23 mg/dL    Creatinine 1.0 0.5 - 1.4  mg/dL    Calcium 9.4 8.7 - 10.5 mg/dL    Total Protein 6.7 6.0 - 8.4 g/dL    Albumin 3.6 3.5 - 5.2 g/dL    Total Bilirubin 0.5 0.1 - 1.0 mg/dL    Alkaline Phosphatase 136 (H) 55 - 135 U/L    AST 21 10 - 40 U/L    ALT 23 10 - 44 U/L    Anion Gap 7 (L) 8 - 16 mmol/L    eGFR 58.8 (A) >60 mL/min/1.73 m^2   Sedimentation rate   Result Value Ref Range    Sed Rate 35 0 - 36 mm/Hr   C-Reactive Protein   Result Value Ref Range    CRP 11.3 (H) 0.0 - 8.2 mg/L   Vitamin D   Result Value Ref Range    Vit D, 25-Hydroxy 18 (L) 30 - 96 ng/mL   Lipid Panel   Result Value Ref Range    Cholesterol 195 120 - 199 mg/dL    Triglycerides 92 30 - 150 mg/dL    HDL 60 40 - 75 mg/dL    LDL Cholesterol 116.6 63.0 - 159.0 mg/dL    HDL/Cholesterol Ratio 30.8 20.0 - 50.0 %    Total Cholesterol/HDL Ratio 3.3 2.0 - 5.0    Non-HDL Cholesterol 135 mg/dL     reviewed labs with patient during this visit    we       Assessment:       1. PSA (psoriatic arthritis)    2. Osteoporosis, post-menopausal    3. History of breast cancer    4. Mixed conductive and sensorineural hearing loss of left ear with unrestricted hearing of right ear    5. History of bladder cancer    6. PMR (polymyalgia rheumatica)              Plan:         Diagnoses and all orders for this visit:    PSA (psoriatic arthritis)  -     Sedimentation rate; Future  -     Comprehensive Metabolic Panel; Future  -     CBC Auto Differential; Future  -     Sedimentation rate; Future  -     C-Reactive Protein; Future  -     Comprehensive Metabolic Panel; Future  -     CBC Auto Differential; Future  -     C-Reactive Protein; Future    Osteoporosis, post-menopausal  -     Sedimentation rate; Future  -     Comprehensive Metabolic Panel; Future  -     CBC Auto Differential; Future  -     Sedimentation rate; Future  -     C-Reactive Protein; Future  -     Comprehensive Metabolic Panel; Future  -     CBC Auto Differential; Future  -     C-Reactive Protein; Future    History of breast cancer  -      Sedimentation rate; Future  -     Comprehensive Metabolic Panel; Future  -     CBC Auto Differential; Future  -     Sedimentation rate; Future  -     C-Reactive Protein; Future  -     Comprehensive Metabolic Panel; Future  -     CBC Auto Differential; Future  -     C-Reactive Protein; Future    Mixed conductive and sensorineural hearing loss of left ear with unrestricted hearing of right ear  -     Sedimentation rate; Future  -     Comprehensive Metabolic Panel; Future  -     CBC Auto Differential; Future  -     Sedimentation rate; Future  -     C-Reactive Protein; Future  -     Comprehensive Metabolic Panel; Future  -     CBC Auto Differential; Future  -     C-Reactive Protein; Future    History of bladder cancer  -     Sedimentation rate; Future  -     Comprehensive Metabolic Panel; Future  -     CBC Auto Differential; Future  -     Sedimentation rate; Future  -     C-Reactive Protein; Future  -     Comprehensive Metabolic Panel; Future  -     CBC Auto Differential; Future  -     C-Reactive Protein; Future    PMR (polymyalgia rheumatica)  -     Sedimentation rate; Future  -     Comprehensive Metabolic Panel; Future  -     CBC Auto Differential; Future  -     Sedimentation rate; Future  -     C-Reactive Protein; Future  -     Comprehensive Metabolic Panel; Future  -     CBC Auto Differential; Future  -     C-Reactive Protein; Future        1.we will hold otezla x 3 months then check labs if flaing will start low dose  otezla  2. Labs ordered    More than 50% of the  50 minute encounter was spent face to face counseling the patient regarding current status and future plan of care as well as side of the medications. All questions were answered to patient's satisfaction

## 2023-06-18 ENCOUNTER — PATIENT MESSAGE (OUTPATIENT)
Dept: FAMILY MEDICINE | Facility: CLINIC | Age: 78
End: 2023-06-18
Payer: MEDICARE

## 2023-06-18 ENCOUNTER — NURSE TRIAGE (OUTPATIENT)
Dept: ADMINISTRATIVE | Facility: CLINIC | Age: 78
End: 2023-06-18
Payer: MEDICARE

## 2023-06-18 NOTE — TELEPHONE ENCOUNTER
Pt with complaints of testing + for covid 10 days ago, started feeling better for 2 days and then symptoms resurfaced, temp today was 100.2.  Pt continues with head congestion and started with cough today, sats 99%.  Care advice states to call pcp in 24 hours.  Patient verbally understands, all questions answered, advised to call back for any worsening symptoms or further needs.       Reason for Disposition   [1] Caller has NON-URGENT question AND [2] triager unable to answer    Additional Information   Negative: SEVERE difficulty breathing (e.g., struggling for each breath, speaks in single words)   Negative: [1] SEVERE weakness (e.g., can't stand or can barely walk) AND [2] new-onset or WORSE   Negative: Difficult to awaken or acting confused (e.g., disoriented, slurred speech)   Negative: Bluish (or gray) lips or face now   Negative: Sounds like a life-threatening emergency to the triager   Negative: [1] MODERATE difficulty breathing (e.g., speaks in phrases, SOB even at rest, pulse 100-120) AND [2] new-onset or WORSE   Negative: [1] MODERATE difficulty breathing AND [2] oxygen level (e.g., pulse oximetry) 91 to 94 percent   Negative: Oxygen level (e.g., pulse oximetry) 90 percent or lower   Negative: MODERATE difficulty breathing (e.g., speaks in phrases, SOB even at rest, pulse 100-120)   Negative: [1] Drinking very little AND [2] dehydration suspected (e.g., no urine > 12 hours, very dry mouth, very lightheaded)   Negative: Patient sounds very sick or weak to the triager   Negative: [1] MILD difficulty breathing (e.g., minimal/no SOB at rest, SOB with walking, pulse <100) AND [2] new-onset   Negative: [1] PERSISTING SYMPTOMS OF COVID-19 AND [2] NEW symptom AND [3] could be serious   Negative: Oxygen level (e.g., pulse oximetry) 91 to 94 percent   Negative: [1] Caller has URGENT question AND [2] triager unable to answer question   Negative: [1] PERSISTING SYMPTOMS OF COVID-19 AND [2] symptoms WORSE    Protocols  used: Coronavirus (COVID-19) Persisting Symptoms Follow-up Call-[facility]

## 2023-06-20 ENCOUNTER — OFFICE VISIT (OUTPATIENT)
Dept: FAMILY MEDICINE | Facility: CLINIC | Age: 78
End: 2023-06-20
Payer: MEDICARE

## 2023-06-20 VITALS
BODY MASS INDEX: 30.75 KG/M2 | TEMPERATURE: 100 F | HEART RATE: 80 BPM | OXYGEN SATURATION: 97 % | DIASTOLIC BLOOD PRESSURE: 86 MMHG | WEIGHT: 180.13 LBS | SYSTOLIC BLOOD PRESSURE: 152 MMHG | HEIGHT: 64 IN

## 2023-06-20 DIAGNOSIS — B96.89 ACUTE BACTERIAL SINUSITIS: Primary | ICD-10-CM

## 2023-06-20 DIAGNOSIS — J01.90 ACUTE BACTERIAL SINUSITIS: Primary | ICD-10-CM

## 2023-06-20 DIAGNOSIS — N39.3 STRESS INCONTINENCE OF URINE: ICD-10-CM

## 2023-06-20 LAB
BACTERIA #/AREA URNS HPF: ABNORMAL /HPF
BILIRUB UR QL STRIP: NEGATIVE
CLARITY UR: CLEAR
COLOR UR: YELLOW
GLUCOSE UR QL STRIP: NEGATIVE
HGB UR QL STRIP: NEGATIVE
KETONES UR QL STRIP: NEGATIVE
LEUKOCYTE ESTERASE UR QL STRIP: ABNORMAL
MICROSCOPIC COMMENT: ABNORMAL
NITRITE UR QL STRIP: NEGATIVE
PH UR STRIP: 6 [PH] (ref 5–8)
PROT UR QL STRIP: NEGATIVE
RBC #/AREA URNS HPF: 1 /HPF (ref 0–4)
SP GR UR STRIP: 1.02 (ref 1–1.03)
SQUAMOUS #/AREA URNS HPF: 1 /HPF
URN SPEC COLLECT METH UR: ABNORMAL
WBC #/AREA URNS HPF: 60 /HPF (ref 0–5)
WBC CLUMPS URNS QL MICRO: ABNORMAL

## 2023-06-20 PROCEDURE — 81000 URINALYSIS NONAUTO W/SCOPE: CPT | Mod: PO | Performed by: STUDENT IN AN ORGANIZED HEALTH CARE EDUCATION/TRAINING PROGRAM

## 2023-06-20 PROCEDURE — 1101F PT FALLS ASSESS-DOCD LE1/YR: CPT | Mod: CPTII,S$GLB,, | Performed by: STUDENT IN AN ORGANIZED HEALTH CARE EDUCATION/TRAINING PROGRAM

## 2023-06-20 PROCEDURE — 99213 OFFICE O/P EST LOW 20 MIN: CPT | Mod: S$GLB,,, | Performed by: STUDENT IN AN ORGANIZED HEALTH CARE EDUCATION/TRAINING PROGRAM

## 2023-06-20 PROCEDURE — 3077F SYST BP >= 140 MM HG: CPT | Mod: CPTII,S$GLB,, | Performed by: STUDENT IN AN ORGANIZED HEALTH CARE EDUCATION/TRAINING PROGRAM

## 2023-06-20 PROCEDURE — 3079F DIAST BP 80-89 MM HG: CPT | Mod: CPTII,S$GLB,, | Performed by: STUDENT IN AN ORGANIZED HEALTH CARE EDUCATION/TRAINING PROGRAM

## 2023-06-20 PROCEDURE — 87186 SC STD MICRODIL/AGAR DIL: CPT | Performed by: STUDENT IN AN ORGANIZED HEALTH CARE EDUCATION/TRAINING PROGRAM

## 2023-06-20 PROCEDURE — 1125F PR PAIN SEVERITY QUANTIFIED, PAIN PRESENT: ICD-10-PCS | Mod: CPTII,S$GLB,, | Performed by: STUDENT IN AN ORGANIZED HEALTH CARE EDUCATION/TRAINING PROGRAM

## 2023-06-20 PROCEDURE — 87088 URINE BACTERIA CULTURE: CPT | Performed by: STUDENT IN AN ORGANIZED HEALTH CARE EDUCATION/TRAINING PROGRAM

## 2023-06-20 PROCEDURE — 1101F PR PT FALLS ASSESS DOC 0-1 FALLS W/OUT INJ PAST YR: ICD-10-PCS | Mod: CPTII,S$GLB,, | Performed by: STUDENT IN AN ORGANIZED HEALTH CARE EDUCATION/TRAINING PROGRAM

## 2023-06-20 PROCEDURE — 87086 URINE CULTURE/COLONY COUNT: CPT | Performed by: STUDENT IN AN ORGANIZED HEALTH CARE EDUCATION/TRAINING PROGRAM

## 2023-06-20 PROCEDURE — 3288F PR FALLS RISK ASSESSMENT DOCUMENTED: ICD-10-PCS | Mod: CPTII,S$GLB,, | Performed by: STUDENT IN AN ORGANIZED HEALTH CARE EDUCATION/TRAINING PROGRAM

## 2023-06-20 PROCEDURE — 99999 PR PBB SHADOW E&M-EST. PATIENT-LVL III: ICD-10-PCS | Mod: PBBFAC,,, | Performed by: STUDENT IN AN ORGANIZED HEALTH CARE EDUCATION/TRAINING PROGRAM

## 2023-06-20 PROCEDURE — 99999 PR PBB SHADOW E&M-EST. PATIENT-LVL III: CPT | Mod: PBBFAC,,, | Performed by: STUDENT IN AN ORGANIZED HEALTH CARE EDUCATION/TRAINING PROGRAM

## 2023-06-20 PROCEDURE — 99213 PR OFFICE/OUTPT VISIT, EST, LEVL III, 20-29 MIN: ICD-10-PCS | Mod: S$GLB,,, | Performed by: STUDENT IN AN ORGANIZED HEALTH CARE EDUCATION/TRAINING PROGRAM

## 2023-06-20 PROCEDURE — 1125F AMNT PAIN NOTED PAIN PRSNT: CPT | Mod: CPTII,S$GLB,, | Performed by: STUDENT IN AN ORGANIZED HEALTH CARE EDUCATION/TRAINING PROGRAM

## 2023-06-20 PROCEDURE — 3077F PR MOST RECENT SYSTOLIC BLOOD PRESSURE >= 140 MM HG: ICD-10-PCS | Mod: CPTII,S$GLB,, | Performed by: STUDENT IN AN ORGANIZED HEALTH CARE EDUCATION/TRAINING PROGRAM

## 2023-06-20 PROCEDURE — 3079F PR MOST RECENT DIASTOLIC BLOOD PRESSURE 80-89 MM HG: ICD-10-PCS | Mod: CPTII,S$GLB,, | Performed by: STUDENT IN AN ORGANIZED HEALTH CARE EDUCATION/TRAINING PROGRAM

## 2023-06-20 PROCEDURE — 87077 CULTURE AEROBIC IDENTIFY: CPT | Performed by: STUDENT IN AN ORGANIZED HEALTH CARE EDUCATION/TRAINING PROGRAM

## 2023-06-20 PROCEDURE — 3288F FALL RISK ASSESSMENT DOCD: CPT | Mod: CPTII,S$GLB,, | Performed by: STUDENT IN AN ORGANIZED HEALTH CARE EDUCATION/TRAINING PROGRAM

## 2023-06-20 PROCEDURE — 1159F MED LIST DOCD IN RCRD: CPT | Mod: CPTII,S$GLB,, | Performed by: STUDENT IN AN ORGANIZED HEALTH CARE EDUCATION/TRAINING PROGRAM

## 2023-06-20 PROCEDURE — 1159F PR MEDICATION LIST DOCUMENTED IN MEDICAL RECORD: ICD-10-PCS | Mod: CPTII,S$GLB,, | Performed by: STUDENT IN AN ORGANIZED HEALTH CARE EDUCATION/TRAINING PROGRAM

## 2023-06-20 RX ORDER — AMOXICILLIN AND CLAVULANATE POTASSIUM 875; 125 MG/1; MG/1
1 TABLET, FILM COATED ORAL 2 TIMES DAILY
Qty: 14 TABLET | Refills: 0 | Status: SHIPPED | OUTPATIENT
Start: 2023-06-20 | End: 2023-06-27

## 2023-06-20 NOTE — ASSESSMENT & PLAN NOTE
Sudden worsening of symptoms after improvement in viral URI symptoms. Likely bacterial based on that criteria. Antibiotics as listed.

## 2023-06-20 NOTE — PROGRESS NOTES
Name: Yuliana Mattson  MRN: [id number]  : 1945  PCP: Tommy Palumbo MD    HPI  Patient presents with upper respiratory symptoms. She was diagnosed with COVID on  and was prescribed Paxlovid and azithromycin, took vitamin C, zinc, and vitamin D. Was trying to isolate for ten days from her chronically ill . She was feeling better until  when she suddenly started feeling worse.     Review of Systems   Constitutional:  Positive for chills and fever (100.6 yesterday.).   HENT:  Positive for congestion and postnasal drip. Negative for sore throat.    Respiratory:  Positive for cough (nonproductive).    Cardiovascular:  Positive for chest pain (when she coughs).   Gastrointestinal:  Negative for nausea and vomiting.   Genitourinary:         Urinary incontinence     Patient Active Problem List   Diagnosis    Family history of ischemic heart disease    Hypermetropia    Other disorders of vitreous    Personal history of antineoplastic chemotherapy    Personal history of irradiation, presenting hazards to health    Unspecified hearing loss    Osteoporosis, post-menopausal    History of breast cancer    Hypertension    Allergic rhinitis    HLD (hyperlipidemia)    History of bladder cancer    Neck pain    Vitamin D deficiency    Malignant (primary) neoplasm, unspecified    Moderate persistent asthma without complication    Drug-induced immunodeficiency    Aortic calcification       Vitals:    23 0806   BP: (!) 152/86   Pulse: 80   Temp: 99.5 °F (37.5 °C)       Physical Exam  Constitutional:       General: She is not in acute distress.     Appearance: Normal appearance. She is well-developed.   HENT:      Head: Normocephalic and atraumatic.      Right Ear: External ear normal.      Left Ear: External ear normal.      Nose: Rhinorrhea present. Rhinorrhea is purulent.      Mouth/Throat:      Pharynx: Oropharynx is clear. No pharyngeal swelling or posterior oropharyngeal erythema.   Eyes:       Conjunctiva/sclera: Conjunctivae normal.   Cardiovascular:      Rate and Rhythm: Normal rate and regular rhythm.      Heart sounds: No murmur heard.    No friction rub. No gallop.   Pulmonary:      Effort: Pulmonary effort is normal. No respiratory distress.      Breath sounds: No wheezing, rhonchi or rales.   Abdominal:      General: Abdomen is flat. There is no distension.   Musculoskeletal:         General: No swelling or deformity.      Right lower leg: No edema.      Left lower leg: No edema.   Skin:     General: Skin is warm and dry.      Coloration: Skin is not jaundiced.   Neurological:      Mental Status: She is alert and oriented to person, place, and time. Mental status is at baseline.   Psychiatric:         Attention and Perception: Attention and perception normal.         Mood and Affect: Mood normal.         Speech: Speech normal.         Behavior: Behavior normal. Behavior is cooperative.         Thought Content: Thought content normal.         Cognition and Memory: Cognition normal.         Judgment: Judgment normal.       1. Acute bacterial sinusitis  Assessment & Plan:  Sudden worsening of symptoms after improvement in viral URI symptoms. Likely bacterial based on that criteria. Antibiotics as listed.    Orders:  -     amoxicillin-clavulanate 875-125mg (AUGMENTIN) 875-125 mg per tablet; Take 1 tablet by mouth 2 (two) times daily. for 7 days  Dispense: 14 tablet; Refill: 0    2. Stress incontinence of urine  -     Urinalysis, Reflex to Urine Culture Urine, Clean Catch  -     Urinalysis Microscopic        Follow up as needed    Bill Stephenson MD  06/20/2023

## 2023-06-22 LAB — BACTERIA UR CULT: ABNORMAL

## 2023-06-27 ENCOUNTER — HOSPITAL ENCOUNTER (OUTPATIENT)
Dept: RADIOLOGY | Facility: HOSPITAL | Age: 78
Discharge: HOME | End: 2023-06-27
Attending: FAMILY MEDICINE
Payer: MEDICARE

## 2023-06-27 DIAGNOSIS — M81.0 OSTEOPOROSIS, UNSPECIFIED OSTEOPOROSIS TYPE, UNSPECIFIED PATHOLOGICAL FRACTURE PRESENCE: ICD-10-CM

## 2023-06-27 PROCEDURE — 77080 DXA BONE DENSITY AXIAL SKELETON 1 OR MORE SITES: ICD-10-PCS | Mod: 26,,, | Performed by: RADIOLOGY

## 2023-06-27 PROCEDURE — 77080 DXA BONE DENSITY AXIAL: CPT | Mod: 26,,, | Performed by: RADIOLOGY

## 2023-06-27 PROCEDURE — 77080 DXA BONE DENSITY AXIAL: CPT | Mod: TC,PO

## 2023-06-28 ENCOUNTER — OFFICE VISIT (OUTPATIENT)
Dept: PODIATRY | Facility: CLINIC | Age: 78
End: 2023-06-28
Payer: MEDICARE

## 2023-06-28 VITALS — WEIGHT: 180.13 LBS | HEIGHT: 64 IN | BODY MASS INDEX: 30.75 KG/M2

## 2023-06-28 DIAGNOSIS — B07.0 PLANTAR WART OF RIGHT FOOT: Primary | ICD-10-CM

## 2023-06-28 PROCEDURE — 99999 PR PBB SHADOW E&M-EST. PATIENT-LVL III: CPT | Mod: PBBFAC,,, | Performed by: STUDENT IN AN ORGANIZED HEALTH CARE EDUCATION/TRAINING PROGRAM

## 2023-06-28 PROCEDURE — 99999 PR PBB SHADOW E&M-EST. PATIENT-LVL III: ICD-10-PCS | Mod: PBBFAC,,, | Performed by: STUDENT IN AN ORGANIZED HEALTH CARE EDUCATION/TRAINING PROGRAM

## 2023-06-28 PROCEDURE — 99499 UNLISTED E&M SERVICE: CPT | Mod: S$GLB,,, | Performed by: STUDENT IN AN ORGANIZED HEALTH CARE EDUCATION/TRAINING PROGRAM

## 2023-06-28 PROCEDURE — 99499 NO LOS: ICD-10-PCS | Mod: S$GLB,,, | Performed by: STUDENT IN AN ORGANIZED HEALTH CARE EDUCATION/TRAINING PROGRAM

## 2023-06-28 PROCEDURE — 17110 PR DESTRUCTION BENIGN LESIONS UP TO 14: ICD-10-PCS | Mod: S$GLB,,, | Performed by: STUDENT IN AN ORGANIZED HEALTH CARE EDUCATION/TRAINING PROGRAM

## 2023-06-28 PROCEDURE — 17110 DESTRUCTION B9 LES UP TO 14: CPT | Mod: S$GLB,,, | Performed by: STUDENT IN AN ORGANIZED HEALTH CARE EDUCATION/TRAINING PROGRAM

## 2023-06-28 NOTE — PROGRESS NOTES
Chief Complaint   Patient presents with    Plantar Warts           HPI:   Yuliana Mattson is a 77 y.o. female with concerns of  painful warts at the bottom of the right foot, which appears to be present for years  Patient reports no acute injury to the area.    Patient states the pain occurs with direct pressure, walking , and weight bearing.      Treatment tried at home: nothing    23: returns for plantar R foot pain.    2023:  Patient returns today for plantar warts of the right foot.  She is doing very well.    Patient Active Problem List   Diagnosis    Family history of ischemic heart disease    Hypermetropia    Other disorders of vitreous    Personal history of antineoplastic chemotherapy    Personal history of irradiation, presenting hazards to health    Unspecified hearing loss    Osteoporosis, post-menopausal    History of breast cancer    Hypertension    Allergic rhinitis    HLD (hyperlipidemia)    History of bladder cancer    Neck pain    Vitamin D deficiency    Malignant (primary) neoplasm, unspecified    Moderate persistent asthma without complication    Drug-induced immunodeficiency    Aortic calcification    Acute bacterial sinusitis         Current Outpatient Medications on File Prior to Visit   Medication Sig Dispense Refill    albuterol (PROVENTIL/VENTOLIN HFA) 90 mcg/actuation inhaler Inhale 2 puffs into the lungs every 6 (six) hours as needed for Wheezing or Shortness of Breath. Rescue 18 g 11    ALPRAZolam (XANAX) 0.25 MG tablet Take 1 tablet (0.25 mg total) by mouth 4 (four) times daily as needed for Anxiety. 28 tablet 0    [] amoxicillin-clavulanate 875-125mg (AUGMENTIN) 875-125 mg per tablet Take 1 tablet by mouth 2 (two) times daily. for 7 days 14 tablet 0    ascorbic acid, vitamin C, (VITAMIN C) 100 MG tablet Take 100 mg by mouth once daily.      azithromycin (Z-OLE) 250 MG tablet Follow instructions on pack. (Patient not taking: Reported on 2023) 6 tablet 0     "fluticasone furoate-vilanteroL (BREO ELLIPTA) 100-25 mcg/dose diskus inhaler Inhale 1 puff into the lungs once daily. Controller 60 each 11    losartan-hydrochlorothiazide 50-12.5 mg (HYZAAR) 50-12.5 mg per tablet Take 1 tablet by mouth once daily. 90 tablet 0    montelukast (SINGULAIR) 10 mg tablet Take 1 tablet (10 mg total) by mouth every evening. 90 tablet 3    oxybutynin (DITROPAN-XL) 5 MG TR24 Take 1 tablet (5 mg total) by mouth once daily. 30 tablet 3    pantoprazole (PROTONIX) 40 MG tablet Take 1 tablet (40 mg total) by mouth once daily. 30 tablet 11    tretinoin (RETIN-A) 0.025 % cream Apply a pea-sized amount to entire face at bedtime, start every other night and increase as tolerated. Take night off if irritation develops. 45 g 3    vitamin D (VITAMIN D3) 1000 units Tab Take 1,000 Units by mouth once daily.      zinc sulfate (ZINC-15 ORAL) Take by mouth.       No current facility-administered medications on file prior to visit.         Review of patient's allergies indicates:   Allergen Reactions    Prochlorperazine edisylate Anaphylaxis     compazine             ROS:   General ROS: negative  Respiratory ROS: no cough, shortness of breath, or wheezing  Cardiovascular ROS: no chest pain or dyspnea on exertion  Musculoskeletal ROS: negative  Neurological ROS: no TIA or stroke symptoms  Dermatological ROS: negative        EXAM:     Vitals:    06/28/23 1557   Weight: 81.7 kg (180 lb 1.9 oz)   Height: 5' 4" (1.626 m)          General: Patient is WD/WN, alert and oriented x 3 and in no apparent distress.     Right  Lower extremity exam:      Vascular:   Dorsalis pedis and posterior tibial pulses are 2/4 .    3 seconds capillary refill time   Toes are warm to touch.     There is no edema.       Neurological:    Light touch, proprioception, and sharp/dull sensation are all intact.     No focal deficits.  Negative Mulders.   Negative Tinels.         Dermatological:    Location: submet 5 and 3  multiple, flat, " plantar verrucous -appearing lesion  Petechiae is present.   No open wounds.  No bruising.  No redness.       Musculoskeletal:    Muscle strength is 5/5 in all groups .          ASSESSMENT/PLAN:    Problem List Items Addressed This Visit    None        I counseled the patient on the patient's conditions, their implications and medical management.       With patient's permission, trichloroacetic acid and salinocaine was applied to the two wart lesion on the plantar surface of the right using a sterile applicator, including a 1-mm rim around the lesion.  A non-porous tape was applied to the area.   Patient tolerated the procedure well without immediate complications.      Patient will leave the tape on for twenty four hours and remove the tape to gently wash the area with soap and water.  Patient is advised of blistering and pain and will modify shoes and activities as needed.  OTC NSAIDs are okay to take for pain.     Alpesh Desai DPM

## 2023-07-07 ENCOUNTER — OFFICE VISIT (OUTPATIENT)
Dept: OTOLARYNGOLOGY | Facility: CLINIC | Age: 78
End: 2023-07-07
Payer: MEDICARE

## 2023-07-07 VITALS — HEIGHT: 64 IN | BODY MASS INDEX: 31.35 KG/M2 | WEIGHT: 183.63 LBS

## 2023-07-07 DIAGNOSIS — H61.23 BILATERAL IMPACTED CERUMEN: ICD-10-CM

## 2023-07-07 DIAGNOSIS — L29.9 ITCHING OF EAR: Primary | ICD-10-CM

## 2023-07-07 PROCEDURE — 3288F FALL RISK ASSESSMENT DOCD: CPT | Mod: CPTII,S$GLB,, | Performed by: NURSE PRACTITIONER

## 2023-07-07 PROCEDURE — 1101F PR PT FALLS ASSESS DOC 0-1 FALLS W/OUT INJ PAST YR: ICD-10-PCS | Mod: CPTII,S$GLB,, | Performed by: NURSE PRACTITIONER

## 2023-07-07 PROCEDURE — 1160F PR REVIEW ALL MEDS BY PRESCRIBER/CLIN PHARMACIST DOCUMENTED: ICD-10-PCS | Mod: CPTII,S$GLB,, | Performed by: NURSE PRACTITIONER

## 2023-07-07 PROCEDURE — 1101F PT FALLS ASSESS-DOCD LE1/YR: CPT | Mod: CPTII,S$GLB,, | Performed by: NURSE PRACTITIONER

## 2023-07-07 PROCEDURE — 69210 PR REMOVAL IMPACTED CERUMEN REQUIRING INSTRUMENTATION, UNILATERAL: ICD-10-PCS | Mod: S$GLB,,, | Performed by: NURSE PRACTITIONER

## 2023-07-07 PROCEDURE — 99999 PR PBB SHADOW E&M-EST. PATIENT-LVL III: CPT | Mod: PBBFAC,,, | Performed by: NURSE PRACTITIONER

## 2023-07-07 PROCEDURE — 69210 REMOVE IMPACTED EAR WAX UNI: CPT | Mod: S$GLB,,, | Performed by: NURSE PRACTITIONER

## 2023-07-07 PROCEDURE — 99214 PR OFFICE/OUTPT VISIT, EST, LEVL IV, 30-39 MIN: ICD-10-PCS | Mod: 25,S$GLB,, | Performed by: NURSE PRACTITIONER

## 2023-07-07 PROCEDURE — 1159F PR MEDICATION LIST DOCUMENTED IN MEDICAL RECORD: ICD-10-PCS | Mod: CPTII,S$GLB,, | Performed by: NURSE PRACTITIONER

## 2023-07-07 PROCEDURE — 1159F MED LIST DOCD IN RCRD: CPT | Mod: CPTII,S$GLB,, | Performed by: NURSE PRACTITIONER

## 2023-07-07 PROCEDURE — 1160F RVW MEDS BY RX/DR IN RCRD: CPT | Mod: CPTII,S$GLB,, | Performed by: NURSE PRACTITIONER

## 2023-07-07 PROCEDURE — 99999 PR PBB SHADOW E&M-EST. PATIENT-LVL III: ICD-10-PCS | Mod: PBBFAC,,, | Performed by: NURSE PRACTITIONER

## 2023-07-07 PROCEDURE — 3288F PR FALLS RISK ASSESSMENT DOCUMENTED: ICD-10-PCS | Mod: CPTII,S$GLB,, | Performed by: NURSE PRACTITIONER

## 2023-07-07 PROCEDURE — 99214 OFFICE O/P EST MOD 30 MIN: CPT | Mod: 25,S$GLB,, | Performed by: NURSE PRACTITIONER

## 2023-07-07 RX ORDER — FLUOCINOLONE ACETONIDE 0.11 MG/ML
3 OIL AURICULAR (OTIC) 2 TIMES DAILY PRN
Qty: 20 ML | Refills: 2 | Status: SHIPPED | OUTPATIENT
Start: 2023-07-07 | End: 2024-07-06

## 2023-07-07 NOTE — PROGRESS NOTES
Subjective:       Patient ID: Yuliana Mattson is a 77 y.o. female.    Chief Complaint: Ear Fullness (Itchy ears )    HPI   Patient saw me last year for intermittent sudden hearing loss AS suspicious for left cochlear hydrops (no vertigo); asymmetry improved on medrol dose pack (high dose prednisone was deferred by pt d/t intolerable side effects with past episodes). We also discussed Maxzide; patient was to discuss whether HCTZ ingredient in her antihypertensive could be increased from 12.5 mg to 25 mg with her PCP. We also discussed low-salt Meniere's diet. Patient had an episode several years ago of unilateral aural fullness, roaring tinnitus and asymmetrical hearing loss suspicious for endolymphatic hydrops. Negative MRI-IAC in April 2013. Treated with high-dose prednisone. A month later, her asymmetrical hearing loss and unilateral aural symptoms had resolved. She has experienced intermittent episodes since then.   Patient was also counseled at that time on PND management. Nasal sprays were deferred by pt d/t intolerable side effects.    Patient uses DermOtic for chronic aural pruritis. Requesting a refill. She found that while she was on Otezla for psoriatic arthritis, her aural pruritis improved. But she was taken off Otezla due to asthma, and now aural pruritis is more bothersome again. Wakes her up in the middle of the night.   Patient had COVID >1 month ago at which time both ears felt blocked.     Review of Systems   Constitutional: Negative.    HENT: Negative.     Eyes: Negative.    Respiratory: Negative.     Cardiovascular: Negative.    Gastrointestinal: Negative.    Musculoskeletal: Negative.    Integumentary:  Negative.   Neurological: Negative.    Hematological: Negative.    Psychiatric/Behavioral: Negative.         Objective:      Physical Exam  Vitals and nursing note reviewed.   Constitutional:       General: She is not in acute distress.     Appearance: She is well-developed. She is not  ill-appearing or diaphoretic.   HENT:      Head: Normocephalic and atraumatic.      Right Ear: Hearing, tympanic membrane, ear canal and external ear normal. No middle ear effusion. Tympanic membrane is not erythematous.      Left Ear: Hearing, tympanic membrane, ear canal and external ear normal.  No middle ear effusion. Tympanic membrane is not erythematous.      Nose: Nose normal.      Mouth/Throat:      Pharynx: Uvula midline.   Eyes:      General: Lids are normal. No scleral icterus.        Right eye: No discharge.         Left eye: No discharge.   Neck:      Trachea: Trachea normal. No tracheal deviation.   Cardiovascular:      Rate and Rhythm: Normal rate.   Pulmonary:      Effort: Pulmonary effort is normal. No respiratory distress.      Breath sounds: No stridor. No wheezing.   Musculoskeletal:         General: Normal range of motion.      Cervical back: Normal range of motion and neck supple.   Skin:     General: Skin is warm and dry.      Coloration: Skin is not pale.   Neurological:      Mental Status: She is alert and oriented to person, place, and time.      Coordination: Coordination normal.      Gait: Gait normal.   Psychiatric:         Speech: Speech normal.         Behavior: Behavior normal. Behavior is cooperative.         Thought Content: Thought content normal.         Judgment: Judgment normal.       SEPARATE PROCEDURE IN OFFICE:   Procedure: Removal of impacted cerumen, bilateral   Pre Procedure Diagnosis: Cerumen Impaction   Post Procedure Diagnosis: Cerumen Impaction   Verbal informed consent in regards to risk of trauma to ear canal, ear drum or hearing, discomfort during procedure and/or inability to remove cerumen impaction in one session or unforeseen events or complications.   No anesthesia.     Procedure in detail:   Ear canal visualized bilateral with appropriate size ear speculum utilizing Operating Head Binocular Otomicroscope   Utilizing the following:  suction cannula was used.  The impacted cerumen of the ear canals was removed atraumatically. The TM and EAC were then inspected and found to be clear of wax. See description of TMs/EACs in PE above.   Complications: No   Condition: Improved/Good    Assessment:       Problem List Items Addressed This Visit    None  Visit Diagnoses       Itching of ear    -  Primary    Relevant Medications    fluocinolone acetonide oiL (DERMOTIC OIL) 0.01 % Drop    Bilateral impacted cerumen                  Plan:     [facility]erm[facility]tic refilled.   Due for audiogram at any time    Patient encouraged to return to clinic if symptoms worsen/persist and as needed for further ENT symptoms or concerns.

## 2023-07-07 NOTE — PATIENT INSTRUCTIONS
"When the eustachian tube is functioning normally, every single time you swallow, yawn, blow your nose, etc., your ears will pop.  This popping is when the eustachian tube opens transiently allowing air to pass from the middle ear to the back of the nose which is open to the environment.  Assuming there is no ear wax or fluid behind the eardrum, ear fullness is usually due to the inability to pop the ears easily.  A pressure difference between the air pressure behind the eardrum and the air pressure outside of the eardrum develops.  When there is a pressure difference, the eardrum can bulge inward or outward creating a sensation of fullness for the person.     EUSTACHIAN TUBE INSTRUCTIONS:  Nasal valsalva:  Pinch nose and with closed mouth try to "pop" air into ears through the back of the nose. Attempt this several times a day. The more popping you have, the quicker it will resolve.     Flonase / fluticasone (steroid spray) is best for stuffy, pressure, fullness. Available over-the-counter without a prescription.   Astepro / azelastine (antihistamine spray) is best for itchy, drippy, sneezy. Available over-the-counter without a prescription.     Use as directed, spraying 1-2 times in each nostril each day. It may take 2-3 days to 2-3 weeks to begin seeing improvement. This medication needs to be taken consistently to see results. Overall, this is a well-tolerated medication with low side effects. The benefit of nasal steroids as opposed to oral steroids is that the nasal steroid spray works primarily in the nose. Common side effects can include: headache, nasal dryness, minor nose bleed.  Rare side effects may include:  septal perforation, elevation in eye pressure, dry eyes, change in smell, allergic reaction.  Notify your provider if you have any concerns or experience these symptoms.     Nasal spray instructions:  Blow nose first gently to clean. Keep chin level with the floor (do not tilt head forward or back). " Insert nasal spray taking caution to direct it AWAY from the middle wall inside the nose (septum) to avoid irritating nasal septum which could cause nosebleed.  Do not tilt spray up but rather flat and out along the roof of your mouth to spray. Angle the tip of the spray out slightly toward the direction of the ears; then spray. Do not take quick vigorous sniff but rather slow gentle inhalation while waiting for medication to absorb into nasal passages. Then administer second spray in same way.       Recommended treatments for chronically itchy ears:  1. Coconut oil, mineral oil, baby oil.  Use an eye dropper to place a drop into each ear at night.   2. Steroid lotion or drops, either over-the-counter or prescription (DermOtic/fluocinolone)

## 2023-07-10 ENCOUNTER — PATIENT MESSAGE (OUTPATIENT)
Dept: ADMINISTRATIVE | Facility: HOSPITAL | Age: 78
End: 2023-07-10
Payer: MEDICARE

## 2023-07-10 ENCOUNTER — PATIENT OUTREACH (OUTPATIENT)
Dept: ADMINISTRATIVE | Facility: HOSPITAL | Age: 78
End: 2023-07-10
Payer: MEDICARE

## 2023-07-10 NOTE — PROGRESS NOTES
Uncontrolled BP REPORT  BP Readings from Last 3 Encounters:   06/20/23 (!) 152/86   04/03/23 126/78   03/22/23 132/71       Non-compliant report chart audits for HYPERTENSION MANAGEMENT     Outreach to patient in reference to hypertension management       NEED REMOTE HOME BP READING DOCUMENTED   OR  BP FOLLOW UP WITH NURSE VISIT OR CARE TEAM MEMBER

## 2023-07-20 ENCOUNTER — OFFICE VISIT (OUTPATIENT)
Dept: PODIATRY | Facility: CLINIC | Age: 78
End: 2023-07-20
Payer: MEDICARE

## 2023-07-20 VITALS — HEIGHT: 64 IN | WEIGHT: 183.63 LBS | BODY MASS INDEX: 31.35 KG/M2

## 2023-07-20 DIAGNOSIS — B07.0 PLANTAR WART OF RIGHT FOOT: Primary | ICD-10-CM

## 2023-07-20 PROCEDURE — 99999 PR PBB SHADOW E&M-EST. PATIENT-LVL III: CPT | Mod: PBBFAC,HCNC,, | Performed by: STUDENT IN AN ORGANIZED HEALTH CARE EDUCATION/TRAINING PROGRAM

## 2023-07-20 PROCEDURE — 99499 NO LOS: ICD-10-PCS | Mod: HCNC,S$GLB,, | Performed by: STUDENT IN AN ORGANIZED HEALTH CARE EDUCATION/TRAINING PROGRAM

## 2023-07-20 PROCEDURE — 17110 DESTRUCTION B9 LES UP TO 14: CPT | Mod: HCNC,S$GLB,, | Performed by: STUDENT IN AN ORGANIZED HEALTH CARE EDUCATION/TRAINING PROGRAM

## 2023-07-20 PROCEDURE — 99499 UNLISTED E&M SERVICE: CPT | Mod: HCNC,S$GLB,, | Performed by: STUDENT IN AN ORGANIZED HEALTH CARE EDUCATION/TRAINING PROGRAM

## 2023-07-20 PROCEDURE — 17110 PR DESTRUCTION BENIGN LESIONS UP TO 14: ICD-10-PCS | Mod: HCNC,S$GLB,, | Performed by: STUDENT IN AN ORGANIZED HEALTH CARE EDUCATION/TRAINING PROGRAM

## 2023-07-20 PROCEDURE — 99999 PR PBB SHADOW E&M-EST. PATIENT-LVL III: ICD-10-PCS | Mod: PBBFAC,HCNC,, | Performed by: STUDENT IN AN ORGANIZED HEALTH CARE EDUCATION/TRAINING PROGRAM

## 2023-07-20 NOTE — PROGRESS NOTES
Chief Complaint   Patient presents with    Plantar Warts           HPI:   Yuliana Mattson is a 77 y.o. female with concerns of  painful warts at the bottom of the right foot, which appears to be present for years  Patient reports no acute injury to the area.    Patient states the pain occurs with direct pressure, walking , and weight bearing.      Treatment tried at home: nothing    6/5/23: returns for plantar R foot pain.    06/28/2023:  Patient returns today for plantar warts of the right foot.  She is doing very well.    7/20/2023: f/u warts R foot. No pain today but doesn't feel like its resolved fully.     Patient Active Problem List   Diagnosis    Family history of ischemic heart disease    Hypermetropia    Other disorders of vitreous    Personal history of antineoplastic chemotherapy    Personal history of irradiation, presenting hazards to health    Unspecified hearing loss    Osteoporosis, post-menopausal    History of breast cancer    Hypertension    Allergic rhinitis    HLD (hyperlipidemia)    History of bladder cancer    Neck pain    Vitamin D deficiency    Malignant (primary) neoplasm, unspecified    Moderate persistent asthma without complication    Drug-induced immunodeficiency    Aortic calcification    Acute bacterial sinusitis         Current Outpatient Medications on File Prior to Visit   Medication Sig Dispense Refill    albuterol (PROVENTIL/VENTOLIN HFA) 90 mcg/actuation inhaler Inhale 2 puffs into the lungs every 6 (six) hours as needed for Wheezing or Shortness of Breath. Rescue 18 g 11    ALPRAZolam (XANAX) 0.25 MG tablet Take 1 tablet (0.25 mg total) by mouth 4 (four) times daily as needed for Anxiety. 28 tablet 0    ascorbic acid, vitamin C, (VITAMIN C) 100 MG tablet Take 100 mg by mouth once daily.      azithromycin (Z-OLE) 250 MG tablet Follow instructions on pack. 6 tablet 0    fluocinolone acetonide oiL (DERMOTIC OIL) 0.01 % Drop Place 3 drops in ear(s) 2 (two) times daily as needed  "(itchy ears). 20 mL 2    fluticasone furoate-vilanteroL (BREO ELLIPTA) 100-25 mcg/dose diskus inhaler Inhale 1 puff into the lungs once daily. Controller 60 each 11    losartan-hydrochlorothiazide 50-12.5 mg (HYZAAR) 50-12.5 mg per tablet Take 1 tablet by mouth once daily. 90 tablet 0    montelukast (SINGULAIR) 10 mg tablet Take 1 tablet (10 mg total) by mouth every evening. 90 tablet 3    oxybutynin (DITROPAN-XL) 5 MG TR24 Take 1 tablet (5 mg total) by mouth once daily. 30 tablet 3    pantoprazole (PROTONIX) 40 MG tablet Take 1 tablet (40 mg total) by mouth once daily. 30 tablet 11    tretinoin (RETIN-A) 0.025 % cream Apply a pea-sized amount to entire face at bedtime, start every other night and increase as tolerated. Take night off if irritation develops. 45 g 3    vitamin D (VITAMIN D3) 1000 units Tab Take 1,000 Units by mouth once daily.      zinc sulfate (ZINC-15 ORAL) Take by mouth.       No current facility-administered medications on file prior to visit.         Review of patient's allergies indicates:   Allergen Reactions    Prochlorperazine edisylate Anaphylaxis     compazine             ROS:   General ROS: negative  Respiratory ROS: no cough, shortness of breath, or wheezing  Cardiovascular ROS: no chest pain or dyspnea on exertion  Musculoskeletal ROS: negative  Neurological ROS: no TIA or stroke symptoms  Dermatological ROS: negative        EXAM:     Vitals:    07/20/23 1414   Weight: 83.3 kg (183 lb 10.3 oz)   Height: 5' 4" (1.626 m)          General: Patient is WD/WN, alert and oriented x 3 and in no apparent distress.     Right  Lower extremity exam:      Vascular:   Dorsalis pedis and posterior tibial pulses are 2/4 .    3 seconds capillary refill time   Toes are warm to touch.     There is no edema.       Neurological:    Light touch, proprioception, and sharp/dull sensation are all intact.     No focal deficits.  Negative Mulders.   Negative Tinels.         Dermatological:    Location: submet 5 " and 3  multiple, flat, plantar verrucous -appearing lesion  Petechiae is present.   No open wounds.  No bruising.  No redness.       Musculoskeletal:    Muscle strength is 5/5 in all groups .          ASSESSMENT/PLAN:    Problem List Items Addressed This Visit    None        I counseled the patient on the patient's conditions, their implications and medical management.       With patient's permission, trichloroacetic acid and salinocaine was applied to the two wart lesion on the plantar surface of the right using a sterile applicator, including a 1-mm rim around the lesion.  A non-porous tape was applied to the area. Patient tolerated the procedure well without immediate complications.      Patient will leave the tape on for twenty four hours and remove the tape to gently wash the area with soap and water.  Patient is advised of blistering and pain and will modify shoes and activities as needed.  OTC NSAIDs are okay to take for pain.     Alpesh Desai DPM

## 2023-08-28 ENCOUNTER — HOSPITAL ENCOUNTER (OUTPATIENT)
Dept: RADIOLOGY | Facility: HOSPITAL | Age: 78
Discharge: HOME | End: 2023-08-28
Attending: NURSE PRACTITIONER
Payer: MEDICARE

## 2023-08-28 DIAGNOSIS — Z90.12 HX OF LEFT MASTECTOMY: ICD-10-CM

## 2023-08-28 DIAGNOSIS — Z85.3 HISTORY OF BREAST CANCER: ICD-10-CM

## 2023-08-28 PROCEDURE — 77061 BREAST TOMOSYNTHESIS UNI: CPT | Mod: TC,HCNC,PO,RT

## 2023-08-28 PROCEDURE — 77061 BREAST TOMOSYNTHESIS UNI: CPT | Mod: 26,HCNC,RT, | Performed by: RADIOLOGY

## 2023-08-28 PROCEDURE — 77065 MAMMO DIGITAL DIAGNOSTIC RIGHT WITH TOMO: ICD-10-PCS | Mod: 26,HCNC,RT, | Performed by: RADIOLOGY

## 2023-08-28 PROCEDURE — 77065 DX MAMMO INCL CAD UNI: CPT | Mod: 26,HCNC,RT, | Performed by: RADIOLOGY

## 2023-08-28 PROCEDURE — 77061 MAMMO DIGITAL DIAGNOSTIC RIGHT WITH TOMO: ICD-10-PCS | Mod: 26,HCNC,RT, | Performed by: RADIOLOGY

## 2023-08-31 ENCOUNTER — PATIENT MESSAGE (OUTPATIENT)
Dept: RHEUMATOLOGY | Facility: CLINIC | Age: 78
End: 2023-08-31
Payer: MEDICARE

## 2023-08-31 ENCOUNTER — PATIENT MESSAGE (OUTPATIENT)
Dept: FAMILY MEDICINE | Facility: CLINIC | Age: 78
End: 2023-08-31

## 2023-08-31 ENCOUNTER — OFFICE VISIT (OUTPATIENT)
Dept: FAMILY MEDICINE | Facility: CLINIC | Age: 78
End: 2023-08-31
Payer: MEDICARE

## 2023-08-31 DIAGNOSIS — Z00.00 ENCOUNTER FOR PREVENTIVE HEALTH EXAMINATION: Primary | ICD-10-CM

## 2023-08-31 DIAGNOSIS — M81.0 OSTEOPOROSIS, POST-MENOPAUSAL: ICD-10-CM

## 2023-08-31 DIAGNOSIS — E78.5 HYPERLIPIDEMIA, UNSPECIFIED HYPERLIPIDEMIA TYPE: ICD-10-CM

## 2023-08-31 DIAGNOSIS — I10 PRIMARY HYPERTENSION: ICD-10-CM

## 2023-08-31 DIAGNOSIS — I70.0 AORTIC CALCIFICATION: ICD-10-CM

## 2023-08-31 DIAGNOSIS — Z00.00 ENCOUNTER FOR MEDICARE ANNUAL WELLNESS EXAM: ICD-10-CM

## 2023-08-31 DIAGNOSIS — J45.40 MODERATE PERSISTENT ASTHMA WITHOUT COMPLICATION: ICD-10-CM

## 2023-08-31 PROCEDURE — 3075F SYST BP GE 130 - 139MM HG: CPT | Mod: HCNC,CPTII,S$GLB, | Performed by: NURSE PRACTITIONER

## 2023-08-31 PROCEDURE — 3079F PR MOST RECENT DIASTOLIC BLOOD PRESSURE 80-89 MM HG: ICD-10-PCS | Mod: HCNC,CPTII,S$GLB, | Performed by: NURSE PRACTITIONER

## 2023-08-31 PROCEDURE — 3288F PR FALLS RISK ASSESSMENT DOCUMENTED: ICD-10-PCS | Mod: HCNC,CPTII,S$GLB, | Performed by: NURSE PRACTITIONER

## 2023-08-31 PROCEDURE — 1160F PR REVIEW ALL MEDS BY PRESCRIBER/CLIN PHARMACIST DOCUMENTED: ICD-10-PCS | Mod: HCNC,CPTII,S$GLB, | Performed by: NURSE PRACTITIONER

## 2023-08-31 PROCEDURE — 1126F AMNT PAIN NOTED NONE PRSNT: CPT | Mod: HCNC,CPTII,S$GLB, | Performed by: NURSE PRACTITIONER

## 2023-08-31 PROCEDURE — 1160F RVW MEDS BY RX/DR IN RCRD: CPT | Mod: HCNC,CPTII,S$GLB, | Performed by: NURSE PRACTITIONER

## 2023-08-31 PROCEDURE — 1101F PT FALLS ASSESS-DOCD LE1/YR: CPT | Mod: HCNC,CPTII,S$GLB, | Performed by: NURSE PRACTITIONER

## 2023-08-31 PROCEDURE — 3075F PR MOST RECENT SYSTOLIC BLOOD PRESS GE 130-139MM HG: ICD-10-PCS | Mod: HCNC,CPTII,S$GLB, | Performed by: NURSE PRACTITIONER

## 2023-08-31 PROCEDURE — 3079F DIAST BP 80-89 MM HG: CPT | Mod: HCNC,CPTII,S$GLB, | Performed by: NURSE PRACTITIONER

## 2023-08-31 PROCEDURE — 3288F FALL RISK ASSESSMENT DOCD: CPT | Mod: HCNC,CPTII,S$GLB, | Performed by: NURSE PRACTITIONER

## 2023-08-31 PROCEDURE — 99999 PR PBB SHADOW E&M-EST. PATIENT-LVL IV: CPT | Mod: PBBFAC,HCNC,, | Performed by: NURSE PRACTITIONER

## 2023-08-31 PROCEDURE — 99999 PR PBB SHADOW E&M-EST. PATIENT-LVL IV: ICD-10-PCS | Mod: PBBFAC,HCNC,, | Performed by: NURSE PRACTITIONER

## 2023-08-31 PROCEDURE — 1101F PR PT FALLS ASSESS DOC 0-1 FALLS W/OUT INJ PAST YR: ICD-10-PCS | Mod: HCNC,CPTII,S$GLB, | Performed by: NURSE PRACTITIONER

## 2023-08-31 PROCEDURE — 1159F PR MEDICATION LIST DOCUMENTED IN MEDICAL RECORD: ICD-10-PCS | Mod: HCNC,CPTII,S$GLB, | Performed by: NURSE PRACTITIONER

## 2023-08-31 PROCEDURE — G0439 PPPS, SUBSEQ VISIT: HCPCS | Mod: HCNC,S$GLB,, | Performed by: NURSE PRACTITIONER

## 2023-08-31 PROCEDURE — 1159F MED LIST DOCD IN RCRD: CPT | Mod: HCNC,CPTII,S$GLB, | Performed by: NURSE PRACTITIONER

## 2023-08-31 PROCEDURE — 1126F PR PAIN SEVERITY QUANTIFIED, NO PAIN PRESENT: ICD-10-PCS | Mod: HCNC,CPTII,S$GLB, | Performed by: NURSE PRACTITIONER

## 2023-08-31 PROCEDURE — G0439 PR MEDICARE ANNUAL WELLNESS SUBSEQUENT VISIT: ICD-10-PCS | Mod: HCNC,S$GLB,, | Performed by: NURSE PRACTITIONER

## 2023-08-31 RX ORDER — LOSARTAN POTASSIUM AND HYDROCHLOROTHIAZIDE 12.5; 5 MG/1; MG/1
1 TABLET ORAL DAILY
Qty: 90 TABLET | Refills: 0 | Status: SHIPPED | OUTPATIENT
Start: 2023-08-31 | End: 2023-12-07

## 2023-08-31 NOTE — PATIENT INSTRUCTIONS
Counseling and Referral of Other Preventative  (Italic type indicates deductible and co-insurance are waived)    Patient Name: Yuliana Mattson  Today's Date: 8/31/2023    Health Maintenance       Date Due Completion Date    Shingles Vaccine (1 of 2) 06/02/2014 4/7/2014    COVID-19 Vaccine (5 - Pfizer risk series) 02/06/2023 12/12/2022    Influenza Vaccine (1) 09/01/2023 9/27/2022    Lipid Panel 06/05/2024 6/5/2023    Mammogram 08/28/2024 8/28/2023    DEXA Scan 06/27/2026 6/27/2023    TETANUS VACCINE 12/15/2026 12/15/2016        No orders of the defined types were placed in this encounter.    The following information is provided to all patients.  This information is to help you find resources for any of the problems found today that may be affecting your health:                Living healthy guide: www.[facility].[place].[facility]      Understanding Diabetes: www.diabetes.org      Eating healthy: www.cdc.gov/healthyweight      CDC home safety checklist: www.cdc.gov/steadi/patient.html      Agency on Aging: www.goea.[place].[facility]      Alcoholics anonymous (AA): www.aa.org      Physical Activity: www.saadia.nih.gov/mu7tnem      Tobacco use: www.quitwithusla.org

## 2023-08-31 NOTE — PROGRESS NOTES
"  Yuliana Mattson presented for a  Medicare AWV and comprehensive Health Risk Assessment today. The following components were reviewed and updated:    Medical history  Family History  Social history  Allergies and Current Medications  Health Risk Assessment  Health Maintenance  Care Team         ** See Completed Assessments for Annual Wellness Visit within the encounter summary.**         The following assessments were completed:  Living Situation  CAGE  Depression Screening  Timed Get Up and Go  Whisper Test  Cognitive Function Screening      Nutrition Screening  ADL Screening  PAQ Screening    Review for Opioid Screening: Patient does not have rx for Opioids.    Review for Substance Use Disorders: Patient does not use substance.     Vitals:    08/31/23 1115   BP: 134/80   BP Location: Left arm   Patient Position: Sitting   BP Method: Medium (Manual)   Pulse: 82   SpO2: 96%   Weight: 83.9 kg (184 lb 15.5 oz)   Height: 5' 4" (1.626 m)     Body mass index is 31.75 kg/m².  Physical Exam  Vitals reviewed.   Constitutional:       General: She is not in acute distress.  HENT:      Head: Normocephalic.   Cardiovascular:      Rate and Rhythm: Normal rate.   Pulmonary:      Effort: Pulmonary effort is normal. No respiratory distress.   Skin:     General: Skin is warm.   Neurological:      General: No focal deficit present.      Mental Status: She is alert.   Psychiatric:         Mood and Affect: Mood normal.               Diagnoses and health risks identified today and associated recommendations/orders:    1. Encounter for preventive health examination  Reviewed health maintenance and provided recommendations   Recommend shingrix, flu vaccines  Pt will schedule with Tommy Palumbo MD  to discuss dxa results and tx options     2. Aortic calcification  Continue to monitor  Followed by Tommy Palumbo MD .      3. Hyperlipidemia, unspecified hyperlipidemia type  Continue to monitor  Followed by Tommy Palumbo, " MD .      4. Moderate persistent asthma without complication  Continue to monitor  Followed by Tommy Palumbo MD .      5. Primary hypertension  Continue to monitor  Followed by Tommy Palubmo MD   Home BP ranges in 130s/70s.      6. Osteoporosis, post-menopausal  Continue to monitor  Followed by Tommy Palumbo MD .      7. Encounter for Medicare annual wellness exam    - Ambulatory Referral/Consult to Enhanced Annual Wellness Visit (eAWV)      Provided Yuliana with a 5-10 year written screening schedule and personal prevention plan. Recommendations were developed using the USPSTF age appropriate recommendations. Education, counseling, and referrals were provided as needed. After Visit Summary printed and given to patient which includes a list of additional screenings\tests needed.    Follow up in one year    LARON Patel offered to discuss advanced care planning, including how to pick a person who would make decisions for you if you were unable to make them for yourself, called a health care power of , and what kind of decisions you might make such as use of life sustaining treatments such as ventilators and tube feeding when faced with a life limiting illness recorded on a living will that they will need to know. (How you want to be cared for as you near the end of your natural life)     X Patient is interested in learning more about how to make advanced directives.  I provided them paperwork and offered to discuss this with them.

## 2023-09-05 ENCOUNTER — PATIENT MESSAGE (OUTPATIENT)
Dept: FAMILY MEDICINE | Facility: CLINIC | Age: 78
End: 2023-09-05
Payer: MEDICARE

## 2023-09-05 DIAGNOSIS — M79.602 ARM PAIN, LEFT: Primary | ICD-10-CM

## 2023-09-10 VITALS
BODY MASS INDEX: 31.57 KG/M2 | HEART RATE: 82 BPM | SYSTOLIC BLOOD PRESSURE: 134 MMHG | OXYGEN SATURATION: 96 % | HEIGHT: 64 IN | DIASTOLIC BLOOD PRESSURE: 80 MMHG | WEIGHT: 184.94 LBS

## 2023-09-13 ENCOUNTER — LAB VISIT (OUTPATIENT)
Dept: LAB | Facility: HOSPITAL | Age: 78
End: 2023-09-13
Attending: INTERNAL MEDICINE
Payer: MEDICARE

## 2023-09-13 ENCOUNTER — IMMUNIZATION (OUTPATIENT)
Dept: FAMILY MEDICINE | Facility: CLINIC | Age: 78
End: 2023-09-13
Payer: MEDICARE

## 2023-09-13 DIAGNOSIS — M81.0 OSTEOPOROSIS, POST-MENOPAUSAL: ICD-10-CM

## 2023-09-13 DIAGNOSIS — L40.50 PSA (PSORIATIC ARTHRITIS): ICD-10-CM

## 2023-09-13 DIAGNOSIS — Z85.51 HISTORY OF BLADDER CANCER: ICD-10-CM

## 2023-09-13 DIAGNOSIS — H90.72 MIXED CONDUCTIVE AND SENSORINEURAL HEARING LOSS OF LEFT EAR WITH UNRESTRICTED HEARING OF RIGHT EAR: ICD-10-CM

## 2023-09-13 DIAGNOSIS — M35.3 PMR (POLYMYALGIA RHEUMATICA): ICD-10-CM

## 2023-09-13 DIAGNOSIS — Z85.3 HISTORY OF BREAST CANCER: ICD-10-CM

## 2023-09-13 LAB
ALBUMIN SERPL BCP-MCNC: 3.7 G/DL (ref 3.5–5.2)
ALP SERPL-CCNC: 159 U/L (ref 55–135)
ALT SERPL W/O P-5'-P-CCNC: 26 U/L (ref 10–44)
ANION GAP SERPL CALC-SCNC: 10 MMOL/L (ref 8–16)
AST SERPL-CCNC: 22 U/L (ref 10–40)
BASOPHILS # BLD AUTO: 0.09 K/UL (ref 0–0.2)
BASOPHILS NFR BLD: 1.1 % (ref 0–1.9)
BILIRUB SERPL-MCNC: 0.3 MG/DL (ref 0.1–1)
BUN SERPL-MCNC: 14 MG/DL (ref 8–23)
CALCIUM SERPL-MCNC: 9.9 MG/DL (ref 8.7–10.5)
CHLORIDE SERPL-SCNC: 102 MMOL/L (ref 95–110)
CO2 SERPL-SCNC: 26 MMOL/L (ref 23–29)
CREAT SERPL-MCNC: 0.8 MG/DL (ref 0.5–1.4)
CRP SERPL-MCNC: 16.1 MG/L (ref 0–8.2)
DIFFERENTIAL METHOD: ABNORMAL
EOSINOPHIL # BLD AUTO: 0.2 K/UL (ref 0–0.5)
EOSINOPHIL NFR BLD: 2.1 % (ref 0–8)
ERYTHROCYTE [DISTWIDTH] IN BLOOD BY AUTOMATED COUNT: 13.7 % (ref 11.5–14.5)
ERYTHROCYTE [SEDIMENTATION RATE] IN BLOOD BY PHOTOMETRIC METHOD: 25 MM/HR (ref 0–36)
EST. GFR  (NO RACE VARIABLE): >60 ML/MIN/1.73 M^2
GLUCOSE SERPL-MCNC: 124 MG/DL (ref 70–110)
HCT VFR BLD AUTO: 42.6 % (ref 37–48.5)
HGB BLD-MCNC: 13.7 G/DL (ref 12–16)
IMM GRANULOCYTES # BLD AUTO: 0.07 K/UL (ref 0–0.04)
IMM GRANULOCYTES NFR BLD AUTO: 0.8 % (ref 0–0.5)
LYMPHOCYTES # BLD AUTO: 1.3 K/UL (ref 1–4.8)
LYMPHOCYTES NFR BLD: 15 % (ref 18–48)
MCH RBC QN AUTO: 29.5 PG (ref 27–31)
MCHC RBC AUTO-ENTMCNC: 32.2 G/DL (ref 32–36)
MCV RBC AUTO: 92 FL (ref 82–98)
MONOCYTES # BLD AUTO: 0.7 K/UL (ref 0.3–1)
MONOCYTES NFR BLD: 7.8 % (ref 4–15)
NEUTROPHILS # BLD AUTO: 6.2 K/UL (ref 1.8–7.7)
NEUTROPHILS NFR BLD: 73.2 % (ref 38–73)
NRBC BLD-RTO: 0 /100 WBC
PLATELET # BLD AUTO: 298 K/UL (ref 150–450)
PMV BLD AUTO: 10.6 FL (ref 9.2–12.9)
POTASSIUM SERPL-SCNC: 4 MMOL/L (ref 3.5–5.1)
PROT SERPL-MCNC: 7 G/DL (ref 6–8.4)
RBC # BLD AUTO: 4.64 M/UL (ref 4–5.4)
SODIUM SERPL-SCNC: 138 MMOL/L (ref 136–145)
WBC # BLD AUTO: 8.44 K/UL (ref 3.9–12.7)

## 2023-09-13 PROCEDURE — 90694 VACC AIIV4 NO PRSRV 0.5ML IM: CPT | Mod: HCNC,S$GLB,, | Performed by: INTERNAL MEDICINE

## 2023-09-13 PROCEDURE — G0008 ADMIN INFLUENZA VIRUS VAC: HCPCS | Mod: HCNC,S$GLB,, | Performed by: INTERNAL MEDICINE

## 2023-09-13 PROCEDURE — 86140 C-REACTIVE PROTEIN: CPT | Mod: HCNC | Performed by: INTERNAL MEDICINE

## 2023-09-13 PROCEDURE — 85025 COMPLETE CBC W/AUTO DIFF WBC: CPT | Mod: HCNC | Performed by: INTERNAL MEDICINE

## 2023-09-13 PROCEDURE — 85652 RBC SED RATE AUTOMATED: CPT | Mod: HCNC | Performed by: INTERNAL MEDICINE

## 2023-09-13 PROCEDURE — 36415 COLL VENOUS BLD VENIPUNCTURE: CPT | Mod: HCNC,PO | Performed by: INTERNAL MEDICINE

## 2023-09-13 PROCEDURE — G0008 FLU VACCINE - QUADRIVALENT - ADJUVANTED: ICD-10-PCS | Mod: HCNC,S$GLB,, | Performed by: INTERNAL MEDICINE

## 2023-09-13 PROCEDURE — 90694 FLU VACCINE - QUADRIVALENT - ADJUVANTED: ICD-10-PCS | Mod: HCNC,S$GLB,, | Performed by: INTERNAL MEDICINE

## 2023-09-13 PROCEDURE — 80053 COMPREHEN METABOLIC PANEL: CPT | Mod: HCNC | Performed by: INTERNAL MEDICINE

## 2023-09-25 PROBLEM — B96.89 ACUTE BACTERIAL SINUSITIS: Status: RESOLVED | Noted: 2023-06-20 | Resolved: 2023-09-25

## 2023-09-25 PROBLEM — J01.90 ACUTE BACTERIAL SINUSITIS: Status: RESOLVED | Noted: 2023-06-20 | Resolved: 2023-09-25

## 2023-09-26 ENCOUNTER — PATIENT MESSAGE (OUTPATIENT)
Dept: FAMILY MEDICINE | Facility: CLINIC | Age: 78
End: 2023-09-26
Payer: MEDICARE

## 2023-09-26 DIAGNOSIS — I89.0 LYMPHEDEMA OF ARM: Primary | ICD-10-CM

## 2023-09-26 NOTE — TELEPHONE ENCOUNTER
Pts order for out pt therapy for lymphedema therapist must state  for lymphedema   Please see my chart message

## 2023-11-15 NOTE — TELEPHONE ENCOUNTER
Offered to schedule HRA appt~ pt declines. Ms. Mattson states she manages her own health care.   
18-Nov-2023

## 2023-12-06 NOTE — TELEPHONE ENCOUNTER
No care due was identified.  Health [facility] Embedded Care Due Messages. Reference number: [id number].   12/06/2023 4:02:06 PM CST

## 2023-12-07 RX ORDER — LOSARTAN POTASSIUM AND HYDROCHLOROTHIAZIDE 12.5; 5 MG/1; MG/1
1 TABLET ORAL
Qty: 90 TABLET | Refills: 3 | Status: SHIPPED | OUTPATIENT
Start: 2023-12-07

## 2023-12-07 NOTE — TELEPHONE ENCOUNTER
Refill Routing Note   Medication(s) are not appropriate for processing by [facility] for the following reason(s):        No active prescription written by provider    [facility] action(s):  Defer               Appointments  past 12m or future 3m with PCP    Date Provider   Last Visit   4/3/2023 Tommy Palumbo MD   Next Visit   Visit date not found Tommy Palumbo MD   ED visits in past 90 days: 0        Note composed:8:59 PM 12/06/2023

## 2023-12-13 DIAGNOSIS — L40.50 PSA (PSORIATIC ARTHRITIS): Primary | ICD-10-CM

## 2023-12-20 PROBLEM — L40.50 PSA (PSORIATIC ARTHRITIS): Status: ACTIVE | Noted: 2023-12-20

## 2023-12-21 ENCOUNTER — PATIENT MESSAGE (OUTPATIENT)
Dept: RHEUMATOLOGY | Facility: CLINIC | Age: 78
End: 2023-12-21
Payer: MEDICARE

## 2023-12-21 DIAGNOSIS — L40.50 PSA (PSORIATIC ARTHRITIS): Primary | ICD-10-CM

## 2023-12-28 ENCOUNTER — LAB VISIT (OUTPATIENT)
Dept: LAB | Facility: HOSPITAL | Age: 78
End: 2023-12-28
Attending: INTERNAL MEDICINE
Payer: MEDICARE

## 2023-12-28 DIAGNOSIS — L40.50 PSA (PSORIATIC ARTHRITIS): ICD-10-CM

## 2023-12-28 LAB
ALBUMIN SERPL BCP-MCNC: 3.7 G/DL (ref 3.5–5.2)
ALP SERPL-CCNC: 138 U/L (ref 55–135)
ALT SERPL W/O P-5'-P-CCNC: 24 U/L (ref 10–44)
ANION GAP SERPL CALC-SCNC: 11 MMOL/L (ref 8–16)
AST SERPL-CCNC: 21 U/L (ref 10–40)
BASOPHILS # BLD AUTO: 0.09 K/UL (ref 0–0.2)
BASOPHILS NFR BLD: 1 % (ref 0–1.9)
BILIRUB SERPL-MCNC: 0.3 MG/DL (ref 0.1–1)
BUN SERPL-MCNC: 18 MG/DL (ref 8–23)
CALCIUM SERPL-MCNC: 9.5 MG/DL (ref 8.7–10.5)
CHLORIDE SERPL-SCNC: 102 MMOL/L (ref 95–110)
CO2 SERPL-SCNC: 26 MMOL/L (ref 23–29)
CREAT SERPL-MCNC: 0.9 MG/DL (ref 0.5–1.4)
CRP SERPL-MCNC: 10.8 MG/L (ref 0–8.2)
DIFFERENTIAL METHOD BLD: ABNORMAL
EOSINOPHIL # BLD AUTO: 0.1 K/UL (ref 0–0.5)
EOSINOPHIL NFR BLD: 1.5 % (ref 0–8)
ERYTHROCYTE [DISTWIDTH] IN BLOOD BY AUTOMATED COUNT: 13.6 % (ref 11.5–14.5)
ERYTHROCYTE [SEDIMENTATION RATE] IN BLOOD BY PHOTOMETRIC METHOD: 26 MM/HR (ref 0–36)
EST. GFR  (NO RACE VARIABLE): >60 ML/MIN/1.73 M^2
GLUCOSE SERPL-MCNC: 128 MG/DL (ref 70–110)
HCT VFR BLD AUTO: 38.7 % (ref 37–48.5)
HGB BLD-MCNC: 13.6 G/DL (ref 12–16)
IMM GRANULOCYTES # BLD AUTO: 0.06 K/UL (ref 0–0.04)
IMM GRANULOCYTES NFR BLD AUTO: 0.7 % (ref 0–0.5)
LYMPHOCYTES # BLD AUTO: 1.5 K/UL (ref 1–4.8)
LYMPHOCYTES NFR BLD: 16.4 % (ref 18–48)
MCH RBC QN AUTO: 29.6 PG (ref 27–31)
MCHC RBC AUTO-ENTMCNC: 35.1 G/DL (ref 32–36)
MCV RBC AUTO: 84 FL (ref 82–98)
MONOCYTES # BLD AUTO: 0.8 K/UL (ref 0.3–1)
MONOCYTES NFR BLD: 8.5 % (ref 4–15)
NEUTROPHILS # BLD AUTO: 6.5 K/UL (ref 1.8–7.7)
NEUTROPHILS NFR BLD: 71.9 % (ref 38–73)
NRBC BLD-RTO: 0 /100 WBC
PLATELET # BLD AUTO: 285 K/UL (ref 150–450)
PMV BLD AUTO: 9.8 FL (ref 9.2–12.9)
POTASSIUM SERPL-SCNC: 3.6 MMOL/L (ref 3.5–5.1)
PROT SERPL-MCNC: 7 G/DL (ref 6–8.4)
RBC # BLD AUTO: 4.59 M/UL (ref 4–5.4)
SODIUM SERPL-SCNC: 139 MMOL/L (ref 136–145)
WBC # BLD AUTO: 9.05 K/UL (ref 3.9–12.7)

## 2023-12-28 PROCEDURE — 80053 COMPREHEN METABOLIC PANEL: CPT | Mod: HCNC | Performed by: INTERNAL MEDICINE

## 2023-12-28 PROCEDURE — 85652 RBC SED RATE AUTOMATED: CPT | Mod: HCNC | Performed by: INTERNAL MEDICINE

## 2023-12-28 PROCEDURE — 36415 COLL VENOUS BLD VENIPUNCTURE: CPT | Mod: HCNC,PO | Performed by: INTERNAL MEDICINE

## 2023-12-28 PROCEDURE — 85025 COMPLETE CBC W/AUTO DIFF WBC: CPT | Mod: HCNC | Performed by: INTERNAL MEDICINE

## 2023-12-28 PROCEDURE — 86140 C-REACTIVE PROTEIN: CPT | Mod: HCNC | Performed by: INTERNAL MEDICINE

## 2024-01-03 ENCOUNTER — OFFICE VISIT (OUTPATIENT)
Dept: RHEUMATOLOGY | Facility: CLINIC | Age: 79
End: 2024-01-03
Payer: MEDICARE

## 2024-01-03 VITALS
WEIGHT: 191.38 LBS | DIASTOLIC BLOOD PRESSURE: 84 MMHG | HEART RATE: 80 BPM | BODY MASS INDEX: 32.67 KG/M2 | SYSTOLIC BLOOD PRESSURE: 155 MMHG | HEIGHT: 64 IN

## 2024-01-03 DIAGNOSIS — H90.72 MIXED CONDUCTIVE AND SENSORINEURAL HEARING LOSS OF LEFT EAR WITH UNRESTRICTED HEARING OF RIGHT EAR: ICD-10-CM

## 2024-01-03 DIAGNOSIS — M81.0 OSTEOPOROSIS, POST-MENOPAUSAL: ICD-10-CM

## 2024-01-03 DIAGNOSIS — M35.3 PMR (POLYMYALGIA RHEUMATICA): ICD-10-CM

## 2024-01-03 DIAGNOSIS — L40.50 PSA (PSORIATIC ARTHRITIS): Primary | ICD-10-CM

## 2024-01-03 PROCEDURE — 3079F DIAST BP 80-89 MM HG: CPT | Mod: HCNC,CPTII,S$GLB, | Performed by: INTERNAL MEDICINE

## 2024-01-03 PROCEDURE — 1125F AMNT PAIN NOTED PAIN PRSNT: CPT | Mod: HCNC,CPTII,S$GLB, | Performed by: INTERNAL MEDICINE

## 2024-01-03 PROCEDURE — 1101F PT FALLS ASSESS-DOCD LE1/YR: CPT | Mod: HCNC,CPTII,S$GLB, | Performed by: INTERNAL MEDICINE

## 2024-01-03 PROCEDURE — 99999 PR PBB SHADOW E&M-EST. PATIENT-LVL IV: CPT | Mod: PBBFAC,HCNC,, | Performed by: INTERNAL MEDICINE

## 2024-01-03 PROCEDURE — 3288F FALL RISK ASSESSMENT DOCD: CPT | Mod: HCNC,CPTII,S$GLB, | Performed by: INTERNAL MEDICINE

## 2024-01-03 PROCEDURE — 96372 THER/PROPH/DIAG INJ SC/IM: CPT | Mod: HCNC,S$GLB,, | Performed by: INTERNAL MEDICINE

## 2024-01-03 PROCEDURE — 99215 OFFICE O/P EST HI 40 MIN: CPT | Mod: 25,HCNC,S$GLB, | Performed by: INTERNAL MEDICINE

## 2024-01-03 PROCEDURE — 3077F SYST BP >= 140 MM HG: CPT | Mod: HCNC,CPTII,S$GLB, | Performed by: INTERNAL MEDICINE

## 2024-01-03 PROCEDURE — 1159F MED LIST DOCD IN RCRD: CPT | Mod: HCNC,CPTII,S$GLB, | Performed by: INTERNAL MEDICINE

## 2024-01-03 RX ORDER — CYANOCOBALAMIN 1000 UG/ML
1000 INJECTION, SOLUTION INTRAMUSCULAR; SUBCUTANEOUS
Status: COMPLETED | OUTPATIENT
Start: 2024-01-03 | End: 2024-01-03

## 2024-01-03 RX ORDER — METHYLPREDNISOLONE ACETATE 80 MG/ML
80 INJECTION, SUSPENSION INTRA-ARTICULAR; INTRALESIONAL; INTRAMUSCULAR; SOFT TISSUE
Status: COMPLETED | OUTPATIENT
Start: 2024-01-03 | End: 2024-01-03

## 2024-01-03 RX ORDER — TIRZEPATIDE 7.5 MG/.5ML
7.5 INJECTION, SOLUTION SUBCUTANEOUS
Qty: 4 PEN | Refills: 5 | Status: SHIPPED | OUTPATIENT
Start: 2024-01-03 | End: 2024-02-15

## 2024-01-03 RX ORDER — TIRZEPATIDE 2.5 MG/.5ML
2.5 INJECTION, SOLUTION SUBCUTANEOUS
Qty: 4 PEN | Refills: 3 | Status: SHIPPED | OUTPATIENT
Start: 2024-01-03 | End: 2024-02-15

## 2024-01-03 RX ORDER — TIRZEPATIDE 5 MG/.5ML
5 INJECTION, SOLUTION SUBCUTANEOUS
Qty: 4 PEN | Refills: 5 | Status: SHIPPED | OUTPATIENT
Start: 2024-01-03 | End: 2024-02-15

## 2024-01-03 RX ADMIN — CYANOCOBALAMIN 1000 MCG: 1000 INJECTION, SOLUTION INTRAMUSCULAR; SUBCUTANEOUS at 03:01

## 2024-01-03 RX ADMIN — METHYLPREDNISOLONE ACETATE 80 MG: 80 INJECTION, SUSPENSION INTRA-ARTICULAR; INTRALESIONAL; INTRAMUSCULAR; SOFT TISSUE at 03:01

## 2024-01-03 NOTE — PROGRESS NOTES
Subjective:       Patient ID: Yuliana Mattson is a 78 y.o. female.    Chief Complaint: Disease Management    Follow up: 76 yo with a h/o uveitis, iritis, She recently dx with otezla she took paxlovid ,she held  her otezla and now scheduled to restart. now but her psoriasis has resolved.  she was dx with meniere's dz, she started otezla and her skin, nails psoriasis and back improved but she had a cough and it worsened she has had breast ca x 2, two different type prior to that she had bladder cancer.  Pt was dx with restrictive airway disease.she is on breo and now has a uti grew Kleb, and proteaus. otezla      Current tx otezla      Review of Systems   Constitutional:  Positive for activity change and fatigue. Negative for appetite change, chills, diaphoresis and unexpected weight change.   HENT:  Negative for congestion, dental problem, ear discharge, ear pain, facial swelling, mouth sores, nosebleeds, postnasal drip, rhinorrhea, sinus pressure, sneezing, sore throat, tinnitus and voice change.    Eyes:  Negative for photophobia, pain, discharge, redness and itching.   Respiratory:  Negative for apnea, cough, chest tightness, shortness of breath and wheezing.    Cardiovascular:  Negative for chest pain, palpitations and leg swelling.   Gastrointestinal:  Negative for abdominal distention, abdominal pain, constipation, diarrhea, nausea and vomiting.   Endocrine: Negative for cold intolerance, heat intolerance, polydipsia and polyuria.   Genitourinary:  Negative for decreased urine volume, difficulty urinating, flank pain, frequency, genital sores, hematuria and urgency.   Musculoskeletal:  Positive for arthralgias, back pain, neck pain and neck stiffness. Negative for gait problem.   Skin:  Negative for pallor, rash and wound.   Allergic/Immunologic: Negative for immunocompromised state.   Neurological:  Negative for dizziness, tremors, weakness and numbness.   Hematological:  Negative for adenopathy. Does not  "bruise/bleed easily.   Psychiatric/Behavioral:  Negative for sleep disturbance. The patient is not nervous/anxious.          Objective:   BP (!) 155/84   Pulse 80   Ht 5' 4.02" (1.626 m)   Wt 86.8 kg (191 lb 5.8 oz)   BMI 32.83 kg/m²      Physical Exam   Constitutional: She is oriented to person, place, and time. No distress.   HENT:   Head: Normocephalic and atraumatic.   Eyes: Pupils are equal, round, and reactive to light. Right eye exhibits no discharge. Left eye exhibits no discharge.   Neck: No thyromegaly present.   Cardiovascular: Normal rate, regular rhythm and normal heart sounds. Exam reveals no gallop and no friction rub.   No murmur heard.  Pulmonary/Chest: Breath sounds normal. She has no wheezes. She has no rales. She exhibits no tenderness.   Abdominal: There is no abdominal tenderness. There is no rebound and no guarding.   Musculoskeletal:         General: Tenderness and deformity present.      Right shoulder: Tenderness present.      Left shoulder: Tenderness present.      Right elbow: Normal.      Left elbow: Tenderness present.      Right wrist: Tenderness present.      Left wrist: Tenderness present.      Cervical back: Neck supple.      Right knee: Effusion present. Tenderness present.      Left knee: Effusion present. Tenderness present.   Lymphadenopathy:     She has no cervical adenopathy.   Neurological: She is alert and oriented to person, place, and time. Gait normal.   Skin: Skin is dry. No rash noted. No erythema. There is pallor.   Psychiatric: Mood, affect and judgment normal.   Vitals reviewed.      Right Side Rheumatological Exam     Examination finds the elbow normal.    The patient is tender to palpation of the shoulder, wrist, knee, 1st PIP, 1st MCP, 2nd PIP, 2nd MCP, 3rd PIP, 3rd MCP, 4th PIP, 4th MCP, 5th PIP and 5th MCP    She has swelling of the 1st PIP, 1st MCP, 2nd PIP, 2nd MCP, 3rd PIP, 3rd MCP, 4th PIP, 4th MCP, 5th PIP and 5th MCP    Shoulder Exam   Tenderness " Location: no tenderness    Range of Motion   Active abduction:  abnormal   Adduction: abnormal  Sensation: normal    Knee Exam   Patellofemoral Crepitus: positive  Effusion: positive  Sensation: normal    Hip Exam   Tenderness Location: posterior  Sensation: normal    Elbow/Wrist Exam   Tenderness Location: no tenderness  Sensation: normal    Left Side Rheumatological Exam     The patient is tender to palpation of the shoulder, elbow, wrist, knee, 1st PIP, 1st MCP, 2nd PIP, 2nd MCP, 3rd PIP, 3rd MCP, 4th PIP, 4th MCP, 5th PIP and 5th MCP.    She has swelling of the 1st PIP, 1st MCP, 2nd PIP, 2nd MCP, 3rd PIP, 3rd MCP, 4th PIP, 4th MCP, 5th PIP and 5th MCP    Shoulder Exam   Tenderness Location: no tenderness    Range of Motion   Active abduction:  abnormal   Sensation: normal    Knee Exam     Patellofemoral Crepitus: positive  Effusion: positive  Sensation: normal    Hip Exam   Tenderness Location: posterior  Sensation: normal    Elbow/Wrist Exam   Sensation: normal      Back/Neck Exam   General Inspection   Gait: normal              Order: [id number]  Status:  Final result   Visible to patient:  No (not released) Next appt:  01/08/2021 at 10:00 AM in Radiology (Mammogram) Dx:  Acute iridocyclitis; Sjogren's syndro...  Component 7mo ago   HLA B27 Interpretation TAQ: [id number]   SAPE: 202    B27 Testing Date 05/18/2020 12:59 PM    HLA B27 Result Negative                Results for orders placed or performed in visit on 12/28/23   Sedimentation rate   Result Value Ref Range    Sed Rate 26 0 - 36 mm/Hr   C-Reactive Protein   Result Value Ref Range    CRP 10.8 (H) 0.0 - 8.2 mg/L   Comprehensive Metabolic Panel   Result Value Ref Range    Sodium 139 136 - 145 mmol/L    Potassium 3.6 3.5 - 5.1 mmol/L    Chloride 102 95 - 110 mmol/L    CO2 26 23 - 29 mmol/L    Glucose 128 (H) 70 - 110 mg/dL    BUN 18 8 - 23 mg/dL    Creatinine 0.9 0.5 - 1.4 mg/dL    Calcium 9.5 8.7 - 10.5 mg/dL    Total Protein 7.0 6.0 - 8.4 g/dL    Albumin  3.7 3.5 - 5.2 g/dL    Total Bilirubin 0.3 0.1 - 1.0 mg/dL    Alkaline Phosphatase 138 (H) 55 - 135 U/L    AST 21 10 - 40 U/L    ALT 24 10 - 44 U/L    eGFR >60.0 >60 mL/min/1.73 m^2    Anion Gap 11 8 - 16 mmol/L   CBC Auto Differential   Result Value Ref Range    WBC 9.05 3.90 - 12.70 K/uL    RBC 4.59 4.00 - 5.40 M/uL    Hemoglobin 13.6 12.0 - 16.0 g/dL    Hematocrit 38.7 37.0 - 48.5 %    MCV 84 82 - 98 fL    MCH 29.6 27.0 - 31.0 pg    MCHC 35.1 32.0 - 36.0 g/dL    RDW 13.6 11.5 - 14.5 %    Platelets 285 150 - 450 K/uL    MPV 9.8 9.2 - 12.9 fL    Immature Granulocytes 0.7 (H) 0.0 - 0.5 %    Gran # (ANC) 6.5 1.8 - 7.7 K/uL    Immature Grans (Abs) 0.06 (H) 0.00 - 0.04 K/uL    Lymph # 1.5 1.0 - 4.8 K/uL    Mono # 0.8 0.3 - 1.0 K/uL    Eos # 0.1 0.0 - 0.5 K/uL    Baso # 0.09 0.00 - 0.20 K/uL    nRBC 0 0 /100 WBC    Gran % 71.9 38.0 - 73.0 %    Lymph % 16.4 (L) 18.0 - 48.0 %    Mono % 8.5 4.0 - 15.0 %    Eosinophil % 1.5 0.0 - 8.0 %    Basophil % 1.0 0.0 - 1.9 %    Differential Method Automated      reviewed labs with patient during this visit        Assessment:       1. PSA (psoriatic arthritis)    2. Osteoporosis, post-menopausal    3. Mixed conductive and sensorineural hearing loss of left ear with unrestricted hearing of right ear    4. BMI 32.0-32.9,adult    5. PMR (polymyalgia rheumatica)                Plan:         Yuliana was seen today for disease management.    Diagnoses and all orders for this visit:    PSA (psoriatic arthritis)  -     apremilast (OTEZLA) 30 mg Tab; Take 1 tablet (30 mg total) by mouth 2 (two) times a day.  -     tirzepatide, weight loss, (ZEPBOUND) 2.5 mg/0.5 mL PnIj; Inject 2.5 mg into the skin every 7 days.  -     tirzepatide, weight loss, (ZEPBOUND) 5 mg/0.5 mL PnIj; Inject 5 mg into the skin every 7 days.  -     tirzepatide, weight loss, (ZEPBOUND) 7.5 mg/0.5 mL PnIj; Inject 7.5 mg into the skin every 7 days.  -     apremilast (OTEZLA STARTER) 10 mg (4)-20 mg (4)-30 mg (47) DsPk; As  directed  -     cyanocobalamin injection 1,000 mcg  -     methylPREDNISolone acetate injection 80 mg    Osteoporosis, post-menopausal  -     apremilast (OTEZLA) 30 mg Tab; Take 1 tablet (30 mg total) by mouth 2 (two) times a day.  -     tirzepatide, weight loss, (ZEPBOUND) 2.5 mg/0.5 mL PnIj; Inject 2.5 mg into the skin every 7 days.  -     tirzepatide, weight loss, (ZEPBOUND) 5 mg/0.5 mL PnIj; Inject 5 mg into the skin every 7 days.  -     tirzepatide, weight loss, (ZEPBOUND) 7.5 mg/0.5 mL PnIj; Inject 7.5 mg into the skin every 7 days.    Mixed conductive and sensorineural hearing loss of left ear with unrestricted hearing of right ear  -     apremilast (OTEZLA) 30 mg Tab; Take 1 tablet (30 mg total) by mouth 2 (two) times a day.  -     tirzepatide, weight loss, (ZEPBOUND) 2.5 mg/0.5 mL PnIj; Inject 2.5 mg into the skin every 7 days.  -     tirzepatide, weight loss, (ZEPBOUND) 5 mg/0.5 mL PnIj; Inject 5 mg into the skin every 7 days.  -     tirzepatide, weight loss, (ZEPBOUND) 7.5 mg/0.5 mL PnIj; Inject 7.5 mg into the skin every 7 days.  -     cyanocobalamin injection 1,000 mcg  -     methylPREDNISolone acetate injection 80 mg    BMI 32.0-32.9,adult  -     apremilast (OTEZLA) 30 mg Tab; Take 1 tablet (30 mg total) by mouth 2 (two) times a day.  -     tirzepatide, weight loss, (ZEPBOUND) 2.5 mg/0.5 mL PnIj; Inject 2.5 mg into the skin every 7 days.  -     tirzepatide, weight loss, (ZEPBOUND) 5 mg/0.5 mL PnIj; Inject 5 mg into the skin every 7 days.  -     tirzepatide, weight loss, (ZEPBOUND) 7.5 mg/0.5 mL PnIj; Inject 7.5 mg into the skin every 7 days.    PMR (polymyalgia rheumatica)  -     cyanocobalamin injection 1,000 mcg  -     methylPREDNISolone acetate injection 80 mg          1.we will hold otezla x 3 months then check labs if flaing will start low dose  otezla  2. Labs ordered  3. Start zepbound    Patient has tried weight watchers, Janine Lindsey diet, she has seen a nutritionist and has had a   and she is lost 10% of her body weight but stalled after 10%    More than 50% of the  40 minute encounter was spent face to face counseling the patient regarding current status and future plan of care as well as side of the medications. All questions were answered to patient's satisfaction

## 2024-01-09 ENCOUNTER — OFFICE VISIT (OUTPATIENT)
Dept: PULMONOLOGY | Facility: CLINIC | Age: 79
End: 2024-01-09
Payer: MEDICARE

## 2024-01-09 VITALS
HEIGHT: 64 IN | DIASTOLIC BLOOD PRESSURE: 86 MMHG | SYSTOLIC BLOOD PRESSURE: 166 MMHG | WEIGHT: 190.13 LBS | OXYGEN SATURATION: 95 % | HEART RATE: 70 BPM | BODY MASS INDEX: 32.46 KG/M2

## 2024-01-09 DIAGNOSIS — J45.20 MILD INTERMITTENT ASTHMA WITHOUT COMPLICATION: Primary | ICD-10-CM

## 2024-01-09 PROBLEM — D84.821 DRUG-INDUCED IMMUNODEFICIENCY: Status: RESOLVED | Noted: 2023-04-08 | Resolved: 2024-01-09

## 2024-01-09 PROBLEM — Z79.899 DRUG-INDUCED IMMUNODEFICIENCY: Status: RESOLVED | Noted: 2023-04-08 | Resolved: 2024-01-09

## 2024-01-09 PROCEDURE — 1126F AMNT PAIN NOTED NONE PRSNT: CPT | Mod: HCNC,CPTII,S$GLB, | Performed by: INTERNAL MEDICINE

## 2024-01-09 PROCEDURE — 99214 OFFICE O/P EST MOD 30 MIN: CPT | Mod: HCNC,S$GLB,, | Performed by: INTERNAL MEDICINE

## 2024-01-09 PROCEDURE — 3079F DIAST BP 80-89 MM HG: CPT | Mod: HCNC,CPTII,S$GLB, | Performed by: INTERNAL MEDICINE

## 2024-01-09 PROCEDURE — 3077F SYST BP >= 140 MM HG: CPT | Mod: HCNC,CPTII,S$GLB, | Performed by: INTERNAL MEDICINE

## 2024-01-09 PROCEDURE — 3288F FALL RISK ASSESSMENT DOCD: CPT | Mod: HCNC,CPTII,S$GLB, | Performed by: INTERNAL MEDICINE

## 2024-01-09 PROCEDURE — 99999 PR PBB SHADOW E&M-EST. PATIENT-LVL III: CPT | Mod: PBBFAC,HCNC,, | Performed by: INTERNAL MEDICINE

## 2024-01-09 PROCEDURE — 1101F PT FALLS ASSESS-DOCD LE1/YR: CPT | Mod: HCNC,CPTII,S$GLB, | Performed by: INTERNAL MEDICINE

## 2024-01-09 RX ORDER — FLUTICASONE PROPIONATE 44 UG/1
1 AEROSOL, METERED RESPIRATORY (INHALATION) 2 TIMES DAILY
Qty: 10.6 G | Refills: 11 | Status: SHIPPED | OUTPATIENT
Start: 2024-01-09 | End: 2024-02-19 | Stop reason: ALTCHOICE

## 2024-01-09 NOTE — PROGRESS NOTES
1/9/2024    Yuliana Mattson  Follow up    Chief Complaint   Patient presents with    Asthma         HPI:   01/09/2024- pt doing well w/o asthma exacerbation. Continues on breo inhaler daily.   she still gets cough sometimes- resolves with albuterol inhaler.  Her sister was dx with lung carcinoid    03/22/2023-   Stop breo 200 inhaler and start breo 100 once daily  Albuterol as needed  Would continue breo 100 through the rest of the year, then at next visit if doing well could consider switching to flovent.    pt has much improved cough since December. She continues taking breo inhaler daily. She did notice slight flare of cough assoc with weed killer spray and pollen recently. She had to use albuterol once since last visit.  She is sensitive to cleaning products, wears N95 when cleaning    09/22/2022-   Use flonase 2 sprays in each nostril daily  Consider adding zyrtec of claritin in addition to singulair to dry up nose mucous  Stop flovent and start breo inhaler one puff daily  Use albuterol inhaler as needed  pt had worsened cough, switched to flovent 220 then was better. 8d now she has noticed worse cough, sticky clear mucous filling up in sinuses she usually gets w/ season change. She has been taking tylenol PM which helps dry up mucous.  Continues on singulair, not sure if helping but she has taken a long time.  She has intermittent coughing spell 10 min long- will get better if expectorates clear mucous.  Inhalers: uses flovent 220 bid, albuterol rarely      6/23/22-   Cough suspected due to asthma  Continue flovent inhaler - if cough worsens could increase to high dose  Continue albuterol as needed  Continue rheumatology follow up and treatment  Lungs look good on chest CT  Get lung function testing  Pt is a 77 yo female with HTN, HLD, bladder cancer age 34, breast ca x2 age 50 and 53- did receive radiation 1995, CAD, allergic rhinitis presenting for new evaluation.  Pt has had dry cough since Aug 2021,  "  c/o coughing spells 15-20 min occasionally. Coughs up mucous then feels better. She used to wake 2-3x/night with cough which is now improved.  Cough seemed to worsen 1/2022- became productive and had fever, sob, weakness, tested neg for COVID with 2 home tests and a PCR but thinks she had covid- sick and bedridden 7d. She was tx with antibiotics 4/13/22- keflex for "possible beginnings of pneumonia" RLL.  Has had cellulitis left leg 5/2022, UTI. Gets recurrent UTIs    Has had some workup per PCP for sinuses, tx for reflux- 40mg omeprazole bid. She did have EGD 4/2022 which was normal but reports coughed the whole time.  She has stopped ACE inhibitor 4/2022 but didn't immediately improve  Cough is now improved on flovent inhaler. Also uses albuterol qhs and prn- uses maybe once/d  Sometimes short of breath w/ coughing spells, with heat seems worse  Symptoms assoc with wheezing  Triggers: cleaning products, wears N95 when cleaning  Since January she feels winded w/ exertion, limits to going in one store when going out due to MEJÍA, fatigue. SOB got much worse now getting better.  Gets allergies- improved on singulair, has restarted since 5/2.  Per Dr Hughes's note she had uveitis, iritis, hearing loss resolved w/ high dose steroids. 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    The chief complaint problem is varies with instability at times    PFSH:  Past Medical History:   Diagnosis Date    Allergic rhinitis     Anticoagulant long-term use     ASPIRIN ONLY - (stops it when she presents a hemorrhagic cystitis)    Bladder cancer     Blood transfusion     Breast cancer     CAD (coronary artery disease), native coronary artery     40% non obstructive    Cholelithiasis     " Endometriosis     Headache(784.0)     HEARING LOSS     Hemorrhoids     HLD (hyperlipidemia)     Hypertension     Iritis     Left wrist fracture 2009    traumatic    Malignant neoplasm of other specified sites of bladder 12/3/2009    Osteoporosis, postmenopausal     PONV (postoperative nausea and vomiting)     Rash     Rosacea     UTI (urinary tract infection)     Vaginal delivery     x 2         Past Surgical History:   Procedure Laterality Date    ADENOIDECTOMY      APPENDECTOMY      BLADDER TUMOR EXCISION  1979    [facility], dr garcia - no recurrences since    BREAST BIOPSY Right     4 core bx negative    BREAST SURGERY Left 1998    ESOPHAGOGASTRODUODENOSCOPY N/A 04/21/2022    Procedure: ESOPHAGOGASTRODUODENOSCOPY (EGD);  Surgeon: Guru Maddox MD;  Location: [facility];  Service: Endoscopy;  Laterality: N/A;    EYE SURGERY  2019    HYSTERECTOMY      MASTECTOMY, PARTIAL  1995    left side - cancer - had radiotherapy 1995, 1998 had chemotherapy    oophrect      pelvic mass      benign    TONSILLECTOMY      TONSILLECTOMY, ADENOIDECTOMY, BILATERAL MYRINGOTOMY AND TUBES      tram flap Left 1998     Social History     Tobacco Use    Smoking status: Never     Passive exposure: Past    Smokeless tobacco: Never   Substance Use Topics    Alcohol use: No    Drug use: No     Family History   Problem Relation Age of Onset    Glaucoma Father     Glaucoma Paternal Aunt     Glaucoma Paternal Grandmother     Ovarian cancer Cousin     Cancer Cousin         1st, ovarian    Amblyopia Neg Hx     Blindness Neg Hx     Cataracts Neg Hx     Hypertension Neg Hx     Macular degeneration Neg Hx     Retinal detachment Neg Hx     Strabismus Neg Hx     Stroke Neg Hx     Thyroid disease Neg Hx     Melanoma Neg Hx      Review of patient's allergies indicates:   Allergen Reactions    Prochlorperazine edisylate Anaphylaxis     compazine       Performance Status:The patient's activity level is functions out of house.      Review of  "Systems:  a review of eleven systems covering constitutional, Eye, HEENT, Psych, Respiratory, Cardiac, GI, , Musculoskeletal, Endocrine, Dermatologic was negative except for pertinent findings as listed ABOVE and below:  All negative with pertinent positives as above       Exam:Comprehensive exam done. BP (!) 166/86 (BP Location: Right forearm, Patient Position: Sitting, BP Method: Medium (Automatic))   Pulse 70   Ht 5' 4" (1.626 m)   Wt 86.3 kg (190 lb 2.4 oz)   SpO2 95%   BMI 32.64 kg/m²   Exam included Vitals as listed, and patient's appearance and affect and alertness and mood, oral exam for yeast and hygiene and pharynx lesions and Mallapatti (M) score, neck with inspection for jvd and masses and thyroid abnormalities and lymph nodes (supraclavicular and infraclavicular nodes and axillary also examined and noted if abn), chest exam included symmetry and effort and fremitus and percussion and auscultation, cardiac exam included rhythm and gallops and murmur and rubs and jvd and edema, abdominal exam for mass and hepatosplenomegaly and tenderness and hernias and bowel sounds, Musculoskeletal exam with muscle tone and posture and mobility/gait and  strength, and skin for rashes and cyanosis and pallor and turgor, extremity for clubbing.  Findings were normal except for pertinent findings listed below:  M2, oropharynx clear  HR regular  Breath sounds clear bilaterally  No edema/clubbing/rashes    Radiographs (ct chest and cxr) reviewed: view by direct vision   CT chest 5/6/22- normal lung fields. Aortic calcification. I do not appreciate any lung nodules. On chest wall left side there are some calcified nodules.  Per radiologist read: A few old calcified pulmonary granulomas are present.    CXR 4/18/22- clear  CXR 4/13/22- possible mild RML infiltrate    Labs reviewed     Latest Reference Range & Units 04/28/22 11:55   LISS Screen Negative <1:80  Positive !   LISS Titer 1  1:320   LISS PATTERN 1  Speckled "   ds DNA Ab Negative 1:10  Negative 1:10   Anti-SSA Antibody 0.00 - 0.99 Ratio 0.05   Anti-SSA Interpretation Negative  Negative   Anti-SSB Antibody 0.00 - 0.99 Ratio 0.06   Anti-SSB Interpretation Negative  Negative   Anti Sm Antibody 0.00 - 0.99 Ratio 0.06   Anti-Sm Interpretation Negative  Negative   Anti Sm/RNP Antibody 0.00 - 0.99 Ratio 0.08   Anti-Sm/RNP Interpretation Negative  Negative      Latest Reference Range & Units 12/23/20 12:09   CCP Antibodies <5.0 U/mL <0.5   Rheumatoid Factor 0.0 - 15.0 IU/mL <10.0      Latest Reference Range & Units 12/23/20 12:09   IgG 4 4 - 86 mg/dL 3 (L)        Latest Reference Range & Units 02/11/22 11:39 04/28/22 11:55   Eos # 0.0 - 0.5 K/uL 0.2 0.3        PFT  reviewed  6/2022- normal        Plan:  Clinical impression is resonably certain and repeated evaluation prn +/- follow up will be needed as below. Chronic cough/reactive airways disease- improves w/ inhalers, seasonal. Doing well, controlled on breo     Yuliana was seen today for asthma.    Diagnoses and all orders for this visit:    Mild intermittent asthma without complication  -     fluticasone propionate (FLOVENT HFA) 44 mcg/actuation inhaler; Inhale 1 puff into the lungs 2 (two) times daily. Controller              Follow up in about 1 year (around 1/9/2025).    Discussed with patient above for education the following:      Patient Instructions   Asthma is doing well  Flovent inhaler 1 puff twice daily- can do this for 3 months, then switch to one puff daily  Albuterol as needed

## 2024-01-09 NOTE — PATIENT INSTRUCTIONS
Asthma is doing well  Flovent inhaler 1 puff twice daily- can do this for 3 months, then switch to one puff daily  Albuterol as needed

## 2024-02-14 NOTE — PROGRESS NOTES
Subjective:      Name: Yuliana Mattson  : 1945  MRN: [id number]    CC:  Annual breast surveillance    HPI:   Yuliana Mattson is a 78 y.o. female presents for annual breast surveillance.    Ms. Mattson is a 78-year-old white female known to Dr. Corral for DCIS of the left breast.    Hx of bladder cancer (30+ yrs ago), Osteoporosis (to be managed by Dr. Hughes), Psoriatic arthritis (managed by Dr. Hughes), CAD, Cholelithiasis, hearing loss, HTN, HLD, Iritis, Hemorrhoids, Endometriosis, Rosacea     Ms. Mattson was diagnosed in  and treated with lumpectomy followed by radiation x 6 yrs.    In , she was diagnosed with Stage I infiltrating left breast carcinoma, which was high-grade, ER positive and OR negative.    She then underwent a left mastectomy with immediate reconstruction, 4 cycles of A/C as well as 60 months of adjuvant Tamoxifen/Arimidex.      2011:  Angiogram:  She had angiogram in  after a high calcium score of 967.  She was found to have non-obstructive CAD with 40% blockage in right coronary.     2015:  EF 60-65%    21:  Patchy, reddened area to left breast; punch biopsy in Dermatology = dermatitis     Today:  02/15/24  She presents to the clinic today for her annual breast evaluation (approx 25 yrs).     Breast tatoo done 23 to  areola of left breast.  Placed back on Otezla for Psoriatic arthritis.  She denies any new breast complaints, bone pain, fevers, chills, drenching night sweats, lymphadenopathy, irregular heartbeat, chest pain, abdominal discomfort, nausea, vomiting, constipation, diarrhea, postmenopausal bleeding, difficulties with hot flashes, unexplained weight loss, etc.  No other new complaints or pertinent findings on a 14-point review of systems.     To note:  Patient requested Digital Diagnostic Mammograms each year.  Patient reports that dentist informed her the she has a tooth with good roots, but (2) large pockets in the jawline.  The tooth will die.    To  note:  The patient was on an oral bisphosphate Boniva:  07 - 08; Actonel 12/08 - 01/2010.         Past Medical History:   Diagnosis Date    Allergic rhinitis     Anticoagulant long-term use     ASPIRIN ONLY - (stops it when she presents a hemorrhagic cystitis)    Bladder cancer     Blood transfusion     Breast cancer     CAD (coronary artery disease), native coronary artery     40% non obstructive    Cholelithiasis     Endometriosis     Headache(784.0)     HEARING LOSS     Hemorrhoids     HLD (hyperlipidemia)     Hypertension     Iritis     Left wrist fracture 2009    traumatic    Malignant neoplasm of other specified sites of bladder 12/3/2009    Osteoporosis, postmenopausal     PONV (postoperative nausea and vomiting)     Rash     Rosacea     UTI (urinary tract infection)     Vaginal delivery     x 2       Past Surgical History:   Procedure Laterality Date    ADENOIDECTOMY      APPENDECTOMY      BLADDER TUMOR EXCISION  1979    [facility], [place], operated by [facility], dr garcia - no recurrences since    BREAST BIOPSY Right     4 core bx negative    BREAST SURGERY Left 1998    ESOPHAGOGASTRODUODENOSCOPY N/A 04/21/2022    Procedure: ESOPHAGOGASTRODUODENOSCOPY (EGD);  Surgeon: Guru Maddox MD;  Location: [facility];  Service: Endoscopy;  Laterality: N/A;    EYE SURGERY  2019    HYSTERECTOMY      MASTECTOMY, PARTIAL  1995    left side - cancer - had radiotherapy 1995, 1998 had chemotherapy    oophrect      pelvic mass      benign    TONSILLECTOMY      TONSILLECTOMY, ADENOIDECTOMY, BILATERAL MYRINGOTOMY AND TUBES      tram flap Left 1998       Family History   Problem Relation Age of Onset    Glaucoma Father     Glaucoma Paternal Aunt     Glaucoma Paternal Grandmother     Ovarian cancer Cousin     Cancer Cousin         1st, ovarian    Amblyopia Neg Hx     Blindness Neg Hx     Cataracts Neg Hx     Hypertension Neg Hx     Macular degeneration Neg Hx     Retinal detachment Neg Hx     Strabismus Neg Hx     Stroke Neg Hx     Thyroid  disease Neg Hx     Melanoma Neg Hx        Social History     Socioeconomic History    Marital status:    Occupational History    Occupation: retired accounting   Tobacco Use    Smoking status: Never     Passive exposure: Past    Smokeless tobacco: Never   Substance and Sexual Activity    Alcohol use: No    Drug use: No     Social Determinants of Health     Financial Resource Strain: Low Risk  (8/31/2023)    Overall Financial Resource Strain (CARDIA)     Difficulty of Paying Living Expenses: Not hard at all   Food Insecurity: No Food Insecurity (2/13/2024)    Hunger Vital Sign     Worried About Running Out of Food in the Last Year: Never true     Ran Out of Food in the Last Year: Never true   Transportation Needs: No Transportation Needs (2/13/2024)    PRAPARE - Transportation     Lack of Transportation (Medical): No     Lack of Transportation (Non-Medical): No   Physical Activity: Inactive (2/13/2024)    Exercise Vital Sign     Days of Exercise per Week: 0 days     Minutes of Exercise per Session: 0 min   Stress: Stress Concern Present (2/13/2024)    [employer] [place] [employer] - Occupational Stress Questionnaire     Feeling of Stress : To some extent   Social Connections: Moderately Isolated (2/13/2024)    Social Connection and Isolation Panel [NHANES]     Frequency of Communication with Friends and Family: More than three times a week     Frequency of Social Gatherings with Friends and Family: Twice a week     Attends Buddhist Services: Never     Active Member of Clubs or Organizations: No     Attends Club or Organization Meetings: Never     Marital Status:    Housing Stability: Low Risk  (2/13/2024)    Housing Stability Vital Sign     Unable to Pay for Housing in the Last Year: No     Number of Places Lived in the Last Year: 1     Unstable Housing in the Last Year: No       Review of patient's allergies indicates:   Allergen Reactions    Prochlorperazine edisylate Anaphylaxis      "compazine       Review of Systems   Eyes:  Negative for visual disturbance.   Cardiovascular:  Negative for chest pain.   Respiratory:  Negative for shortness of breath.    Hematologic/Lymphatic: Negative for adenopathy.   Skin:  Negative for rash.   Musculoskeletal:  Positive for back pain.   Gastrointestinal:  Negative for abdominal pain and diarrhea.   Genitourinary:  Negative for frequency.   Neurological:  Positive for headaches.   Psychiatric/Behavioral:  The patient is not nervous/anxious.             Objective:   Weight:   Gain of 10 1/2 pounds in 1 year  Vitals:    02/15/24 0921   BP: 128/80   BP Location: Right arm   Patient Position: Sitting   BP Method: Medium (Manual)   Pulse: 99   Resp: 16   Temp: 97.6 °F (36.4 °C)   TempSrc: Temporal   SpO2: 100%   Weight: 85.9 kg (189 lb 6 oz)   Height: 5' 3" (1.6 m)        Physical Exam  Vitals reviewed.   Constitutional:       General: She is not in acute distress.  HENT:      Head: Normocephalic and atraumatic.   Eyes:      Conjunctiva/sclera: Conjunctivae normal.   Cardiovascular:      Rate and Rhythm: Normal rate and regular rhythm.      Pulses: Normal pulses.      Heart sounds: Normal heart sounds.   Pulmonary:      Effort: Pulmonary effort is normal.      Breath sounds: Normal breath sounds. No wheezing.   Chest:      Comments: BREASTS:  Right breast remains soft; free of masses, nipple discharge or inversion; tenderness at 12:00 o'clock position.  Left breast with noted reconstruction & areola tattoo with no signs of local reoccurrence.  Abdominal:      General: Bowel sounds are normal.      Palpations: Abdomen is soft.      Tenderness: There is no abdominal tenderness.   Musculoskeletal:         General: Normal range of motion.      Cervical back: Neck supple.      Right lower leg: No edema.      Left lower leg: No edema.   Lymphadenopathy:      Cervical: No cervical adenopathy.      Upper Body:      Right upper body: No supraclavicular or axillary " adenopathy.      Left upper body: No supraclavicular or axillary adenopathy.      Lower Body: No right inguinal adenopathy. No left inguinal adenopathy.   Skin:     General: Skin is warm and dry.   Neurological:      Mental Status: She is alert and oriented to person, place, and time.   Psychiatric:         Behavior: Behavior normal.         Thought Content: Thought content normal.             Current Outpatient Medications on File Prior to Visit   Medication Sig    albuterol (PROVENTIL/VENTOLIN HFA) 90 mcg/actuation inhaler Inhale 2 puffs into the lungs every 6 (six) hours as needed for Wheezing or Shortness of Breath. Rescue    ALPRAZolam (XANAX) 0.25 MG tablet Take 1 tablet (0.25 mg total) by mouth 4 (four) times daily as needed for Anxiety.    apremilast (OTEZLA STARTER) 10 mg (4)-20 mg (4)-30 mg (47) DsPk As directed    apremilast (OTEZLA) 30 mg Tab Take 1 tablet (30 mg total) by mouth 2 (two) times a day.    ascorbic acid, vitamin C, (VITAMIN C) 100 MG tablet Take 100 mg by mouth once daily.    fluocinolone acetonide oiL (DERMOTIC OIL) 0.01 % Drop Place 3 drops in ear(s) 2 (two) times daily as needed (itchy ears).    fluticasone propionate (FLOVENT HFA) 44 mcg/actuation inhaler Inhale 1 puff into the lungs 2 (two) times daily. Controller    losartan-hydrochlorothiazide 50-12.5 mg (HYZAAR) 50-12.5 mg per tablet TAKE 1 TABLET BY MOUTH EVERY DAY    oxybutynin (DITROPAN-XL) 5 MG TR24 Take 1 tablet (5 mg total) by mouth once daily.    pantoprazole (PROTONIX) 40 MG tablet Take 1 tablet (40 mg total) by mouth once daily.    tirzepatide, weight loss, (ZEPBOUND) 2.5 mg/0.5 mL PnIj Inject 2.5 mg into the skin every 7 days.    tirzepatide, weight loss, (ZEPBOUND) 5 mg/0.5 mL PnIj Inject 5 mg into the skin every 7 days.    tirzepatide, weight loss, (ZEPBOUND) 7.5 mg/0.5 mL PnIj Inject 7.5 mg into the skin every 7 days.    tretinoin (RETIN-A) 0.025 % cream Apply a pea-sized amount to entire face at bedtime, start every  other night and increase as tolerated. Take night off if irritation develops.    vitamin D (VITAMIN D3) 1000 units Tab Take 1,000 Units by mouth once daily.    zinc sulfate (ZINC-15 ORAL) Take by mouth.     No current facility-administered medications on file prior to visit.     Lab Results   Component Value Date    WBC 8.76 02/15/2024    HGB 13.8 02/15/2024    HCT 40.3 02/15/2024    MCV 87 02/15/2024     02/15/2024       CMP  Sodium   Date Value Ref Range Status   02/15/2024 138 136 - 145 mmol/L Final     Potassium   Date Value Ref Range Status   02/15/2024 4.0 3.5 - 5.1 mmol/L Final     Chloride   Date Value Ref Range Status   02/15/2024 102 95 - 110 mmol/L Final     CO2   Date Value Ref Range Status   02/15/2024 25 23 - 29 mmol/L Final     Glucose   Date Value Ref Range Status   02/15/2024 191 (H) 70 - 110 mg/dL Final     BUN   Date Value Ref Range Status   02/15/2024 13 8 - 23 mg/dL Final     Creatinine   Date Value Ref Range Status   02/15/2024 1.0 0.5 - 1.4 mg/dL Final     Calcium   Date Value Ref Range Status   02/15/2024 9.8 8.7 - 10.5 mg/dL Final     Total Protein   Date Value Ref Range Status   02/15/2024 7.4 6.0 - 8.4 g/dL Final     Albumin   Date Value Ref Range Status   02/15/2024 3.6 3.5 - 5.2 g/dL Final     Total Bilirubin   Date Value Ref Range Status   02/15/2024 0.5 0.1 - 1.0 mg/dL Final     Comment:     For infants and newborns, interpretation of results should be based  on gestational age, weight and in agreement with clinical  observations.    Premature Infant recommended reference ranges:  Up to 24 hours.............<8.0 mg/dL  Up to 48 hours............<12.0 mg/dL  3-5 days..................<15.0 mg/dL  6-29 days.................<15.0 mg/dL       Alkaline Phosphatase   Date Value Ref Range Status   02/15/2024 148 (H) 55 - 135 U/L Final     AST   Date Value Ref Range Status   02/15/2024 25 10 - 40 U/L Final     ALT   Date Value Ref Range Status   02/15/2024 28 10 - 44 U/L Final     Anion  Gap   Date Value Ref Range Status   02/15/2024 11 8 - 16 mmol/L Final     eGFR   Date Value Ref Range Status   02/15/2024 58.4 (A) >60 mL/min/1.73 m^2 Final       Mammo Digital Diagnostic Right with Harish  Narrative: Result:  Mammo Digital Diagnostic Right with Harish    History:  Patient is 78 y.o. and is seen for diagnostic imaging.    Films Compared:  Compared to: 08/11/2022 Mammo Digital Diagnostic Right with Harish,   02/11/2022 Mammo Digital Diagnostic Right with Harish, 02/12/2021 Mammo   Digital Diagnostic Right w/ Harish, 01/08/2020 Mammo Digital Diagnostic   Right w/ Harish, and 01/14/2019 Mammo Digital Screening Right with   Tomosynthesis_CAD     Findings:   This procedure was performed using tomosynthesis.   Computer-aided detection was utilized in the interpretation of this   examination.    The right breast is heterogeneously dense, which may obscure small masses.   There is no evidence of suspicious masses, microcalcifications or   architectural distortion.  Impression:    No mammographic evidence of malignancy.    BI-RADS Category 1: Negative    Recommendation:  Routine screening mammogram in 1 year is recommended.     BMD 06/27/2023:  Osteoporosis; improved density in lumbar spine & left femoral neck; decreased density in total hip    Assessment:       1. History of breast cancer    2. History of bladder cancer    3. Osteoporosis, post-menopausal         Plan:     History of breast cancer    History of bladder cancer    Osteoporosis, post-menopausal    Hx of DCIS of left breast - STACY @ 25 yrs post; F/U in 1 year with CBC, CMP, Mammo (on or after 08/28/2024)  Osteoporosis - follow with Dr. Hughes  Psoriatic arthritis - follow in Rheumatology; now on Otezla        30 minutes were spent in coordination of patient's care, record review and counseling.    Route Chart for Scheduling    Med Onc Chart Routing      Follow up with physician    Follow up with TRISTIN 1 year. f/u in 1 yr with CBC, CMP & MAMMO, R (on or after  08/28/2024)   Infusion scheduling note    Injection scheduling note    Labs    Imaging    Pharmacy appointment    Other referrals                         Answers submitted by the patient for this visit:  Review of Systems Questionnaire (Submitted on 2/13/2024)  appetite change : No  unexpected weight change: No  mouth sores: No

## 2024-02-15 ENCOUNTER — LAB VISIT (OUTPATIENT)
Dept: LAB | Facility: HOSPITAL | Age: 79
End: 2024-02-15
Attending: NURSE PRACTITIONER
Payer: MEDICARE

## 2024-02-15 ENCOUNTER — OFFICE VISIT (OUTPATIENT)
Dept: HEMATOLOGY/ONCOLOGY | Facility: CLINIC | Age: 79
End: 2024-02-15
Payer: MEDICARE

## 2024-02-15 VITALS
DIASTOLIC BLOOD PRESSURE: 80 MMHG | OXYGEN SATURATION: 100 % | BODY MASS INDEX: 33.55 KG/M2 | SYSTOLIC BLOOD PRESSURE: 128 MMHG | HEIGHT: 63 IN | HEART RATE: 99 BPM | TEMPERATURE: 98 F | WEIGHT: 189.38 LBS | RESPIRATION RATE: 16 BRPM

## 2024-02-15 DIAGNOSIS — Z85.3 HISTORY OF BREAST CANCER: Primary | ICD-10-CM

## 2024-02-15 DIAGNOSIS — Z90.12 HX OF LEFT MASTECTOMY: ICD-10-CM

## 2024-02-15 DIAGNOSIS — Z85.51 HISTORY OF BLADDER CANCER: ICD-10-CM

## 2024-02-15 DIAGNOSIS — M81.0 OSTEOPOROSIS, POST-MENOPAUSAL: ICD-10-CM

## 2024-02-15 DIAGNOSIS — Z85.3 HISTORY OF BREAST CANCER: ICD-10-CM

## 2024-02-15 LAB
ALBUMIN SERPL BCP-MCNC: 3.6 G/DL (ref 3.5–5.2)
ALP SERPL-CCNC: 148 U/L (ref 55–135)
ALT SERPL W/O P-5'-P-CCNC: 28 U/L (ref 10–44)
ANION GAP SERPL CALC-SCNC: 11 MMOL/L (ref 8–16)
AST SERPL-CCNC: 25 U/L (ref 10–40)
BASOPHILS # BLD AUTO: 0.05 K/UL (ref 0–0.2)
BASOPHILS NFR BLD: 0.6 % (ref 0–1.9)
BILIRUB SERPL-MCNC: 0.5 MG/DL (ref 0.1–1)
BUN SERPL-MCNC: 13 MG/DL (ref 8–23)
CALCIUM SERPL-MCNC: 9.8 MG/DL (ref 8.7–10.5)
CHLORIDE SERPL-SCNC: 102 MMOL/L (ref 95–110)
CO2 SERPL-SCNC: 25 MMOL/L (ref 23–29)
CREAT SERPL-MCNC: 1 MG/DL (ref 0.5–1.4)
DIFFERENTIAL METHOD BLD: ABNORMAL
EOSINOPHIL # BLD AUTO: 0.1 K/UL (ref 0–0.5)
EOSINOPHIL NFR BLD: 1.5 % (ref 0–8)
ERYTHROCYTE [DISTWIDTH] IN BLOOD BY AUTOMATED COUNT: 13.5 % (ref 11.5–14.5)
EST. GFR  (NO RACE VARIABLE): 58.4 ML/MIN/1.73 M^2
GLUCOSE SERPL-MCNC: 191 MG/DL (ref 70–110)
HCT VFR BLD AUTO: 40.3 % (ref 37–48.5)
HGB BLD-MCNC: 13.8 G/DL (ref 12–16)
IMM GRANULOCYTES # BLD AUTO: 0.08 K/UL (ref 0–0.04)
IMM GRANULOCYTES NFR BLD AUTO: 0.9 % (ref 0–0.5)
LYMPHOCYTES # BLD AUTO: 1.5 K/UL (ref 1–4.8)
LYMPHOCYTES NFR BLD: 16.8 % (ref 18–48)
MCH RBC QN AUTO: 29.9 PG (ref 27–31)
MCHC RBC AUTO-ENTMCNC: 34.2 G/DL (ref 32–36)
MCV RBC AUTO: 87 FL (ref 82–98)
MONOCYTES # BLD AUTO: 0.7 K/UL (ref 0.3–1)
MONOCYTES NFR BLD: 7.8 % (ref 4–15)
NEUTROPHILS # BLD AUTO: 6.4 K/UL (ref 1.8–7.7)
NEUTROPHILS NFR BLD: 72.4 % (ref 38–73)
NRBC BLD-RTO: 0 /100 WBC
PLATELET # BLD AUTO: 327 K/UL (ref 150–450)
PMV BLD AUTO: 9.1 FL (ref 9.2–12.9)
POTASSIUM SERPL-SCNC: 4 MMOL/L (ref 3.5–5.1)
PROT SERPL-MCNC: 7.4 G/DL (ref 6–8.4)
RBC # BLD AUTO: 4.61 M/UL (ref 4–5.4)
SODIUM SERPL-SCNC: 138 MMOL/L (ref 136–145)
WBC # BLD AUTO: 8.76 K/UL (ref 3.9–12.7)

## 2024-02-15 PROCEDURE — 1160F RVW MEDS BY RX/DR IN RCRD: CPT | Mod: HCNC,CPTII,S$GLB, | Performed by: NURSE PRACTITIONER

## 2024-02-15 PROCEDURE — 3079F DIAST BP 80-89 MM HG: CPT | Mod: HCNC,CPTII,S$GLB, | Performed by: NURSE PRACTITIONER

## 2024-02-15 PROCEDURE — 1101F PT FALLS ASSESS-DOCD LE1/YR: CPT | Mod: HCNC,CPTII,S$GLB, | Performed by: NURSE PRACTITIONER

## 2024-02-15 PROCEDURE — 80053 COMPREHEN METABOLIC PANEL: CPT | Mod: HCNC,PN | Performed by: NURSE PRACTITIONER

## 2024-02-15 PROCEDURE — 1126F AMNT PAIN NOTED NONE PRSNT: CPT | Mod: HCNC,CPTII,S$GLB, | Performed by: NURSE PRACTITIONER

## 2024-02-15 PROCEDURE — 1159F MED LIST DOCD IN RCRD: CPT | Mod: HCNC,CPTII,S$GLB, | Performed by: NURSE PRACTITIONER

## 2024-02-15 PROCEDURE — 99999 PR PBB SHADOW E&M-EST. PATIENT-LVL IV: CPT | Mod: PBBFAC,HCNC,, | Performed by: NURSE PRACTITIONER

## 2024-02-15 PROCEDURE — 85025 COMPLETE CBC W/AUTO DIFF WBC: CPT | Mod: HCNC,PN | Performed by: NURSE PRACTITIONER

## 2024-02-15 PROCEDURE — 36415 COLL VENOUS BLD VENIPUNCTURE: CPT | Mod: HCNC,PN | Performed by: NURSE PRACTITIONER

## 2024-02-15 PROCEDURE — 3074F SYST BP LT 130 MM HG: CPT | Mod: HCNC,CPTII,S$GLB, | Performed by: NURSE PRACTITIONER

## 2024-02-15 PROCEDURE — 99214 OFFICE O/P EST MOD 30 MIN: CPT | Mod: HCNC,S$GLB,, | Performed by: NURSE PRACTITIONER

## 2024-02-15 PROCEDURE — 3288F FALL RISK ASSESSMENT DOCD: CPT | Mod: HCNC,CPTII,S$GLB, | Performed by: NURSE PRACTITIONER

## 2024-02-15 RX ORDER — FLUTICASONE FUROATE AND VILANTEROL TRIFENATATE 100; 25 UG/1; UG/1
1 POWDER RESPIRATORY (INHALATION)
COMMUNITY
Start: 2024-01-22 | End: 2024-04-12 | Stop reason: SDUPTHER

## 2024-02-19 ENCOUNTER — OFFICE VISIT (OUTPATIENT)
Dept: FAMILY MEDICINE | Facility: CLINIC | Age: 79
End: 2024-02-19
Payer: MEDICARE

## 2024-02-19 VITALS
TEMPERATURE: 99 F | HEIGHT: 63 IN | WEIGHT: 189.63 LBS | BODY MASS INDEX: 33.6 KG/M2 | OXYGEN SATURATION: 97 % | SYSTOLIC BLOOD PRESSURE: 138 MMHG | RESPIRATION RATE: 18 BRPM | DIASTOLIC BLOOD PRESSURE: 74 MMHG | HEART RATE: 98 BPM

## 2024-02-19 DIAGNOSIS — R73.09 ABNORMAL GLUCOSE: ICD-10-CM

## 2024-02-19 DIAGNOSIS — E55.9 VITAMIN D DEFICIENCY: ICD-10-CM

## 2024-02-19 DIAGNOSIS — J32.1 CHRONIC FRONTAL SINUSITIS: Primary | ICD-10-CM

## 2024-02-19 DIAGNOSIS — I10 PRIMARY HYPERTENSION: ICD-10-CM

## 2024-02-19 PROCEDURE — 1126F AMNT PAIN NOTED NONE PRSNT: CPT | Mod: HCNC,CPTII,S$GLB, | Performed by: FAMILY MEDICINE

## 2024-02-19 PROCEDURE — 99999 PR PBB SHADOW E&M-EST. PATIENT-LVL III: CPT | Mod: PBBFAC,HCNC,, | Performed by: FAMILY MEDICINE

## 2024-02-19 PROCEDURE — 3078F DIAST BP <80 MM HG: CPT | Mod: HCNC,CPTII,S$GLB, | Performed by: FAMILY MEDICINE

## 2024-02-19 PROCEDURE — 1101F PT FALLS ASSESS-DOCD LE1/YR: CPT | Mod: HCNC,CPTII,S$GLB, | Performed by: FAMILY MEDICINE

## 2024-02-19 PROCEDURE — 1159F MED LIST DOCD IN RCRD: CPT | Mod: HCNC,CPTII,S$GLB, | Performed by: FAMILY MEDICINE

## 2024-02-19 PROCEDURE — 3075F SYST BP GE 130 - 139MM HG: CPT | Mod: HCNC,CPTII,S$GLB, | Performed by: FAMILY MEDICINE

## 2024-02-19 PROCEDURE — 99214 OFFICE O/P EST MOD 30 MIN: CPT | Mod: HCNC,S$GLB,, | Performed by: FAMILY MEDICINE

## 2024-02-19 PROCEDURE — 1160F RVW MEDS BY RX/DR IN RCRD: CPT | Mod: HCNC,CPTII,S$GLB, | Performed by: FAMILY MEDICINE

## 2024-02-19 PROCEDURE — 3288F FALL RISK ASSESSMENT DOCD: CPT | Mod: HCNC,CPTII,S$GLB, | Performed by: FAMILY MEDICINE

## 2024-02-19 RX ORDER — INFLUENZA A VIRUS A/VICTORIA/4897/2022 IVR-238 (H1N1) ANTIGEN (FORMALDEHYDE INACTIVATED), INFLUENZA A VIRUS A/DARWIN/6/2021 IVR-227 (H3N2) ANTIGEN (FORMALDEHYDE INACTIVATED), INFLUENZA B VIRUS B/AUSTRIA/1359417/2021 BVR-26 ANTIGEN (FORMALDEHYDE INACTIVATED), INFLUENZA B VIRUS B/PHUKET/3073/2013 BVR-1B ANTIGEN (FORMALDEHYDE INACTIVATED) 15; 15; 15; 15 UG/.5ML; UG/.5ML; UG/.5ML; UG/.5ML
INJECTION, SUSPENSION INTRAMUSCULAR
COMMUNITY
Start: 2023-09-13

## 2024-02-19 RX ORDER — FLUTICASONE PROPIONATE 50 MCG
1 SPRAY, SUSPENSION (ML) NASAL 2 TIMES DAILY
Qty: 16 G | Refills: 2 | Status: SHIPPED | OUTPATIENT
Start: 2024-02-19

## 2024-02-19 RX ORDER — AMOXICILLIN AND CLAVULANATE POTASSIUM 875; 125 MG/1; MG/1
1 TABLET, FILM COATED ORAL 2 TIMES DAILY
Qty: 20 TABLET | Refills: 0 | Status: SHIPPED | OUTPATIENT
Start: 2024-02-19 | End: 2024-02-29

## 2024-02-19 RX ORDER — CYANOCOBALAMIN 1000 UG/ML
INJECTION, SOLUTION INTRAMUSCULAR; SUBCUTANEOUS
COMMUNITY
Start: 2024-01-03

## 2024-02-19 NOTE — PROGRESS NOTES
Subjective:       Patient ID: Yuliana Mattson is a 78 y.o. female.    Chief Complaint: Headache (Cough symptoms for two weeks)    C/o 2 weeks of progressive headache, sinus pressure, thick sputum, thick nasal discharge.  Taking tylenol for headache.  Denies fever.  Using xyzal, Dayuil, Nyquil, Flonase, Singulair    Denies any active wheezing or SOB.  Using Breo daily.        Past Medical History:   Diagnosis Date    Allergic rhinitis     Anticoagulant long-term use     ASPIRIN ONLY - (stops it when she presents a hemorrhagic cystitis)    Bladder cancer     Blood transfusion     Breast cancer     CAD (coronary artery disease), native coronary artery     40% non obstructive    Cholelithiasis     Endometriosis     Headache(784.0)     HEARING LOSS     Hemorrhoids     HLD (hyperlipidemia)     Hypertension     Iritis     Left wrist fracture 2009    traumatic    Malignant neoplasm of other specified sites of bladder 12/3/2009    Osteoporosis, postmenopausal     PONV (postoperative nausea and vomiting)     Rash     Rosacea     UTI (urinary tract infection)     Vaginal delivery     x 2       Past Surgical History:   Procedure Laterality Date    ADENOIDECTOMY      APPENDECTOMY      BLADDER TUMOR EXCISION  1979    [facility], dr garcia - no recurrences since    BREAST BIOPSY Right     4 core bx negative    BREAST SURGERY Left 1998    ESOPHAGOGASTRODUODENOSCOPY N/A 04/21/2022    Procedure: ESOPHAGOGASTRODUODENOSCOPY (EGD);  Surgeon: Guru Maddox MD;  Location: [facility];  Service: Endoscopy;  Laterality: N/A;    EYE SURGERY  2019    HYSTERECTOMY      MASTECTOMY, PARTIAL  1995    left side - cancer - had radiotherapy 1995, 1998 had chemotherapy    oophrect      pelvic mass      benign    TONSILLECTOMY      TONSILLECTOMY, ADENOIDECTOMY, BILATERAL MYRINGOTOMY AND TUBES      tram flap Left 1998       Review of patient's allergies indicates:   Allergen Reactions    Prochlorperazine edisylate Anaphylaxis      compazine       Social History     Socioeconomic History    Marital status:    Occupational History    Occupation: retired accounting   Tobacco Use    Smoking status: Never     Passive exposure: Past    Smokeless tobacco: Never   Substance and Sexual Activity    Alcohol use: No    Drug use: No     Social Determinants of Health     Financial Resource Strain: Low Risk  (8/31/2023)    Overall Financial Resource Strain (CARDIA)     Difficulty of Paying Living Expenses: Not hard at all   Food Insecurity: No Food Insecurity (2/13/2024)    Hunger Vital Sign     Worried About Running Out of Food in the Last Year: Never true     Ran Out of Food in the Last Year: Never true   Transportation Needs: No Transportation Needs (2/13/2024)    PRAPARE - Transportation     Lack of Transportation (Medical): No     Lack of Transportation (Non-Medical): No   Physical Activity: Inactive (2/13/2024)    Exercise Vital Sign     Days of Exercise per Week: 0 days     Minutes of Exercise per Session: 0 min   Stress: Stress Concern Present (2/13/2024)    [employer] [place] [employer] - Occupational Stress Questionnaire     Feeling of Stress : To some extent   Social Connections: Moderately Isolated (2/13/2024)    Social Connection and Isolation Panel [NHANES]     Frequency of Communication with Friends and Family: More than three times a week     Frequency of Social Gatherings with Friends and Family: Twice a week     Attends Mosque Services: Never     Active Member of Clubs or Organizations: No     Attends Club or Organization Meetings: Never     Marital Status:    Housing Stability: Low Risk  (2/13/2024)    Housing Stability Vital Sign     Unable to Pay for Housing in the Last Year: No     Number of Places Lived in the Last Year: 1     Unstable Housing in the Last Year: No       Current Outpatient Medications on File Prior to Visit   Medication Sig Dispense Refill    albuterol (PROVENTIL/VENTOLIN HFA) 90  mcg/actuation inhaler Inhale 2 puffs into the lungs every 6 (six) hours as needed for Wheezing or Shortness of Breath. Rescue 18 g 11    apremilast (OTEZLA) 30 mg Tab Take 1 tablet (30 mg total) by mouth 2 (two) times a day. 60 tablet 11    ascorbic acid, vitamin C, (VITAMIN C) 100 MG tablet Take 100 mg by mouth once daily.      BREO ELLIPTA 100-25 mcg/dose diskus inhaler Inhale 1 puff into the lungs.      cyanocobalamin 1,000 mcg/mL injection       fluocinolone acetonide oiL (DERMOTIC OIL) 0.01 % Drop Place 3 drops in ear(s) 2 (two) times daily as needed (itchy ears). 20 mL 2    losartan-hydrochlorothiazide 50-12.5 mg (HYZAAR) 50-12.5 mg per tablet TAKE 1 TABLET BY MOUTH EVERY DAY 90 tablet 3    pantoprazole (PROTONIX) 40 MG tablet Take 1 tablet (40 mg total) by mouth once daily. 30 tablet 11    tretinoin (RETIN-A) 0.025 % cream Apply a pea-sized amount to entire face at bedtime, start every other night and increase as tolerated. Take night off if irritation develops. 45 g 3    vitamin D (VITAMIN D3) 1000 units Tab Take 5,000 Units by mouth once daily.      ALPRAZolam (XANAX) 0.25 MG tablet Take 1 tablet (0.25 mg total) by mouth 4 (four) times daily as needed for Anxiety. (Patient not taking: Reported on 2/15/2024) 28 tablet 0    apremilast (OTEZLA STARTER) 10 mg (4)-20 mg (4)-30 mg (47) DsPk As directed (Patient not taking: Reported on 2/19/2024) 55 tablet 0    FLUAD QUAD 2023-24,65Y UP,,PF, 60 mcg (15 mcg x 4)/0.5 mL Syrg       oxybutynin (DITROPAN-XL) 5 MG TR24 Take 1 tablet (5 mg total) by mouth once daily. 30 tablet 3    [DISCONTINUED] fluticasone propionate (FLOVENT HFA) 44 mcg/actuation inhaler Inhale 1 puff into the lungs 2 (two) times daily. Controller (Patient not taking: Reported on 2/19/2024) 10.6 g 11     No current facility-administered medications on file prior to visit.       Family History   Problem Relation Age of Onset    Glaucoma Father     Glaucoma Paternal Aunt     Glaucoma Paternal  "Grandmother     Ovarian cancer Cousin     Cancer Cousin         1st, ovarian    Amblyopia Neg Hx     Blindness Neg Hx     Cataracts Neg Hx     Hypertension Neg Hx     Macular degeneration Neg Hx     Retinal detachment Neg Hx     Strabismus Neg Hx     Stroke Neg Hx     Thyroid disease Neg Hx     Melanoma Neg Hx        Review of Systems   Constitutional:  Positive for fatigue. Negative for appetite change, chills, fever and unexpected weight change.   HENT:  Positive for congestion and sinus pressure. Negative for sore throat and trouble swallowing.    Eyes:  Negative for pain and visual disturbance.   Respiratory:  Negative for cough, shortness of breath and wheezing.    Cardiovascular:  Negative for chest pain and palpitations.   Gastrointestinal:  Negative for abdominal pain, blood in stool, diarrhea, nausea and vomiting.   Genitourinary:  Negative for difficulty urinating, dysuria and hematuria.   Musculoskeletal:  Negative for arthralgias, gait problem and neck pain.   Skin:  Negative for rash and wound.   Neurological:  Negative for dizziness, weakness, numbness and headaches.   Hematological:  Negative for adenopathy.   Psychiatric/Behavioral:  Negative for dysphoric mood.        Objective:      /74   Pulse 98   Temp 98.9 °F (37.2 °C) (Oral)   Resp 18   Ht 5' 3" (1.6 m)   Wt 86 kg (189 lb 9.5 oz)   SpO2 97%   BMI 33.59 kg/m²   Physical Exam  Constitutional:       Appearance: She is well-developed.   HENT:      Head: Normocephalic and atraumatic.      Right Ear: Tympanic membrane, ear canal and external ear normal.      Left Ear: Hearing, tympanic membrane and external ear normal.      Nose: Nose normal. No septal deviation or rhinorrhea.      Right Sinus: No maxillary sinus tenderness or frontal sinus tenderness.      Left Sinus: No maxillary sinus tenderness or frontal sinus tenderness.      Mouth/Throat:      Pharynx: No oropharyngeal exudate or posterior oropharyngeal erythema.      Tonsils: No " tonsillar abscesses.   Eyes:      Conjunctiva/sclera: Conjunctivae normal.      Pupils: Pupils are equal, round, and reactive to light.   Cardiovascular:      Rate and Rhythm: Normal rate and regular rhythm.      Heart sounds: Normal heart sounds.   Pulmonary:      Effort: Pulmonary effort is normal.      Breath sounds: Normal breath sounds. No wheezing or rales.   Musculoskeletal:      Cervical back: Normal range of motion and neck supple.   Lymphadenopathy:      Cervical: No cervical adenopathy.         Assessment:       1. Chronic frontal sinusitis    2. Primary hypertension    3. Abnormal glucose    4. Vitamin D deficiency        Plan:       Chronic frontal sinusitis  -     amoxicillin-clavulanate 875-125mg (AUGMENTIN) 875-125 mg per tablet; Take 1 tablet by mouth 2 (two) times daily. for 10 days  Dispense: 20 tablet; Refill: 0  -     fluticasone propionate (FLONASE) 50 mcg/actuation nasal spray; 1 spray (50 mcg total) by Each Nostril route 2 (two) times a day.  Dispense: 16 g; Refill: 2    Primary hypertension  -     Comprehensive Metabolic Panel; Future; Expected date: 05/19/2024  -     Lipid Panel; Future; Expected date: 05/19/2024    Abnormal glucose  -     Hemoglobin A1C; Future; Expected date: 05/19/2024    Vitamin D deficiency  -     Vitamin D; Future; Expected date: 05/19/2024        Increase flonase to BID  Patient advised to take Mucinex BID, pursue nasal saline irrigation, increase oral fluids, and try warm steam inhalation.   BP controlled; continue present BP med  F/u 4 months with labs for physical

## 2024-04-04 ENCOUNTER — CLINICAL SUPPORT (OUTPATIENT)
Dept: UROLOGY | Facility: CLINIC | Age: 79
End: 2024-04-04
Payer: MEDICARE

## 2024-04-04 DIAGNOSIS — N39.0 RECURRENT UTI: Primary | ICD-10-CM

## 2024-04-04 LAB
BILIRUBIN, UA POC OHS: NEGATIVE
BLOOD, UA POC OHS: NEGATIVE
CLARITY, UA POC OHS: CLEAR
COLOR, UA POC OHS: YELLOW
GLUCOSE, UA POC OHS: NEGATIVE
KETONES, UA POC OHS: NEGATIVE
LEUKOCYTES, UA POC OHS: ABNORMAL
NITRITE, UA POC OHS: NEGATIVE
PH, UA POC OHS: 6.5
PROTEIN, UA POC OHS: NEGATIVE
SPECIFIC GRAVITY, UA POC OHS: 1.01
UROBILINOGEN, UA POC OHS: 0.2

## 2024-04-04 PROCEDURE — 51701 INSERT BLADDER CATHETER: CPT | Mod: HCNC,S$GLB,,

## 2024-04-04 PROCEDURE — 87088 URINE BACTERIA CULTURE: CPT | Mod: HCNC

## 2024-04-04 PROCEDURE — 87186 SC STD MICRODIL/AGAR DIL: CPT | Mod: HCNC

## 2024-04-04 PROCEDURE — 87086 URINE CULTURE/COLONY COUNT: CPT | Mod: HCNC

## 2024-04-04 PROCEDURE — 99999 PR PBB SHADOW E&M-EST. PATIENT-LVL II: CPT | Mod: PBBFAC,HCNC,,

## 2024-04-04 PROCEDURE — 87077 CULTURE AEROBIC IDENTIFY: CPT | Mod: HCNC

## 2024-04-04 PROCEDURE — 81003 URINALYSIS AUTO W/O SCOPE: CPT | Mod: QW,HCNC,S$GLB,

## 2024-04-04 NOTE — PROGRESS NOTES
Patient to clinic for cath urine. Patient catheterized using sterile technique, approximately 90 mL clear yellow urine drained from patient bladder. Patient tolerated well.

## 2024-04-07 ENCOUNTER — PATIENT MESSAGE (OUTPATIENT)
Dept: UROLOGY | Facility: CLINIC | Age: 79
End: 2024-04-07
Payer: MEDICARE

## 2024-04-07 DIAGNOSIS — N30.00 ACUTE CYSTITIS WITHOUT HEMATURIA: Primary | ICD-10-CM

## 2024-04-07 LAB — BACTERIA UR CULT: ABNORMAL

## 2024-04-07 RX ORDER — CIPROFLOXACIN 500 MG/1
500 TABLET ORAL 2 TIMES DAILY
Qty: 10 TABLET | Refills: 0 | Status: SHIPPED | OUTPATIENT
Start: 2024-04-07 | End: 2024-04-12

## 2024-04-08 ENCOUNTER — TELEPHONE (OUTPATIENT)
Dept: UROLOGY | Facility: CLINIC | Age: 79
End: 2024-04-08
Payer: MEDICARE

## 2024-04-08 NOTE — TELEPHONE ENCOUNTER
----- Message from Ana Dunaway NP sent at 4/7/2024  2:08 PM CDT -----  Low count bacteria seen in culture, abx prescribed

## 2024-04-11 ENCOUNTER — PATIENT MESSAGE (OUTPATIENT)
Dept: PULMONOLOGY | Facility: CLINIC | Age: 79
End: 2024-04-11
Payer: MEDICARE

## 2024-04-12 DIAGNOSIS — R05.3 CHRONIC COUGH: ICD-10-CM

## 2024-04-12 DIAGNOSIS — J45.40 MODERATE PERSISTENT ASTHMA WITHOUT COMPLICATION: Primary | ICD-10-CM

## 2024-04-12 RX ORDER — FLUTICASONE FUROATE AND VILANTEROL TRIFENATATE 100; 25 UG/1; UG/1
1 POWDER RESPIRATORY (INHALATION) DAILY
Qty: 60 EACH | Refills: 11 | Status: SHIPPED | OUTPATIENT
Start: 2024-04-12

## 2024-04-12 RX ORDER — ALBUTEROL SULFATE 90 UG/1
2 AEROSOL, METERED RESPIRATORY (INHALATION) EVERY 6 HOURS PRN
Qty: 18 G | Refills: 11 | Status: SHIPPED | OUTPATIENT
Start: 2024-04-12

## 2024-04-27 ENCOUNTER — PATIENT MESSAGE (OUTPATIENT)
Dept: URGENT CARE | Facility: CLINIC | Age: 79
End: 2024-04-27
Payer: MEDICARE

## 2024-04-29 ENCOUNTER — OFFICE VISIT (OUTPATIENT)
Dept: UROLOGY | Facility: CLINIC | Age: 79
End: 2024-04-29
Payer: MEDICARE

## 2024-04-29 VITALS — HEIGHT: 63 IN | WEIGHT: 185.88 LBS | BODY MASS INDEX: 32.93 KG/M2

## 2024-04-29 DIAGNOSIS — N39.0 RECURRENT UTI: Primary | ICD-10-CM

## 2024-04-29 DIAGNOSIS — R39.9 UTI SYMPTOMS: ICD-10-CM

## 2024-04-29 LAB
BACTERIA #/AREA URNS HPF: NORMAL /HPF
MICROSCOPIC COMMENT: NORMAL
SQUAMOUS #/AREA URNS HPF: 1 /HPF

## 2024-04-29 PROCEDURE — 1101F PT FALLS ASSESS-DOCD LE1/YR: CPT | Mod: HCNC,CPTII,S$GLB,

## 2024-04-29 PROCEDURE — 1126F AMNT PAIN NOTED NONE PRSNT: CPT | Mod: HCNC,CPTII,S$GLB,

## 2024-04-29 PROCEDURE — 99213 OFFICE O/P EST LOW 20 MIN: CPT | Mod: HCNC,S$GLB,,

## 2024-04-29 PROCEDURE — 87086 URINE CULTURE/COLONY COUNT: CPT | Mod: HCNC

## 2024-04-29 PROCEDURE — 1160F RVW MEDS BY RX/DR IN RCRD: CPT | Mod: HCNC,CPTII,S$GLB,

## 2024-04-29 PROCEDURE — 1159F MED LIST DOCD IN RCRD: CPT | Mod: HCNC,CPTII,S$GLB,

## 2024-04-29 PROCEDURE — 99999 PR PBB SHADOW E&M-EST. PATIENT-LVL III: CPT | Mod: PBBFAC,HCNC,,

## 2024-04-29 PROCEDURE — 3288F FALL RISK ASSESSMENT DOCD: CPT | Mod: HCNC,CPTII,S$GLB,

## 2024-04-29 PROCEDURE — 81000 URINALYSIS NONAUTO W/SCOPE: CPT | Mod: HCNC,PO

## 2024-04-29 RX ORDER — CEPHALEXIN 500 MG/1
500 CAPSULE ORAL EVERY 8 HOURS
Qty: 21 CAPSULE | Refills: 0 | Status: SHIPPED | OUTPATIENT
Start: 2024-04-29 | End: 2024-05-06

## 2024-04-29 RX ORDER — SULFACETAMIDE SODIUM, SULFUR 100; 50 MG/G; MG/G
EMULSION TOPICAL
COMMUNITY
Start: 2024-03-05

## 2024-04-29 NOTE — PROGRESS NOTES
[facility] Note  Staff: Ana Dunaway FNP-C    PCP: MD Deepali    Chief Complaint: UTI Follow Up    Subjective:        HPI: Yuliana Mattson is a 78 y.o. female presents today for UTI follow up. She came in the office for a nurse visit on 4/4/2024 and cath urine was positive for low count of klebsiella. She was treated with a 5 day course of cipro. She states she still feels some urgency and dysuria. She denies hematuria, fever, flank pain, and difficulty urinating.     We have previously discussed the use of estrogen vaginal cream as a preventative but she does not wish to pursue. She does have oxybutynin at home and we discussed using this as needed for urgency, frequency, and bladder spasms.     Questions asked pt during office visit today:  Urgency:Yes , incontinence with urgency? Yes ;   DysuriaYes   Gross HematuriaNo  History of UTI: Yes     History of Kidney Stones?:  No    Constipation issues?:  No    REVIEW OF SYSTEMS:  Review of Systems   Constitutional: Negative.  Negative for chills and fever.   HENT: Negative.     Eyes: Negative.    Respiratory: Negative.     Cardiovascular: Negative.    Gastrointestinal: Negative.  Negative for abdominal pain, constipation, diarrhea, nausea and vomiting.   Genitourinary:  Positive for dysuria and urgency. Negative for flank pain, frequency and hematuria.   Musculoskeletal: Negative.  Negative for back pain.   Skin: Negative.    Neurological: Negative.    Endo/Heme/Allergies: Negative.    Psychiatric/Behavioral: Negative.         PMHx:  Past Medical History:   Diagnosis Date    Allergic rhinitis     Anticoagulant long-term use     ASPIRIN ONLY - (stops it when she presents a hemorrhagic cystitis)    Bladder cancer     Blood transfusion     Breast cancer     CAD (coronary artery disease), native coronary artery     40% non obstructive    Cholelithiasis     Endometriosis     Headache(784.0)     HEARING LOSS     Hemorrhoids     HLD  (hyperlipidemia)     Hypertension     Iritis     Left wrist fracture 2009    traumatic    Malignant neoplasm of other specified sites of bladder 12/3/2009    Osteoporosis, postmenopausal     PONV (postoperative nausea and vomiting)     Rash     Rosacea     UTI (urinary tract infection)     Vaginal delivery     x 2       PSHx:  Past Surgical History:   Procedure Laterality Date    ADENOIDECTOMY      APPENDECTOMY      BLADDER TUMOR EXCISION  1979    [facility], dr garcia - no recurrences since    BREAST BIOPSY Right     4 core bx negative    BREAST SURGERY Left 1998    ESOPHAGOGASTRODUODENOSCOPY N/A 04/21/2022    Procedure: ESOPHAGOGASTRODUODENOSCOPY (EGD);  Surgeon: Guru Maddox MD;  Location: [facility];  Service: Endoscopy;  Laterality: N/A;    EYE SURGERY  2019    HYSTERECTOMY      MASTECTOMY, PARTIAL  1995    left side - cancer - had radiotherapy 1995, 1998 had chemotherapy    oophrect      pelvic mass      benign    TONSILLECTOMY      TONSILLECTOMY, ADENOIDECTOMY, BILATERAL MYRINGOTOMY AND TUBES      tram flap Left 1998       Fam Hx:   malignancies: No , gyn malignancies: No   kidney stones: No     Soc Hx:  , lives in [place]    Allergies:  Prochlorperazine edisylate    Medications: reviewed     Objective:   There were no vitals filed for this visit.    Physical Exam  Constitutional:       Appearance: Normal appearance.   HENT:      Head: Normocephalic.      Mouth/Throat:      Mouth: Mucous membranes are moist.   Eyes:      Conjunctiva/sclera: Conjunctivae normal.   Abdominal:      General: There is no distension.      Palpations: Abdomen is soft.      Tenderness: There is no abdominal tenderness.   Musculoskeletal:         General: Normal range of motion.      Cervical back: Normal range of motion.   Skin:     General: Skin is warm.   Neurological:      Mental Status: She is alert and oriented to person, place, and time.   Psychiatric:         Mood and Affect: Mood normal.          Behavior: Behavior normal.         LABS REVIEW:  UA today:  cath urine  Color:Clear, Yellow  Spec. Grav.  1.010  PH  6.5  Negative for leukocytes, nitrates, protein, glucose, ketones, urobili, bili, and blood.    Assessment:       1. Recurrent UTI    2. UTI symptoms          Plan:      Cath urine sent for micro UA and urine culture  Recommend oxybutynin as needed  Keflex 500mg TID x 7 days prescribed empirically- pt advised to wait until culture results    F/u as needed per treatment plan    [facility]: Active    SHRADDHA Tomlin

## 2024-05-01 LAB — BACTERIA UR CULT: NO GROWTH

## 2024-05-06 ENCOUNTER — OFFICE VISIT (OUTPATIENT)
Dept: RHEUMATOLOGY | Facility: CLINIC | Age: 79
End: 2024-05-06
Payer: MEDICARE

## 2024-05-06 VITALS
BODY MASS INDEX: 33.13 KG/M2 | DIASTOLIC BLOOD PRESSURE: 79 MMHG | HEIGHT: 63 IN | SYSTOLIC BLOOD PRESSURE: 167 MMHG | WEIGHT: 187 LBS | HEART RATE: 76 BPM

## 2024-05-06 DIAGNOSIS — T50.905S MEDICATION REACTION, SEQUELA: ICD-10-CM

## 2024-05-06 DIAGNOSIS — R79.82 ELEVATED C-REACTIVE PROTEIN (CRP): ICD-10-CM

## 2024-05-06 DIAGNOSIS — L40.50 PSA (PSORIATIC ARTHRITIS): Primary | ICD-10-CM

## 2024-05-06 PROCEDURE — 99213 OFFICE O/P EST LOW 20 MIN: CPT | Mod: HCNC,S$GLB,, | Performed by: PHYSICIAN ASSISTANT

## 2024-05-06 PROCEDURE — 99999 PR PBB SHADOW E&M-EST. PATIENT-LVL III: CPT | Mod: PBBFAC,HCNC,, | Performed by: PHYSICIAN ASSISTANT

## 2024-05-06 PROCEDURE — 3077F SYST BP >= 140 MM HG: CPT | Mod: HCNC,CPTII,S$GLB, | Performed by: PHYSICIAN ASSISTANT

## 2024-05-06 PROCEDURE — 1126F AMNT PAIN NOTED NONE PRSNT: CPT | Mod: HCNC,CPTII,S$GLB, | Performed by: PHYSICIAN ASSISTANT

## 2024-05-06 PROCEDURE — 3078F DIAST BP <80 MM HG: CPT | Mod: HCNC,CPTII,S$GLB, | Performed by: PHYSICIAN ASSISTANT

## 2024-05-06 PROCEDURE — 1159F MED LIST DOCD IN RCRD: CPT | Mod: HCNC,CPTII,S$GLB, | Performed by: PHYSICIAN ASSISTANT

## 2024-05-06 ASSESSMENT — ROUTINE ASSESSMENT OF PATIENT INDEX DATA (RAPID3)
MDHAQ FUNCTION SCORE: 0.4
FATIGUE SCORE: 2.2
PATIENT GLOBAL ASSESSMENT SCORE: 1.5
PAIN SCORE: 2
TOTAL RAPID3 SCORE: 1.61
PSYCHOLOGICAL DISTRESS SCORE: 2.2

## 2024-05-06 NOTE — Clinical Note
Labs 1 week prior to next visit Please find out if zepbound PA was completed or where I can find that information ?

## 2024-05-06 NOTE — PROGRESS NOTES
Subjective:       Patient ID: Yuliana Mattson is a 78 y.o. female.    Chief Complaint: Disease Management    Mrs. Mattson is a 78 year old female who presents to clinic for follow up on psoriatic arthritis and sicca syndrome. She is a new patient to me. She started otezla in January due to arthritis flare and her sx improved significantly within 6-7 weeks and she continued otezla until April 1st. She had recurrence of severe cough while on otezla, which is why she stopped this medication in the past. She is off treatment at this time, but overall her pain is minimal. She has swelling and instability of the R ankle and mild pain. She is taking tylenol 1 in the AM and 2 in the PM with some relief.     She has lost a few lbs since her last visit with dietary changes, but has difficulty staying on 1200 calorie diet. Zepbound was not covered by insurance and not sure if PA was done?    Current treatment:  1. Otezla (OFF)      Review of Systems   Constitutional:  Negative for activity change, appetite change, chills, fatigue and fever.   Eyes:  Negative for visual disturbance.   Respiratory:  Negative for cough and shortness of breath.    Cardiovascular:  Negative for chest pain, palpitations and leg swelling.   Gastrointestinal:  Negative for abdominal pain, constipation, diarrhea, nausea and vomiting.   Musculoskeletal:  Positive for arthralgias and joint swelling.   Neurological:  Negative for dizziness, weakness, light-headedness and headaches.         Objective:     Vitals:    05/06/24 1406   BP: (!) 167/79   Pulse: 76       Past Medical History:   Diagnosis Date    Allergic rhinitis     Anticoagulant long-term use     ASPIRIN ONLY - (stops it when she presents a hemorrhagic cystitis)    Bladder cancer     Blood transfusion     Breast cancer     CAD (coronary artery disease), native coronary artery     40% non obstructive    Cholelithiasis     Endometriosis     Headache(784.0)     HEARING LOSS     Hemorrhoids      HLD (hyperlipidemia)     Hypertension     Iritis     Left wrist fracture 2009    traumatic    Malignant neoplasm of other specified sites of bladder 12/3/2009    Osteoporosis, postmenopausal     PONV (postoperative nausea and vomiting)     Rash     Rosacea     UTI (urinary tract infection)     Vaginal delivery     x 2     Past Surgical History:   Procedure Laterality Date    ADENOIDECTOMY      APPENDECTOMY      BLADDER TUMOR EXCISION  1979    [facility] [place], dr garcia - no recurrences since    BREAST BIOPSY Right     4 core bx negative    BREAST SURGERY Left 1998    ESOPHAGOGASTRODUODENOSCOPY N/A 04/21/2022    Procedure: ESOPHAGOGASTRODUODENOSCOPY (EGD);  Surgeon: Guru Maddox MD;  Location: [facility];  Service: Endoscopy;  Laterality: N/A;    EYE SURGERY  2019    HYSTERECTOMY      MASTECTOMY, PARTIAL  1995    left side - cancer - had radiotherapy 1995, 1998 had chemotherapy    oophrect      pelvic mass      benign    TONSILLECTOMY      TONSILLECTOMY, ADENOIDECTOMY, BILATERAL MYRINGOTOMY AND TUBES      tram flap Left 1998          Physical Exam   Constitutional: She is oriented to person, place, and time.   Eyes: Right conjunctiva is not injected. Left conjunctiva is not injected.   Neck: No JVD present. No thyromegaly present.   Cardiovascular: Normal rate.   Pulmonary/Chest: Effort normal.   Lymphadenopathy:     She has no cervical adenopathy.   Neurological: She is alert and oriented to person, place, and time. Gait normal.   Skin: No rash noted.   Psychiatric: Mood and affect normal.       Right Side Rheumatological Exam     Foot Exam     Range of Motion   Ankle Joint   Dorsiflexion:  normal   Plantar flexion:  normal   Ankle Swelling: positive    Left Side Rheumatological Exam     Foot Exam   Ankle Swelling: positive            Component      Latest Ref Rng 12/28/2023   WBC      3.90 - 12.70 K/uL 9.05    RBC      4.00 - 5.40 M/uL 4.59    Hemoglobin      12.0 - 16.0 g/dL 13.6    Hematocrit      37.0 -  48.5 % 38.7    MCV      82 - 98 fL 84    MCH      27.0 - 31.0 pg 29.6    MCHC      32.0 - 36.0 g/dL 35.1    RDW      11.5 - 14.5 % 13.6    Platelet Count      150 - 450 K/uL 285    MPV      9.2 - 12.9 fL 9.8    Immature Granulocytes      0.0 - 0.5 % 0.7 (H)    Gran # (ANC)      1.8 - 7.7 K/uL 6.5    Immature Grans (Abs)      0.00 - 0.04 K/uL 0.06 (H)    Lymph #      1.0 - 4.8 K/uL 1.5    Mono #      0.3 - 1.0 K/uL 0.8    Eos #      0.0 - 0.5 K/uL 0.1    Baso #      0.00 - 0.20 K/uL 0.09    nRBC      0 /100 WBC 0    Gran %      38.0 - 73.0 % 71.9    Lymph %      18.0 - 48.0 % 16.4 (L)    Mono %      4.0 - 15.0 % 8.5    Eos %      0.0 - 8.0 % 1.5    Basophil %      0.0 - 1.9 % 1.0    Differential Method Automated    Sodium      136 - 145 mmol/L 139    Potassium      3.5 - 5.1 mmol/L 3.6    Chloride      95 - 110 mmol/L 102    CO2      23 - 29 mmol/L 26    Glucose      70 - 110 mg/dL 128 (H)    BUN      8 - 23 mg/dL 18    Creatinine      0.5 - 1.4 mg/dL 0.9    Calcium      8.7 - 10.5 mg/dL 9.5    PROTEIN TOTAL      6.0 - 8.4 g/dL 7.0    Albumin      3.5 - 5.2 g/dL 3.7    BILIRUBIN TOTAL      0.1 - 1.0 mg/dL 0.3    ALP      55 - 135 U/L 138 (H)    AST      10 - 40 U/L 21    ALT      10 - 44 U/L 24    eGFR      >60 mL/min/1.73 m^2 >60.0    Anion Gap      8 - 16 mmol/L 11    Sed Rate      0 - 36 mm/Hr 26    CRP      0.0 - 8.2 mg/L 10.8 (H)         Assessment:       1. PSA (psoriatic arthritis)    2. Medication reaction, sequela    3. Elevated C-reactive protein (CRP)            Plan:       PSA (psoriatic arthritis)  -     CBC Auto Differential; Future; Expected date: 05/06/2024  -     Comprehensive Metabolic Panel; Future; Expected date: 05/06/2024  -     C-Reactive Protein; Future; Expected date: 05/06/2024  -     Sedimentation rate; Future; Expected date: 05/06/2024    Medication reaction, sequela    Elevated C-reactive protein (CRP)        78 year old female with  Psoriatic arthritis, sicca syndrome, elevated CRP  --hx  of bladder cancer  --hx of breast cancer  --osteoporosis    Plan:  May consider trial of otezla 30 mg daily instead of BID  X-ray foot/ankle discussed. She wants to try home exercises and discuss with PCP  Follow up on zepbound PA      Follow up:  4 mo Dr. Hughes with labs prior

## 2024-05-20 ENCOUNTER — LAB VISIT (OUTPATIENT)
Dept: LAB | Facility: HOSPITAL | Age: 79
End: 2024-05-20
Attending: FAMILY MEDICINE
Payer: MEDICARE

## 2024-05-20 DIAGNOSIS — I10 PRIMARY HYPERTENSION: ICD-10-CM

## 2024-05-20 DIAGNOSIS — E55.9 VITAMIN D DEFICIENCY: ICD-10-CM

## 2024-05-20 DIAGNOSIS — R73.09 ABNORMAL GLUCOSE: ICD-10-CM

## 2024-05-20 LAB
25(OH)D3+25(OH)D2 SERPL-MCNC: 47 NG/ML (ref 30–96)
ALBUMIN SERPL BCP-MCNC: 3.7 G/DL (ref 3.5–5.2)
ALP SERPL-CCNC: 151 U/L (ref 55–135)
ALT SERPL W/O P-5'-P-CCNC: 31 U/L (ref 10–44)
ANION GAP SERPL CALC-SCNC: 7 MMOL/L (ref 8–16)
AST SERPL-CCNC: 23 U/L (ref 10–40)
BILIRUB SERPL-MCNC: 0.3 MG/DL (ref 0.1–1)
BUN SERPL-MCNC: 15 MG/DL (ref 8–23)
CALCIUM SERPL-MCNC: 9.4 MG/DL (ref 8.7–10.5)
CHLORIDE SERPL-SCNC: 103 MMOL/L (ref 95–110)
CHOLEST SERPL-MCNC: 202 MG/DL (ref 120–199)
CHOLEST/HDLC SERPL: 3.6 {RATIO} (ref 2–5)
CO2 SERPL-SCNC: 26 MMOL/L (ref 23–29)
CREAT SERPL-MCNC: 0.9 MG/DL (ref 0.5–1.4)
EST. GFR  (NO RACE VARIABLE): >60 ML/MIN/1.73 M^2
ESTIMATED AVG GLUCOSE: 131 MG/DL (ref 68–131)
GLUCOSE SERPL-MCNC: 131 MG/DL (ref 70–110)
HBA1C MFR BLD: 6.2 % (ref 4–5.6)
HDLC SERPL-MCNC: 56 MG/DL (ref 40–75)
HDLC SERPL: 27.7 % (ref 20–50)
LDLC SERPL CALC-MCNC: 124.2 MG/DL (ref 63–159)
NONHDLC SERPL-MCNC: 146 MG/DL
POTASSIUM SERPL-SCNC: 4.1 MMOL/L (ref 3.5–5.1)
PROT SERPL-MCNC: 7 G/DL (ref 6–8.4)
SODIUM SERPL-SCNC: 136 MMOL/L (ref 136–145)
TRIGL SERPL-MCNC: 109 MG/DL (ref 30–150)

## 2024-05-20 PROCEDURE — 83036 HEMOGLOBIN GLYCOSYLATED A1C: CPT | Mod: HCNC | Performed by: FAMILY MEDICINE

## 2024-05-20 PROCEDURE — 80061 LIPID PANEL: CPT | Mod: HCNC | Performed by: FAMILY MEDICINE

## 2024-05-20 PROCEDURE — 82306 VITAMIN D 25 HYDROXY: CPT | Mod: HCNC | Performed by: FAMILY MEDICINE

## 2024-05-20 PROCEDURE — 80053 COMPREHEN METABOLIC PANEL: CPT | Mod: HCNC | Performed by: FAMILY MEDICINE

## 2024-05-20 PROCEDURE — 36415 COLL VENOUS BLD VENIPUNCTURE: CPT | Mod: HCNC,PO | Performed by: FAMILY MEDICINE

## 2024-05-27 ENCOUNTER — OFFICE VISIT (OUTPATIENT)
Dept: FAMILY MEDICINE | Facility: CLINIC | Age: 79
End: 2024-05-27
Payer: MEDICARE

## 2024-05-27 VITALS
DIASTOLIC BLOOD PRESSURE: 76 MMHG | SYSTOLIC BLOOD PRESSURE: 154 MMHG | OXYGEN SATURATION: 96 % | WEIGHT: 185.44 LBS | HEART RATE: 73 BPM | HEIGHT: 63 IN | BODY MASS INDEX: 32.86 KG/M2 | RESPIRATION RATE: 18 BRPM

## 2024-05-27 DIAGNOSIS — C80.1 MALIGNANT (PRIMARY) NEOPLASM, UNSPECIFIED: ICD-10-CM

## 2024-05-27 DIAGNOSIS — I70.0 AORTIC CALCIFICATION: ICD-10-CM

## 2024-05-27 DIAGNOSIS — J45.40 MODERATE PERSISTENT ASTHMA WITHOUT COMPLICATION: ICD-10-CM

## 2024-05-27 DIAGNOSIS — I10 PRIMARY HYPERTENSION: ICD-10-CM

## 2024-05-27 DIAGNOSIS — K21.9 GASTROESOPHAGEAL REFLUX DISEASE, UNSPECIFIED WHETHER ESOPHAGITIS PRESENT: ICD-10-CM

## 2024-05-27 DIAGNOSIS — Z00.00 PREVENTATIVE HEALTH CARE: Primary | ICD-10-CM

## 2024-05-27 PROCEDURE — 3078F DIAST BP <80 MM HG: CPT | Mod: HCNC,CPTII,S$GLB, | Performed by: FAMILY MEDICINE

## 2024-05-27 PROCEDURE — 3077F SYST BP >= 140 MM HG: CPT | Mod: HCNC,CPTII,S$GLB, | Performed by: FAMILY MEDICINE

## 2024-05-27 PROCEDURE — 3288F FALL RISK ASSESSMENT DOCD: CPT | Mod: HCNC,CPTII,S$GLB, | Performed by: FAMILY MEDICINE

## 2024-05-27 PROCEDURE — 1101F PT FALLS ASSESS-DOCD LE1/YR: CPT | Mod: HCNC,CPTII,S$GLB, | Performed by: FAMILY MEDICINE

## 2024-05-27 PROCEDURE — 1126F AMNT PAIN NOTED NONE PRSNT: CPT | Mod: HCNC,CPTII,S$GLB, | Performed by: FAMILY MEDICINE

## 2024-05-27 PROCEDURE — 1160F RVW MEDS BY RX/DR IN RCRD: CPT | Mod: HCNC,CPTII,S$GLB, | Performed by: FAMILY MEDICINE

## 2024-05-27 PROCEDURE — 99397 PER PM REEVAL EST PAT 65+ YR: CPT | Mod: HCNC,S$GLB,, | Performed by: FAMILY MEDICINE

## 2024-05-27 PROCEDURE — 99999 PR PBB SHADOW E&M-EST. PATIENT-LVL III: CPT | Mod: PBBFAC,HCNC,, | Performed by: FAMILY MEDICINE

## 2024-05-27 PROCEDURE — 1159F MED LIST DOCD IN RCRD: CPT | Mod: HCNC,CPTII,S$GLB, | Performed by: FAMILY MEDICINE

## 2024-05-27 RX ORDER — MONTELUKAST SODIUM 10 MG/1
10 TABLET ORAL NIGHTLY
Qty: 90 TABLET | Refills: 3 | Status: SHIPPED | OUTPATIENT
Start: 2024-05-27 | End: 2024-06-26

## 2024-05-27 RX ORDER — LOSARTAN POTASSIUM 100 MG/1
100 TABLET ORAL DAILY
Qty: 90 TABLET | Refills: 3 | Status: SHIPPED | OUTPATIENT
Start: 2024-05-27 | End: 2025-05-27

## 2024-05-27 RX ORDER — PANTOPRAZOLE SODIUM 40 MG/1
40 TABLET, DELAYED RELEASE ORAL DAILY
Qty: 90 TABLET | Refills: 3 | Status: SHIPPED | OUTPATIENT
Start: 2024-05-27 | End: 2025-05-27

## 2024-05-27 RX ORDER — LATANOPROST 50 UG/ML
1 SOLUTION/ DROPS OPHTHALMIC NIGHTLY
COMMUNITY
Start: 2024-05-23

## 2024-05-27 NOTE — PROGRESS NOTES
Chief Complaint   Patient presents with    Preventative Health Care     Physical with labs prior     HISTORY OF PRESENT ILLNESS:     HTN - tolerating Lisinopril HCT  HLD - following a low-fat diet  Allergic rhinitis - Tolerating flonase as needed during fall season; taking singulair and Xyzal  Osteoporosis - BMD 12/10/12 showed Osteoporosis -- She took Prolia and feels like this caused a number of symptoms (aching in back, generalized itching, dizziness episodes).  Managing calcium intake with diet. Taking Vitmain D3 5,000 IU daily. She had taken Boniva and fosamax in the past that caused some upper GI symptoms.  Breast cancer, bladder cancer - following with Dr. Corrla annually in January; with oncology with labs, mammogram and CXR  Vitamin d deficiency - taking vitamin D  Psoraisis - following with rheumatology; getting intermittent flares of myalgias, arthralgias  coclear menieres - stable on present BP medication      Doing some regular exercise        Past Medical History:   Diagnosis Date    Allergic rhinitis     Anticoagulant long-term use     ASPIRIN ONLY - (stops it when she presents a hemorrhagic cystitis)    Bladder cancer     Blood transfusion     Breast cancer     CAD (coronary artery disease), native coronary artery     40% non obstructive    Cholelithiasis     Endometriosis     Headache(784.0)     HEARING LOSS     Hemorrhoids     HLD (hyperlipidemia)     Hypertension     Iritis     Left wrist fracture 2009    traumatic    Malignant neoplasm of other specified sites of bladder 12/3/2009    Osteoporosis, postmenopausal     PONV (postoperative nausea and vomiting)     Rash     Rosacea     UTI (urinary tract infection)     Vaginal delivery     x 2       Past Surgical History:   Procedure Laterality Date    ADENOIDECTOMY      APPENDECTOMY      [facility]    BREAST BIOPSY Right     4 core bx negative    BREAST SURGERY Left 1998     ESOPHAGOGASTRODUODENOSCOPY N/A 04/21/2022    Procedure: ESOPHAGOGASTRODUODENOSCOPY (EGD);  Surgeon: Guru Maddox MD;  Location: [facility];  Service: Endoscopy;  Laterality: N/A;    EYE SURGERY  2019    HYSTERECTOMY      MASTECTOMY, PARTIAL  1995    left side - cancer - had radiotherapy 1995, 1998 had chemotherapy    oophrect      pelvic mass      benign    TONSILLECTOMY      TONSILLECTOMY, ADENOIDECTOMY, BILATERAL MYRINGOTOMY AND TUBES      tram flap Left 1998       Review of patient's allergies indicates:   Allergen Reactions    Compazine [prochlorperazine edisylate] Anaphylaxis       Social History     Socioeconomic History    Marital status:    Occupational History    Occupation: retired accounting   Tobacco Use    Smoking status: Never     Passive exposure: Past    Smokeless tobacco: Never   Substance and Sexual Activity    Alcohol use: No    Drug use: No     Social Determinants of Health     Financial Resource Strain: Low Risk  (8/31/2023)    Overall Financial Resource Strain (CARDIA)     Difficulty of Paying Living Expenses: Not hard at all   Food Insecurity: No Food Insecurity (2/13/2024)    Hunger Vital Sign     Worried About Running Out of Food in the Last Year: Never true     Ran Out of Food in the Last Year: Never true   Transportation Needs: No Transportation Needs (2/13/2024)    PRAPARE - Transportation     Lack of Transportation (Medical): No     Lack of Transportation (Non-Medical): No   Physical Activity: Inactive (2/13/2024)    Exercise Vital Sign     Days of Exercise per Week: 0 days     Minutes of Exercise per Session: 0 min   Stress: Stress Concern Present (2/13/2024)    [employer] - Occupational Stress Questionnaire     Feeling of Stress : To some extent   Housing Stability: Low Risk  (2/13/2024)    Housing Stability Vital Sign     Unable to Pay for Housing in the Last Year: No     Number of Places Lived in the Last Year: 1     Unstable Housing in the  Last Year: No       Current Outpatient Medications on File Prior to Visit   Medication Sig Dispense Refill    albuterol (PROVENTIL/VENTOLIN HFA) 90 mcg/actuation inhaler Inhale 2 puffs into the lungs every 6 (six) hours as needed for Wheezing or Shortness of Breath. Rescue 18 g 11    BREO ELLIPTA 100-25 mcg/dose diskus inhaler Inhale 1 puff into the lungs once daily. 60 each 11    fluocinolone acetonide oiL (DERMOTIC OIL) 0.01 % Drop Place 3 drops in ear(s) 2 (two) times daily as needed (itchy ears). 20 mL 2    fluticasone propionate (FLONASE) 50 mcg/actuation nasal spray 1 spray (50 mcg total) by Each Nostril route 2 (two) times a day. 16 g 2    latanoprost 0.005 % ophthalmic solution Place 1 drop into both eyes every evening.      sulfacetamide sodium-sulfur 10-5 % (w/w) Clsr Apply topically.      tretinoin (RETIN-A) 0.025 % cream Apply a pea-sized amount to entire face at bedtime, start every other night and increase as tolerated. Take night off if irritation develops. 45 g 3    vitamin D (VITAMIN D3) 1000 units Tab Take 5,000 Units by mouth once daily. 125mg/5000IU      cyanocobalamin 1,000 mcg/mL injection  (Patient not taking: Reported on 5/27/2024)      FLUAD QUAD 2023-24,65Y UP,,PF, 60 mcg (15 mcg x 4)/0.5 mL Syrg  (Patient not taking: Reported on 5/27/2024)      oxybutynin (DITROPAN-XL) 5 MG TR24 Take 1 tablet (5 mg total) by mouth once daily. (Patient not taking: Reported on 5/27/2024) 30 tablet 3     No current facility-administered medications on file prior to visit.       Family History   Problem Relation Name Age of Onset    Glaucoma Father      Glaucoma Paternal Aunt      Glaucoma Paternal Grandmother      Ovarian cancer Cousin      Cancer Cousin          1st, ovarian    Amblyopia Neg Hx      Blindness Neg Hx      Cataracts Neg Hx      Hypertension Neg Hx      Macular degeneration Neg Hx      Retinal detachment Neg Hx      Strabismus Neg Hx      Stroke Neg Hx      Thyroid disease Neg Hx      Melanoma  "Neg Hx         REVIEW OF SYSTEMS:   GENERAL: No fever, chills, or weight changes.  EARS: Denies ear pain, discharge, tinnitus or vertigo. Denies hearing loss.   MOUTH & THROAT: No hoarseness, change in voice, swallowing difficulty. No excessive gum bleeding.   CARDIOVASCULAR: Denies dyspnea, orthopnea, or palpitations.  GI/ABDOMEN: Appetite fine. No weight loss. Denies nausea, vomiting, constipation, abdominal pain, hematemesis or blood in stool.  URINARY: No dysuria,hematuria, nocturia, incontinence, flank pain, urgency, or urinary difficulty    PHYSICAL EXAM:   BP (!) 154/76   Pulse 73   Resp 18   Ht 5' 3" (1.6 m)   Wt 84.1 kg (185 lb 6.5 oz)   SpO2 96%   BMI 32.84 kg/m²   GENERAL: This is a healthy-appearing white female, sitting   upright, in no apparent distress. Alert and oriented x4.   TMs clear  Oropharynx clear  NECK: Supple. There is no lymphadenopathy, thyromegaly or JVD.  CV: S1, S2, RRR; 2/6 SHENA at RSB   CHEST: Clear to auscultation bilaterally with good respiratory movement.  ABD: soft, NT/ND + BS no HSM   EXTREMITIES: Showed no cyanosis, clubbing. Trace edema     Results for orders placed or performed in visit on 05/20/24   Comprehensive Metabolic Panel   Result Value Ref Range    Sodium 136 136 - 145 mmol/L    Potassium 4.1 3.5 - 5.1 mmol/L    Chloride 103 95 - 110 mmol/L    CO2 26 23 - 29 mmol/L    Glucose 131 (H) 70 - 110 mg/dL    BUN 15 8 - 23 mg/dL    Creatinine 0.9 0.5 - 1.4 mg/dL    Calcium 9.4 8.7 - 10.5 mg/dL    Total Protein 7.0 6.0 - 8.4 g/dL    Albumin 3.7 3.5 - 5.2 g/dL    Total Bilirubin 0.3 0.1 - 1.0 mg/dL    Alkaline Phosphatase 151 (H) 55 - 135 U/L    AST 23 10 - 40 U/L    ALT 31 10 - 44 U/L    eGFR >60.0 >60 mL/min/1.73 m^2    Anion Gap 7 (L) 8 - 16 mmol/L   Hemoglobin A1C   Result Value Ref Range    Hemoglobin A1C 6.2 (H) 4.0 - 5.6 %    Estimated Avg Glucose 131 68 - 131 mg/dL   Lipid Panel   Result Value Ref Range    Cholesterol 202 (H) 120 - 199 mg/dL    Triglycerides 109 30 " - 150 mg/dL    HDL 56 40 - 75 mg/dL    LDL Cholesterol 124.2 63.0 - 159.0 mg/dL    HDL/Cholesterol Ratio 27.7 20.0 - 50.0 %    Total Cholesterol/HDL Ratio 3.6 2.0 - 5.0    Non-HDL Cholesterol 146 mg/dL   Vitamin D   Result Value Ref Range    Vit D, 25-Hydroxy 47 30 - 96 ng/mL         A/P:  Preventative health care    Primary hypertension  -     losartan (COZAAR) 100 MG tablet; Take 1 tablet (100 mg total) by mouth once daily.  Dispense: 90 tablet; Refill: 3    Gastroesophageal reflux disease, unspecified whether esophagitis present  -     pantoprazole (PROTONIX) 40 MG tablet; Take 1 tablet (40 mg total) by mouth once daily.  Dispense: 90 tablet; Refill: 3    Moderate persistent asthma without complication  -     montelukast (SINGULAIR) 10 mg tablet; Take 1 tablet (10 mg total) by mouth every evening.  Dispense: 90 tablet; Refill: 3    Malignant (primary) neoplasm, unspecified    Aortic calcification         Stop losartan HCT  Increase to losartan 100mg; nurse BP check in 2 weeks   Vitamin d level and BMD; consider Reclast if BMD significantly worse.  Continue present meds  Continue low-fat diet  Continue regular weight bearing exrcise  Continue follow-up with oncology, rheumatology  Counseled on regular exercise, maintenance of a healthy weight, balanced diet rich in fruits/vegetables and lean protein, and avoidance of unhealthy habits like smoking and excessive alcohol intake.    Visit today included increased complexity associated with the care of the episodic problems listed above addressed and managing the longitudinal care of the patient due to the serious and/or complex managed problem(s) listed above.

## 2024-06-10 ENCOUNTER — TELEPHONE (OUTPATIENT)
Dept: FAMILY MEDICINE | Facility: CLINIC | Age: 79
End: 2024-06-10

## 2024-06-10 ENCOUNTER — CLINICAL SUPPORT (OUTPATIENT)
Dept: FAMILY MEDICINE | Facility: CLINIC | Age: 79
End: 2024-06-10
Payer: MEDICARE

## 2024-06-10 VITALS
BODY MASS INDEX: 32.86 KG/M2 | HEART RATE: 60 BPM | WEIGHT: 185.44 LBS | HEIGHT: 63 IN | OXYGEN SATURATION: 99 % | SYSTOLIC BLOOD PRESSURE: 142 MMHG | DIASTOLIC BLOOD PRESSURE: 80 MMHG

## 2024-06-10 DIAGNOSIS — I10 PRIMARY HYPERTENSION: Primary | ICD-10-CM

## 2024-06-10 PROCEDURE — 99999 PR PBB SHADOW E&M-EST. PATIENT-LVL V: CPT | Mod: PBBFAC,HCNC,,

## 2024-06-10 PROCEDURE — 99499 UNLISTED E&M SERVICE: CPT | Mod: HCNC,S$GLB,, | Performed by: FAMILY MEDICINE

## 2024-06-10 NOTE — TELEPHONE ENCOUNTER
----- Message from Tommy Palumbo MD sent at 6/10/2024  1:00 PM CDT -----  BP stable but still not ideal. I would like to have her scheduled for another nurse BP check in 2 weeks to allow the higher dose of losartan (100mg) a longer trial before we make any further changes.  ----- Message -----  From: Junie Maynard LPN  Sent: 6/10/2024  11:49 AM CDT  To: Tommy Palumbo MD    Please See Nurse Notes

## 2024-06-10 NOTE — PROGRESS NOTES
Yuliana Mattson 78 y.o. female is here today for Blood Pressure check.   History of HTN yes.    Review of patient's allergies indicates:   Allergen Reactions    Prochlorperazine edisylate Anaphylaxis     compazine     Creatinine   Date Value Ref Range Status   05/20/2024 0.9 0.5 - 1.4 mg/dL Final     Sodium   Date Value Ref Range Status   05/20/2024 136 136 - 145 mmol/L Final     Potassium   Date Value Ref Range Status   05/20/2024 4.1 3.5 - 5.1 mmol/L Final   ]  Patient verifies taking blood pressure medications on a regular basis at the same time of the day.     Current Outpatient Medications:     albuterol (PROVENTIL/VENTOLIN HFA) 90 mcg/actuation inhaler, Inhale 2 puffs into the lungs every 6 (six) hours as needed for Wheezing or Shortness of Breath. Rescue, Disp: 18 g, Rfl: 11    BREO ELLIPTA 100-25 mcg/dose diskus inhaler, Inhale 1 puff into the lungs once daily., Disp: 60 each, Rfl: 11    fluocinolone acetonide oiL (DERMOTIC OIL) 0.01 % Drop, Place 3 drops in ear(s) 2 (two) times daily as needed (itchy ears)., Disp: 20 mL, Rfl: 2    fluticasone propionate (FLONASE) 50 mcg/actuation nasal spray, 1 spray (50 mcg total) by Each Nostril route 2 (two) times a day., Disp: 16 g, Rfl: 2    latanoprost 0.005 % ophthalmic solution, Place 1 drop into both eyes every evening., Disp: , Rfl:     losartan (COZAAR) 100 MG tablet, Take 1 tablet (100 mg total) by mouth once daily., Disp: 90 tablet, Rfl: 3    montelukast (SINGULAIR) 10 mg tablet, Take 1 tablet (10 mg total) by mouth every evening., Disp: 90 tablet, Rfl: 3    oxybutynin (DITROPAN-XL) 5 MG TR24, Take 1 tablet (5 mg total) by mouth once daily., Disp: 30 tablet, Rfl: 3    pantoprazole (PROTONIX) 40 MG tablet, Take 1 tablet (40 mg total) by mouth once daily., Disp: 90 tablet, Rfl: 3    sulfacetamide sodium-sulfur 10-5 % (w/w) Clsr, Apply topically., Disp: , Rfl:     tretinoin (RETIN-A) 0.025 % cream, Apply a pea-sized amount to entire face at bedtime, start every  other night and increase as tolerated. Take night off if irritation develops., Disp: 45 g, Rfl: 3    vitamin D (VITAMIN D3) 1000 units Tab, Take 5,000 Units by mouth once daily. 125mg/5000IU, Disp: , Rfl:     cyanocobalamin 1,000 mcg/mL injection, , Disp: , Rfl:     FLUAD QUAD 2023-24,65Y UP,,PF, 60 mcg (15 mcg x 4)/0.5 mL Syrg, , Disp: , Rfl:   Does patient have record of home blood pressure readings no. Readings have been averaging Pt states BP's have been fluctuating the last 2 weeks.   Last dose of blood pressure medication was taken at 6/10/24 @ 8 am.  Patient is asymptomatic.   Complains of None @ this time.    BP: (!) 144/84 , Pulse: 66 .    Blood pressure reading after 15 minutes was 142/80, Pulse 60.  Dr. Palumbo notified.

## 2024-06-10 NOTE — TELEPHONE ENCOUNTER
Lt VM to call and schedule another Nurse Visit - BP Check within 2 wks - to allow for higher dose of Losartan a longer trial before we make any changes.

## 2024-06-13 ENCOUNTER — LAB VISIT (OUTPATIENT)
Dept: LAB | Facility: HOSPITAL | Age: 79
End: 2024-06-13
Payer: MEDICARE

## 2024-06-13 DIAGNOSIS — N39.0 RECURRENT UTI: ICD-10-CM

## 2024-06-13 PROCEDURE — 87086 URINE CULTURE/COLONY COUNT: CPT | Mod: HCNC

## 2024-06-13 PROCEDURE — 87088 URINE BACTERIA CULTURE: CPT | Mod: HCNC

## 2024-06-13 PROCEDURE — 87077 CULTURE AEROBIC IDENTIFY: CPT | Mod: HCNC

## 2024-06-13 PROCEDURE — 87186 SC STD MICRODIL/AGAR DIL: CPT | Mod: HCNC

## 2024-06-15 LAB — BACTERIA UR CULT: ABNORMAL

## 2024-06-17 ENCOUNTER — PATIENT MESSAGE (OUTPATIENT)
Dept: UROLOGY | Facility: CLINIC | Age: 79
End: 2024-06-17
Payer: MEDICARE

## 2024-06-17 DIAGNOSIS — N30.00 ACUTE CYSTITIS WITHOUT HEMATURIA: Primary | ICD-10-CM

## 2024-06-17 RX ORDER — CEPHALEXIN 500 MG/1
500 CAPSULE ORAL EVERY 8 HOURS
Qty: 21 CAPSULE | Refills: 0 | Status: SHIPPED | OUTPATIENT
Start: 2024-06-17 | End: 2024-06-24

## 2024-06-24 ENCOUNTER — CLINICAL SUPPORT (OUTPATIENT)
Dept: FAMILY MEDICINE | Facility: CLINIC | Age: 79
End: 2024-06-24
Payer: MEDICARE

## 2024-06-24 VITALS — DIASTOLIC BLOOD PRESSURE: 72 MMHG | HEART RATE: 79 BPM | SYSTOLIC BLOOD PRESSURE: 132 MMHG

## 2024-06-24 DIAGNOSIS — I10 PRIMARY HYPERTENSION: Primary | ICD-10-CM

## 2024-06-24 PROCEDURE — 99499 UNLISTED E&M SERVICE: CPT | Mod: HCNC,S$GLB,, | Performed by: FAMILY MEDICINE

## 2024-06-24 PROCEDURE — 99999 PR PBB SHADOW E&M-EST. PATIENT-LVL III: CPT | Mod: PBBFAC,HCNC,,

## 2024-06-24 NOTE — PROGRESS NOTES
Yuliana Mattson 78 y.o. female is here today for Blood Pressure check.   History of HTN yes.    Review of patient's allergies indicates:   Allergen Reactions    Prochlorperazine edisylate Anaphylaxis     compazine     Creatinine   Date Value Ref Range Status   05/20/2024 0.9 0.5 - 1.4 mg/dL Final     Sodium   Date Value Ref Range Status   05/20/2024 136 136 - 145 mmol/L Final     Potassium   Date Value Ref Range Status   05/20/2024 4.1 3.5 - 5.1 mmol/L Final   ]  Patient verifies taking blood pressure medications on a regular basis at the same time of the day.     Current Outpatient Medications:     albuterol (PROVENTIL/VENTOLIN HFA) 90 mcg/actuation inhaler, Inhale 2 puffs into the lungs every 6 (six) hours as needed for Wheezing or Shortness of Breath. Rescue, Disp: 18 g, Rfl: 11    BREO ELLIPTA 100-25 mcg/dose diskus inhaler, Inhale 1 puff into the lungs once daily., Disp: 60 each, Rfl: 11    cephALEXin (KEFLEX) 500 MG capsule, Take 1 capsule (500 mg total) by mouth every 8 (eight) hours. for 7 days, Disp: 21 capsule, Rfl: 0    cyanocobalamin 1,000 mcg/mL injection, , Disp: , Rfl:     FLUAD QUAD 2023-24,65Y UP,,PF, 60 mcg (15 mcg x 4)/0.5 mL Syrg, , Disp: , Rfl:     fluocinolone acetonide oiL (DERMOTIC OIL) 0.01 % Drop, Place 3 drops in ear(s) 2 (two) times daily as needed (itchy ears)., Disp: 20 mL, Rfl: 2    fluticasone propionate (FLONASE) 50 mcg/actuation nasal spray, 1 spray (50 mcg total) by Each Nostril route 2 (two) times a day., Disp: 16 g, Rfl: 2    latanoprost 0.005 % ophthalmic solution, Place 1 drop into both eyes every evening., Disp: , Rfl:     losartan (COZAAR) 100 MG tablet, Take 1 tablet (100 mg total) by mouth once daily., Disp: 90 tablet, Rfl: 3    montelukast (SINGULAIR) 10 mg tablet, Take 1 tablet (10 mg total) by mouth every evening., Disp: 90 tablet, Rfl: 3    pantoprazole (PROTONIX) 40 MG tablet, Take 1 tablet (40 mg total) by mouth once daily., Disp: 90 tablet, Rfl: 3    sulfacetamide  sodium-sulfur 10-5 % (w/w) Clsr, Apply topically., Disp: , Rfl:     tretinoin (RETIN-A) 0.025 % cream, Apply a pea-sized amount to entire face at bedtime, start every other night and increase as tolerated. Take night off if irritation develops., Disp: 45 g, Rfl: 3    vitamin D (VITAMIN D3) 1000 units Tab, Take 5,000 Units by mouth once daily. 125mg/5000IU, Disp: , Rfl:     oxybutynin (DITROPAN-XL) 5 MG TR24, Take 1 tablet (5 mg total) by mouth once daily., Disp: 30 tablet, Rfl: 3  Does patient have record of home blood pressure readings no. Readings have been averaging N/A.   Last dose of blood pressure medication was taken at 0730.  Patient is asymptomatic.   Complains of N/A.    BP: 132/72 , Pulse: 79 .

## 2024-07-08 ENCOUNTER — TELEPHONE (OUTPATIENT)
Dept: UROLOGY | Facility: CLINIC | Age: 79
End: 2024-07-08
Payer: MEDICARE

## 2024-07-08 ENCOUNTER — LAB VISIT (OUTPATIENT)
Dept: LAB | Facility: HOSPITAL | Age: 79
End: 2024-07-08
Payer: MEDICARE

## 2024-07-08 DIAGNOSIS — N39.0 RECURRENT UTI: Primary | ICD-10-CM

## 2024-07-08 DIAGNOSIS — N39.0 RECURRENT UTI: ICD-10-CM

## 2024-07-08 LAB
BACTERIA #/AREA URNS HPF: ABNORMAL /HPF
MICROSCOPIC COMMENT: ABNORMAL
RBC #/AREA URNS HPF: 1 /HPF (ref 0–4)
SQUAMOUS #/AREA URNS HPF: 1 /HPF
WBC #/AREA URNS HPF: 15 /HPF (ref 0–5)

## 2024-07-08 PROCEDURE — 87088 URINE BACTERIA CULTURE: CPT | Mod: HCNC

## 2024-07-08 PROCEDURE — 87186 SC STD MICRODIL/AGAR DIL: CPT | Mod: 59,HCNC

## 2024-07-08 PROCEDURE — 87086 URINE CULTURE/COLONY COUNT: CPT | Mod: HCNC

## 2024-07-08 PROCEDURE — 81000 URINALYSIS NONAUTO W/SCOPE: CPT | Mod: HCNC,PO

## 2024-07-10 ENCOUNTER — PATIENT MESSAGE (OUTPATIENT)
Dept: UROLOGY | Facility: CLINIC | Age: 79
End: 2024-07-10
Payer: MEDICARE

## 2024-07-10 ENCOUNTER — TELEPHONE (OUTPATIENT)
Dept: UROLOGY | Facility: CLINIC | Age: 79
End: 2024-07-10
Payer: MEDICARE

## 2024-07-10 DIAGNOSIS — N30.00 ACUTE CYSTITIS WITHOUT HEMATURIA: Primary | ICD-10-CM

## 2024-07-10 LAB
BACTERIA UR CULT: ABNORMAL
BACTERIA UR CULT: ABNORMAL

## 2024-07-10 RX ORDER — LEVOFLOXACIN 500 MG/1
500 TABLET, FILM COATED ORAL DAILY
Qty: 5 TABLET | Refills: 0 | Status: SHIPPED | OUTPATIENT
Start: 2024-07-10 | End: 2024-07-15

## 2024-07-10 NOTE — TELEPHONE ENCOUNTER
----- Message from Ana Dunaway NP sent at 7/10/2024  8:26 AM CDT -----  Levaquin sent to pharmacy, waiting for final report

## 2024-07-11 DIAGNOSIS — K21.9 GASTROESOPHAGEAL REFLUX DISEASE, UNSPECIFIED WHETHER ESOPHAGITIS PRESENT: ICD-10-CM

## 2024-07-11 DIAGNOSIS — R35.0 URINARY FREQUENCY: ICD-10-CM

## 2024-07-11 DIAGNOSIS — R39.89 BLADDER PAIN: ICD-10-CM

## 2024-07-11 DIAGNOSIS — R39.15 URINARY URGENCY: ICD-10-CM

## 2024-07-11 RX ORDER — OXYBUTYNIN CHLORIDE 5 MG/1
5 TABLET, EXTENDED RELEASE ORAL
Qty: 30 TABLET | Refills: 3 | Status: SHIPPED | OUTPATIENT
Start: 2024-07-11

## 2024-07-11 RX ORDER — PANTOPRAZOLE SODIUM 40 MG/1
40 TABLET, DELAYED RELEASE ORAL
Qty: 90 TABLET | Refills: 3 | Status: SHIPPED | OUTPATIENT
Start: 2024-07-11

## 2024-07-11 NOTE — TELEPHONE ENCOUNTER
Refill Decision Note   Yuliana Mattson  is requesting a refill authorization.  Brief Assessment and Rationale for Refill:  Approve     Medication Therapy Plan:         Comments:     Note composed:9:38 AM 07/11/2024

## 2024-07-11 NOTE — TELEPHONE ENCOUNTER
No care due was identified.  [facility] Embedded Care Due Messages. Reference number: [id number].   7/11/2024 8:41:51 AM CDT

## 2024-07-11 NOTE — TELEPHONE ENCOUNTER
----- Message from Ana Dunaway NP sent at 7/11/2024  8:21 AM CDT -----  Continue levaquin as prescribed

## 2024-08-06 ENCOUNTER — LAB VISIT (OUTPATIENT)
Dept: LAB | Facility: HOSPITAL | Age: 79
End: 2024-08-06
Payer: MEDICARE

## 2024-08-06 DIAGNOSIS — N39.0 RECURRENT UTI: ICD-10-CM

## 2024-08-06 PROCEDURE — 87086 URINE CULTURE/COLONY COUNT: CPT | Mod: HCNC

## 2024-08-07 ENCOUNTER — TELEPHONE (OUTPATIENT)
Dept: FAMILY MEDICINE | Facility: CLINIC | Age: 79
End: 2024-08-07
Payer: MEDICARE

## 2024-08-07 LAB — BACTERIA UR CULT: NORMAL

## 2024-08-08 ENCOUNTER — TELEPHONE (OUTPATIENT)
Dept: FAMILY MEDICINE | Facility: CLINIC | Age: 79
End: 2024-08-08
Payer: MEDICARE

## 2024-08-08 ENCOUNTER — TELEPHONE (OUTPATIENT)
Dept: UROLOGY | Facility: CLINIC | Age: 79
End: 2024-08-08
Payer: MEDICARE

## 2024-08-21 ENCOUNTER — OFFICE VISIT (OUTPATIENT)
Dept: FAMILY MEDICINE | Facility: CLINIC | Age: 79
End: 2024-08-21
Payer: MEDICARE

## 2024-08-21 VITALS
WEIGHT: 187.38 LBS | HEIGHT: 63 IN | BODY MASS INDEX: 33.2 KG/M2 | HEART RATE: 81 BPM | SYSTOLIC BLOOD PRESSURE: 190 MMHG | DIASTOLIC BLOOD PRESSURE: 98 MMHG | OXYGEN SATURATION: 97 %

## 2024-08-21 DIAGNOSIS — H65.191 ACUTE MUCOID OTITIS MEDIA OF RIGHT EAR: ICD-10-CM

## 2024-08-21 DIAGNOSIS — J32.9 SINUSITIS, UNSPECIFIED CHRONICITY, UNSPECIFIED LOCATION: ICD-10-CM

## 2024-08-21 DIAGNOSIS — I10 HYPERTENSION, UNSPECIFIED TYPE: Primary | ICD-10-CM

## 2024-08-21 DIAGNOSIS — F41.9 ANXIETY: ICD-10-CM

## 2024-08-21 DIAGNOSIS — H26.9 CATARACT OF LEFT EYE, UNSPECIFIED CATARACT TYPE: ICD-10-CM

## 2024-08-21 DIAGNOSIS — Z01.818 PRE-OPERATIVE CLEARANCE: ICD-10-CM

## 2024-08-21 PROCEDURE — 1101F PT FALLS ASSESS-DOCD LE1/YR: CPT | Mod: HCNC,CPTII,S$GLB, | Performed by: NURSE PRACTITIONER

## 2024-08-21 PROCEDURE — 1159F MED LIST DOCD IN RCRD: CPT | Mod: HCNC,CPTII,S$GLB, | Performed by: NURSE PRACTITIONER

## 2024-08-21 PROCEDURE — 1160F RVW MEDS BY RX/DR IN RCRD: CPT | Mod: HCNC,CPTII,S$GLB, | Performed by: NURSE PRACTITIONER

## 2024-08-21 PROCEDURE — 3077F SYST BP >= 140 MM HG: CPT | Mod: HCNC,CPTII,S$GLB, | Performed by: NURSE PRACTITIONER

## 2024-08-21 PROCEDURE — 3080F DIAST BP >= 90 MM HG: CPT | Mod: HCNC,CPTII,S$GLB, | Performed by: NURSE PRACTITIONER

## 2024-08-21 PROCEDURE — 99214 OFFICE O/P EST MOD 30 MIN: CPT | Mod: HCNC,S$GLB,, | Performed by: NURSE PRACTITIONER

## 2024-08-21 PROCEDURE — 3288F FALL RISK ASSESSMENT DOCD: CPT | Mod: HCNC,CPTII,S$GLB, | Performed by: NURSE PRACTITIONER

## 2024-08-21 PROCEDURE — 1126F AMNT PAIN NOTED NONE PRSNT: CPT | Mod: HCNC,CPTII,S$GLB, | Performed by: NURSE PRACTITIONER

## 2024-08-21 PROCEDURE — 99999 PR PBB SHADOW E&M-EST. PATIENT-LVL IV: CPT | Mod: PBBFAC,HCNC,, | Performed by: NURSE PRACTITIONER

## 2024-08-21 RX ORDER — ALPRAZOLAM 0.5 MG/1
0.5 TABLET ORAL 2 TIMES DAILY PRN
Qty: 30 TABLET | Refills: 0 | Status: SHIPPED | OUTPATIENT
Start: 2024-08-21 | End: 2024-09-20

## 2024-08-21 RX ORDER — HYDROCHLOROTHIAZIDE 12.5 MG/1
12.5 TABLET ORAL DAILY
Qty: 30 TABLET | Refills: 5 | Status: SHIPPED | OUTPATIENT
Start: 2024-08-21 | End: 2025-08-21

## 2024-08-21 RX ORDER — AMLODIPINE BESYLATE 5 MG/1
5 TABLET ORAL DAILY
Qty: 30 TABLET | Refills: 0 | Status: SHIPPED | OUTPATIENT
Start: 2024-08-21 | End: 2025-08-21

## 2024-08-21 RX ORDER — AMOXICILLIN AND CLAVULANATE POTASSIUM 875; 125 MG/1; MG/1
1 TABLET, FILM COATED ORAL 2 TIMES DAILY
Qty: 14 TABLET | Refills: 0 | Status: SHIPPED | OUTPATIENT
Start: 2024-08-21 | End: 2024-08-28

## 2024-08-21 NOTE — PROGRESS NOTES
Subjective:       Patient ID: Yuliana Mattson is a 78 y.o. female.    Chief Complaint: Pre-op Exam    HPI clearance for left cataract surgery with Dr Miriam Preston on 9/5/24. Having a headache since starting eyedrops for increased ocular pressure 3 mos ago.  pt states she did well with the hydrochlorothiazide in the past but stopped taking it because she was afraid to take when in the car or on a flight. recently had flare of psoriasis and uveitis 1 month ago. Followed by Dr Hughes for sicca syndrome and psoriatic arthritis. Pt reports she only had 1 hour of sleep last night because her mind was racing thinking about her upcoming surgery and wedding anniversary. Pt thinks she may be currently having another autoimmune flare. Has been very anxious and states she is apprehensive about her upcoming eye surgery.    BP very elevated today. Pt just returned from vacation in [place] and has not checked BP in past 2 wks.    Right TM infected upon exam. Pt has been having sinus pressure, clear nasal drainage and tooth pain for several weeks. Saw dentist today for tooth pain and was told she had a normal exam. Denies ear pain.    Past Medical History:   Diagnosis Date    Allergic rhinitis     Anticoagulant long-term use     ASPIRIN ONLY - (stops it when she presents a hemorrhagic cystitis)    Bladder cancer     Blood transfusion     Breast cancer     CAD (coronary artery disease), native coronary artery     40% non obstructive    Cholelithiasis     Endometriosis     Headache(784.0)     HEARING LOSS     Hemorrhoids     HLD (hyperlipidemia)     Hypertension     Iritis     Left wrist fracture 2009    traumatic    Malignant neoplasm of other specified sites of bladder 12/3/2009    Osteoporosis, postmenopausal     PONV (postoperative nausea and vomiting)     Rash     Rosacea     UTI (urinary tract infection)     Vaginal delivery     x 2       Past Surgical History:   Procedure Laterality Date    ADENOIDECTOMY       APPENDECTOMY      BLADDER TUMOR EXCISION  1979    [facility], dr garcia - no recurrences since    BREAST BIOPSY Right     4 core bx negative    BREAST SURGERY Left 1998    ESOPHAGOGASTRODUODENOSCOPY N/A 04/21/2022    Procedure: ESOPHAGOGASTRODUODENOSCOPY (EGD);  Surgeon: Guru Maddox MD;  Location: [facility];  Service: Endoscopy;  Laterality: N/A;    EYE SURGERY  2019    HYSTERECTOMY      MASTECTOMY, PARTIAL  1995    left side - cancer - had radiotherapy 1995, 1998 had chemotherapy    oophrect      pelvic mass      benign    TONSILLECTOMY      TONSILLECTOMY, ADENOIDECTOMY, BILATERAL MYRINGOTOMY AND TUBES      tram flap Left 1998       Review of patient's allergies indicates:   Allergen Reactions    Prochlorperazine edisylate Anaphylaxis     compazine       Social History     Socioeconomic History    Marital status:    Occupational History    Occupation: retired accounting   Tobacco Use    Smoking status: Never     Passive exposure: Past    Smokeless tobacco: Never   Substance and Sexual Activity    Alcohol use: No    Drug use: No     Social Determinants of Health     Financial Resource Strain: Low Risk  (8/31/2023)    Overall Financial Resource Strain (CARDIA)     Difficulty of Paying Living Expenses: Not hard at all   Food Insecurity: No Food Insecurity (2/13/2024)    Hunger Vital Sign     Worried About Running Out of Food in the Last Year: Never true     Ran Out of Food in the Last Year: Never true   Transportation Needs: No Transportation Needs (2/13/2024)    PRAPARE - Transportation     Lack of Transportation (Medical): No     Lack of Transportation (Non-Medical): No   Physical Activity: Inactive (2/13/2024)    Exercise Vital Sign     Days of Exercise per Week: 0 days     Minutes of Exercise per Session: 0 min   Stress: Stress Concern Present (2/13/2024)    [employer] [place] [employer] - Occupational Stress Questionnaire     Feeling of Stress : To some extent   Housing  Stability: Low Risk  (2/13/2024)    Housing Stability Vital Sign     Unable to Pay for Housing in the Last Year: No     Number of Places Lived in the Last Year: 1     Unstable Housing in the Last Year: No       Current Outpatient Medications on File Prior to Visit   Medication Sig Dispense Refill    albuterol (PROVENTIL/VENTOLIN HFA) 90 mcg/actuation inhaler Inhale 2 puffs into the lungs every 6 (six) hours as needed for Wheezing or Shortness of Breath. Rescue 18 g 11    BREO ELLIPTA 100-25 mcg/dose diskus inhaler Inhale 1 puff into the lungs once daily. 60 each 11    fluticasone propionate (FLONASE) 50 mcg/actuation nasal spray 1 spray (50 mcg total) by Each Nostril route 2 (two) times a day. 16 g 2    latanoprost 0.005 % ophthalmic solution Place 1 drop into both eyes every evening.      losartan (COZAAR) 100 MG tablet Take 1 tablet (100 mg total) by mouth once daily. 90 tablet 3    oxybutynin (DITROPAN-XL) 5 MG TR24 TAKE 1 TABLET(5 MG) BY MOUTH EVERY DAY 30 tablet 3    pantoprazole (PROTONIX) 40 MG tablet TAKE 1 TABLET(40 MG) BY MOUTH EVERY DAY 90 tablet 3    sulfacetamide sodium-sulfur 10-5 % (w/w) Clsr Apply topically.      tretinoin (RETIN-A) 0.025 % cream Apply a pea-sized amount to entire face at bedtime, start every other night and increase as tolerated. Take night off if irritation develops. 45 g 3    vitamin D (VITAMIN D3) 1000 units Tab Take 5,000 Units by mouth once daily. 125mg/5000IU      [DISCONTINUED] cyanocobalamin 1,000 mcg/mL injection       [DISCONTINUED] FLUAD QUAD 2023-24,65Y UP,,PF, 60 mcg (15 mcg x 4)/0.5 mL Syrg        No current facility-administered medications on file prior to visit.       Family History   Problem Relation Name Age of Onset    Glaucoma Father      Glaucoma Paternal Aunt      Glaucoma Paternal Grandmother      Ovarian cancer Cousin      Cancer Cousin          1st, ovarian    Amblyopia Neg Hx      Blindness Neg Hx      Cataracts Neg Hx      Hypertension Neg Hx      Macular  "degeneration Neg Hx      Retinal detachment Neg Hx      Strabismus Neg Hx      Stroke Neg Hx      Thyroid disease Neg Hx      Melanoma Neg Hx         Review of Systems   Constitutional:  Negative for activity change.   HENT:  Positive for sinus pressure and sinus pain. Negative for hearing loss, rhinorrhea and trouble swallowing.    Eyes:  Positive for visual disturbance. Negative for discharge.   Respiratory:  Negative for chest tightness and wheezing.    Cardiovascular:  Negative for chest pain and palpitations.   Gastrointestinal:  Negative for blood in stool, constipation, diarrhea and vomiting.   Endocrine: Negative for polydipsia and polyuria.   Genitourinary:  Negative for difficulty urinating, dysuria, hematuria and menstrual problem.   Musculoskeletal:  Positive for arthralgias and neck pain. Negative for joint swelling.   Neurological:  Positive for headaches. Negative for weakness.   Psychiatric/Behavioral:  Negative for confusion and dysphoric mood.        Objective:      BP (!) 190/98   Pulse 81   Ht 5' 3" (1.6 m)   Wt 85 kg (187 lb 6.3 oz)   SpO2 97%   BMI 33.19 kg/m²   Physical Exam  Vitals and nursing note reviewed.   Constitutional:       General: She is not in acute distress.     Appearance: Normal appearance. She is well-groomed.   HENT:      Head: Normocephalic.      Right Ear: External ear normal. A middle ear effusion (purulent) is present. Tympanic membrane is erythematous.      Left Ear: Tympanic membrane, ear canal and external ear normal.      Nose: Nose normal.      Mouth/Throat:      Lips: Pink.      Mouth: Mucous membranes are moist.      Pharynx: Oropharynx is clear. No oropharyngeal exudate or posterior oropharyngeal erythema.   Eyes:      Extraocular Movements: Extraocular movements intact.      Conjunctiva/sclera: Conjunctivae normal.      Pupils: Pupils are equal, round, and reactive to light.   Cardiovascular:      Rate and Rhythm: Normal rate and regular rhythm.      Heart " sounds: Murmur heard.   Pulmonary:      Effort: Pulmonary effort is normal.      Breath sounds: Normal breath sounds.   Chest:      Chest wall: No tenderness.   Abdominal:      General: Bowel sounds are normal.      Palpations: Abdomen is soft.      Tenderness: There is no abdominal tenderness.   Musculoskeletal:         General: No swelling or tenderness. Normal range of motion.      Cervical back: Normal range of motion and neck supple.      Right lower leg: No edema.      Left lower leg: No edema.   Lymphadenopathy:      Cervical: No cervical adenopathy.   Skin:     General: Skin is warm and dry.   Neurological:      General: No focal deficit present.      Mental Status: She is alert and oriented to person, place, and time. Mental status is at baseline.      Motor: Motor function is intact.      Gait: Gait is intact.   Psychiatric:         Mood and Affect: Mood normal.         Behavior: Behavior normal.         Assessment:       1. Hypertension, unspecified type    2. Acute mucoid otitis media of right ear    3. Sinusitis, unspecified chronicity, unspecified location    4. Anxiety    5. Pre-operative clearance    6. Cataract of left eye, unspecified cataract type        Plan:       Hypertension, unspecified type  -     hydroCHLOROthiazide (HYDRODIURIL) 12.5 MG Tab; Take 1 tablet (12.5 mg total) by mouth once daily.  Dispense: 30 tablet; Refill: 5  -     amLODIPine (NORVASC) 5 MG tablet; Take 1 tablet (5 mg total) by mouth once daily.  Dispense: 30 tablet; Refill: 0    Acute mucoid otitis media of right ear  -     amoxicillin-clavulanate 875-125mg (AUGMENTIN) 875-125 mg per tablet; Take 1 tablet by mouth 2 (two) times daily. for 7 days  Dispense: 14 tablet; Refill: 0    Sinusitis, unspecified chronicity, unspecified location  -     amoxicillin-clavulanate 875-125mg (AUGMENTIN) 875-125 mg per tablet; Take 1 tablet by mouth 2 (two) times daily. for 7 days  Dispense: 14 tablet; Refill: 0    Anxiety  -     ALPRAZolam  (XANAX) 0.5 MG tablet; Take 1 tablet (0.5 mg total) by mouth 2 (two) times daily as needed for Anxiety or Insomnia.  Dispense: 30 tablet; Refill: 0    Pre-operative clearance    Cataract of left eye, unspecified cataract type        Pt has several causes of headache which include HTN, anxiety, sinusitis and OM    HTN: start hydrochlorothiazide and amlodipine. Continue losartan. RTC in 1 week for BP recheck    Sinusitis/right OM: augmentin bid X 7 days; take with daily probiotic. Continue flonase and antihistamine. Will recheck in 1 week    Anxiety/insomnia most likely contributing to HTN. Xanax prn.

## 2024-08-28 ENCOUNTER — OFFICE VISIT (OUTPATIENT)
Dept: FAMILY MEDICINE | Facility: CLINIC | Age: 79
End: 2024-08-28
Payer: MEDICARE

## 2024-08-28 VITALS
DIASTOLIC BLOOD PRESSURE: 80 MMHG | BODY MASS INDEX: 32.95 KG/M2 | HEART RATE: 72 BPM | HEIGHT: 63 IN | OXYGEN SATURATION: 96 % | SYSTOLIC BLOOD PRESSURE: 130 MMHG | WEIGHT: 185.94 LBS

## 2024-08-28 DIAGNOSIS — Z01.818 PRE-OPERATIVE CLEARANCE: Primary | ICD-10-CM

## 2024-08-28 DIAGNOSIS — H26.9 CATARACT OF LEFT EYE, UNSPECIFIED CATARACT TYPE: ICD-10-CM

## 2024-08-28 DIAGNOSIS — I10 HYPERTENSION, UNSPECIFIED TYPE: ICD-10-CM

## 2024-08-28 PROCEDURE — 3075F SYST BP GE 130 - 139MM HG: CPT | Mod: HCNC,CPTII,S$GLB, | Performed by: NURSE PRACTITIONER

## 2024-08-28 PROCEDURE — 1159F MED LIST DOCD IN RCRD: CPT | Mod: HCNC,CPTII,S$GLB, | Performed by: NURSE PRACTITIONER

## 2024-08-28 PROCEDURE — 1101F PT FALLS ASSESS-DOCD LE1/YR: CPT | Mod: HCNC,CPTII,S$GLB, | Performed by: NURSE PRACTITIONER

## 2024-08-28 PROCEDURE — 1126F AMNT PAIN NOTED NONE PRSNT: CPT | Mod: HCNC,CPTII,S$GLB, | Performed by: NURSE PRACTITIONER

## 2024-08-28 PROCEDURE — 3079F DIAST BP 80-89 MM HG: CPT | Mod: HCNC,CPTII,S$GLB, | Performed by: NURSE PRACTITIONER

## 2024-08-28 PROCEDURE — 1160F RVW MEDS BY RX/DR IN RCRD: CPT | Mod: HCNC,CPTII,S$GLB, | Performed by: NURSE PRACTITIONER

## 2024-08-28 PROCEDURE — 3288F FALL RISK ASSESSMENT DOCD: CPT | Mod: HCNC,CPTII,S$GLB, | Performed by: NURSE PRACTITIONER

## 2024-08-28 PROCEDURE — 99213 OFFICE O/P EST LOW 20 MIN: CPT | Mod: HCNC,S$GLB,, | Performed by: NURSE PRACTITIONER

## 2024-08-28 PROCEDURE — 99999 PR PBB SHADOW E&M-EST. PATIENT-LVL IV: CPT | Mod: PBBFAC,HCNC,, | Performed by: NURSE PRACTITIONER

## 2024-08-28 RX ORDER — AMLODIPINE BESYLATE 5 MG/1
5 TABLET ORAL DAILY
Qty: 30 TABLET | Refills: 5 | Status: SHIPPED | OUTPATIENT
Start: 2024-08-28 | End: 2025-08-28

## 2024-08-28 NOTE — PROGRESS NOTES
Subjective:       Patient ID: Yuliana Mattson is a 79 y.o. female.    Chief Complaint: Follow-up and Pre-op Exam    HPI follow up for HTN last visit and  clearance for left cataract surgery with Dr Miriam Preston on 9/5/24.  Started hydrochlorothiazide and amlodipine 1 week ago and BP readings have been improved. Has been keeping a home BP log and readings are improved.    Also completed 7 days of augmentin for sinusitis and right OM. Reports sinus pressure and ear pain are improved. Having clear nasal drainage. Headaches are resolved.    Reports xanax helps with her anxiety and took one prior to appt today.      Past Medical History:   Diagnosis Date    Allergic rhinitis     Anticoagulant long-term use     ASPIRIN ONLY - (stops it when she presents a hemorrhagic cystitis)    Bladder cancer     Blood transfusion     Breast cancer     CAD (coronary artery disease), native coronary artery     40% non obstructive    Cholelithiasis     Endometriosis     Headache(784.0)     HEARING LOSS     Hemorrhoids     HLD (hyperlipidemia)     Hypertension     Iritis     Left wrist fracture 2009    traumatic    Malignant neoplasm of other specified sites of bladder 12/3/2009    Osteoporosis, postmenopausal     PONV (postoperative nausea and vomiting)     Rash     Rosacea     UTI (urinary tract infection)     Vaginal delivery     x 2       Past Surgical History:   Procedure Laterality Date    ADENOIDECTOMY      APPENDECTOMY      BLADDER TUMOR EXCISION  1979    [facility], dr garcia - no recurrences since    BREAST BIOPSY Right     4 core bx negative    BREAST SURGERY Left 1998    ESOPHAGOGASTRODUODENOSCOPY N/A 04/21/2022    Procedure: ESOPHAGOGASTRODUODENOSCOPY (EGD);  Surgeon: Guru Maddox MD;  [facility]: [facility];  Service: Endoscopy;  Laterality: N/A;    EYE SURGERY  2019    HYSTERECTOMY      MASTECTOMY, PARTIAL  1995    left side - cancer - had radiotherapy 1995, 1998 had chemotherapy    oophrect      pelvic mass       benign    TONSILLECTOMY      TONSILLECTOMY, ADENOIDECTOMY, BILATERAL MYRINGOTOMY AND TUBES      tram flap Left 1998       Review of patient's allergies indicates:   Allergen Reactions    Prochlorperazine edisylate Anaphylaxis     compazine       Social History     Socioeconomic History    Marital status:    Occupational History    Occupation: retired accounting   Tobacco Use    Smoking status: Never     Passive exposure: Past    Smokeless tobacco: Never   Substance and Sexual Activity    Alcohol use: No    Drug use: No     Social Determinants of Health     Financial Resource Strain: Low Risk  (8/31/2023)    Overall Financial Resource Strain (CARDIA)     Difficulty of Paying Living Expenses: Not hard at all   Food Insecurity: No Food Insecurity (2/13/2024)    Hunger Vital Sign     Worried About Running Out of Food in the Last Year: Never true     Ran Out of Food in the Last Year: Never true   Transportation Needs: No Transportation Needs (2/13/2024)    PRAPARE - Transportation     Lack of Transportation (Medical): No     Lack of Transportation (Non-Medical): No   Physical Activity: Inactive (2/13/2024)    Exercise Vital Sign     Days of Exercise per Week: 0 days     Minutes of Exercise per Session: 0 min   Stress: Stress Concern Present (2/13/2024)    [employer] [place] [employer] - Occupational Stress Questionnaire     Feeling of Stress : To some extent   Housing Stability: Low Risk  (2/13/2024)    Housing Stability Vital Sign     Unable to Pay for Housing in the Last Year: No     Number of Places Lived in the Last Year: 1     Unstable Housing in the Last Year: No       Current Outpatient Medications on File Prior to Visit   Medication Sig Dispense Refill    albuterol (PROVENTIL/VENTOLIN HFA) 90 mcg/actuation inhaler Inhale 2 puffs into the lungs every 6 (six) hours as needed for Wheezing or Shortness of Breath. Rescue 18 g 11    ALPRAZolam (XANAX) 0.5 MG tablet Take 1 tablet (0.5 mg total) by  mouth 2 (two) times daily as needed for Anxiety or Insomnia. 30 tablet 0    BREO ELLIPTA 100-25 mcg/dose diskus inhaler Inhale 1 puff into the lungs once daily. 60 each 11    fluticasone propionate (FLONASE) 50 mcg/actuation nasal spray 1 spray (50 mcg total) by Each Nostril route 2 (two) times a day. 16 g 2    hydroCHLOROthiazide (HYDRODIURIL) 12.5 MG Tab Take 1 tablet (12.5 mg total) by mouth once daily. 30 tablet 5    latanoprost 0.005 % ophthalmic solution Place 1 drop into both eyes every evening.      losartan (COZAAR) 100 MG tablet Take 1 tablet (100 mg total) by mouth once daily. 90 tablet 3    oxybutynin (DITROPAN-XL) 5 MG TR24 TAKE 1 TABLET(5 MG) BY MOUTH EVERY DAY 30 tablet 3    pantoprazole (PROTONIX) 40 MG tablet TAKE 1 TABLET(40 MG) BY MOUTH EVERY DAY 90 tablet 3    sulfacetamide sodium-sulfur 10-5 % (w/w) Clsr Apply topically.      tretinoin (RETIN-A) 0.025 % cream Apply a pea-sized amount to entire face at bedtime, start every other night and increase as tolerated. Take night off if irritation develops. 45 g 3    vitamin D (VITAMIN D3) 1000 units Tab Take 5,000 Units by mouth once daily. 125mg/5000IU      [DISCONTINUED] amLODIPine (NORVASC) 5 MG tablet Take 1 tablet (5 mg total) by mouth once daily. 30 tablet 0    [DISCONTINUED] amoxicillin-clavulanate 875-125mg (AUGMENTIN) 875-125 mg per tablet Take 1 tablet by mouth 2 (two) times daily. for 7 days 14 tablet 0     No current facility-administered medications on file prior to visit.       Family History   Problem Relation Name Age of Onset    Glaucoma Father      Glaucoma Paternal Aunt      Glaucoma Paternal Grandmother      Ovarian cancer Cousin      Cancer Cousin          1st, ovarian    Amblyopia Neg Hx      Blindness Neg Hx      Cataracts Neg Hx      Hypertension Neg Hx      Macular degeneration Neg Hx      Retinal detachment Neg Hx      Strabismus Neg Hx      Stroke Neg Hx      Thyroid disease Neg Hx      Melanoma Neg Hx         Review of  "Systems   Constitutional: Negative.    HENT: Negative.     Eyes: Negative.    Respiratory: Negative.     Cardiovascular: Negative.    Gastrointestinal: Negative.    Endocrine: Negative.    Genitourinary: Negative.    Musculoskeletal: Negative.    Skin: Negative.    Neurological: Negative.    Psychiatric/Behavioral: Negative.         Objective:      /80   Pulse 72   Ht 5' 3" (1.6 m)   Wt 84.4 kg (185 lb 15.3 oz)   SpO2 96%   BMI 32.94 kg/m²   Physical Exam  Vitals and nursing note reviewed.   Constitutional:       General: She is not in acute distress.     Appearance: Normal appearance. She is well-groomed.   HENT:      Head: Normocephalic.      Right Ear: Tympanic membrane, ear canal and external ear normal.      Left Ear: Tympanic membrane, ear canal and external ear normal.      Nose: Nose normal.      Mouth/Throat:      Lips: Pink.      Mouth: Mucous membranes are moist.      Pharynx: Oropharynx is clear. No oropharyngeal exudate or posterior oropharyngeal erythema.   Eyes:      Extraocular Movements: Extraocular movements intact.      Conjunctiva/sclera: Conjunctivae normal.      Pupils: Pupils are equal, round, and reactive to light.   Cardiovascular:      Rate and Rhythm: Normal rate and regular rhythm.      Heart sounds: Normal heart sounds.   Pulmonary:      Effort: Pulmonary effort is normal.      Breath sounds: Normal breath sounds.   Chest:      Chest wall: No tenderness.   Abdominal:      General: Bowel sounds are normal.      Palpations: Abdomen is soft.      Tenderness: There is no abdominal tenderness.   Musculoskeletal:         General: No swelling or tenderness. Normal range of motion.      Cervical back: Normal range of motion and neck supple.      Right lower leg: No edema.      Left lower leg: No edema.   Lymphadenopathy:      Cervical: No cervical adenopathy.   Skin:     General: Skin is warm and dry.   Neurological:      General: No focal deficit present.      Mental Status: She is " alert and oriented to person, place, and time. Mental status is at baseline.      Gait: Gait is intact.   Psychiatric:         Mood and Affect: Mood normal.         Behavior: Behavior normal.         Assessment:       1. Pre-operative clearance    2. Hypertension, unspecified type    3. Cataract of left eye, unspecified cataract type        Plan:       Pre-operative clearance    Hypertension, unspecified type  -     amLODIPine (NORVASC) 5 MG tablet; Take 1 tablet (5 mg total) by mouth once daily.  Dispense: 30 tablet; Refill: 5    Cataract of left eye, unspecified cataract type        HTN: stable. Continue losartan, amlodipine and hydrochlorothiazide. RTC 6 mos    Cleared for cataract surgery.

## 2024-08-29 ENCOUNTER — HOSPITAL ENCOUNTER (OUTPATIENT)
Dept: RADIOLOGY | Facility: HOSPITAL | Age: 79
Discharge: HOME | End: 2024-08-29
Attending: NURSE PRACTITIONER
Payer: MEDICARE

## 2024-08-29 DIAGNOSIS — Z90.12 HX OF LEFT MASTECTOMY: ICD-10-CM

## 2024-08-29 DIAGNOSIS — Z85.3 HISTORY OF BREAST CANCER: ICD-10-CM

## 2024-08-29 PROCEDURE — 77061 BREAST TOMOSYNTHESIS UNI: CPT | Mod: TC,HCNC,PO,RT

## 2024-09-03 ENCOUNTER — OFFICE VISIT (OUTPATIENT)
Dept: RHEUMATOLOGY | Facility: CLINIC | Age: 79
End: 2024-09-03
Payer: MEDICARE

## 2024-09-03 VITALS
HEART RATE: 82 BPM | DIASTOLIC BLOOD PRESSURE: 76 MMHG | SYSTOLIC BLOOD PRESSURE: 137 MMHG | HEIGHT: 63 IN | WEIGHT: 185.44 LBS | BODY MASS INDEX: 32.86 KG/M2

## 2024-09-03 DIAGNOSIS — H20.9 UVEITIS OF BOTH EYES: ICD-10-CM

## 2024-09-03 DIAGNOSIS — N39.0 URINARY TRACT INFECTION WITHOUT HEMATURIA, SITE UNSPECIFIED: ICD-10-CM

## 2024-09-03 DIAGNOSIS — L40.50 PSA (PSORIATIC ARTHRITIS): Primary | ICD-10-CM

## 2024-09-03 DIAGNOSIS — R78.81 BACTEREMIA: ICD-10-CM

## 2024-09-03 DIAGNOSIS — L40.9 PSORIASIS: ICD-10-CM

## 2024-09-03 DIAGNOSIS — M35.3 PMR (POLYMYALGIA RHEUMATICA): ICD-10-CM

## 2024-09-03 PROCEDURE — 3078F DIAST BP <80 MM HG: CPT | Mod: HCNC,CPTII,S$GLB, | Performed by: INTERNAL MEDICINE

## 2024-09-03 PROCEDURE — 3288F FALL RISK ASSESSMENT DOCD: CPT | Mod: HCNC,CPTII,S$GLB, | Performed by: INTERNAL MEDICINE

## 2024-09-03 PROCEDURE — 1159F MED LIST DOCD IN RCRD: CPT | Mod: HCNC,CPTII,S$GLB, | Performed by: INTERNAL MEDICINE

## 2024-09-03 PROCEDURE — 99999 PR PBB SHADOW E&M-EST. PATIENT-LVL III: CPT | Mod: PBBFAC,HCNC,, | Performed by: INTERNAL MEDICINE

## 2024-09-03 PROCEDURE — 3075F SYST BP GE 130 - 139MM HG: CPT | Mod: HCNC,CPTII,S$GLB, | Performed by: INTERNAL MEDICINE

## 2024-09-03 PROCEDURE — 1101F PT FALLS ASSESS-DOCD LE1/YR: CPT | Mod: HCNC,CPTII,S$GLB, | Performed by: INTERNAL MEDICINE

## 2024-09-03 PROCEDURE — 99215 OFFICE O/P EST HI 40 MIN: CPT | Mod: HCNC,S$GLB,, | Performed by: INTERNAL MEDICINE

## 2024-09-03 PROCEDURE — 1160F RVW MEDS BY RX/DR IN RCRD: CPT | Mod: HCNC,CPTII,S$GLB, | Performed by: INTERNAL MEDICINE

## 2024-09-03 PROCEDURE — 1125F AMNT PAIN NOTED PAIN PRSNT: CPT | Mod: HCNC,CPTII,S$GLB, | Performed by: INTERNAL MEDICINE

## 2024-09-03 RX ORDER — GRANULES FOR ORAL 3 G/1
3 POWDER ORAL ONCE
Qty: 3 G | Refills: 0 | Status: SHIPPED | OUTPATIENT
Start: 2024-09-03 | End: 2024-09-03

## 2024-09-03 RX ORDER — RISANKIZUMAB-RZAA 150 MG/ML
150 INJECTION SUBCUTANEOUS
Qty: 1 ML | Refills: 4 | Status: SHIPPED | OUTPATIENT
Start: 2024-09-03

## 2024-09-03 RX ORDER — RISANKIZUMAB-RZAA 150 MG/ML
150 INJECTION SUBCUTANEOUS
Qty: 1 ML | Refills: 1 | Status: SHIPPED | OUTPATIENT
Start: 2024-09-03

## 2024-09-03 ASSESSMENT — ROUTINE ASSESSMENT OF PATIENT INDEX DATA (RAPID3)
PATIENT GLOBAL ASSESSMENT SCORE: 2
PAIN SCORE: 3
PSYCHOLOGICAL DISTRESS SCORE: 2.2
MDHAQ FUNCTION SCORE: 0.3
TOTAL RAPID3 SCORE: 2

## 2024-09-03 NOTE — PROGRESS NOTES
Subjective:     Patient ID:  Yuliana Mattson    Chief Complaint:  Disease Management     History of Present Illness:  Pt is a 79 y.o. female psoriatic arthritis and sicca syndrome. Stopped otezla Feb she was doing great in May off tx. She started having psoriasis in her ears and around her  ears, she had neck and back pain April, June and July 2024 She started otezla in January due to arthritis flare and her sx improved significantly within 6-7 weeks and she continued otezla until April 1st. She had recurrence of severe cough while on otezla, which is why she stopped this medication in the past. She is off treatment at this time, but overall her pain is minimal. She has swelling and instability of the R ankle and mild pain. She is taking tylenol 1 in the AM and 2 in the PM with some relief.      She has lost a few lbs since her last visit with dietary changes, but has difficulty staying on 1200 calorie diet. Zepbound was not covered by insurance and not sure if PA was done?     Current treatment:  1. Otezla (OFF)     Rheumatologic History:   - Diagnosis/es:  - Positive serologies:  - Infectious screening labs:  - Previous Treatments:  - Current Treatments:     Interval History:   Hospitalization since last office visit: No    Patient Active Problem List    Diagnosis Date Noted    PSA (psoriatic arthritis) 12/20/2023    Aortic calcification 04/08/2023    Moderate persistent asthma without complication 03/22/2023    Malignant (primary) neoplasm, unspecified 03/19/2022    Vitamin D deficiency 01/17/2020    Neck pain 07/30/2019    History of bladder cancer 03/20/2019    Hypertension     Allergic rhinitis     HLD (hyperlipidemia)     History of breast cancer 05/02/2013    Osteoporosis, post-menopausal 12/06/2012    Other disorders of vitreous 06/09/2011    Family history of ischemic heart disease 05/17/2011    Personal history of antineoplastic chemotherapy 05/17/2011    Personal history of irradiation, presenting  hazards to health 05/17/2011    Unspecified hearing loss 10/07/2010    Hypermetropia 01/25/2010     Past Surgical History:   Procedure Laterality Date    ADENOIDECTOMY      APPENDECTOMY      BLADDER TUMOR EXCISION  1979    [facility], dr garcia - no recurrences since    BREAST BIOPSY Right     4 core bx negative    BREAST SURGERY Left 1998    ESOPHAGOGASTRODUODENOSCOPY N/A 04/21/2022    Procedure: ESOPHAGOGASTRODUODENOSCOPY (EGD);  Surgeon: Guru Maddox MD;  Location: [facility];  Service: Endoscopy;  Laterality: N/A;    EYE SURGERY  2019    HYSTERECTOMY      MASTECTOMY, PARTIAL  1995    left side - cancer - had radiotherapy 1995, 1998 had chemotherapy    oophrect      pelvic mass      benign    TONSILLECTOMY      TONSILLECTOMY, ADENOIDECTOMY, BILATERAL MYRINGOTOMY AND TUBES      tram flap Left 1998     Social History     Tobacco Use    Smoking status: Never     Passive exposure: Past    Smokeless tobacco: Never   Substance Use Topics    Alcohol use: No    Drug use: No     Family History   Problem Relation Name Age of Onset    Glaucoma Father      Glaucoma Paternal Aunt      Glaucoma Paternal Grandmother      Ovarian cancer Cousin      Cancer Cousin          1st, ovarian    Amblyopia Neg Hx      Blindness Neg Hx      Cataracts Neg Hx      Hypertension Neg Hx      Macular degeneration Neg Hx      Retinal detachment Neg Hx      Strabismus Neg Hx      Stroke Neg Hx      Thyroid disease Neg Hx      Melanoma Neg Hx       Review of patient's allergies indicates:   Allergen Reactions    Prochlorperazine edisylate Anaphylaxis     compazine       Review of Systems   Review of Systems     Current Medications:  Current Outpatient Medications   Medication Instructions    albuterol (PROVENTIL/VENTOLIN HFA) 90 mcg/actuation inhaler 2 puffs, Inhalation, Every 6 hours PRN, Rescue    ALPRAZolam (XANAX) 0.5 mg, Oral, 2 times daily PRN    amLODIPine (NORVASC) 5 mg, Oral, Daily    BREO ELLIPTA 100-25 mcg/dose diskus  "inhaler 1 puff, Inhalation, Daily    fluticasone propionate (FLONASE) 50 mcg, Each Nostril, 2 times daily    hydroCHLOROthiazide (HYDRODIURIL) 12.5 mg, Oral, Daily    latanoprost 0.005 % ophthalmic solution 1 drop, Both Eyes, Nightly    losartan (COZAAR) 100 mg, Oral, Daily    oxybutynin (DITROPAN-XL) 5 mg, Oral    pantoprazole (PROTONIX) 40 mg, Oral    SKYRIZI 150 mg, Subcutaneous, Every 28 days    SKYRIZI 150 mg, Subcutaneous, Every 12 weeks    sulfacetamide sodium-sulfur 10-5 % (w/w) Clsr Topical (Top)    tretinoin (RETIN-A) 0.025 % cream Apply a pea-sized amount to entire face at bedtime, start every other night and increase as tolerated. Take night off if irritation develops.    vitamin D (VITAMIN D3) 5,000 Units, Oral, Daily, 125mg/5000IU         Objective:     Vitals:    09/03/24 1253   BP: 137/76   Pulse: 82   Weight: 84.1 kg (185 lb 6.5 oz)   Height: 5' 2.99" (1.6 m)   PainSc:   3      Body mass index is 32.85 kg/m².     Physical Examinations:  Physical Exam   Constitutional: She is oriented to person, place, and time. She appears distressed.   HENT:   Head: Normocephalic and atraumatic.   Eyes: Pupils are equal, round, and reactive to light. Right eye exhibits no discharge. Left eye exhibits no discharge.   Neck: No thyromegaly present.   Cardiovascular: Normal rate, regular rhythm and normal heart sounds. Exam reveals no gallop and no friction rub.   No murmur heard.  Pulmonary/Chest: Breath sounds normal. She has no wheezes. She has no rales. She exhibits no tenderness.   Abdominal: There is no abdominal tenderness. There is no rebound and no guarding.   Musculoskeletal:         General: Tenderness and deformity present.      Right shoulder: Tenderness present.      Left shoulder: Tenderness present.      Right elbow: Normal.      Left elbow: Tenderness present.      Right wrist: Tenderness present.      Left wrist: Tenderness present.      Cervical back: Neck supple.      Right knee: Effusion present. " Tenderness present.      Left knee: Effusion present. Tenderness present.   Lymphadenopathy:     She has no cervical adenopathy.   Neurological: She is alert and oriented to person, place, and time. Gait normal.   Skin: Skin is dry. No rash noted. No erythema. There is pallor.   Psychiatric: Mood and affect normal.   Vitals reviewed.      Right Side Rheumatological Exam     Examination finds the elbow normal.    The patient is tender to palpation of the shoulder, wrist, knee, 1st PIP, 1st MCP, 2nd PIP, 2nd MCP, 3rd PIP, 3rd MCP, 4th PIP, 4th MCP, 5th PIP and 5th MCP    She has swelling of the 1st PIP, 1st MCP, 2nd PIP, 2nd MCP, 3rd PIP, 3rd MCP, 4th PIP, 4th MCP, 5th PIP and 5th MCP    Shoulder Exam   Tenderness Location: no tenderness    Range of Motion   Active abduction:  abnormal   Adduction: abnormal  Sensation: normal    Knee Exam   Patellofemoral Crepitus: positive  Effusion: positive  Sensation: normal    Hip Exam   Tenderness Location: posterior  Sensation: normal    Elbow/Wrist Exam   Tenderness Location: no tenderness  Sensation: normal    Left Side Rheumatological Exam     The patient is tender to palpation of the shoulder, elbow, wrist, knee, 1st PIP, 1st MCP, 2nd PIP, 2nd MCP, 3rd PIP, 3rd MCP, 4th PIP, 4th MCP, 5th PIP and 5th MCP.    She has swelling of the 1st PIP, 1st MCP, 2nd PIP, 2nd MCP, 3rd PIP, 3rd MCP, 4th PIP, 4th MCP, 5th PIP and 5th MCP    Shoulder Exam   Tenderness Location: no tenderness    Range of Motion   Active abduction:  abnormal   Sensation: normal    Knee Exam     Patellofemoral Crepitus: positive  Effusion: positive  Sensation: normal    Hip Exam   Tenderness Location: posterior  Sensation: normal    Elbow/Wrist Exam   Sensation: normal      Back/Neck Exam   General Inspection   Gait: normal            Disease Assessment Scores:  Patient's Global Assessment of arthritis (0-10): 1  Physician's Global Assessment of arthritis (0-10): 1  Number of Tender Joints (0-28): 1  Number of  "Swollen Joints (0-28): 1        1/2/2024     7:13 PM   Rapid3 Question Responses and Scores   MDHAQ Score 0.4   Psychologic Score 3.3   Pain Score 3.5   When you awakened in the morning OVER THE LAST WEEK, did you feel stiff? Yes   If Yes, please indicate the number of hours until you are as limber as you will be for the day 1   Fatigue Score 5   Global Health Score 5   RAPID3 Score 3.28       Monitoring Lab Results:  Lab Results   Component Value Date    WBC 8.76 02/15/2024    RBC 4.61 02/15/2024    HGB 13.8 02/15/2024    HCT 40.3 02/15/2024    MCV 87 02/15/2024    MCH 29.9 02/15/2024    MCHC 34.2 02/15/2024    RDW 13.5 02/15/2024     02/15/2024        Lab Results   Component Value Date     05/20/2024    K 4.1 05/20/2024     05/20/2024    CO2 26 05/20/2024     (H) 05/20/2024    BUN 15 05/20/2024    CREATININE 0.9 05/20/2024    CALCIUM 9.4 05/20/2024    PROT 7.0 05/20/2024    ALBUMIN 3.7 05/20/2024    BILITOT 0.3 05/20/2024    ALKPHOS 151 (H) 05/20/2024    AST 23 05/20/2024    ALT 31 05/20/2024    ANIONGAP 7 (L) 05/20/2024    EGFRNORACEVR >60.0 05/20/2024       Lab Results   Component Value Date    SEDRATE 26 12/28/2023    CRP 10.8 (H) 12/28/2023        Lab Results   Component Value Date    BLAELLPT80YQ 47 05/20/2024    RSLUXCBB16 367 05/14/2020        Lab Results   Component Value Date    CHOL 202 (H) 05/20/2024    HDL 56 05/20/2024    LDLCALC 124.2 05/20/2024    TRIG 109 05/20/2024       Lab Results   Component Value Date    RF <10.0 12/23/2020    CCPANTIBODIE <0.5 12/23/2020     Lab Results   Component Value Date    ANASCREEN Positive (A) 04/28/2022    ANATITER 1:320 04/28/2022    DSDNA Negative 1:10 04/28/2022     Lab Results   Component Value Date    HLABB27 Negative 05/14/2020       Infectious Disease Screening:  No results found for: "HEPBSAG", "HEPBCAB", "HEPBSAB", "HEPBSURFABQU", "HEPBIGM"  Lab Results   Component Value Date    HEPCAB Negative 01/05/2015     No results found for: " ""TBGOLDPLUS", "QUANTTBGDPL"  No results found for: "QUANTIFERON", "SVCMT", "QUANTAGVALUE", "QUANTNILVALU", "QUANTMITOGEN", "QFTTBAG", "QINT"     Imaging: DEXA, Xrays, MRIs, CTs, etc    Old & Outside Medical Records:  Reviewed old and all outside medical records available in Care Everywhere     Assessment:     Encounter Diagnoses   Name Primary?    PSA (psoriatic arthritis) Yes    Psoriasis     PMR (polymyalgia rheumatica)     Uveitis of both eyes        Plan:      Encounter Diagnoses   Name Primary?    PSA (psoriatic arthritis) Yes    Psoriasis     PMR (polymyalgia rheumatica)     Uveitis of both eyes      Yuliana was seen today for disease management.    Diagnoses and all orders for this visit:    PSA (psoriatic arthritis)  -     risankizumab-rzaa (SKYRIZI) 150 mg/mL PnIj; Inject 150 mg into the skin every 28 days.  -     risankizumab-rzaa (SKYRIZI) 150 mg/mL PnIj; Inject 150 mg into the skin every 12 weeks.  -     Hepatitis C Antibody; Future  -     Quantiferon Gold TB; Future  -     Hepatitis B Surface Antigen; Future  -     Hepatitis B Surface Antibody, Qual/Quant; Future  -     Hepatitis B Core Antibody, Total; Future  -     Hepatitis B Surface Ab, Qualitative; Future  -     fosfomycin (MONUROL) 3 gram Pack; Take 3 g by mouth once. for 1 dose  -     Urinalysis; Standing  -     CULTURE, URINE; Standing    Psoriasis  -     risankizumab-rzaa (SKYRIZI) 150 mg/mL PnIj; Inject 150 mg into the skin every 28 days.  -     risankizumab-rzaa (SKYRIZI) 150 mg/mL PnIj; Inject 150 mg into the skin every 12 weeks.  -     Hepatitis C Antibody; Future  -     Quantiferon Gold TB; Future  -     Hepatitis B Surface Antigen; Future  -     Hepatitis B Surface Antibody, Qual/Quant; Future  -     Hepatitis B Core Antibody, Total; Future  -     Hepatitis B Surface Ab, Qualitative; Future  -     fosfomycin (MONUROL) 3 gram Pack; Take 3 g by mouth once. for 1 dose  -     Urinalysis; Standing  -     CULTURE, URINE; Standing    PMR " (polymyalgia rheumatica)  -     Hepatitis C Antibody; Future  -     Quantiferon Gold TB; Future  -     Hepatitis B Surface Antigen; Future  -     Hepatitis B Surface Antibody, Qual/Quant; Future  -     Hepatitis B Core Antibody, Total; Future  -     Hepatitis B Surface Ab, Qualitative; Future  -     fosfomycin (MONUROL) 3 gram Pack; Take 3 g by mouth once. for 1 dose  -     Urinalysis; Standing  -     CULTURE, URINE; Standing    Uveitis of both eyes  -     Hepatitis C Antibody; Future  -     Quantiferon Gold TB; Future  -     Hepatitis B Surface Antigen; Future  -     Hepatitis B Surface Antibody, Qual/Quant; Future  -     Hepatitis B Core Antibody, Total; Future  -     Hepatitis B Surface Ab, Qualitative; Future  -     fosfomycin (MONUROL) 3 gram Pack; Take 3 g by mouth once. for 1 dose  -     Urinalysis; Standing  -     CULTURE, URINE; Standing    Urinary tract infection without hematuria, site unspecified  -     fosfomycin (MONUROL) 3 gram Pack; Take 3 g by mouth once. for 1 dose    Bacteremia  -     CULTURE, URINE; Standing      After her L cataract surgery if having a flare we will start skyrizi    1. Consider skyrizi  2. Cleared for surgery  3. F/u 6 months     Follow-up 6 months    More than 50% of the  67  minute encounter was spent face to face counseling the patient regarding current status and future plan of care as well as side effects  of the medications. All questions were answered to patient's satisfaction also includes  non-face to face time preparing to see the patient (eg, review of tests), Obtaining and/or reviewing separately obtained history, Documenting clinical information in the electronic or other health record, Independently interpreting results

## 2024-09-16 ENCOUNTER — LAB VISIT (OUTPATIENT)
Dept: LAB | Facility: HOSPITAL | Age: 79
End: 2024-09-16
Attending: INTERNAL MEDICINE
Payer: MEDICARE

## 2024-09-16 DIAGNOSIS — L40.9 PSORIASIS: ICD-10-CM

## 2024-09-16 DIAGNOSIS — H20.9 UVEITIS OF BOTH EYES: ICD-10-CM

## 2024-09-16 DIAGNOSIS — M35.3 PMR (POLYMYALGIA RHEUMATICA): ICD-10-CM

## 2024-09-16 DIAGNOSIS — L40.50 PSA (PSORIATIC ARTHRITIS): ICD-10-CM

## 2024-09-16 DIAGNOSIS — N39.0 URINARY TRACT INFECTION WITHOUT HEMATURIA, SITE UNSPECIFIED: ICD-10-CM

## 2024-09-16 LAB
ALBUMIN SERPL BCP-MCNC: 3.6 G/DL (ref 3.5–5.2)
ALP SERPL-CCNC: 158 U/L (ref 55–135)
ALT SERPL W/O P-5'-P-CCNC: 29 U/L (ref 10–44)
ANION GAP SERPL CALC-SCNC: 13 MMOL/L (ref 8–16)
AST SERPL-CCNC: 21 U/L (ref 10–40)
BASOPHILS # BLD AUTO: 0.09 K/UL (ref 0–0.2)
BASOPHILS NFR BLD: 1.2 % (ref 0–1.9)
BILIRUB SERPL-MCNC: 0.4 MG/DL (ref 0.1–1)
BUN SERPL-MCNC: 14 MG/DL (ref 8–23)
CALCIUM SERPL-MCNC: 9.8 MG/DL (ref 8.7–10.5)
CHLORIDE SERPL-SCNC: 102 MMOL/L (ref 95–110)
CO2 SERPL-SCNC: 24 MMOL/L (ref 23–29)
CREAT SERPL-MCNC: 0.8 MG/DL (ref 0.5–1.4)
CRP SERPL-MCNC: 16.2 MG/L (ref 0–8.2)
DIFFERENTIAL METHOD BLD: ABNORMAL
EOSINOPHIL # BLD AUTO: 0.2 K/UL (ref 0–0.5)
EOSINOPHIL NFR BLD: 2 % (ref 0–8)
ERYTHROCYTE [DISTWIDTH] IN BLOOD BY AUTOMATED COUNT: 13.8 % (ref 11.5–14.5)
ERYTHROCYTE [SEDIMENTATION RATE] IN BLOOD BY PHOTOMETRIC METHOD: 26 MM/HR (ref 0–36)
EST. GFR  (NO RACE VARIABLE): >60 ML/MIN/1.73 M^2
GLUCOSE SERPL-MCNC: 136 MG/DL (ref 70–110)
HBV CORE AB SERPL QL IA: NORMAL
HBV SURFACE AB SER-ACNC: <3 MIU/ML
HBV SURFACE AB SER-ACNC: NORMAL M[IU]/ML
HBV SURFACE AG SERPL QL IA: NORMAL
HCT VFR BLD AUTO: 41 % (ref 37–48.5)
HCV AB SERPL QL IA: NORMAL
HGB BLD-MCNC: 13.4 G/DL (ref 12–16)
IMM GRANULOCYTES # BLD AUTO: 0.07 K/UL (ref 0–0.04)
IMM GRANULOCYTES NFR BLD AUTO: 0.9 % (ref 0–0.5)
LYMPHOCYTES # BLD AUTO: 1.1 K/UL (ref 1–4.8)
LYMPHOCYTES NFR BLD: 15 % (ref 18–48)
MCH RBC QN AUTO: 29.5 PG (ref 27–31)
MCHC RBC AUTO-ENTMCNC: 32.7 G/DL (ref 32–36)
MCV RBC AUTO: 90 FL (ref 82–98)
MONOCYTES # BLD AUTO: 0.6 K/UL (ref 0.3–1)
MONOCYTES NFR BLD: 8.2 % (ref 4–15)
NEUTROPHILS # BLD AUTO: 5.5 K/UL (ref 1.8–7.7)
NEUTROPHILS NFR BLD: 72.7 % (ref 38–73)
NRBC BLD-RTO: 0 /100 WBC
PLATELET # BLD AUTO: 293 K/UL (ref 150–450)
PMV BLD AUTO: 10.3 FL (ref 9.2–12.9)
POTASSIUM SERPL-SCNC: 3.2 MMOL/L (ref 3.5–5.1)
PROT SERPL-MCNC: 7.1 G/DL (ref 6–8.4)
RBC # BLD AUTO: 4.54 M/UL (ref 4–5.4)
SODIUM SERPL-SCNC: 139 MMOL/L (ref 136–145)
WBC # BLD AUTO: 7.53 K/UL (ref 3.9–12.7)

## 2024-09-16 PROCEDURE — 85025 COMPLETE CBC W/AUTO DIFF WBC: CPT | Mod: HCNC | Performed by: INTERNAL MEDICINE

## 2024-09-16 PROCEDURE — 86706 HEP B SURFACE ANTIBODY: CPT | Mod: 91,HCNC | Performed by: INTERNAL MEDICINE

## 2024-09-16 PROCEDURE — 85652 RBC SED RATE AUTOMATED: CPT | Mod: HCNC | Performed by: INTERNAL MEDICINE

## 2024-09-16 PROCEDURE — 86803 HEPATITIS C AB TEST: CPT | Mod: HCNC | Performed by: INTERNAL MEDICINE

## 2024-09-16 PROCEDURE — 80053 COMPREHEN METABOLIC PANEL: CPT | Mod: HCNC | Performed by: INTERNAL MEDICINE

## 2024-09-16 PROCEDURE — 87340 HEPATITIS B SURFACE AG IA: CPT | Mod: HCNC | Performed by: INTERNAL MEDICINE

## 2024-09-16 PROCEDURE — 86704 HEP B CORE ANTIBODY TOTAL: CPT | Mod: HCNC | Performed by: INTERNAL MEDICINE

## 2024-09-16 PROCEDURE — 86140 C-REACTIVE PROTEIN: CPT | Mod: HCNC | Performed by: INTERNAL MEDICINE

## 2024-09-16 PROCEDURE — 86480 TB TEST CELL IMMUN MEASURE: CPT | Mod: HCNC | Performed by: INTERNAL MEDICINE

## 2024-09-16 PROCEDURE — 86706 HEP B SURFACE ANTIBODY: CPT | Mod: HCNC | Performed by: INTERNAL MEDICINE

## 2024-09-18 LAB
GAMMA INTERFERON BACKGROUND BLD IA-ACNC: 0.01 IU/ML
M TB IFN-G CD4+ BCKGRND COR BLD-ACNC: 0 IU/ML
M TB IFN-G CD4+ BCKGRND COR BLD-ACNC: 0.01 IU/ML
MITOGEN IGNF BCKGRD COR BLD-ACNC: 5.18 IU/ML
TB GOLD PLUS: NEGATIVE

## 2024-09-19 LAB
HBV SURFACE AB SER QL IA: NEGATIVE
HBV SURFACE AB SERPL IA-ACNC: <3 MIU/ML

## 2024-09-24 ENCOUNTER — LAB VISIT (OUTPATIENT)
Dept: LAB | Facility: HOSPITAL | Age: 79
End: 2024-09-24
Payer: MEDICARE

## 2024-09-24 DIAGNOSIS — H20.9 UVEITIS OF BOTH EYES: ICD-10-CM

## 2024-09-24 DIAGNOSIS — R78.81 BACTEREMIA: ICD-10-CM

## 2024-09-24 DIAGNOSIS — L40.50 PSA (PSORIATIC ARTHRITIS): ICD-10-CM

## 2024-09-24 DIAGNOSIS — M35.3 PMR (POLYMYALGIA RHEUMATICA): ICD-10-CM

## 2024-09-24 DIAGNOSIS — L40.9 PSORIASIS: ICD-10-CM

## 2024-09-24 LAB
BACTERIA #/AREA URNS HPF: ABNORMAL /HPF
BILIRUB UR QL STRIP: NEGATIVE
CLARITY UR: ABNORMAL
COLOR UR: YELLOW
GLUCOSE UR QL STRIP: NEGATIVE
HGB UR QL STRIP: NEGATIVE
KETONES UR QL STRIP: NEGATIVE
LEUKOCYTE ESTERASE UR QL STRIP: NEGATIVE
MICROSCOPIC COMMENT: ABNORMAL
NITRITE UR QL STRIP: POSITIVE
PH UR STRIP: 6 [PH] (ref 5–8)
PROT UR QL STRIP: NEGATIVE
SP GR UR STRIP: 1.02 (ref 1–1.03)
URN SPEC COLLECT METH UR: ABNORMAL
WBC #/AREA URNS HPF: 12 /HPF (ref 0–5)

## 2024-09-24 PROCEDURE — 87086 URINE CULTURE/COLONY COUNT: CPT | Mod: HCNC | Performed by: INTERNAL MEDICINE

## 2024-09-24 PROCEDURE — 87186 SC STD MICRODIL/AGAR DIL: CPT | Mod: HCNC | Performed by: INTERNAL MEDICINE

## 2024-09-24 PROCEDURE — 81000 URINALYSIS NONAUTO W/SCOPE: CPT | Mod: HCNC,PO | Performed by: INTERNAL MEDICINE

## 2024-09-24 PROCEDURE — 87088 URINE BACTERIA CULTURE: CPT | Mod: HCNC | Performed by: INTERNAL MEDICINE

## 2024-09-26 LAB — BACTERIA UR CULT: ABNORMAL

## 2024-10-01 DIAGNOSIS — I10 HYPERTENSION, UNSPECIFIED TYPE: ICD-10-CM

## 2024-10-01 NOTE — TELEPHONE ENCOUNTER
No care due was identified.  [facility] Embedded Care Due Messages. Reference number: [id number].   10/01/2024 2:44:39 PM CDT

## 2024-10-02 RX ORDER — AMLODIPINE BESYLATE 5 MG/1
5 TABLET ORAL DAILY
Qty: 90 TABLET | Refills: 3 | Status: SHIPPED | OUTPATIENT
Start: 2024-10-02 | End: 2025-10-02

## 2024-10-02 NOTE — TELEPHONE ENCOUNTER
Refill Routing Note   Medication(s) are not appropriate for processing by [facility] for the following reason(s):        No active prescription written by provider  New or recently adjusted medication    [facility] action(s):  Defer               Appointments  past 12m or future 3m with PCP    Date Provider   Last Visit   5/27/2024 Tommy Palumbo MD   Next Visit   Visit date not found Tommy Palumbo MD   ED visits in past 90 days: 0        Note composed:2:15 AM 10/02/2024

## 2024-10-15 ENCOUNTER — LAB VISIT (OUTPATIENT)
Dept: LAB | Facility: HOSPITAL | Age: 79
End: 2024-10-15
Attending: INTERNAL MEDICINE
Payer: MEDICARE

## 2024-10-15 DIAGNOSIS — M35.3 PMR (POLYMYALGIA RHEUMATICA): ICD-10-CM

## 2024-10-15 DIAGNOSIS — L40.9 PSORIASIS: ICD-10-CM

## 2024-10-15 DIAGNOSIS — L40.50 PSA (PSORIATIC ARTHRITIS): ICD-10-CM

## 2024-10-15 DIAGNOSIS — H20.9 UVEITIS OF BOTH EYES: ICD-10-CM

## 2024-10-15 DIAGNOSIS — R78.81 BACTEREMIA: ICD-10-CM

## 2024-10-15 LAB
BILIRUB UR QL STRIP: NEGATIVE
CLARITY UR: ABNORMAL
COLOR UR: YELLOW
GLUCOSE UR QL STRIP: NEGATIVE
HGB UR QL STRIP: ABNORMAL
KETONES UR QL STRIP: NEGATIVE
LEUKOCYTE ESTERASE UR QL STRIP: NEGATIVE
NITRITE UR QL STRIP: NEGATIVE
PH UR STRIP: 6 [PH] (ref 5–8)
PROT UR QL STRIP: NEGATIVE
SP GR UR STRIP: 1.02 (ref 1–1.03)
URN SPEC COLLECT METH UR: ABNORMAL

## 2024-10-15 PROCEDURE — 87086 URINE CULTURE/COLONY COUNT: CPT | Mod: HCNC | Performed by: INTERNAL MEDICINE

## 2024-10-15 PROCEDURE — 81003 URINALYSIS AUTO W/O SCOPE: CPT | Mod: HCNC,PO | Performed by: INTERNAL MEDICINE

## 2024-10-15 PROCEDURE — 87088 URINE BACTERIA CULTURE: CPT | Mod: HCNC | Performed by: INTERNAL MEDICINE

## 2024-10-15 PROCEDURE — 87186 SC STD MICRODIL/AGAR DIL: CPT | Mod: HCNC | Performed by: INTERNAL MEDICINE

## 2024-10-17 ENCOUNTER — TELEPHONE (OUTPATIENT)
Dept: UROLOGY | Facility: CLINIC | Age: 79
End: 2024-10-17
Payer: MEDICARE

## 2024-10-17 ENCOUNTER — OFFICE VISIT (OUTPATIENT)
Dept: UROLOGY | Facility: CLINIC | Age: 79
End: 2024-10-17
Payer: MEDICARE

## 2024-10-17 ENCOUNTER — PATIENT MESSAGE (OUTPATIENT)
Dept: RHEUMATOLOGY | Facility: CLINIC | Age: 79
End: 2024-10-17
Payer: MEDICARE

## 2024-10-17 VITALS — HEIGHT: 63 IN | BODY MASS INDEX: 32.77 KG/M2 | WEIGHT: 184.94 LBS

## 2024-10-17 DIAGNOSIS — N39.0 RECURRENT UTI: ICD-10-CM

## 2024-10-17 DIAGNOSIS — R39.9 UTI SYMPTOMS: Primary | ICD-10-CM

## 2024-10-17 DIAGNOSIS — Z79.2 PROPHYLACTIC ANTIBIOTIC: ICD-10-CM

## 2024-10-17 LAB — BACTERIA UR CULT: ABNORMAL

## 2024-10-17 PROCEDURE — 99999 PR PBB SHADOW E&M-EST. PATIENT-LVL III: CPT | Mod: PBBFAC,HCNC,,

## 2024-10-17 PROCEDURE — 1160F RVW MEDS BY RX/DR IN RCRD: CPT | Mod: HCNC,CPTII,S$GLB,

## 2024-10-17 PROCEDURE — 99213 OFFICE O/P EST LOW 20 MIN: CPT | Mod: HCNC,S$GLB,,

## 2024-10-17 PROCEDURE — 1159F MED LIST DOCD IN RCRD: CPT | Mod: HCNC,CPTII,S$GLB,

## 2024-10-17 PROCEDURE — 1101F PT FALLS ASSESS-DOCD LE1/YR: CPT | Mod: HCNC,CPTII,S$GLB,

## 2024-10-17 PROCEDURE — 3288F FALL RISK ASSESSMENT DOCD: CPT | Mod: HCNC,CPTII,S$GLB,

## 2024-10-17 PROCEDURE — 1125F AMNT PAIN NOTED PAIN PRSNT: CPT | Mod: HCNC,CPTII,S$GLB,

## 2024-10-17 RX ORDER — CEPHALEXIN 250 MG/1
250 CAPSULE ORAL DAILY
Qty: 90 CAPSULE | Refills: 1 | Status: SHIPPED | OUTPATIENT
Start: 2024-10-17 | End: 2025-04-15

## 2024-10-17 RX ORDER — GRANULES FOR ORAL 3 G/1
3 POWDER ORAL DAILY
Qty: 6 G | Refills: 0 | Status: SHIPPED | OUTPATIENT
Start: 2024-10-17 | End: 2024-10-19

## 2024-10-17 NOTE — PROGRESS NOTES
[facility] Note  Staff: Ana Dunaway FNP-C    PCP: MD Deepali    Chief Complaint: UTI Symptoms    Subjective:        HPI: Yuliana Mattson is a 79 y.o. female presents today for evaluation of UTI symptoms.  She states she has felt these symptoms for the past few days and had a urine culture done 2 days ago by her rheumatologist.  She has not been prescribed antibiotics.  We have previously discussed the use of vaginal estrogen cream as a preventative.  She is concerned with this as she has a history of breast cancer but we will reach out to her provider to see if this is acceptable.  We also discussed the use of D mannose supplementation daily.  She has undergone full urological workup in the past which has all been negative.    Questions asked pt during office visit today:  Urgency:Yes , incontinence with urgency? No;   DysuriaYes   Gross HematuriaNo  History of UTI: Yes       REVIEW OF SYSTEMS:  Review of Systems   Constitutional: Negative.  Negative for chills and fever.   Gastrointestinal:  Positive for abdominal pain. Negative for constipation, diarrhea, nausea and vomiting.   Genitourinary:  Positive for dysuria, frequency and urgency. Negative for flank pain and hematuria.   Musculoskeletal:  Negative for back pain.       PMHx:  Past Medical History:   Diagnosis Date    Allergic rhinitis     Anticoagulant long-term use     ASPIRIN ONLY - (stops it when she presents a hemorrhagic cystitis)    Bladder cancer     Blood transfusion     Breast cancer     CAD (coronary artery disease), native coronary artery     40% non obstructive    Cholelithiasis     Endometriosis     Headache(784.0)     HEARING LOSS     Hemorrhoids     HLD (hyperlipidemia)     Hypertension     Iritis     Left wrist fracture 2009    traumatic    Malignant neoplasm of other specified sites of bladder 12/3/2009    Osteoporosis, postmenopausal     PONV (postoperative nausea and vomiting)     Rash     Rosacea     UTI  (urinary tract infection)     Vaginal delivery     x 2       PSHx:  Past Surgical History:   Procedure Laterality Date    ADENOIDECTOMY      APPENDECTOMY      BLADDER TUMOR EXCISION  1979    [facility], dr garcia - no recurrences since    BREAST BIOPSY Right     4 core bx negative    BREAST SURGERY Left 1998    ESOPHAGOGASTRODUODENOSCOPY N/A 04/21/2022    Procedure: ESOPHAGOGASTRODUODENOSCOPY (EGD);  Surgeon: Guru Maddox MD;  Location: [facility];  Service: Endoscopy;  Laterality: N/A;    EYE SURGERY  2019    HYSTERECTOMY      MASTECTOMY, PARTIAL  1995    left side - cancer - had radiotherapy 1995, 1998 had chemotherapy    oophrect      pelvic mass      benign    TONSILLECTOMY      TONSILLECTOMY, ADENOIDECTOMY, BILATERAL MYRINGOTOMY AND TUBES      tram flap Left 1998       Fam Hx:   malignancies: No , gyn malignancies: Yes - cousin ovarian cancer   kidney stones: No     Soc Hx:  , lives in [place]    Allergies:  Prochlorperazine edisylate    Medications: reviewed     Objective:   There were no vitals filed for this visit.    Physical Exam  Constitutional:       Appearance: Normal appearance.   Pulmonary:      Effort: Pulmonary effort is normal.   Abdominal:      Palpations: Abdomen is soft.      Tenderness: There is no right CVA tenderness or left CVA tenderness.   Musculoskeletal:         General: Normal range of motion.      Cervical back: Normal range of motion.   Skin:     General: Skin is warm.   Neurological:      Mental Status: She is oriented to person, place, and time.   Psychiatric:         Mood and Affect: Mood normal.         Behavior: Behavior normal.           Assessment:       1. UTI symptoms    2. Recurrent UTI    3. Prophylactic antibiotic          Plan:      Fosfomycin 3 gram doses x 2 prescribed  Keflex 250 daily prescribed as a prophylactic  Pt will contact our office if able to use vaginal estrogen cream    F/u as needed    [facility]: Active    Ana Dunaway  FNP-C

## 2024-10-17 NOTE — TELEPHONE ENCOUNTER
Called pt to inform pt that appt today is not needed due to pt has a positive urine culture that needs to be treated by ordering provider. Pt was not happy and stated she is keeping her appt and she needs to speak with provider as she keeps getting Daron's and she would like to be on a maintenance medication to help. Pt was informed that if she comes in today she will be getting a catherized urine and won't be treated off another ordering providers urine. She stated that provider told her before she does not need a cath UA pt again was informed that she can discuss this with provider once she gets here.

## 2024-10-17 NOTE — PATIENT INSTRUCTIONS
Thank you very much for reaching out with questions about your experience with recurrent urinary tract infections. We want to make sure that all of our patients have all of the information they need to properly deal with such a troublesome problem. Hopefully, this message will be helpful to you.     Urinary tract infections (UTI) are considered recurrent when they happen 3 (or more) times in a year or 2 (or more) times in 6 months. It's important to remember that infections should be associated with symptoms such as burning, pain, urinary frequency or new urinary incontinence. Each infection should also be confirmed with a urine culture. Cloudy urine or strong-smelling urine alone without other accompanying symptoms is generally not considered an indication of an infection.     Some patients with recurrent UTI will require an more extensive evaluation if there is a concern for an underlying abnormality. There are many reasons why this might be suspected but a previous urologic surgery or not emptying your bladder completely are the most common ones.  Additional studies might include a CT or ultrasound of the kidney. Some patients may need to have the bladder checked with a small scope. Much of the time, such testing is not needed.     Perhaps 10-20% of people may be found to have an abnormality of the urinary tract which explains their infections. But, for most people, there is no obvious problem found to clearly explain the infections. It may be that some people are naturally more likely than others to develop a UTI. For some, changes that occur with age, including menopause, may contribute to infections. It is very likely that recent use of antibiotics considerably increases the risk of another UTI.      Once a Urology evaluation is complete, you do not need to continue to follow in the Urology Department unless we have requested that you return. You do not need to return to the Urology Department to have  treatment for future infections.  We can provide you with advice on ways to reduce your risk of further infections. This might include over-the-counter medications or even prescriptions. Unfortunately, for some patients, it is not possible to eliminate their increased risk of infection and you may continue to experience frequent infections.     If you have symptoms of a urinary tract infection, an urgent care center is often the quickest place to be evaluated and treated for a urinary tract infection. An appointment with your primary care provider is an alternative if an urgent care center is not available. Unfortunately, the Urology Department is not an ideal location to have a suspected UTI evaluated and most of the time you will be asked to be evaluated at an urgent care center if you reach out to us about a potential infection. We do not recommend beginning antibiotic therapy for a possible UTI without checking a urine culture first.     If we found an abnormality at the time of your evaluation (such as a stone or incomplete emptying), we will work to correct the surgical problem. If you either did not need an extensive evaluation (U/S, CT, cystoscopy, etc) or have completed a normal evaluation (this may be 80-90%), several measures may help to reduce the risk of future infections:     Cranberry Juice and Extracts - Chemicals found in cranberry may bind to infection-causing bacteria and prevent them from adhering to the bladder. It is reasonable for patients worried about infections to use this as a preventative with important limitations. Cranberry juice can be high in sugar which may cause problems in diabetics. Cranberry cocktails that contain only a portion of cranberry juice, often do not contain enough PAC to have the intended effect. There are studies showing effectiveness of cranberry extract but it is difficult to know the amount of the active chemicals in cranberry pills.      D-mannose - D-mannose  acts in much the same way as cranberry. D-mannose is well-tolerated and has been shown in several studies to have a significant impact on reducing risk of recurrent infection after completion of an antibiotic. Like cranberry, this should be taken twice daily for effect. I usually recommend that you take 1000 mg of D-mannose twice daily. The brand does not matter.     Probiotics - The impact of probiotics is unclear, largely due to the variety of available strains. There is more evidence supporting the use of the specific strains Lactobacillus rhamnosus GR-1 and Lactobacillus reuteri RC-14.  Culturelle and RepHresh both make probiotics designed for urinary tract health that contain these bacteria.     Estrogen  -  Vaginal estrogen therapy, which replaces the effects of estrogen on the vaginal tissues, reduces the risk of recurrent UTI in menopausal women. Women who are near or past menopause and experience recurrent UTI should consider using vaginal estrogen therapy to reduce their risk of infections. Given the risks for some patients, this should be discussed with your gynecologist.      I hope that this has provided some insight as well as next steps that you can take to reduce the risk of urinary tract infections. If we can be of further help, please do not hesitate to make an office appointment so that we can further discuss your needs.     Kindest regards,  [facility]

## 2024-11-14 ENCOUNTER — OFFICE VISIT (OUTPATIENT)
Dept: FAMILY MEDICINE | Facility: CLINIC | Age: 79
End: 2024-11-14
Payer: MEDICARE

## 2024-11-14 VITALS
DIASTOLIC BLOOD PRESSURE: 88 MMHG | TEMPERATURE: 99 F | SYSTOLIC BLOOD PRESSURE: 150 MMHG | WEIGHT: 177.94 LBS | HEART RATE: 82 BPM | BODY MASS INDEX: 32.74 KG/M2 | OXYGEN SATURATION: 97 % | HEIGHT: 62 IN

## 2024-11-14 DIAGNOSIS — R30.0 DYSURIA: Primary | ICD-10-CM

## 2024-11-14 DIAGNOSIS — I10 PRIMARY HYPERTENSION: ICD-10-CM

## 2024-11-14 LAB
BILIRUBIN, UA POC OHS: NEGATIVE
BLOOD, UA POC OHS: ABNORMAL
CLARITY, UA POC OHS: CLEAR
COLOR, UA POC OHS: YELLOW
GLUCOSE, UA POC OHS: NEGATIVE
KETONES, UA POC OHS: NEGATIVE
LEUKOCYTES, UA POC OHS: ABNORMAL
NITRITE, UA POC OHS: NEGATIVE
PH, UA POC OHS: 6
PROTEIN, UA POC OHS: NEGATIVE
SPECIFIC GRAVITY, UA POC OHS: 1.01
UROBILINOGEN, UA POC OHS: 0.2

## 2024-11-14 PROCEDURE — 81003 URINALYSIS AUTO W/O SCOPE: CPT | Mod: QW,HCNC,S$GLB, | Performed by: NURSE PRACTITIONER

## 2024-11-14 PROCEDURE — 1125F AMNT PAIN NOTED PAIN PRSNT: CPT | Mod: HCNC,CPTII,S$GLB, | Performed by: NURSE PRACTITIONER

## 2024-11-14 PROCEDURE — 1101F PT FALLS ASSESS-DOCD LE1/YR: CPT | Mod: HCNC,CPTII,S$GLB, | Performed by: NURSE PRACTITIONER

## 2024-11-14 PROCEDURE — 99999 PR PBB SHADOW E&M-EST. PATIENT-LVL IV: CPT | Mod: PBBFAC,HCNC,, | Performed by: NURSE PRACTITIONER

## 2024-11-14 PROCEDURE — 87186 SC STD MICRODIL/AGAR DIL: CPT | Mod: 59,HCNC | Performed by: NURSE PRACTITIONER

## 2024-11-14 PROCEDURE — 87086 URINE CULTURE/COLONY COUNT: CPT | Mod: HCNC | Performed by: NURSE PRACTITIONER

## 2024-11-14 PROCEDURE — 3077F SYST BP >= 140 MM HG: CPT | Mod: HCNC,CPTII,S$GLB, | Performed by: NURSE PRACTITIONER

## 2024-11-14 PROCEDURE — 87088 URINE BACTERIA CULTURE: CPT | Mod: HCNC | Performed by: NURSE PRACTITIONER

## 2024-11-14 PROCEDURE — 99213 OFFICE O/P EST LOW 20 MIN: CPT | Mod: HCNC,S$GLB,, | Performed by: NURSE PRACTITIONER

## 2024-11-14 PROCEDURE — 3288F FALL RISK ASSESSMENT DOCD: CPT | Mod: HCNC,CPTII,S$GLB, | Performed by: NURSE PRACTITIONER

## 2024-11-14 PROCEDURE — 1159F MED LIST DOCD IN RCRD: CPT | Mod: HCNC,CPTII,S$GLB, | Performed by: NURSE PRACTITIONER

## 2024-11-14 PROCEDURE — 3079F DIAST BP 80-89 MM HG: CPT | Mod: HCNC,CPTII,S$GLB, | Performed by: NURSE PRACTITIONER

## 2024-11-14 RX ORDER — CIPROFLOXACIN 500 MG/1
500 TABLET ORAL 2 TIMES DAILY
Qty: 10 TABLET | Refills: 0 | Status: SHIPPED | OUTPATIENT
Start: 2024-11-14 | End: 2024-11-19

## 2024-11-14 RX ORDER — BRINZOLAMIDE/BRIMONIDINE TARTRATE 10; 2 MG/ML; MG/ML
1 SUSPENSION/ DROPS OPHTHALMIC 2 TIMES DAILY
COMMUNITY
Start: 2024-11-01

## 2024-11-14 RX ORDER — MONTELUKAST SODIUM 10 MG/1
10 TABLET ORAL DAILY
COMMUNITY
Start: 2024-09-09

## 2024-11-14 RX ORDER — PREDNISOLONE ACETATE 10 MG/ML
1 SUSPENSION/ DROPS OPHTHALMIC 4 TIMES DAILY
COMMUNITY
Start: 2024-06-11

## 2024-11-14 NOTE — PROGRESS NOTES
Subjective:       Patient ID: Yuliana Mattson is a 79 y.o. female.    Chief Complaint: Follow-up and Urinary Tract Infection (Low blood pressure )    Hypertension     pt reports she felt weak 4 days ago and BP was 83/55. Pt stopped amlodipine and bP readings have been improved. Pt reports her general weakness is improved. Brought home BP log and readings have been normal.    Started with burning with urination, frequency, urinary incontinence for over 1 month but worse over past 5 days. Has had 7 UTI since 4/2024. Last treated with fosfomycin X 2 doses on 10/17/24 for positive urine culture. Pt was prescribed preventative dose of keflex and estradiol cream. History of estrogen positive breast cancer and pelvic mass at age 50 and had a hysterectomy. Pt was told to avoid taking estrogen. Also had bladder cancer at age 34. Had normal CT urogram and cystoscope in 2022    Followed by rheumatology for psoriatic arthritis.      Past Medical History:   Diagnosis Date    Allergic rhinitis     Anticoagulant long-term use     ASPIRIN ONLY - (stops it when she presents a hemorrhagic cystitis)    Bladder cancer     Blood transfusion     Breast cancer     CAD (coronary artery disease), native coronary artery     40% non obstructive    Cholelithiasis     Endometriosis     Headache(784.0)     HEARING LOSS     Hemorrhoids     HLD (hyperlipidemia)     Hypertension     Iritis     Left wrist fracture 2009    traumatic    Malignant neoplasm of other specified sites of bladder 12/3/2009    Osteoporosis, postmenopausal     PONV (postoperative nausea and vomiting)     Rash     Rosacea     UTI (urinary tract infection)     Vaginal delivery     x 2       Past Surgical History:   Procedure Laterality Date    ADENOIDECTOMY      APPENDECTOMY      BLADDER TUMOR EXCISION  1979    [facility], dr garcia - no recurrences since    BREAST BIOPSY Right     4 core bx negative    BREAST SURGERY Left 1998    ESOPHAGOGASTRODUODENOSCOPY N/A  04/21/2022    Procedure: ESOPHAGOGASTRODUODENOSCOPY (EGD);  Surgeon: Guru Maddox MD;  Location: [facility];  Service: Endoscopy;  Laterality: N/A;    EYE SURGERY  2019    HYSTERECTOMY      MASTECTOMY, PARTIAL  1995    left side - cancer - had radiotherapy 1995, 1998 had chemotherapy    oophrect      pelvic mass      benign    TONSILLECTOMY      TONSILLECTOMY, ADENOIDECTOMY, BILATERAL MYRINGOTOMY AND TUBES      tram flap Left 1998       Review of patient's allergies indicates:   Allergen Reactions    Prochlorperazine edisylate Anaphylaxis     compazine       Social History     Socioeconomic History    Marital status:    Occupational History    Occupation: retired accounting   Tobacco Use    Smoking status: Never     Passive exposure: Past    Smokeless tobacco: Never   Substance and Sexual Activity    Alcohol use: No    Drug use: No     Social Drivers of Health     Financial Resource Strain: Low Risk  (8/31/2023)    Overall Financial Resource Strain (CARDIA)     Difficulty of Paying Living Expenses: Not hard at all   Food Insecurity: No Food Insecurity (2/13/2024)    Hunger Vital Sign     Worried About Running Out of Food in the Last Year: Never true     Ran Out of Food in the Last Year: Never true   Transportation Needs: No Transportation Needs (2/13/2024)    PRAPARE - Transportation     Lack of Transportation (Medical): No     Lack of Transportation (Non-Medical): No   Physical Activity: Inactive (2/13/2024)    Exercise Vital Sign     Days of Exercise per Week: 0 days     Minutes of Exercise per Session: 0 min   Stress: Stress Concern Present (2/13/2024)    [employer] - Occupational Stress Questionnaire     Feeling of Stress : To some extent   Housing Stability: Low Risk  (2/13/2024)    Housing Stability Vital Sign     Unable to Pay for Housing in the Last Year: No     Number of Places Lived in the Last Year: 1     Unstable Housing in the Last Year: No       Current  Outpatient Medications on File Prior to Visit   Medication Sig Dispense Refill    albuterol (PROVENTIL/VENTOLIN HFA) 90 mcg/actuation inhaler Inhale 2 puffs into the lungs every 6 (six) hours as needed for Wheezing or Shortness of Breath. Rescue 18 g 11    BREO ELLIPTA 100-25 mcg/dose diskus inhaler Inhale 1 puff into the lungs once daily. 60 each 11    fluticasone propionate (FLONASE) 50 mcg/actuation nasal spray 1 spray (50 mcg total) by Each Nostril route 2 (two) times a day. 16 g 2    hydroCHLOROthiazide (HYDRODIURIL) 12.5 MG Tab Take 1 tablet (12.5 mg total) by mouth once daily. 30 tablet 5    losartan (COZAAR) 100 MG tablet Take 1 tablet (100 mg total) by mouth once daily. 90 tablet 3    montelukast (SINGULAIR) 10 mg tablet Take 10 mg by mouth once daily.      oxybutynin (DITROPAN-XL) 5 MG TR24 TAKE 1 TABLET(5 MG) BY MOUTH EVERY DAY (Patient taking differently: Take 5 mg by mouth as needed.) 30 tablet 3    pantoprazole (PROTONIX) 40 MG tablet TAKE 1 TABLET(40 MG) BY MOUTH EVERY DAY 90 tablet 3    prednisoLONE acetate (PRED FORTE) 1 % DrpS Place 1 drop into both eyes 4 (four) times daily.      risankizumab-rzaa (SKYRIZI) 150 mg/mL PnIj Inject 150 mg into the skin every 28 days. 1 mL 1    risankizumab-rzaa (SKYRIZI) 150 mg/mL PnIj Inject 150 mg into the skin every 12 weeks. 1 mL 4    SIMBRINZA 1-0.2 % DrpS Place 1 drop into both eyes 2 (two) times a day.      sulfacetamide sodium-sulfur 10-5 % (w/w) Clsr Apply topically.      tretinoin (RETIN-A) 0.025 % cream Apply a pea-sized amount to entire face at bedtime, start every other night and increase as tolerated. Take night off if irritation develops. 45 g 3    vitamin D (VITAMIN D3) 1000 units Tab Take 5,000 Units by mouth once daily. 125mg/5000IU      ALPRAZolam (XANAX) 0.5 MG tablet Take 1 tablet (0.5 mg total) by mouth 2 (two) times daily as needed for Anxiety or Insomnia. 30 tablet 0    cephALEXin (KEFLEX) 250 MG capsule Take 1 capsule (250 mg total) by mouth  "once daily. (Patient not taking: Reported on 11/14/2024) 90 capsule 1    cranberry extract-d-mannose 500-50 mg Chew Take 500 mg by mouth once daily.      [DISCONTINUED] amLODIPine (NORVASC) 5 MG tablet Take 1 tablet (5 mg total) by mouth once daily. (Patient not taking: Reported on 11/14/2024) 90 tablet 3    [DISCONTINUED] latanoprost 0.005 % ophthalmic solution Place 1 drop into both eyes every evening. (Patient not taking: Reported on 11/14/2024)       No current facility-administered medications on file prior to visit.       Family History   Problem Relation Name Age of Onset    Glaucoma Father      Glaucoma Paternal Aunt      Glaucoma Paternal Grandmother      Ovarian cancer Cousin      Cancer Cousin          1st, ovarian    Amblyopia Neg Hx      Blindness Neg Hx      Cataracts Neg Hx      Hypertension Neg Hx      Macular degeneration Neg Hx      Retinal detachment Neg Hx      Strabismus Neg Hx      Stroke Neg Hx      Thyroid disease Neg Hx      Melanoma Neg Hx         Review of Systems   Genitourinary:  Positive for dysuria, frequency and urgency.       Objective:      BP (!) 150/88 (Patient Position: Sitting)   Pulse 82   Temp 98.5 °F (36.9 °C) (Oral)   Ht 5' 2" (1.575 m)   Wt 80.7 kg (177 lb 14.6 oz)   SpO2 97%   BMI 32.54 kg/m²   Physical Exam  Vitals and nursing note reviewed.   Constitutional:       General: She is not in acute distress.     Appearance: Normal appearance. She is well-groomed.   HENT:      Head: Normocephalic.      Right Ear: External ear normal.      Left Ear: External ear normal.      Nose: Nose normal.      Mouth/Throat:      Lips: Pink.      Mouth: Mucous membranes are moist.      Pharynx: Oropharynx is clear. No oropharyngeal exudate or posterior oropharyngeal erythema.   Eyes:      Extraocular Movements: Extraocular movements intact.      Conjunctiva/sclera: Conjunctivae normal.      Pupils: Pupils are equal, round, and reactive to light.   Cardiovascular:      Rate and Rhythm: " Normal rate and regular rhythm.      Heart sounds: Normal heart sounds. No murmur heard.  Pulmonary:      Effort: Pulmonary effort is normal.      Breath sounds: Normal breath sounds.   Chest:      Chest wall: No tenderness.   Abdominal:      General: Bowel sounds are normal.      Palpations: Abdomen is soft.      Tenderness: There is abdominal tenderness (suprapubic). There is no right CVA tenderness or left CVA tenderness.   Musculoskeletal:         General: No swelling or tenderness. Normal range of motion.      Cervical back: Normal range of motion and neck supple.      Right lower leg: No edema.      Left lower leg: No edema.   Lymphadenopathy:      Cervical: No cervical adenopathy.   Skin:     General: Skin is warm and dry.   Neurological:      General: No focal deficit present.      Mental Status: She is alert and oriented to person, place, and time. Mental status is at baseline.      Gait: Gait is intact.   Psychiatric:         Mood and Affect: Mood normal.         Behavior: Behavior normal.         Assessment:       1. Dysuria    2. Primary hypertension        Plan:       Dysuria  -     POCT Urinalysis(Instrument)  -     CULTURE, URINE; Future; Expected date: 11/14/2024    Primary hypertension    Other orders  -     ciprofloxacin HCl (CIPRO) 500 MG tablet; Take 1 tablet (500 mg total) by mouth 2 (two) times daily. for 5 days  Dispense: 10 tablet; Refill: 0        HTN: continue losartan. Will stay off of losartan. Continue to monitor BP at home and RTC if 140/90 or greater    Dysuria: start cipro. Urine culture sent. Increase water intake.

## 2024-11-16 LAB — BACTERIA UR CULT: ABNORMAL

## 2024-11-18 ENCOUNTER — PATIENT MESSAGE (OUTPATIENT)
Dept: UROLOGY | Facility: CLINIC | Age: 79
End: 2024-11-18
Payer: MEDICARE

## 2024-11-18 ENCOUNTER — PATIENT MESSAGE (OUTPATIENT)
Dept: FAMILY MEDICINE | Facility: CLINIC | Age: 79
End: 2024-11-18
Payer: MEDICARE

## 2024-11-18 DIAGNOSIS — N39.0 RECURRENT UTI: Primary | ICD-10-CM

## 2024-11-18 DIAGNOSIS — N39.0 URINARY TRACT INFECTION WITHOUT HEMATURIA, SITE UNSPECIFIED: Primary | ICD-10-CM

## 2024-11-19 RX ORDER — ESTRADIOL 0.1 MG/G
CREAM VAGINAL
Qty: 42.5 G | Refills: 3 | Status: SHIPPED | OUTPATIENT
Start: 2024-11-20

## 2024-11-22 ENCOUNTER — OFFICE VISIT (OUTPATIENT)
Dept: RHEUMATOLOGY | Facility: CLINIC | Age: 79
End: 2024-11-22
Payer: MEDICARE

## 2024-11-22 ENCOUNTER — PATIENT MESSAGE (OUTPATIENT)
Dept: PODIATRY | Facility: CLINIC | Age: 79
End: 2024-11-22
Payer: MEDICARE

## 2024-11-22 VITALS
SYSTOLIC BLOOD PRESSURE: 111 MMHG | DIASTOLIC BLOOD PRESSURE: 75 MMHG | WEIGHT: 174.19 LBS | HEART RATE: 87 BPM | HEIGHT: 62 IN | BODY MASS INDEX: 32.05 KG/M2

## 2024-11-22 DIAGNOSIS — L40.9 PSORIASIS: ICD-10-CM

## 2024-11-22 DIAGNOSIS — H20.9 UVEITIS OF BOTH EYES: ICD-10-CM

## 2024-11-22 DIAGNOSIS — L40.50 PSA (PSORIATIC ARTHRITIS): Primary | ICD-10-CM

## 2024-11-22 PROCEDURE — 1125F AMNT PAIN NOTED PAIN PRSNT: CPT | Mod: HCNC,CPTII,S$GLB, | Performed by: PHYSICIAN ASSISTANT

## 2024-11-22 PROCEDURE — 1101F PT FALLS ASSESS-DOCD LE1/YR: CPT | Mod: HCNC,CPTII,S$GLB, | Performed by: PHYSICIAN ASSISTANT

## 2024-11-22 PROCEDURE — 1159F MED LIST DOCD IN RCRD: CPT | Mod: HCNC,CPTII,S$GLB, | Performed by: PHYSICIAN ASSISTANT

## 2024-11-22 PROCEDURE — 3288F FALL RISK ASSESSMENT DOCD: CPT | Mod: HCNC,CPTII,S$GLB, | Performed by: PHYSICIAN ASSISTANT

## 2024-11-22 PROCEDURE — 1160F RVW MEDS BY RX/DR IN RCRD: CPT | Mod: HCNC,CPTII,S$GLB, | Performed by: PHYSICIAN ASSISTANT

## 2024-11-22 PROCEDURE — 3074F SYST BP LT 130 MM HG: CPT | Mod: HCNC,CPTII,S$GLB, | Performed by: PHYSICIAN ASSISTANT

## 2024-11-22 PROCEDURE — 99999 PR PBB SHADOW E&M-EST. PATIENT-LVL IV: CPT | Mod: PBBFAC,HCNC,, | Performed by: PHYSICIAN ASSISTANT

## 2024-11-22 PROCEDURE — 3078F DIAST BP <80 MM HG: CPT | Mod: HCNC,CPTII,S$GLB, | Performed by: PHYSICIAN ASSISTANT

## 2024-11-22 PROCEDURE — 99214 OFFICE O/P EST MOD 30 MIN: CPT | Mod: HCNC,S$GLB,, | Performed by: PHYSICIAN ASSISTANT

## 2024-11-22 RX ORDER — KETOROLAC TROMETHAMINE 5 MG/ML
1 SOLUTION OPHTHALMIC 4 TIMES DAILY
COMMUNITY
Start: 2024-09-30

## 2024-11-22 RX ORDER — IBUPROFEN 600 MG/1
600 TABLET ORAL 2 TIMES DAILY
COMMUNITY
Start: 2024-10-18

## 2024-11-22 ASSESSMENT — ROUTINE ASSESSMENT OF PATIENT INDEX DATA (RAPID3)
FATIGUE SCORE: 2.2
PATIENT GLOBAL ASSESSMENT SCORE: 3
PAIN SCORE: 3
MDHAQ FUNCTION SCORE: 0.3
PSYCHOLOGICAL DISTRESS SCORE: 2.2
TOTAL RAPID3 SCORE: 2.33

## 2024-11-22 NOTE — PROGRESS NOTES
Subjective:       Patient ID: Yuliana Mattson is a 79 y.o. female.    Chief Complaint: Disease Management    Mrs. Mattson is a 79 year old female who presents to clinic for follow up on psoriatic arthritis and sicca syndrome. Gustavo ordered last visit, but she has not started treatment yet. She has been suffering with recurrent UTI since earlier in the year. She completed course of cipro 11/19 and she has no sx of UTI at this time. She plans to start prophylaxis with keflex tomorrow.     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 She is followed by Retina Specialist Dr. Mac Andrade.     She also has ongoing psoriasis in her ears. She has pain in both thumbs up the forearms, plantar fasciitis, and R ankle pain. She has pain as well in her neck and thoracic spine. She has adjusted her activity level based on her pain/fatigue.     Current treatment:  1. Skyrizi (has not started treatment yet)    Prior treatment:  1. Otezla (OFF)      Review of Systems   Constitutional:  Negative for activity change, appetite change, chills, fatigue and fever.   Eyes:  Negative for visual disturbance.   Respiratory:  Negative for cough and shortness of breath.    Cardiovascular:  Negative for chest pain, palpitations and leg swelling.   Gastrointestinal:  Negative for abdominal pain, constipation, diarrhea, nausea and vomiting.   Musculoskeletal:  Positive for arthralgias and joint swelling.   Neurological:  Negative for dizziness, weakness, light-headedness and headaches.         Objective:     Vitals:    11/22/24 0949   BP: 111/75   Pulse: 87       Past Medical History:   Diagnosis Date    Allergic rhinitis     Anticoagulant long-term use     ASPIRIN ONLY - (stops it when she presents a hemorrhagic cystitis)    Bladder cancer     Blood transfusion     Breast cancer     CAD (coronary artery disease), native coronary artery     40% non obstructive    Cholelithiasis     Endometriosis      Headache(784.0)     HEARING LOSS     Hemorrhoids     HLD (hyperlipidemia)     Hypertension     Iritis     Left wrist fracture 2009    traumatic    Malignant neoplasm of other specified sites of bladder 12/3/2009    Osteoporosis, postmenopausal     PONV (postoperative nausea and vomiting)     Rash     Rosacea     UTI (urinary tract infection)     Vaginal delivery     x 2     Past Surgical History:   Procedure Laterality Date    ADENOIDECTOMY      APPENDECTOMY      BLADDER TUMOR EXCISION  1979    [facility], [place], dr garcia - no recurrences since    BREAST BIOPSY Right     4 core bx negative    BREAST SURGERY Left 1998    ESOPHAGOGASTRODUODENOSCOPY N/A 04/21/2022    Procedure: ESOPHAGOGASTRODUODENOSCOPY (EGD);  Surgeon: Guru Maddox MD;  [facility];  Service: Endoscopy;  Laterality: N/A;    EYE SURGERY  2019    HYSTERECTOMY      MASTECTOMY, PARTIAL  1995    left side - cancer - had radiotherapy 1995, 1998 had chemotherapy    oophrect      pelvic mass      benign    TONSILLECTOMY      TONSILLECTOMY, ADENOIDECTOMY, BILATERAL MYRINGOTOMY AND TUBES      tram flap Left 1998          Physical Exam   Constitutional: She is oriented to person, place, and time.   Eyes: Right conjunctiva is not injected. Left conjunctiva is not injected.   Cardiovascular: Normal rate.   Pulmonary/Chest: Effort normal.   Musculoskeletal:      Right wrist: Tenderness present.      Left wrist: Tenderness present.   Neurological: She is alert and oriented to person, place, and time. Gait normal.   Skin: Rash (peeling in the ear canals) noted.   Psychiatric: Mood and affect normal.       Right Side Rheumatological Exam     The patient is tender to palpation of the wrist and 1st CMC    Left Side Rheumatological Exam     The patient is tender to palpation of the wrist and 1st CMC.      Back/Neck Exam   Tenderness Right paramedian tenderness of the Lower C-Spine and Upper T-Spine.Left paramedian tenderness of the Lower C-Spine and  Upper T-Spine.          Component      Latest Ref Rng 9/16/2024 11/14/2024   WBC      3.90 - 12.70 K/uL 7.53     RBC      4.00 - 5.40 M/uL 4.54     Hemoglobin      12.0 - 16.0 g/dL 13.4     Hematocrit      37.0 - 48.5 % 41.0     MCV      82 - 98 fL 90     MCH      27.0 - 31.0 pg 29.5     MCHC      32.0 - 36.0 g/dL 32.7     RDW      11.5 - 14.5 % 13.8     Platelet Count      150 - 450 K/uL 293     MPV      9.2 - 12.9 fL 10.3     Immature Granulocytes      0.0 - 0.5 % 0.9 (H)     Gran # (ANC)      1.8 - 7.7 K/uL 5.5     Immature Grans (Abs)      0.00 - 0.04 K/uL 0.07 (H)     Lymph #      1.0 - 4.8 K/uL 1.1     Mono #      0.3 - 1.0 K/uL 0.6     Eos #      0.0 - 0.5 K/uL 0.2     Baso #      0.00 - 0.20 K/uL 0.09     nRBC      0 /100 WBC 0     Gran %      38.0 - 73.0 % 72.7     Lymph %      18.0 - 48.0 % 15.0 (L)     Mono %      4.0 - 15.0 % 8.2     Eos %      0.0 - 8.0 % 2.0     Basophil %      0.0 - 1.9 % 1.2     Differential Method Automated     Sodium      136 - 145 mmol/L 139     Potassium      3.5 - 5.1 mmol/L 3.2 (L)     Chloride      95 - 110 mmol/L 102     CO2      23 - 29 mmol/L 24     Glucose      70 - 110 mg/dL 136 (H)     BUN      8 - 23 mg/dL 14     Creatinine      0.5 - 1.4 mg/dL 0.8     Calcium      8.7 - 10.5 mg/dL 9.8     PROTEIN TOTAL      6.0 - 8.4 g/dL 7.1     Albumin      3.5 - 5.2 g/dL 3.6     BILIRUBIN TOTAL      0.1 - 1.0 mg/dL 0.4     ALP      55 - 135 U/L 158 (H)     AST      10 - 40 U/L 21     ALT      10 - 44 U/L 29     eGFR      >60 mL/min/1.73 m^2 >60.0     Anion Gap      8 - 16 mmol/L 13     Color, POC UA      Yellow, Straw, Colorless   Yellow    Clarity, POC UA      Clear   Clear    Glucose, POC UA      Negative   Negative    Bilirubin, POC UA      Negative   Negative    Ketones, POC UA      Negative   Negative    Spec Grav POC UA      1.005 - 1.030   1.010    Blood, POC UA      Negative   Trace-intact !    pH, POC UA      5.0 - 8.0   6.0    Protein, POC UA      Negative   Negative     Urobilinogen, POC UA      <=1.0   0.2    Nitrite, POC UA      Negative   Negative    WBC, POC UA      Negative   Trace !    NIL      IU/mL 0.21548     TB1 - Nil      IU/mL 0.002     TB2 - Nil      IU/mL 0.006     Mitogen - Nil      IU/mL 5.180     TB Gold Plus      Negative  Negative     Hep. B Surf Ab, Qual Negative     Hep. B Surf Ab, Quant.      mIU/mL <3     Hep B S Ab      mIU/mL <3.00     Hep B S Ab       Non-reactive     Hepatitis C Ab      Non-reactive  Non-reactive     Hepatitis B Surface Ag      Non-reactive  Non-reactive     Hep B Core Total Ab      Non-reactive  Non-reactive     Sed Rate      0 - 36 mm/Hr 26     CRP      0.0 - 8.2 mg/L 16.2 (H)          Assessment and Plan:       1. PSA (psoriatic arthritis) (Primary)  Start Skyrizi 150 mg wk 0, wk 4 then every 12 weeks. Will avoid TNF inhibitor with history of breast and bladder cancer.  - CBC Auto Differential; Future  - Comprehensive Metabolic Panel; Future  - C-Reactive Protein; Future  - Sedimentation rate; Future    2. Psoriasis  Start Skyrizi as above    3. Uveitis of both eyes  Start Skyrizi as above  Continue to follow with Retina Specialist    As an aside:  For medication monitoring/disease process monitoring I have added these labs:   - CBC Auto Differential; Future  - Comprehensive Metabolic Panel; Future  - C-Reactive Protein; Future  - Sedimentation rate; Future        The patient agrees to the above plan and if they have any other concerns they will notify me    Follow up:  4 mo Dr. Hughes with labs prior

## 2024-11-24 ENCOUNTER — PATIENT MESSAGE (OUTPATIENT)
Dept: RHEUMATOLOGY | Facility: CLINIC | Age: 79
End: 2024-11-24
Payer: MEDICARE

## 2025-01-09 ENCOUNTER — OFFICE VISIT (OUTPATIENT)
Dept: FAMILY MEDICINE | Facility: CLINIC | Age: 80
End: 2025-01-09
Payer: MEDICARE

## 2025-01-09 VITALS
HEART RATE: 74 BPM | OXYGEN SATURATION: 98 % | RESPIRATION RATE: 18 BRPM | HEIGHT: 62 IN | WEIGHT: 168.44 LBS | BODY MASS INDEX: 31 KG/M2 | SYSTOLIC BLOOD PRESSURE: 142 MMHG | DIASTOLIC BLOOD PRESSURE: 74 MMHG

## 2025-01-09 DIAGNOSIS — N39.0 RECURRENT UTI: Primary | ICD-10-CM

## 2025-01-09 DIAGNOSIS — K21.9 GASTROESOPHAGEAL REFLUX DISEASE, UNSPECIFIED WHETHER ESOPHAGITIS PRESENT: ICD-10-CM

## 2025-01-09 DIAGNOSIS — L29.9 ITCHING OF EAR: ICD-10-CM

## 2025-01-09 LAB
BILIRUBIN, UA POC OHS: NEGATIVE
BLOOD, UA POC OHS: ABNORMAL
CLARITY, UA POC OHS: ABNORMAL
COLOR, UA POC OHS: YELLOW
GLUCOSE, UA POC OHS: NEGATIVE
KETONES, UA POC OHS: NEGATIVE
LEUKOCYTES, UA POC OHS: ABNORMAL
NITRITE, UA POC OHS: POSITIVE
PH, UA POC OHS: 6
PROTEIN, UA POC OHS: NEGATIVE
SPECIFIC GRAVITY, UA POC OHS: 1.01
UROBILINOGEN, UA POC OHS: 0.2

## 2025-01-09 PROCEDURE — 3288F FALL RISK ASSESSMENT DOCD: CPT | Mod: CPTII,S$GLB,, | Performed by: FAMILY MEDICINE

## 2025-01-09 PROCEDURE — 99999 PR PBB SHADOW E&M-EST. PATIENT-LVL III: CPT | Mod: PBBFAC,,, | Performed by: FAMILY MEDICINE

## 2025-01-09 PROCEDURE — 3078F DIAST BP <80 MM HG: CPT | Mod: CPTII,S$GLB,, | Performed by: FAMILY MEDICINE

## 2025-01-09 PROCEDURE — 87186 SC STD MICRODIL/AGAR DIL: CPT | Performed by: FAMILY MEDICINE

## 2025-01-09 PROCEDURE — 87086 URINE CULTURE/COLONY COUNT: CPT | Performed by: FAMILY MEDICINE

## 2025-01-09 PROCEDURE — 81003 URINALYSIS AUTO W/O SCOPE: CPT | Mod: QW,S$GLB,, | Performed by: FAMILY MEDICINE

## 2025-01-09 PROCEDURE — 1125F AMNT PAIN NOTED PAIN PRSNT: CPT | Mod: CPTII,S$GLB,, | Performed by: FAMILY MEDICINE

## 2025-01-09 PROCEDURE — 3077F SYST BP >= 140 MM HG: CPT | Mod: CPTII,S$GLB,, | Performed by: FAMILY MEDICINE

## 2025-01-09 PROCEDURE — 1159F MED LIST DOCD IN RCRD: CPT | Mod: CPTII,S$GLB,, | Performed by: FAMILY MEDICINE

## 2025-01-09 PROCEDURE — 87088 URINE BACTERIA CULTURE: CPT | Performed by: FAMILY MEDICINE

## 2025-01-09 PROCEDURE — 1101F PT FALLS ASSESS-DOCD LE1/YR: CPT | Mod: CPTII,S$GLB,, | Performed by: FAMILY MEDICINE

## 2025-01-09 PROCEDURE — 99214 OFFICE O/P EST MOD 30 MIN: CPT | Mod: S$GLB,,, | Performed by: FAMILY MEDICINE

## 2025-01-09 PROCEDURE — 1160F RVW MEDS BY RX/DR IN RCRD: CPT | Mod: CPTII,S$GLB,, | Performed by: FAMILY MEDICINE

## 2025-01-09 RX ORDER — CIPROFLOXACIN 500 MG/1
500 TABLET ORAL 2 TIMES DAILY
Qty: 28 TABLET | Refills: 0 | Status: SHIPPED | OUTPATIENT
Start: 2025-01-09 | End: 2025-01-23

## 2025-01-09 RX ORDER — FLUOCINOLONE ACETONIDE 0.11 MG/ML
3 OIL AURICULAR (OTIC) 2 TIMES DAILY PRN
Qty: 20 ML | Refills: 2 | Status: SHIPPED | OUTPATIENT
Start: 2025-01-09 | End: 2026-01-09

## 2025-01-09 RX ORDER — ESOMEPRAZOLE MAGNESIUM 40 MG/1
40 CAPSULE, DELAYED RELEASE ORAL
Qty: 90 CAPSULE | Refills: 3 | Status: SHIPPED | OUTPATIENT
Start: 2025-01-09 | End: 2026-01-09

## 2025-01-09 NOTE — PROGRESS NOTES
Subjective:       Patient ID: Yuliana Mattson is a 79 y.o. female.    Chief Complaint: Urinary Tract Infection    Here with possible UTI.  She has had symptoms for 5 days with urgency, frequency, urethral pain.  She does have recurrent UTIs.  Currently trying vaginal estrogen. She decided not to begin the daily keflex.  Recently treated for Klebsiella UTI with Cipro for 7 days.  Taking cranberry and D-Mannose.    On compounded semaglutide. Lost 20 pounds in 6 months.    C/o some persistent reflux despite Protonix.    Dysuria   This is a recurrent problem. The current episode started in the past 7 days. The problem occurs intermittently. The problem has been gradually worsening. The quality of the pain is described as aching and burning. The pain is at a severity of 4/10. There has been no fever. She is Not sexually active. There is No history of pyelonephritis. Associated symptoms include frequency and urgency. Pertinent negatives include no behavior changes, chills, discharge, flank pain, hematuria, hesitancy, nausea, possible pregnancy, sweats, vomiting, weight loss, constipation, rash or withholding. She has tried acetaminophen, increased fluids and sitz baths for the symptoms. The treatment provided no relief. Her past medical history is significant for hypertension, recurrent UTIs and a urological procedure. There is no history of catheterization, diabetes insipidus, diabetes mellitus, genitourinary reflux, kidney stones, a single kidney or STD.       Past Medical History:   Diagnosis Date    Allergic rhinitis     Anticoagulant long-term use     ASPIRIN ONLY - (stops it when she presents a hemorrhagic cystitis)    Bladder cancer     Blood transfusion     Breast cancer     CAD (coronary artery disease), native coronary artery     40% non obstructive    Cholelithiasis     Endometriosis     Headache(784.0)     HEARING LOSS     Hemorrhoids     HLD (hyperlipidemia)     Hypertension     Iritis     Left wrist  fracture 2009    traumatic    Malignant neoplasm of other specified sites of bladder 12/3/2009    Osteoporosis, postmenopausal     PONV (postoperative nausea and vomiting)     Rash     Rosacea     UTI (urinary tract infection)     Vaginal delivery     x 2       Past Surgical History:   Procedure Laterality Date    ADENOIDECTOMY      APPENDECTOMY      BLADDER TUMOR EXCISION  1979    [facility], dr garcia - no recurrences since    BREAST BIOPSY Right     4 core bx negative    BREAST SURGERY Left 1998    ESOPHAGOGASTRODUODENOSCOPY N/A 04/21/2022    Procedure: ESOPHAGOGASTRODUODENOSCOPY (EGD);  Surgeon: Guru Maddox MD;  Location: [facility];  Service: Endoscopy;  Laterality: N/A;    EYE SURGERY  2019    HYSTERECTOMY      MASTECTOMY, PARTIAL  1995    left side - cancer - had radiotherapy 1995, 1998 had chemotherapy    oophrect      pelvic mass      benign    TONSILLECTOMY      TONSILLECTOMY, ADENOIDECTOMY, BILATERAL MYRINGOTOMY AND TUBES      tram flap Left 1998       Review of patient's allergies indicates:   Allergen Reactions    Prochlorperazine edisylate Anaphylaxis     compazine       Social History     Socioeconomic History    Marital status:    Occupational History    Occupation: retired accounting   Tobacco Use    Smoking status: Never     Passive exposure: Past    Smokeless tobacco: Never   Substance and Sexual Activity    Alcohol use: No    Drug use: No     Social Drivers of Health     Financial Resource Strain: Low Risk  (8/31/2023)    Overall Financial Resource Strain (CARDIA)     Difficulty of Paying Living Expenses: Not hard at all   Food Insecurity: No Food Insecurity (2/13/2024)    Hunger Vital Sign     Worried About Running Out of Food in the Last Year: Never true     Ran Out of Food in the Last Year: Never true   Transportation Needs: No Transportation Needs (2/13/2024)    PRAPARE - Transportation     Lack of Transportation (Medical): No     Lack of Transportation (Non-Medical): No    Physical Activity: Inactive (2/13/2024)    Exercise Vital Sign     Days of Exercise per Week: 0 days     Minutes of Exercise per Session: 0 min   Stress: Stress Concern Present (2/13/2024)    [employer] - Occupational Stress Questionnaire     Feeling of Stress : To some extent   Housing Stability: Low Risk  (2/13/2024)    Housing Stability Vital Sign     Unable to Pay for Housing in the Last Year: No     Number of Places Lived in the Last Year: 1     Unstable Housing in the Last Year: No       Current Outpatient Medications on File Prior to Visit   Medication Sig Dispense Refill    albuterol (PROVENTIL/VENTOLIN HFA) 90 mcg/actuation inhaler Inhale 2 puffs into the lungs every 6 (six) hours as needed for Wheezing or Shortness of Breath. Rescue 18 g 11    ALPRAZolam (XANAX) 0.5 MG tablet Take 1 tablet (0.5 mg total) by mouth 2 (two) times daily as needed for Anxiety or Insomnia. 30 tablet 0    BREO ELLIPTA 100-25 mcg/dose diskus inhaler Inhale 1 puff into the lungs once daily. 60 each 11    cranberry extract-d-mannose 500-50 mg Chew Take 500 mg by mouth once daily.      estradioL (ESTRACE) 0.01 % (0.1 mg/gram) vaginal cream Place 1 fingertip of cream first at urethral opening and then external vagina every other day. Please do not use plastic applicator 42.5 g 3    fluticasone propionate (FLONASE) 50 mcg/actuation nasal spray 1 spray (50 mcg total) by Each Nostril route 2 (two) times a day. 16 g 2    losartan (COZAAR) 100 MG tablet Take 1 tablet (100 mg total) by mouth once daily. 90 tablet 3    montelukast (SINGULAIR) 10 mg tablet Take 10 mg by mouth once daily.      risankizumab-rzaa (SKYRIZI) 150 mg/mL PnIj Inject 150 mg into the skin every 28 days. 1 mL 1    SEMAGLUTIDE, WEIGHT LOSS, SUBQ Inject into the skin once a week.      SIMBRINZA 1-0.2 % DrpS Place 1 drop into both eyes 2 (two) times a day.      sulfacetamide sodium-sulfur 10-5 % (w/w) Clsr Apply topically.      tretinoin  (RETIN-A) 0.025 % cream Apply a pea-sized amount to entire face at bedtime, start every other night and increase as tolerated. Take night off if irritation develops. 45 g 3    vitamin D (VITAMIN D3) 1000 units Tab Take 5,000 Units by mouth once daily. 125mg/5000IU      [DISCONTINUED] pantoprazole (PROTONIX) 40 MG tablet TAKE 1 TABLET(40 MG) BY MOUTH EVERY DAY 90 tablet 3    hydroCHLOROthiazide (HYDRODIURIL) 12.5 MG Tab Take 1 tablet (12.5 mg total) by mouth once daily. (Patient not taking: Reported on 1/9/2025) 30 tablet 5    [DISCONTINUED] cephALEXin (KEFLEX) 250 MG capsule Take 1 capsule (250 mg total) by mouth once daily. (Patient not taking: Reported on 1/9/2025) 90 capsule 1    [DISCONTINUED] fluocinolone acetonide oiL (DERMOTIC OIL) 0.01 % Drop Place 3 drops in ear(s) 2 (two) times daily as needed (itchy ears). 20 mL 2    [DISCONTINUED] ibuprofen (ADVIL,MOTRIN) 600 MG tablet Take 600 mg by mouth 2 (two) times daily.      [DISCONTINUED] ketorolac 0.5% (ACULAR) 0.5 % Drop Place 1 drop into the left eye 4 (four) times daily.      [DISCONTINUED] oxybutynin (DITROPAN-XL) 5 MG TR24 TAKE 1 TABLET(5 MG) BY MOUTH EVERY DAY (Patient taking differently: Take 5 mg by mouth as needed.) 30 tablet 3    [DISCONTINUED] prednisoLONE acetate (PRED FORTE) 1 % DrpS Place 1 drop into both eyes 4 (four) times daily.      [DISCONTINUED] risankizumab-rzaa (SKYRIZI) 150 mg/mL PnIj Inject 150 mg into the skin every 12 weeks. (Patient not taking: Reported on 11/22/2024) 1 mL 4     No current facility-administered medications on file prior to visit.       Family History   Problem Relation Name Age of Onset    Glaucoma Father      Glaucoma Paternal Aunt      Glaucoma Paternal Grandmother      Ovarian cancer Cousin      Cancer Cousin          1st, ovarian    Amblyopia Neg Hx      Blindness Neg Hx      Cataracts Neg Hx      Hypertension Neg Hx      Macular degeneration Neg Hx      Retinal detachment Neg Hx      Strabismus Neg Hx      Stroke  "Neg Hx      Thyroid disease Neg Hx      Melanoma Neg Hx         Review of Systems   Constitutional:  Negative for chills and weight loss.   Gastrointestinal:  Negative for constipation, nausea and vomiting.   Genitourinary:  Positive for dysuria, frequency and urgency. Negative for flank pain, hematuria and hesitancy.   Skin:  Negative for rash.       Objective:      BP (!) 142/74   Pulse 74   Resp 18   Ht 5' 2" (1.575 m)   Wt 76.4 kg (168 lb 6.9 oz)   SpO2 98%   BMI 30.81 kg/m²   Physical Exam  Constitutional:       Appearance: Normal appearance. She is well-developed.   HENT:      Head: Normocephalic.      Mouth/Throat:      Pharynx: No oropharyngeal exudate or posterior oropharyngeal erythema.   Eyes:      Conjunctiva/sclera: Conjunctivae normal.      Pupils: Pupils are equal, round, and reactive to light.   Neck:      Thyroid: No thyromegaly.   Cardiovascular:      Rate and Rhythm: Normal rate and regular rhythm.      Heart sounds: Normal heart sounds, S1 normal and S2 normal. No murmur heard.     No friction rub. No gallop.   Pulmonary:      Effort: Pulmonary effort is normal.      Breath sounds: Normal breath sounds. No wheezing or rales.   Abdominal:      General: Abdomen is flat. Bowel sounds are normal.      Tenderness: There is no abdominal tenderness.   Musculoskeletal:      Cervical back: Normal range of motion and neck supple.      Right lower leg: No edema.      Left lower leg: No edema.   Lymphadenopathy:      Cervical: No cervical adenopathy.   Skin:     General: Skin is warm.      Findings: No rash.   Neurological:      Mental Status: She is alert and oriented to person, place, and time.      Cranial Nerves: No cranial nerve deficit.      Gait: Gait normal.         Results for orders placed or performed in visit on 01/09/25   POCT Urinalysis(Instrument)    Collection Time: 01/09/25 10:59 AM   Result Value Ref Range    Color, POC UA Yellow Yellow, Straw, Colorless    Clarity, POC UA Slight " Cloudy (A) Clear    Glucose, POC UA Negative Negative    Bilirubin, POC UA Negative Negative    Ketones, POC UA Negative Negative    Spec Grav POC UA 1.015 1.005 - 1.030    Blood, POC UA Trace-intact (A) Negative    pH, POC UA 6.0 5.0 - 8.0    Protein, POC UA Negative Negative    Urobilinogen, POC UA 0.2 <=1.0    Nitrite, POC UA Positive (A) Negative    WBC, POC UA Small (A) Negative       Assessment:       1. Recurrent UTI    2. Gastroesophageal reflux disease, unspecified whether esophagitis present    3. Itching of ear        Plan:       Recurrent UTI  -     POCT Urinalysis(Instrument)  -     Urine Culture High Risk; Future; Expected date: 01/09/2025  -     ciprofloxacin HCl (CIPRO) 500 MG tablet; Take 1 tablet (500 mg total) by mouth 2 (two) times daily. for 14 days  Dispense: 28 tablet; Refill: 0    Gastroesophageal reflux disease, unspecified whether esophagitis present  -     esomeprazole (NEXIUM) 40 MG capsule; Take 1 capsule (40 mg total) by mouth before breakfast.  Dispense: 90 capsule; Refill: 3    Itching of ear  -     fluocinolone acetonide oiL (DERMOTIC OIL) 0.01 % Drop; Place 3 drops in ear(s) 2 (two) times daily as needed (itchy ears).  Dispense: 20 mL; Refill: 2      Will treat with longer course of Cipro  Continue vaginal estrogen  Urine culture  Switch from protonix to Nexium  Counseled on regular exercise, maintenance of a healthy weight, balanced diet rich in fruits/vegetables and lean protein, and avoidance of unhealthy habits like smoking and excessive alcohol intake.

## 2025-01-11 LAB — BACTERIA UR CULT: ABNORMAL

## 2025-01-14 ENCOUNTER — OFFICE VISIT (OUTPATIENT)
Dept: PULMONOLOGY | Facility: CLINIC | Age: 80
End: 2025-01-14
Payer: MEDICARE

## 2025-01-14 VITALS — HEIGHT: 62 IN | WEIGHT: 166 LBS | HEART RATE: 68 BPM | OXYGEN SATURATION: 99 % | BODY MASS INDEX: 30.55 KG/M2

## 2025-01-14 DIAGNOSIS — J45.20 MILD INTERMITTENT ASTHMA WITHOUT COMPLICATION: Primary | ICD-10-CM

## 2025-01-14 DIAGNOSIS — J45.40 MODERATE PERSISTENT ASTHMA WITHOUT COMPLICATION: ICD-10-CM

## 2025-01-14 PROCEDURE — 99214 OFFICE O/P EST MOD 30 MIN: CPT | Mod: S$GLB,,, | Performed by: INTERNAL MEDICINE

## 2025-01-14 PROCEDURE — 1101F PT FALLS ASSESS-DOCD LE1/YR: CPT | Mod: CPTII,S$GLB,, | Performed by: INTERNAL MEDICINE

## 2025-01-14 PROCEDURE — 1159F MED LIST DOCD IN RCRD: CPT | Mod: CPTII,S$GLB,, | Performed by: INTERNAL MEDICINE

## 2025-01-14 PROCEDURE — 99999 PR PBB SHADOW E&M-EST. PATIENT-LVL III: CPT | Mod: PBBFAC,,, | Performed by: INTERNAL MEDICINE

## 2025-01-14 PROCEDURE — 3288F FALL RISK ASSESSMENT DOCD: CPT | Mod: CPTII,S$GLB,, | Performed by: INTERNAL MEDICINE

## 2025-01-14 NOTE — PROGRESS NOTES
1/14/2025    Yuliana Mattson  Follow up    Chief Complaint   Patient presents with    Asthma         HPI:   01/14/2025- flovent was not affordable with insurance so she has used breo daily then went to every other day starting November.  She has been having issues with psoriasis and uveitis, also needing to see retinal specialist. She has been taking skyrizi.  She stays on singulair and xyzal.    01/09/2024-   Asthma is doing well  Flovent inhaler 1 puff twice daily- can do this for 3 months, then switch to one puff daily  Albuterol as needed    pt doing well w/o asthma exacerbation. Continues on breo inhaler daily.   she still gets cough sometimes- resolves with albuterol inhaler.  Her sister was dx with lung carcinoid    03/22/2023-   Stop breo 200 inhaler and start breo 100 once daily  Albuterol as needed  Would continue breo 100 through the rest of the year, then at next visit if doing well could consider switching to flovent.    pt has much improved cough since December. She continues taking breo inhaler daily. She did notice slight flare of cough assoc with weed killer spray and pollen recently. She had to use albuterol once since last visit.  She is sensitive to cleaning products, wears N95 when cleaning    09/22/2022-   Use flonase 2 sprays in each nostril daily  Consider adding zyrtec of claritin in addition to singulair to dry up nose mucous  Stop flovent and start breo inhaler one puff daily  Use albuterol inhaler as needed  pt had worsened cough, switched to flovent 220 then was better. 8d now she has noticed worse cough, sticky clear mucous filling up in sinuses she usually gets w/ season change. She has been taking tylenol PM which helps dry up mucous.  Continues on singulair, not sure if helping but she has taken a long time.  She has intermittent coughing spell 10 min long- will get better if expectorates clear mucous.  Inhalers: uses flovent 220 bid, albuterol rarely      6/23/22-   Cough  "suspected due to asthma  Continue flovent inhaler - if cough worsens could increase to high dose  Continue albuterol as needed  Continue rheumatology follow up and treatment  Lungs look good on chest CT  Get lung function testing  Pt is a 75 yo female with HTN, HLD, bladder cancer age 34, breast ca x2 age 50 and 53- did receive radiation 1995, CAD, allergic rhinitis presenting for new evaluation.  Pt has had dry cough since Aug 2021,   c/o coughing spells 15-20 min occasionally. Coughs up mucous then feels better. She used to wake 2-3x/night with cough which is now improved.  Cough seemed to worsen 1/2022- became productive and had fever, sob, weakness, tested neg for COVID with 2 home tests and a PCR but thinks she had covid- sick and bedridden 7d. She was tx with antibiotics 4/13/22- keflex for "possible beginnings of pneumonia" RLL.  Has had cellulitis left leg 5/2022, UTI. Gets recurrent UTIs    Has had some workup per PCP for sinuses, tx for reflux- 40mg omeprazole bid. She did have EGD 4/2022 which was normal but reports coughed the whole time.  She has stopped ACE inhibitor 4/2022 but didn't immediately improve  Cough is now improved on flovent inhaler. Also uses albuterol qhs and prn- uses maybe once/d  Sometimes short of breath w/ coughing spells, with heat seems worse  Symptoms assoc with wheezing  Triggers: cleaning products, wears N95 when cleaning  Since January she feels winded w/ exertion, limits to going in one store when going out due to MEJÍA, fatigue. SOB got much worse now getting better.  Gets allergies- improved on singulair, has restarted since 5/2.  Per Dr Hughes's note she had uveitis, iritis, hearing loss resolved w/ high dose steroids. 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    The chief complaint problem is stable    PFSH:  Past Medical History:   Diagnosis Date    Allergic rhinitis     Anticoagulant long-term use     ASPIRIN ONLY - (stops it when she presents a hemorrhagic cystitis)    Bladder cancer     Blood transfusion     Breast cancer     CAD (coronary artery disease), native coronary artery     40% non obstructive    Cholelithiasis     Endometriosis     Headache(784.0)     HEARING LOSS     Hemorrhoids     HLD (hyperlipidemia)     Hypertension     Iritis     Left wrist fracture 2009    traumatic    Malignant neoplasm of other specified sites of bladder 12/3/2009    Osteoporosis, postmenopausal     PONV (postoperative nausea and vomiting)     Rash     Rosacea     UTI (urinary tract infection)     Vaginal delivery     x 2         Past Surgical History:   Procedure Laterality Date    ADENOIDECTOMY      APPENDECTOMY      BLADDER TUMOR EXCISION  1979    [facility], dr garcia - no recurrences since    BREAST BIOPSY Right     4 core bx negative    BREAST SURGERY Left 1998    ESOPHAGOGASTRODUODENOSCOPY N/A 04/21/2022    Procedure: ESOPHAGOGASTRODUODENOSCOPY (EGD);  Surgeon: Guru Maddox MD;  Location: [facility];  Service: Endoscopy;  Laterality: N/A;    EYE SURGERY  2019    HYSTERECTOMY      MASTECTOMY, PARTIAL  1995    left side - cancer - had radiotherapy 1995, 1998 had chemotherapy    oophrect      pelvic mass      benign    TONSILLECTOMY      TONSILLECTOMY, ADENOIDECTOMY, BILATERAL MYRINGOTOMY AND TUBES      tram flap Left 1998     Social History     Tobacco Use    Smoking status: Never     Passive exposure: Past    Smokeless tobacco: Never   Substance Use Topics    Alcohol use: No    Drug use: No     Family History   Problem Relation Name Age of Onset    Glaucoma Father      Glaucoma Paternal Aunt      Glaucoma Paternal Grandmother      Ovarian cancer Cousin      Cancer Cousin          1st, ovarian    Amblyopia Neg Hx      Blindness  "Neg Hx      Cataracts Neg Hx      Hypertension Neg Hx      Macular degeneration Neg Hx      Retinal detachment Neg Hx      Strabismus Neg Hx      Stroke Neg Hx      Thyroid disease Neg Hx      Melanoma Neg Hx       Review of patient's allergies indicates:   Allergen Reactions    Prochlorperazine edisylate Anaphylaxis     compazine       Performance Status:The patient's activity level is functions out of house.      Review of Systems:  a review of eleven systems covering constitutional, Eye, HEENT, Psych, Respiratory, Cardiac, GI, , Musculoskeletal, Endocrine, Dermatologic was negative except for pertinent findings as listed ABOVE and below:  All negative with pertinent positives as above       Exam:Comprehensive exam done. BP (P) 126/69 (BP Location: Right arm, Patient Position: Sitting)   Pulse 68   Ht 5' 2" (1.575 m)   Wt 75.3 kg (166 lb 0.1 oz)   SpO2 99%   BMI 30.36 kg/m²   Exam included Vitals as listed, and patient's appearance and affect and alertness and mood, oral exam for yeast and hygiene and pharynx lesions and Mallapatti (M) score, neck with inspection for jvd and masses and thyroid abnormalities and lymph nodes (supraclavicular and infraclavicular nodes and axillary also examined and noted if abn), chest exam included symmetry and effort and fremitus and percussion and auscultation, cardiac exam included rhythm and gallops and murmur and rubs and jvd and edema, abdominal exam for mass and hepatosplenomegaly and tenderness and hernias and bowel sounds, Musculoskeletal exam with muscle tone and posture and mobility/gait and  strength, and skin for rashes and cyanosis and pallor and turgor, extremity for clubbing.  Findings were normal except for pertinent findings listed below:  M2, oropharynx clear  HR regular  Breath sounds clear bilaterally  No edema/clubbing/rashes    Radiographs (ct chest and cxr) reviewed: view by direct vision   CT chest 5/6/22- normal lung fields. Aortic calcification. " I do not appreciate any lung nodules. On chest wall left side there are some calcified nodules.  Per radiologist read: A few old calcified pulmonary granulomas are present.    CXR 4/18/22- clear  CXR 4/13/22- possible mild RML infiltrate    Labs reviewed     Latest Reference Range & Units 04/28/22 11:55   LISS Screen Negative <1:80  Positive !   LISS Titer 1  1:320   LISS PATTERN 1  Speckled   ds DNA Ab Negative 1:10  Negative 1:10   Anti-SSA Antibody 0.00 - 0.99 Ratio 0.05   Anti-SSA Interpretation Negative  Negative   Anti-SSB Antibody 0.00 - 0.99 Ratio 0.06   Anti-SSB Interpretation Negative  Negative   Anti Sm Antibody 0.00 - 0.99 Ratio 0.06   Anti-Sm Interpretation Negative  Negative   Anti Sm/RNP Antibody 0.00 - 0.99 Ratio 0.08   Anti-Sm/RNP Interpretation Negative  Negative      Latest Reference Range & Units 12/23/20 12:09   CCP Antibodies <5.0 U/mL <0.5   Rheumatoid Factor 0.0 - 15.0 IU/mL <10.0      Latest Reference Range & Units 12/23/20 12:09   IgG 4 4 - 86 mg/dL 3 (L)        Latest Reference Range & Units 02/11/22 11:39 04/28/22 11:55   Eos # 0.0 - 0.5 K/uL 0.2 0.3        PFT  reviewed  6/2022- normal        Plan:  Clinical impression is resonably certain and repeated evaluation prn +/- follow up will be needed as below. Chronic cough/reactive airways disease- improves w/ inhalers, seasonal. Doing well, asymptomatic and coming off controller now    Yuliana was seen today for asthma.    Diagnoses and all orders for this visit:    Mild intermittent asthma without complication    Moderate persistent asthma without complication          Follow up if symptoms worsen or fail to improve.    Discussed with patient above for education the following:      Patient Instructions   Ok to stop breo inhaler and restart if needed  Albuterol as needed  Continue singulair and xyzal

## 2025-01-29 ENCOUNTER — PATIENT MESSAGE (OUTPATIENT)
Dept: RHEUMATOLOGY | Facility: CLINIC | Age: 80
End: 2025-01-29
Payer: MEDICARE

## 2025-02-17 ENCOUNTER — LAB VISIT (OUTPATIENT)
Dept: LAB | Facility: HOSPITAL | Age: 80
End: 2025-02-17
Payer: MEDICARE

## 2025-02-17 ENCOUNTER — OFFICE VISIT (OUTPATIENT)
Dept: HEMATOLOGY/ONCOLOGY | Facility: CLINIC | Age: 80
End: 2025-02-17
Payer: MEDICARE

## 2025-02-17 VITALS
RESPIRATION RATE: 18 BRPM | OXYGEN SATURATION: 96 % | HEART RATE: 71 BPM | TEMPERATURE: 98 F | BODY MASS INDEX: 29.02 KG/M2 | HEIGHT: 63 IN | WEIGHT: 163.81 LBS | DIASTOLIC BLOOD PRESSURE: 74 MMHG | SYSTOLIC BLOOD PRESSURE: 141 MMHG

## 2025-02-17 DIAGNOSIS — Z85.3 HISTORY OF BREAST CANCER: Primary | ICD-10-CM

## 2025-02-17 DIAGNOSIS — H20.9 UVEITIS OF BOTH EYES: ICD-10-CM

## 2025-02-17 DIAGNOSIS — L40.50 PSA (PSORIATIC ARTHRITIS): ICD-10-CM

## 2025-02-17 DIAGNOSIS — Z85.3 HISTORY OF BREAST CANCER: ICD-10-CM

## 2025-02-17 LAB
ALBUMIN SERPL BCP-MCNC: 3.6 G/DL (ref 3.5–5.2)
ALBUMIN SERPL BCP-MCNC: 3.6 G/DL (ref 3.5–5.2)
ALP SERPL-CCNC: 126 U/L (ref 40–150)
ALP SERPL-CCNC: 126 U/L (ref 40–150)
ALT SERPL W/O P-5'-P-CCNC: 16 U/L (ref 10–44)
ALT SERPL W/O P-5'-P-CCNC: 16 U/L (ref 10–44)
ANION GAP SERPL CALC-SCNC: 11 MMOL/L (ref 8–16)
ANION GAP SERPL CALC-SCNC: 11 MMOL/L (ref 8–16)
AST SERPL-CCNC: 18 U/L (ref 10–40)
AST SERPL-CCNC: 18 U/L (ref 10–40)
BASOPHILS # BLD AUTO: 0.08 K/UL (ref 0–0.2)
BASOPHILS # BLD AUTO: 0.08 K/UL (ref 0–0.2)
BASOPHILS NFR BLD: 1.1 % (ref 0–1.9)
BASOPHILS NFR BLD: 1.1 % (ref 0–1.9)
BILIRUB SERPL-MCNC: 0.3 MG/DL (ref 0.1–1)
BILIRUB SERPL-MCNC: 0.3 MG/DL (ref 0.1–1)
BUN SERPL-MCNC: 10 MG/DL (ref 8–23)
BUN SERPL-MCNC: 10 MG/DL (ref 8–23)
CALCIUM SERPL-MCNC: 10 MG/DL (ref 8.7–10.5)
CALCIUM SERPL-MCNC: 10 MG/DL (ref 8.7–10.5)
CHLORIDE SERPL-SCNC: 108 MMOL/L (ref 95–110)
CHLORIDE SERPL-SCNC: 108 MMOL/L (ref 95–110)
CO2 SERPL-SCNC: 21 MMOL/L (ref 23–29)
CO2 SERPL-SCNC: 21 MMOL/L (ref 23–29)
CREAT SERPL-MCNC: 0.8 MG/DL (ref 0.5–1.4)
CREAT SERPL-MCNC: 0.8 MG/DL (ref 0.5–1.4)
DIFFERENTIAL METHOD BLD: ABNORMAL
DIFFERENTIAL METHOD BLD: ABNORMAL
EOSINOPHIL # BLD AUTO: 0.1 K/UL (ref 0–0.5)
EOSINOPHIL # BLD AUTO: 0.1 K/UL (ref 0–0.5)
EOSINOPHIL NFR BLD: 1.5 % (ref 0–8)
EOSINOPHIL NFR BLD: 1.5 % (ref 0–8)
ERYTHROCYTE [DISTWIDTH] IN BLOOD BY AUTOMATED COUNT: 14 % (ref 11.5–14.5)
ERYTHROCYTE [DISTWIDTH] IN BLOOD BY AUTOMATED COUNT: 14 % (ref 11.5–14.5)
ERYTHROCYTE [SEDIMENTATION RATE] IN BLOOD BY PHOTOMETRIC METHOD: 38 MM/HR (ref 0–36)
EST. GFR  (NO RACE VARIABLE): >60 ML/MIN/1.73 M^2
EST. GFR  (NO RACE VARIABLE): >60 ML/MIN/1.73 M^2
GLUCOSE SERPL-MCNC: 115 MG/DL (ref 70–110)
GLUCOSE SERPL-MCNC: 115 MG/DL (ref 70–110)
HCT VFR BLD AUTO: 38.4 % (ref 37–48.5)
HCT VFR BLD AUTO: 38.4 % (ref 37–48.5)
HGB BLD-MCNC: 12.9 G/DL (ref 12–16)
HGB BLD-MCNC: 12.9 G/DL (ref 12–16)
IMM GRANULOCYTES # BLD AUTO: 0.04 K/UL (ref 0–0.04)
IMM GRANULOCYTES # BLD AUTO: 0.04 K/UL (ref 0–0.04)
IMM GRANULOCYTES NFR BLD AUTO: 0.6 % (ref 0–0.5)
IMM GRANULOCYTES NFR BLD AUTO: 0.6 % (ref 0–0.5)
LYMPHOCYTES # BLD AUTO: 1 K/UL (ref 1–4.8)
LYMPHOCYTES # BLD AUTO: 1 K/UL (ref 1–4.8)
LYMPHOCYTES NFR BLD: 14.2 % (ref 18–48)
LYMPHOCYTES NFR BLD: 14.2 % (ref 18–48)
MCH RBC QN AUTO: 30.4 PG (ref 27–31)
MCH RBC QN AUTO: 30.4 PG (ref 27–31)
MCHC RBC AUTO-ENTMCNC: 33.6 G/DL (ref 32–36)
MCHC RBC AUTO-ENTMCNC: 33.6 G/DL (ref 32–36)
MCV RBC AUTO: 90 FL (ref 82–98)
MCV RBC AUTO: 90 FL (ref 82–98)
MONOCYTES # BLD AUTO: 0.6 K/UL (ref 0.3–1)
MONOCYTES # BLD AUTO: 0.6 K/UL (ref 0.3–1)
MONOCYTES NFR BLD: 7.9 % (ref 4–15)
MONOCYTES NFR BLD: 7.9 % (ref 4–15)
NEUTROPHILS # BLD AUTO: 5.3 K/UL (ref 1.8–7.7)
NEUTROPHILS # BLD AUTO: 5.3 K/UL (ref 1.8–7.7)
NEUTROPHILS NFR BLD: 74.7 % (ref 38–73)
NEUTROPHILS NFR BLD: 74.7 % (ref 38–73)
NRBC BLD-RTO: 0 /100 WBC
NRBC BLD-RTO: 0 /100 WBC
PLATELET # BLD AUTO: 261 K/UL (ref 150–450)
PLATELET # BLD AUTO: 261 K/UL (ref 150–450)
PMV BLD AUTO: 10.1 FL (ref 9.2–12.9)
PMV BLD AUTO: 10.1 FL (ref 9.2–12.9)
POTASSIUM SERPL-SCNC: 3.8 MMOL/L (ref 3.5–5.1)
POTASSIUM SERPL-SCNC: 3.8 MMOL/L (ref 3.5–5.1)
PROT SERPL-MCNC: 6.9 G/DL (ref 6–8.4)
PROT SERPL-MCNC: 6.9 G/DL (ref 6–8.4)
RBC # BLD AUTO: 4.25 M/UL (ref 4–5.4)
RBC # BLD AUTO: 4.25 M/UL (ref 4–5.4)
SODIUM SERPL-SCNC: 140 MMOL/L (ref 136–145)
SODIUM SERPL-SCNC: 140 MMOL/L (ref 136–145)
WBC # BLD AUTO: 7.1 K/UL (ref 3.9–12.7)
WBC # BLD AUTO: 7.1 K/UL (ref 3.9–12.7)

## 2025-02-17 PROCEDURE — 85652 RBC SED RATE AUTOMATED: CPT | Performed by: PHYSICIAN ASSISTANT

## 2025-02-17 PROCEDURE — 85025 COMPLETE CBC W/AUTO DIFF WBC: CPT | Mod: PN | Performed by: NURSE PRACTITIONER

## 2025-02-17 PROCEDURE — 86140 C-REACTIVE PROTEIN: CPT | Performed by: PHYSICIAN ASSISTANT

## 2025-02-17 PROCEDURE — 36415 COLL VENOUS BLD VENIPUNCTURE: CPT | Mod: PN | Performed by: NURSE PRACTITIONER

## 2025-02-17 PROCEDURE — 80053 COMPREHEN METABOLIC PANEL: CPT | Mod: PO | Performed by: NURSE PRACTITIONER

## 2025-02-17 NOTE — PROGRESS NOTES
Subjective:      Name: Yuliana Mattson  : 1945  MRN: [id number]    CC:  Annual breast surveillance    HPI:   Yuliana Mattson is a 79 y.o. female presents for annual breast surveillance.    Ms. Mattson is a 79-year-old white female known to Dr. Corral for DCIS of the left breast.    Hx of bladder cancer (30+ yrs ago), Osteoporosis (to be managed by Dr. Hughes), Psoriatic arthritis (managed by Dr. Hughes), CAD, Cholelithiasis, hearing loss, HTN, HLD, Iritis, Hemorrhoids, Endometriosis, Rosacea     Ms. Mattson was diagnosed in  and treated with lumpectomy followed by radiation x 6 yrs.    In , she was diagnosed with Stage I infiltrating left breast carcinoma, which was high-grade, ER positive and GA negative.    She then underwent a left mastectomy with immediate reconstruction, 4 cycles of A/C as well as 60 months of adjuvant Tamoxifen/Arimidex.      2011:  Angiogram:  She had angiogram in  after a high calcium score of 967.  She was found to have non-obstructive CAD with 40% blockage in right coronary.     2015:  EF 60-65%    21:  Patchy, reddened area to left breast; punch biopsy in Dermatology = dermatitis     Today:  27  Ms. Mattson presents to the clinic today for her annual breast evaluation (approx 26 yrs).     She denies any new breast complaints, bone pain, fevers, chills, drenching night sweats, lymphadenopathy, irregular heartbeat, chest pain, abdominal discomfort, nausea, vomiting, constipation, diarrhea, postmenopausal bleeding, difficulties with hot flashes, unexplained weight loss, etc.    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  She is following with an eye specialist.  She is on Skyrizi now for Psoriatic arthritis and reports great benefits.  No other new complaints or pertinent findings on a 14-point review of systems.     To note:  Patient requested Digital Diagnostic Mammograms each year.  Patient reports that  dentist informed her the she has a tooth with good roots, but (2) large pockets in the jawline.  The tooth will die.    To note:  The patient was on an oral bisphosphate Boniva:  07 - 08; Actonel 12/08 - 01/2010.         Past Medical History:   Diagnosis Date    Allergic rhinitis     Anticoagulant long-term use     ASPIRIN ONLY - (stops it when she presents a hemorrhagic cystitis)    Bladder cancer     Blood transfusion     Breast cancer     CAD (coronary artery disease), native coronary artery     40% non obstructive    Cholelithiasis     Endometriosis     Headache(784.0)     HEARING LOSS     Hemorrhoids     HLD (hyperlipidemia)     Hypertension     Iritis     Left wrist fracture 2009    traumatic    Malignant neoplasm of other specified sites of bladder 12/3/2009    Osteoporosis, postmenopausal     PONV (postoperative nausea and vomiting)     Rash     Rosacea     UTI (urinary tract infection)     Vaginal delivery     x 2       Past Surgical History:   Procedure Laterality Date    ADENOIDECTOMY      APPENDECTOMY      BLADDER TUMOR EXCISION  1979    [facility], dr garcia - no recurrences since    BREAST BIOPSY Right     4 core bx negative    BREAST SURGERY Left 1998    ESOPHAGOGASTRODUODENOSCOPY N/A 04/21/2022    Procedure: ESOPHAGOGASTRODUODENOSCOPY (EGD);  Surgeon: Guru Maddox MD;  Location: [facility];  Service: Endoscopy;  Laterality: N/A;    EYE SURGERY  2019    HYSTERECTOMY      MASTECTOMY, PARTIAL  1995    left side - cancer - had radiotherapy 1995, 1998 had chemotherapy    oophrect      pelvic mass      benign    TONSILLECTOMY      TONSILLECTOMY, ADENOIDECTOMY, BILATERAL MYRINGOTOMY AND TUBES      tram flap Left 1998       Family History   Problem Relation Name Age of Onset    Glaucoma Father      Glaucoma Paternal Aunt      Glaucoma Paternal Grandmother      Ovarian cancer Cousin      Cancer Cousin          1st, ovarian    Amblyopia Neg Hx      Blindness Neg Hx      Cataracts Neg Hx       Hypertension Neg Hx      Macular degeneration Neg Hx      Retinal detachment Neg Hx      Strabismus Neg Hx      Stroke Neg Hx      Thyroid disease Neg Hx      Melanoma Neg Hx         Social History     Socioeconomic History    Marital status:    Occupational History    Occupation: retired accounting   Tobacco Use    Smoking status: Never     Passive exposure: Past    Smokeless tobacco: Never   Substance and Sexual Activity    Alcohol use: No    Drug use: No     Social Drivers of Health     Financial Resource Strain: Low Risk  (8/31/2023)    Overall Financial Resource Strain (CARDIA)     Difficulty of Paying Living Expenses: Not hard at all   Food Insecurity: No Food Insecurity (2/13/2024)    Hunger Vital Sign     Worried About Running Out of Food in the Last Year: Never true     Ran Out of Food in the Last Year: Never true   Transportation Needs: No Transportation Needs (2/13/2024)    PRAPARE - Transportation     Lack of Transportation (Medical): No     Lack of Transportation (Non-Medical): No   Physical Activity: Inactive (2/13/2024)    Exercise Vital Sign     Days of Exercise per Week: 0 days     Minutes of Exercise per Session: 0 min   Stress: Stress Concern Present (2/13/2024)    [employer] [place] [employer] - Occupational Stress Questionnaire     Feeling of Stress : To some extent   Housing Stability: Low Risk  (2/13/2024)    Housing Stability Vital Sign     Unable to Pay for Housing in the Last Year: No     Number of Places Lived in the Last Year: 1     Unstable Housing in the Last Year: No       Review of patient's allergies indicates:   Allergen Reactions    Prochlorperazine edisylate Anaphylaxis     compazine       Review of Systems   Eyes:  Negative for visual disturbance.   Cardiovascular:  Negative for chest pain.   Respiratory:  Negative for cough and shortness of breath.    Hematologic/Lymphatic: Negative for adenopathy.   Skin:  Negative for rash.   Musculoskeletal:  Positive for  "back pain.   Gastrointestinal:  Negative for abdominal pain and diarrhea.   Genitourinary:  Negative for frequency.   Neurological:  Positive for headaches.   Psychiatric/Behavioral:  The patient is not nervous/anxious.             Objective:      Vitals:    02/17/25 1049   BP: (!) 141/74   BP Location: Right arm   Patient Position: Sitting   Pulse: 71   Resp: 18   Temp: 97.6 °F (36.4 °C)   TempSrc: Temporal   SpO2: 96%   Weight: 74.3 kg (163 lb 12.8 oz)   Height: 5' 3" (1.6 m)          Physical Exam  Vitals reviewed.   Constitutional:       General: She is not in acute distress.  HENT:      Head: Normocephalic and atraumatic.   Eyes:      Conjunctiva/sclera: Conjunctivae normal.   Cardiovascular:      Rate and Rhythm: Normal rate and regular rhythm.      Pulses: Normal pulses.      Heart sounds: Normal heart sounds.   Pulmonary:      Effort: Pulmonary effort is normal.      Breath sounds: Normal breath sounds. No wheezing.   Chest:      Comments: BREASTS:  Right breast remains soft; free of masses, nipple discharge or inversion; tenderness at 12:00 o'clock position.  Left breast with noted reconstruction & areola tattoo with no signs of local reoccurrence.  Abdominal:      General: Bowel sounds are normal.      Palpations: Abdomen is soft.      Tenderness: There is no abdominal tenderness.   Musculoskeletal:         General: Normal range of motion.      Cervical back: Neck supple.      Right lower leg: No edema.      Left lower leg: No edema.   Lymphadenopathy:      Cervical: No cervical adenopathy.      Upper Body:      Right upper body: No supraclavicular or axillary adenopathy.      Left upper body: No supraclavicular or axillary adenopathy.      Lower Body: No right inguinal adenopathy. No left inguinal adenopathy.   Skin:     General: Skin is warm and dry.   Neurological:      Mental Status: She is alert and oriented to person, place, and time.   Psychiatric:         Behavior: Behavior normal.         Thought " Content: Thought content normal.           Current Outpatient Medications on File Prior to Visit   Medication Sig    albuterol (PROVENTIL/VENTOLIN HFA) 90 mcg/actuation inhaler Inhale 2 puffs into the lungs every 6 (six) hours as needed for Wheezing or Shortness of Breath. Rescue    ALPRAZolam (XANAX) 0.5 MG tablet Take 1 tablet (0.5 mg total) by mouth 2 (two) times daily as needed for Anxiety or Insomnia.    BREO ELLIPTA 100-25 mcg/dose diskus inhaler Inhale 1 puff into the lungs once daily.    cranberry extract-d-mannose 500-50 mg Chew Take 500 mg by mouth once daily.    esomeprazole (NEXIUM) 40 MG capsule Take 1 capsule (40 mg total) by mouth before breakfast.    estradioL (ESTRACE) 0.01 % (0.1 mg/gram) vaginal cream Place 1 fingertip of cream first at urethral opening and then external vagina every other day. Please do not use plastic applicator    fluocinolone acetonide oiL (DERMOTIC OIL) 0.01 % Drop Place 3 drops in ear(s) 2 (two) times daily as needed (itchy ears).    fluticasone propionate (FLONASE) 50 mcg/actuation nasal spray 1 spray (50 mcg total) by Each Nostril route 2 (two) times a day.    hydroCHLOROthiazide (HYDRODIURIL) 12.5 MG Tab Take 1 tablet (12.5 mg total) by mouth once daily. (Patient not taking: Reported on 1/9/2025)    losartan (COZAAR) 100 MG tablet Take 1 tablet (100 mg total) by mouth once daily.    montelukast (SINGULAIR) 10 mg tablet Take 10 mg by mouth once daily.    risankizumab-rzaa (SKYRIZI) 150 mg/mL PnIj Inject 150 mg into the skin every 28 days.    SEMAGLUTIDE, WEIGHT LOSS, SUBQ Inject into the skin once a week.    SIMBRINZA 1-0.2 % DrpS Place 1 drop into both eyes 2 (two) times a day.    sulfacetamide sodium-sulfur 10-5 % (w/w) Clsr Apply topically.    tretinoin (RETIN-A) 0.025 % cream Apply a pea-sized amount to entire face at bedtime, start every other night and increase as tolerated. Take night off if irritation develops.    vitamin D (VITAMIN D3) 1000 units Tab Take 5,000  Units by mouth once daily. 125mg/5000IU     No current facility-administered medications on file prior to visit.     Lab Results   Component Value Date    WBC 7.10 02/17/2025    WBC 7.10 02/17/2025    HGB 12.9 02/17/2025    HGB 12.9 02/17/2025    HCT 38.4 02/17/2025    HCT 38.4 02/17/2025    MCV 90 02/17/2025    MCV 90 02/17/2025     02/17/2025     02/17/2025       CMP  Sodium   Date Value Ref Range Status   02/17/2025 140 136 - 145 mmol/L Final   02/17/2025 140 136 - 145 mmol/L Final     Potassium   Date Value Ref Range Status   02/17/2025 3.8 3.5 - 5.1 mmol/L Final   02/17/2025 3.8 3.5 - 5.1 mmol/L Final     Chloride   Date Value Ref Range Status   02/17/2025 108 95 - 110 mmol/L Final   02/17/2025 108 95 - 110 mmol/L Final     CO2   Date Value Ref Range Status   02/17/2025 21 (L) 23 - 29 mmol/L Final   02/17/2025 21 (L) 23 - 29 mmol/L Final     Glucose   Date Value Ref Range Status   02/17/2025 115 (H) 70 - 110 mg/dL Final   02/17/2025 115 (H) 70 - 110 mg/dL Final     BUN   Date Value Ref Range Status   02/17/2025 10 8 - 23 mg/dL Final   02/17/2025 10 8 - 23 mg/dL Final     Creatinine   Date Value Ref Range Status   02/17/2025 0.8 0.5 - 1.4 mg/dL Final   02/17/2025 0.8 0.5 - 1.4 mg/dL Final     Calcium   Date Value Ref Range Status   02/17/2025 10.0 8.7 - 10.5 mg/dL Final   02/17/2025 10.0 8.7 - 10.5 mg/dL Final     Total Protein   Date Value Ref Range Status   02/17/2025 6.9 6.0 - 8.4 g/dL Final   02/17/2025 6.9 6.0 - 8.4 g/dL Final     Albumin   Date Value Ref Range Status   02/17/2025 3.6 3.5 - 5.2 g/dL Final   02/17/2025 3.6 3.5 - 5.2 g/dL Final     Total Bilirubin   Date Value Ref Range Status   02/17/2025 0.3 0.1 - 1.0 mg/dL Final     Comment:     For infants and newborns, interpretation of results should be based  on gestational age, weight and in agreement with clinical  observations.    Premature Infant recommended reference ranges:  Up to 24 hours.............<8.0 mg/dL  Up to 48  hours............<12.0 mg/dL  3-5 days..................<15.0 mg/dL  6-29 days.................<15.0 mg/dL     02/17/2025 0.3 0.1 - 1.0 mg/dL Final     Comment:     For infants and newborns, interpretation of results should be based  on gestational age, weight and in agreement with clinical  observations.    Premature Infant recommended reference ranges:  Up to 24 hours.............<8.0 mg/dL  Up to 48 hours............<12.0 mg/dL  3-5 days..................<15.0 mg/dL  6-29 days.................<15.0 mg/dL       Alkaline Phosphatase   Date Value Ref Range Status   02/17/2025 126 40 - 150 U/L Final   02/17/2025 126 40 - 150 U/L Final     AST   Date Value Ref Range Status   02/17/2025 18 10 - 40 U/L Final   02/17/2025 18 10 - 40 U/L Final     ALT   Date Value Ref Range Status   02/17/2025 16 10 - 44 U/L Final   02/17/2025 16 10 - 44 U/L Final     Anion Gap   Date Value Ref Range Status   02/17/2025 11 8 - 16 mmol/L Final   02/17/2025 11 8 - 16 mmol/L Final     eGFR   Date Value Ref Range Status   02/17/2025 >60 >60 mL/min/1.73 m^2 Final   02/17/2025 >60 >60 mL/min/1.73 m^2 Final     08/29/24  Mammo Digital Diagnostic Right with Harish  Narrative: Facility:  [facility] [place] [address]  [phone]    Name: Yuliana Mattson    MRN: [id number]    Result:  Mammo Digital Diagnostic Right with Harish    History:  Patient is 79 y.o. and is seen for diagnostic imaging.    Films Compared:  Compared to: 08/28/2023 Mammo Digital Diagnostic Right with Harish (No   Change), 08/11/2022 US Breast Right Limited, 08/11/2022 Mammo Digital   Diagnostic Right with Harish, 02/11/2022 US Breast Right Limited, and   02/11/2022 Mammo Digital Diagnostic Right with Harish     Findings:  This procedure was performed using tomosynthesis.   Computer-aided detection was utilized in the interpretation of this   examination.    There is no evidence of suspicious masses, microcalcifications or   architectural  distortion.    Breast Density:  The right breast is heterogeneously dense, which may obscure small masses.   Impression:    No mammographic evidence of malignancy.    BI-RADS Category 1: Negative    Recommendation:  Routine screening mammogram in 1 year is recommended.    Carrington Crawley MD     BMD 06/27/2023:  Osteoporosis; improved density in lumbar spine & left femoral neck; decreased density in total hip    Assessment:       1. History of breast cancer           Plan:     There are no diagnoses linked to this encounter.  Hx of DCIS of left breast - STACY @ 28 yrs post; F/U in 1 year with CBC, CMP, Mammo (on or after 08/29/2025)  Osteoporosis - follow with Dr. Hughes  Psoriatic arthritis - follow in Rheumatology; now on JENNIFER Jimenez, FNP-C  [facility]    30 minutes were spent in coordination of patient's care, record review and counseling.    Route Chart for Scheduling    Med Onc Chart Routing      Follow up with physician    Follow up with [facility]IN 1 year. f/u in 1 year with CBC, CMP & Mammo, Digital diagnostic, right on or after 08/29/25   Infusion scheduling note    Injection scheduling note    Labs    Imaging   Mammo digital diagnostic of right with daya (on or after 08/29/25)   Pharmacy appointment    Other referrals                       Answers submitted by the patient for this visit:  Review of Systems Questionnaire (Submitted on 2/12/2025)  appetite change : No  unexpected weight change: No  mouth sores: No

## 2025-02-18 LAB — CRP SERPL-MCNC: 9.5 MG/L (ref 0–8.2)

## 2025-02-19 ENCOUNTER — RESULTS FOLLOW-UP (OUTPATIENT)
Dept: HEMATOLOGY/ONCOLOGY | Facility: CLINIC | Age: 80
End: 2025-02-19

## 2025-03-19 ENCOUNTER — PATIENT OUTREACH (OUTPATIENT)
Dept: ADMINISTRATIVE | Facility: HOSPITAL | Age: 80
End: 2025-03-19
Payer: MEDICARE

## 2025-03-19 VITALS — DIASTOLIC BLOOD PRESSURE: 77 MMHG | SYSTOLIC BLOOD PRESSURE: 123 MMHG

## 2025-03-21 ENCOUNTER — LAB VISIT (OUTPATIENT)
Dept: LAB | Facility: HOSPITAL | Age: 80
End: 2025-03-21
Payer: MEDICARE

## 2025-03-21 ENCOUNTER — OFFICE VISIT (OUTPATIENT)
Dept: FAMILY MEDICINE | Facility: CLINIC | Age: 80
End: 2025-03-21
Payer: MEDICARE

## 2025-03-21 VITALS
WEIGHT: 158.31 LBS | HEART RATE: 78 BPM | HEIGHT: 63 IN | OXYGEN SATURATION: 99 % | DIASTOLIC BLOOD PRESSURE: 84 MMHG | SYSTOLIC BLOOD PRESSURE: 152 MMHG | BODY MASS INDEX: 28.05 KG/M2

## 2025-03-21 DIAGNOSIS — R30.0 DYSURIA: ICD-10-CM

## 2025-03-21 DIAGNOSIS — N39.0 RECURRENT UTI: ICD-10-CM

## 2025-03-21 DIAGNOSIS — N39.0 URINARY TRACT INFECTION WITHOUT HEMATURIA, SITE UNSPECIFIED: ICD-10-CM

## 2025-03-21 DIAGNOSIS — R30.0 DYSURIA: Primary | ICD-10-CM

## 2025-03-21 LAB
BILIRUBIN, UA POC OHS: NEGATIVE
BLOOD, UA POC OHS: NEGATIVE
CLARITY, UA POC OHS: CLEAR
COLOR, UA POC OHS: YELLOW
GLUCOSE, UA POC OHS: NEGATIVE
KETONES, UA POC OHS: NEGATIVE
LEUKOCYTES, UA POC OHS: ABNORMAL
NITRITE, UA POC OHS: NEGATIVE
PH, UA POC OHS: 6
PROTEIN, UA POC OHS: NEGATIVE
SPECIFIC GRAVITY, UA POC OHS: 1.01
UROBILINOGEN, UA POC OHS: 0.2

## 2025-03-21 PROCEDURE — 87086 URINE CULTURE/COLONY COUNT: CPT | Performed by: NURSE PRACTITIONER

## 2025-03-21 PROCEDURE — 87088 URINE BACTERIA CULTURE: CPT | Performed by: NURSE PRACTITIONER

## 2025-03-21 PROCEDURE — 99999 PR PBB SHADOW E&M-EST. PATIENT-LVL IV: CPT | Mod: PBBFAC,,, | Performed by: NURSE PRACTITIONER

## 2025-03-21 PROCEDURE — 87186 SC STD MICRODIL/AGAR DIL: CPT | Performed by: NURSE PRACTITIONER

## 2025-03-21 RX ORDER — DIFLUPREDNATE OPHTHALMIC 0.5 MG/ML
1 EMULSION OPHTHALMIC 2 TIMES DAILY
COMMUNITY
Start: 2025-02-25

## 2025-03-21 RX ORDER — NITROFURANTOIN 25; 75 MG/1; MG/1
100 CAPSULE ORAL 2 TIMES DAILY
Qty: 14 CAPSULE | Refills: 0 | Status: SHIPPED | OUTPATIENT
Start: 2025-03-21 | End: 2025-03-28

## 2025-03-21 NOTE — PROGRESS NOTES
Subjective:       Patient ID: Yuliana Mattson is a 79 y.o. female.    Chief Complaint: Urinary Tract Infection    Dysuria   This is a recurrent problem. The current episode started acute onset. The problem occurs every urination. The problem has been unchanged. The quality of the pain is described as burning. The pain is at a severity of 3/10. The pain is moderate. There has been no fever. She is Not sexually active. There is No history of pyelonephritis. Associated symptoms include frequency and urgency. Pertinent negatives include no behavior changes, chills, discharge, flank pain, hematuria, hesitancy, nausea, possible pregnancy, sweats, vomiting, weight loss, constipation, rash or withholding. She has tried acetaminophen, antibiotics, home medications, increased fluids and sitz baths for the symptoms. The treatment provided no relief. Her past medical history is significant for catheterization and hypertension. There is no history of diabetes insipidus, diabetes mellitus, genitourinary reflux, kidney stones, recurrent UTIs, a single kidney, STD, urinary stasis or a urological procedure.    burning with urination, malodorous urine, urinary frequency, lower abdominal cramping X 1 day. Went to urgent care for UTI 2 wks ago and was prescribed 5 day course of keflex, which was completed 6 days ago.  Has seen urology for recurrent UTI and was prescribed estrogen vaginal cream.      Past Medical History:   Diagnosis Date    Allergic rhinitis     Anticoagulant long-term use     ASPIRIN ONLY - (stops it when she presents a hemorrhagic cystitis)    Bladder cancer     Blood transfusion     Breast cancer     CAD (coronary artery disease), native coronary artery     40% non obstructive    Cholelithiasis     Endometriosis     Headache(784.0)     HEARING LOSS     Hemorrhoids     HLD (hyperlipidemia)     Hypertension     Iritis     Left wrist fracture 2009    traumatic    Malignant neoplasm of other specified sites of bladder  "12/3/2009    Osteoporosis, postmenopausal     PONV (postoperative nausea and vomiting)     Rash     Rosacea     UTI (urinary tract infection)     Vaginal delivery     x 2       Past Surgical History:   Procedure Laterality Date    ADENOIDECTOMY      APPENDECTOMY      BLADDER TUMOR EXCISION  1979    [facility], dr garcia - no recurrences since    BREAST BIOPSY Right     4 core bx negative    BREAST SURGERY Left 1998    ESOPHAGOGASTRODUODENOSCOPY N/A 04/21/2022    Procedure: ESOPHAGOGASTRODUODENOSCOPY (EGD);  Surgeon: Guru Maddox MD;  Location: [facility];  Service: Endoscopy;  Laterality: N/A;    EYE SURGERY  2019    HYSTERECTOMY      MASTECTOMY, PARTIAL  1995    left side - cancer - had radiotherapy 1995, 1998 had chemotherapy    oophrect      pelvic mass      benign    TONSILLECTOMY      TONSILLECTOMY, ADENOIDECTOMY, BILATERAL MYRINGOTOMY AND TUBES      tram flap Left 1998       Review of patient's allergies indicates:   Allergen Reactions    Prochlorperazine edisylate Anaphylaxis     compazine       Social History[1]    Medications Ordered Prior to Encounter[2]    Family History   Problem Relation Name Age of Onset    Glaucoma Father      Glaucoma Paternal Aunt      Glaucoma Paternal Grandmother      Ovarian cancer Cousin      Cancer Cousin          1st, ovarian    Amblyopia Neg Hx      Blindness Neg Hx      Cataracts Neg Hx      Hypertension Neg Hx      Macular degeneration Neg Hx      Retinal detachment Neg Hx      Strabismus Neg Hx      Stroke Neg Hx      Thyroid disease Neg Hx      Melanoma Neg Hx         Review of Systems   Constitutional:  Negative for chills and weight loss.   Gastrointestinal:  Negative for constipation, nausea and vomiting.   Genitourinary:  Positive for dysuria, frequency and urgency. Negative for flank pain, hematuria and hesitancy.   Skin:  Negative for rash.       Objective:      BP (!) 152/84   Pulse 78   Ht 5' 3" (1.6 m)   Wt 71.8 kg (158 lb 4.6 oz)   SpO2 99%   " BMI 28.04 kg/m²   Physical Exam  Vitals and nursing note reviewed.   Constitutional:       General: She is not in acute distress.     Appearance: Normal appearance. She is well-groomed.   HENT:      Head: Normocephalic.      Right Ear: External ear normal.      Left Ear: External ear normal.      Nose: Nose normal.      Mouth/Throat:      Lips: Pink.      Mouth: Mucous membranes are moist.      Pharynx: Oropharynx is clear. No oropharyngeal exudate or posterior oropharyngeal erythema.   Eyes:      Extraocular Movements: Extraocular movements intact.      Conjunctiva/sclera: Conjunctivae normal.      Pupils: Pupils are equal, round, and reactive to light.   Cardiovascular:      Rate and Rhythm: Normal rate and regular rhythm.      Heart sounds: Normal heart sounds. No murmur heard.  Pulmonary:      Effort: Pulmonary effort is normal.      Breath sounds: Normal breath sounds.   Chest:      Chest wall: No tenderness.   Abdominal:      General: Bowel sounds are normal.      Palpations: Abdomen is soft.      Tenderness: There is abdominal tenderness (bilateral lower quadrants). There is no right CVA tenderness or left CVA tenderness.   Musculoskeletal:         General: No swelling or tenderness. Normal range of motion.      Cervical back: Normal range of motion and neck supple.      Right lower leg: No edema.      Left lower leg: No edema.   Skin:     General: Skin is warm and dry.   Neurological:      General: No focal deficit present.      Mental Status: She is alert and oriented to person, place, and time. Mental status is at baseline.      Gait: Gait is intact.   Psychiatric:         Mood and Affect: Mood normal.         Behavior: Behavior normal.         Assessment:       1. Dysuria    2. Urinary tract infection without hematuria, site unspecified    3. Recurrent UTI        Plan:       Dysuria  -     POCT Urinalysis(Instrument)  -     Urine Culture High Risk; Future; Expected date: 03/21/2025    Urinary tract  infection without hematuria, site unspecified  -     nitrofurantoin, macrocrystal-monohydrate, (MACROBID) 100 MG capsule; Take 1 capsule (100 mg total) by mouth 2 (two) times daily. for 7 days  Dispense: 14 capsule; Refill: 0    Recurrent UTI        Macrobid X 7 days. Increase water intake.         [1]   Social History  Socioeconomic History    Marital status:    Occupational History    Occupation: retired accounting   Tobacco Use    Smoking status: Never     Passive exposure: Past    Smokeless tobacco: Never   Substance and Sexual Activity    Alcohol use: No    Drug use: No     Social Drivers of Health     Financial Resource Strain: Low Risk  (3/21/2025)    Overall Financial Resource Strain (CARDIA)     Difficulty of Paying Living Expenses: Not hard at all   Food Insecurity: No Food Insecurity (3/21/2025)    Hunger Vital Sign     Worried About Running Out of Food in the Last Year: Never true     Ran Out of Food in the Last Year: Never true   Transportation Needs: No Transportation Needs (3/21/2025)    PRAPARE - Transportation     Lack of Transportation (Medical): No     Lack of Transportation (Non-Medical): No   Physical Activity: Insufficiently Active (3/21/2025)    Exercise Vital Sign     Days of Exercise per Week: 1 day     Minutes of Exercise per Session: 30 min   Stress: No Stress Concern Present (3/21/2025)    [place] [employer] - Occupational Stress Questionnaire     Feeling of Stress : Only a little   Housing Stability: Low Risk  (3/21/2025)    Housing Stability Vital Sign     Unable to Pay for Housing in the Last Year: No     Number of Times Moved in the Last Year: 0     Homeless in the Last Year: No   [2]   Current Outpatient Medications on File Prior to Visit   Medication Sig Dispense Refill    cranberry extract-d-mannose 500-50 mg Chew Take 500 mg by mouth once daily.      difluprednate (DUREZOL) 0.05 % Drop ophthalmic solution Place 1 drop into the left eye 2 (two) times  daily.      esomeprazole (NEXIUM) 40 MG capsule Take 1 capsule (40 mg total) by mouth before breakfast. 90 capsule 3    estradioL (ESTRACE) 0.01 % (0.1 mg/gram) vaginal cream Place 1 fingertip of cream first at urethral opening and then external vagina every other day. Please do not use plastic applicator 42.5 g 3    fluocinolone acetonide oiL (DERMOTIC OIL) 0.01 % Drop Place 3 drops in ear(s) 2 (two) times daily as needed (itchy ears). 20 mL 2    fluticasone propionate (FLONASE) 50 mcg/actuation nasal spray 1 spray (50 mcg total) by Each Nostril route 2 (two) times a day. 16 g 2    hydroCHLOROthiazide (HYDRODIURIL) 12.5 MG Tab Take 1 tablet (12.5 mg total) by mouth once daily. 30 tablet 5    losartan (COZAAR) 100 MG tablet Take 1 tablet (100 mg total) by mouth once daily. 90 tablet 3    montelukast (SINGULAIR) 10 mg tablet Take 10 mg by mouth once daily.      risankizumab-rzaa (SKYRIZI) 150 mg/mL PnIj Inject 150 mg into the skin every 28 days. 1 mL 1    SEMAGLUTIDE, WEIGHT LOSS, SUBQ Inject into the skin once a week.      SIMBRINZA 1-0.2 % DrpS Place 1 drop into both eyes 2 (two) times a day.      sulfacetamide sodium-sulfur 10-5 % (w/w) Clsr Apply topically.      tretinoin (RETIN-A) 0.025 % cream Apply a pea-sized amount to entire face at bedtime, start every other night and increase as tolerated. Take night off if irritation develops. 45 g 3    vitamin D (VITAMIN D3) 1000 units Tab Take 5,000 Units by mouth once daily. 125mg/5000IU      [DISCONTINUED] BREO ELLIPTA 100-25 mcg/dose diskus inhaler Inhale 1 puff into the lungs once daily. 60 each 11    [DISCONTINUED] albuterol (PROVENTIL/VENTOLIN HFA) 90 mcg/actuation inhaler Inhale 2 puffs into the lungs every 6 (six) hours as needed for Wheezing or Shortness of Breath. Rescue (Patient not taking: Reported on 2/17/2025) 18 g 11    [DISCONTINUED] ALPRAZolam (XANAX) 0.5 MG tablet Take 1 tablet (0.5 mg total) by mouth 2 (two) times daily as needed for Anxiety or  Insomnia. (Patient not taking: Reported on 3/21/2025) 30 tablet 0     No current facility-administered medications on file prior to visit.

## 2025-03-23 LAB
BACTERIA UR CULT: ABNORMAL
BACTERIA UR CULT: ABNORMAL

## 2025-03-24 ENCOUNTER — RESULTS FOLLOW-UP (OUTPATIENT)
Dept: FAMILY MEDICINE | Facility: CLINIC | Age: 80
End: 2025-03-24

## 2025-03-24 ENCOUNTER — PATIENT MESSAGE (OUTPATIENT)
Dept: FAMILY MEDICINE | Facility: CLINIC | Age: 80
End: 2025-03-24
Payer: MEDICARE

## 2025-03-24 DIAGNOSIS — N39.0 URINARY TRACT INFECTION WITHOUT HEMATURIA, SITE UNSPECIFIED: Primary | ICD-10-CM

## 2025-03-31 ENCOUNTER — LAB VISIT (OUTPATIENT)
Dept: LAB | Facility: HOSPITAL | Age: 80
End: 2025-03-31
Attending: NURSE PRACTITIONER
Payer: MEDICARE

## 2025-03-31 DIAGNOSIS — N39.0 URINARY TRACT INFECTION WITHOUT HEMATURIA, SITE UNSPECIFIED: ICD-10-CM

## 2025-03-31 PROCEDURE — 87088 URINE BACTERIA CULTURE: CPT

## 2025-04-03 ENCOUNTER — RESULTS FOLLOW-UP (OUTPATIENT)
Dept: FAMILY MEDICINE | Facility: CLINIC | Age: 80
End: 2025-04-03

## 2025-04-03 ENCOUNTER — TELEPHONE (OUTPATIENT)
Dept: FAMILY MEDICINE | Facility: CLINIC | Age: 80
End: 2025-04-03
Payer: MEDICARE

## 2025-04-03 DIAGNOSIS — N39.0 RECURRENT UTI: Primary | ICD-10-CM

## 2025-04-03 LAB — BACTERIA UR CULT: ABNORMAL

## 2025-04-03 RX ORDER — CIPROFLOXACIN 500 MG/1
500 TABLET ORAL 2 TIMES DAILY
Qty: 10 TABLET | Refills: 0 | Status: SHIPPED | OUTPATIENT
Start: 2025-04-03 | End: 2025-04-08

## 2025-04-03 NOTE — TELEPHONE ENCOUNTER
Urine culture still positive for Klebsiella. Started with urinary urgency, burning with urination, lower abdominal pain and malodorous urine 6 days ago. Completed macrobid 6 days ago. Also took keflex 3 weeks ago. Rx for cipro sent. Repeat urine culture in 2 wks.

## 2025-04-09 ENCOUNTER — LAB VISIT (OUTPATIENT)
Dept: LAB | Facility: HOSPITAL | Age: 80
End: 2025-04-09
Payer: MEDICARE

## 2025-04-09 DIAGNOSIS — N39.0 RECURRENT UTI: ICD-10-CM

## 2025-04-09 PROCEDURE — 87086 URINE CULTURE/COLONY COUNT: CPT

## 2025-04-10 LAB — BACTERIA UR CULT: NORMAL

## 2025-04-11 ENCOUNTER — RESULTS FOLLOW-UP (OUTPATIENT)
Dept: FAMILY MEDICINE | Facility: CLINIC | Age: 80
End: 2025-04-11

## 2025-05-12 ENCOUNTER — OFFICE VISIT (OUTPATIENT)
Dept: FAMILY MEDICINE | Facility: CLINIC | Age: 80
End: 2025-05-12
Payer: MEDICARE

## 2025-05-12 VITALS
SYSTOLIC BLOOD PRESSURE: 134 MMHG | DIASTOLIC BLOOD PRESSURE: 66 MMHG | BODY MASS INDEX: 28.67 KG/M2 | OXYGEN SATURATION: 96 % | HEIGHT: 63 IN | WEIGHT: 161.81 LBS | HEART RATE: 82 BPM

## 2025-05-12 DIAGNOSIS — R01.1 MURMUR: ICD-10-CM

## 2025-05-12 DIAGNOSIS — R09.89 OTHER SPECIFIED SYMPTOMS AND SIGNS INVOLVING THE CIRCULATORY AND RESPIRATORY SYSTEMS: ICD-10-CM

## 2025-05-12 DIAGNOSIS — I10 HYPERTENSION, UNSPECIFIED TYPE: ICD-10-CM

## 2025-05-12 DIAGNOSIS — R00.2 POUNDING HEARTBEAT: Primary | ICD-10-CM

## 2025-05-12 PROCEDURE — 3078F DIAST BP <80 MM HG: CPT | Mod: CPTII,S$GLB,, | Performed by: FAMILY MEDICINE

## 2025-05-12 PROCEDURE — 3075F SYST BP GE 130 - 139MM HG: CPT | Mod: CPTII,S$GLB,, | Performed by: FAMILY MEDICINE

## 2025-05-12 PROCEDURE — 1159F MED LIST DOCD IN RCRD: CPT | Mod: CPTII,S$GLB,, | Performed by: FAMILY MEDICINE

## 2025-05-12 PROCEDURE — 99999 PR PBB SHADOW E&M-EST. PATIENT-LVL III: CPT | Mod: PBBFAC,,, | Performed by: FAMILY MEDICINE

## 2025-05-12 PROCEDURE — 3288F FALL RISK ASSESSMENT DOCD: CPT | Mod: CPTII,S$GLB,, | Performed by: FAMILY MEDICINE

## 2025-05-12 PROCEDURE — 1101F PT FALLS ASSESS-DOCD LE1/YR: CPT | Mod: CPTII,S$GLB,, | Performed by: FAMILY MEDICINE

## 2025-05-12 PROCEDURE — 99214 OFFICE O/P EST MOD 30 MIN: CPT | Mod: S$GLB,,, | Performed by: FAMILY MEDICINE

## 2025-05-12 PROCEDURE — 1126F AMNT PAIN NOTED NONE PRSNT: CPT | Mod: CPTII,S$GLB,, | Performed by: FAMILY MEDICINE

## 2025-05-12 RX ORDER — AMLODIPINE BESYLATE 5 MG/1
5 TABLET ORAL DAILY
Start: 2025-05-12 | End: 2026-05-12

## 2025-05-12 NOTE — PROGRESS NOTES
Subjective:       Patient ID: Yuliana Mattson is a 79 y.o. female.    Chief Complaint: Feeling of pulsations in neck and ears (Since April)    C/o 1 month of pounding sensation in bilateral ears.  This is synchronized with her pulse.  This comes and goes.  She notices this most at night.  It is less during the day.    She has been active with walking.    BP has been controlled on Losartan and amlodipine.    Carotid US in 2013 showed only minimal plaque formation on the left      Past Medical History:   Diagnosis Date    Allergic rhinitis     Anticoagulant long-term use     ASPIRIN ONLY - (stops it when she presents a hemorrhagic cystitis)    Bladder cancer     Blood transfusion     Breast cancer     CAD (coronary artery disease), native coronary artery     40% non obstructive    Cholelithiasis     Endometriosis     Headache(784.0)     HEARING LOSS     Hemorrhoids     HLD (hyperlipidemia)     Hypertension     Iritis     Left wrist fracture 2009    traumatic    Malignant neoplasm of other specified sites of bladder 12/03/2009    Osteoporosis, postmenopausal     PONV (postoperative nausea and vomiting)     Rash     Rosacea     UTI (urinary tract infection)     Uveitis     Vaginal delivery     x 2       Past Surgical History:   Procedure Laterality Date    ADENOIDECTOMY      APPENDECTOMY      BLADDER TUMOR EXCISION  1979    [facility], dr garcia - no recurrences since    BREAST BIOPSY Right     4 core bx negative    BREAST SURGERY Left 1998    ESOPHAGOGASTRODUODENOSCOPY N/A 04/21/2022    Procedure: ESOPHAGOGASTRODUODENOSCOPY (EGD);  Surgeon: Guru Maddox MD;  Location: [facility];  Service: Endoscopy;  Laterality: N/A;    EYE SURGERY  2019    HYSTERECTOMY      MASTECTOMY, PARTIAL  1995    left side - cancer - had radiotherapy 1995, 1998 had chemotherapy    oophrect      pelvic mass      benign    TONSILLECTOMY      TONSILLECTOMY, ADENOIDECTOMY, BILATERAL MYRINGOTOMY AND TUBES      tram flap Left 1998  "      Review of patient's allergies indicates:   Allergen Reactions    Prochlorperazine edisylate Anaphylaxis     compazine       Social History[1]    Medications Ordered Prior to Encounter[2]    Family History   Problem Relation Name Age of Onset    Glaucoma Father      Glaucoma Paternal Aunt      Glaucoma Paternal Grandmother      Ovarian cancer Cousin      Cancer Cousin          1st, ovarian    Amblyopia Neg Hx      Blindness Neg Hx      Cataracts Neg Hx      Hypertension Neg Hx      Macular degeneration Neg Hx      Retinal detachment Neg Hx      Strabismus Neg Hx      Stroke Neg Hx      Thyroid disease Neg Hx      Melanoma Neg Hx         Review of Systems    Objective:      /66   Pulse 82   Ht 5' 3" (1.6 m)   Wt 73.4 kg (161 lb 13.1 oz)   SpO2 96%   BMI 28.66 kg/m²   Physical Exam  Constitutional:       Appearance: Normal appearance. She is well-developed.   HENT:      Head: Normocephalic.      Mouth/Throat:      Pharynx: No oropharyngeal exudate or posterior oropharyngeal erythema.   Eyes:      Conjunctiva/sclera: Conjunctivae normal.      Pupils: Pupils are equal, round, and reactive to light.   Neck:      Thyroid: No thyromegaly.   Cardiovascular:      Rate and Rhythm: Normal rate and regular rhythm.      Heart sounds: S1 normal and S2 normal. Murmur heard.      No friction rub. No gallop.   Pulmonary:      Effort: Pulmonary effort is normal.      Breath sounds: Normal breath sounds. No wheezing or rales.   Musculoskeletal:      Cervical back: Normal range of motion and neck supple.      Right lower leg: No edema.      Left lower leg: No edema.   Lymphadenopathy:      Cervical: No cervical adenopathy.   Skin:     General: Skin is warm.      Findings: No rash.   Neurological:      Mental Status: She is alert and oriented to person, place, and time.      Cranial Nerves: No cranial nerve deficit.      Gait: Gait normal.         Assessment:       1. Pounding heartbeat    2. Hypertension, unspecified " type    3. Murmur    4. Other specified symptoms and signs involving the circulatory and respiratory systems        Plan:       Pounding heartbeat  -     US Carotid Bilateral; Future; Expected date: 05/12/2025    Hypertension, unspecified type  -     US Carotid Bilateral; Future; Expected date: 05/12/2025  -     amLODIPine (NORVASC) 5 MG tablet; Take 1 tablet (5 mg total) by mouth once daily.    Murmur  -     Echo; Future    Other specified symptoms and signs involving the circulatory and respiratory systems  -     US Carotid Bilateral; Future; Expected date: 05/12/2025      Continue present meds  Will contact with results once available  Counseled on regular exercise, maintenance of a healthy weight, balanced diet rich in fruits/vegetables and lean protein, and avoidance of unhealthy habits like smoking and excessive alcohol intake.         [1]   Social History  Socioeconomic History    Marital status:    Occupational History    Occupation: retired accounting   Tobacco Use    Smoking status: Never     Passive exposure: Past    Smokeless tobacco: Never   Substance and Sexual Activity    Alcohol use: No    Drug use: No     Social Drivers of Health     Financial Resource Strain: Low Risk  (3/21/2025)    Overall Financial Resource Strain (CARDIA)     Difficulty of Paying Living Expenses: Not hard at all   Food Insecurity: No Food Insecurity (3/21/2025)    Hunger Vital Sign     Worried About Running Out of Food in the Last Year: Never true     Ran Out of Food in the Last Year: Never true   Transportation Needs: No Transportation Needs (3/21/2025)    PRAPARE - Transportation     Lack of Transportation (Medical): No     Lack of Transportation (Non-Medical): No   Physical Activity: Insufficiently Active (3/21/2025)    Exercise Vital Sign     Days of Exercise per Week: 1 day     Minutes of Exercise per Session: 30 min   Stress: No Stress Concern Present (3/21/2025)    Citizen of [place] [employer] -  Occupational Stress Questionnaire     Feeling of Stress : Only a little   Housing Stability: Low Risk  (3/21/2025)    Housing Stability Vital Sign     Unable to Pay for Housing in the Last Year: No     Number of Times Moved in the Last Year: 0     Homeless in the Last Year: No   [2]   Current Outpatient Medications on File Prior to Visit   Medication Sig Dispense Refill    cranberry extract-d-mannose 500-50 mg Chew Take 500 mg by mouth once daily.      esomeprazole (NEXIUM) 40 MG capsule Take 1 capsule (40 mg total) by mouth before breakfast. 90 capsule 3    estradioL (ESTRACE) 0.01 % (0.1 mg/gram) vaginal cream Place 1 fingertip of cream first at urethral opening and then external vagina every other day. Please do not use plastic applicator 42.5 g 3    fluocinolone acetonide oiL (DERMOTIC OIL) 0.01 % Drop Place 3 drops in ear(s) 2 (two) times daily as needed (itchy ears). 20 mL 2    fluticasone propionate (FLONASE) 50 mcg/actuation nasal spray 1 spray (50 mcg total) by Each Nostril route 2 (two) times a day. 16 g 2    losartan (COZAAR) 100 MG tablet Take 1 tablet (100 mg total) by mouth once daily. 90 tablet 3    montelukast (SINGULAIR) 10 mg tablet Take 10 mg by mouth once daily.      risankizumab-rzaa (SKYRIZI) 150 mg/mL PnIj Inject 150 mg into the skin every 28 days. 1 mL 1    SIMBRINZA 1-0.2 % DrpS Place 1 drop into both eyes 2 (two) times a day.      sulfacetamide sodium-sulfur 10-5 % (w/w) Clsr Apply topically.      tretinoin (RETIN-A) 0.025 % cream Apply a pea-sized amount to entire face at bedtime, start every other night and increase as tolerated. Take night off if irritation develops. 45 g 3    vitamin D (VITAMIN D3) 1000 units Tab Take 5,000 Units by mouth once daily. 125mg/5000IU      difluprednate (DUREZOL) 0.05 % Drop ophthalmic solution Place 1 drop into the left eye 2 (two) times daily. (Patient not taking: Reported on 5/12/2025)      hydroCHLOROthiazide (HYDRODIURIL) 12.5 MG Tab Take 1 tablet (12.5  mg total) by mouth once daily. (Patient not taking: Reported on 5/12/2025) 30 tablet 5    SEMAGLUTIDE, WEIGHT LOSS, SUBQ Inject into the skin once a week. (Patient not taking: Reported on 5/12/2025)       No current facility-administered medications on file prior to visit.

## 2025-05-13 ENCOUNTER — HOSPITAL ENCOUNTER (OUTPATIENT)
Dept: RADIOLOGY | Facility: HOSPITAL | Age: 80
Discharge: HOME | End: 2025-05-13
Attending: FAMILY MEDICINE
Payer: MEDICARE

## 2025-05-13 DIAGNOSIS — I10 HYPERTENSION, UNSPECIFIED TYPE: ICD-10-CM

## 2025-05-13 DIAGNOSIS — R00.2 POUNDING HEARTBEAT: ICD-10-CM

## 2025-05-13 DIAGNOSIS — R09.89 OTHER SPECIFIED SYMPTOMS AND SIGNS INVOLVING THE CIRCULATORY AND RESPIRATORY SYSTEMS: ICD-10-CM

## 2025-05-13 PROCEDURE — 93880 EXTRACRANIAL BILAT STUDY: CPT | Mod: 26,,, | Performed by: RADIOLOGY

## 2025-05-13 PROCEDURE — 93880 EXTRACRANIAL BILAT STUDY: CPT | Mod: TC,PO

## 2025-05-14 ENCOUNTER — RESULTS FOLLOW-UP (OUTPATIENT)
Dept: FAMILY MEDICINE | Facility: CLINIC | Age: 80
End: 2025-05-14

## 2025-05-15 DIAGNOSIS — I10 PRIMARY HYPERTENSION: ICD-10-CM

## 2025-05-15 RX ORDER — LOSARTAN POTASSIUM 100 MG/1
TABLET ORAL
Qty: 90 TABLET | Refills: 3 | Status: SHIPPED | OUTPATIENT
Start: 2025-05-15

## 2025-05-15 NOTE — TELEPHONE ENCOUNTER
Refill Decision Note   Yuliana Mattson  is requesting a refill authorization.  Brief Assessment and Rationale for Refill:  Approve     Medication Therapy Plan:        Comments:     Note composed:12:44 PM 05/15/2025

## 2025-05-15 NOTE — TELEPHONE ENCOUNTER
No care due was identified.  [facility] Embedded Care Due Messages. Reference number: [id number].   5/15/2025 12:20:24 PM CDT

## 2025-05-22 ENCOUNTER — HOSPITAL ENCOUNTER (OUTPATIENT)
Dept: CARDIOLOGY | Facility: HOSPITAL | Age: 80
Discharge: HOME | End: 2025-05-22
Attending: FAMILY MEDICINE
Payer: MEDICARE

## 2025-05-22 VITALS — HEIGHT: 63 IN | BODY MASS INDEX: 28.53 KG/M2 | WEIGHT: 161 LBS

## 2025-05-22 DIAGNOSIS — R01.1 MURMUR: ICD-10-CM

## 2025-05-22 LAB
AORTIC SIZE INDEX (SOV): 1.5 CM/M2
AORTIC SIZE INDEX: 1.8 CM/M2
ASCENDING AORTA: 3.1 CM
AV INDEX (PROSTH): 0.73
AV MEAN GRADIENT: 7 MMHG
AV PEAK GRADIENT: 13 MMHG
AV REGURGITATION PRESSURE HALF TIME: 380 MS
AV VALVE AREA BY VELOCITY RATIO: 2.1 CM²
AV VALVE AREA: 2.3 CM²
AV VELOCITY RATIO: 0.67
BSA FOR ECHO PROCEDURE: 1.8 M2
CV ECHO LV RWT: 0.4 CM
DOP CALC AO PEAK VEL: 1.8 M/S
DOP CALC AO VTI: 39.2 CM
DOP CALC LVOT AREA: 3.1 CM2
DOP CALC LVOT DIAMETER: 2 CM
DOP CALC LVOT PEAK VEL: 1.2 M/S
DOP CALC LVOT STROKE VOLUME: 89.5 CM3
DOP CALC MV VTI: 43.2 CM
DOP CALCLVOT PEAK VEL VTI: 28.5 CM
E WAVE DECELERATION TIME: 237 MSEC
E/A RATIO: 1.14
E/E' RATIO: 20 M/S
ECHO LV POSTERIOR WALL: 0.9 CM (ref 0.6–1.1)
FRACTIONAL SHORTENING: 35.6 % (ref 28–44)
INTERVENTRICULAR SEPTUM: 0.9 CM (ref 0.6–1.1)
LEFT ATRIUM AREA SYSTOLIC (APICAL 2 CHAMBER): 16.48 CM2
LEFT ATRIUM AREA SYSTOLIC (APICAL 4 CHAMBER): 20 CM2
LEFT ATRIUM SIZE: 4.3 CM
LEFT ATRIUM VOLUME INDEX MOD: 30 ML/M2
LEFT ATRIUM VOLUME MOD: 53 ML
LEFT INTERNAL DIMENSION IN SYSTOLE: 2.9 CM (ref 2.1–4)
LEFT VENTRICLE DIASTOLIC VOLUME INDEX: 52.27 ML/M2
LEFT VENTRICLE DIASTOLIC VOLUME: 92 ML
LEFT VENTRICLE END SYSTOLIC VOLUME APICAL 2 CHAMBER: 45.25 ML
LEFT VENTRICLE END SYSTOLIC VOLUME APICAL 4 CHAMBER: 65.65 ML
LEFT VENTRICLE MASS INDEX: 75.5 G/M2
LEFT VENTRICLE SYSTOLIC VOLUME INDEX: 18.2 ML/M2
LEFT VENTRICLE SYSTOLIC VOLUME: 32 ML
LEFT VENTRICULAR INTERNAL DIMENSION IN DIASTOLE: 4.5 CM (ref 3.5–6)
LEFT VENTRICULAR MASS: 132.8 G
LV LATERAL E/E' RATIO: 20.3 M/S
LV SEPTAL E/E' RATIO: 20.3 M/S
LVED V (TEICH): 92.1 ML
LVES V (TEICH): 32.09 ML
LVOT MG: 2.76 MMHG
LVOT MV: 0.77 CM/S
MV MEAN GRADIENT: 3 MMHG
MV PEAK A VEL: 1.07 M/S
MV PEAK E VEL: 1.22 M/S
MV PEAK GRADIENT: 6 MMHG
MV STENOSIS PRESSURE HALF TIME: 57.82 MS
MV VALVE AREA BY CONTINUITY EQUATION: 2.07 CM2
MV VALVE AREA P 1/2 METHOD: 3.8 CM2
OHS CV RV/LV RATIO: 0.82 CM
PISA AR MAX VEL: 3.72 M/S
PISA TR MAX VEL: 2.5 M/S
PULM VEIN S/D RATIO: 0.86
PV PEAK D VEL: 0.66 M/S
PV PEAK S VEL: 0.57 M/S
RA PRESSURE ESTIMATED: 8 MMHG
RA VOL SYS: 22.6 ML
RIGHT ATRIAL AREA: 10.8 CM2
RIGHT ATRIUM END SYSTOLIC VOLUME APICAL 4 CHAMBER INDEX BSA: 11.92 ML/M2
RIGHT ATRIUM VOLUME AREA LENGTH APICAL 4 CHAMBER: 20.98 ML
RIGHT VENTRICLE DIASTOLIC BASEL DIMENSION: 3.7 CM
RIGHT VENTRICLE DIASTOLIC LENGTH: 6.7 CM
RIGHT VENTRICLE DIASTOLIC MID DIMENSION: 2.4 CM
RIGHT VENTRICULAR END-DIASTOLIC DIMENSION: 3.72 CM
RIGHT VENTRICULAR LENGTH IN DIASTOLE (APICAL 4-CHAMBER VIEW): 6.7 CM
RV MID DIAMA: 2.44 CM
RV TB RVSP: 11 MMHG
RV TISSUE DOPPLER FREE WALL SYSTOLIC VELOCITY 1 (APICAL 4 CHAMBER VIEW): 12.5 CM/S
SINUS: 2.71 CM
STJ: 2.7 CM
TDI LATERAL: 0.06 M/S
TDI SEPTAL: 0.06 M/S
TDI: 0.06 M/S
TR MAX PG: 24 MMHG
TRICUSPID ANNULAR PLANE SYSTOLIC EXCURSION: 2.4 CM
TV REST PULMONARY ARTERY PRESSURE: 33 MMHG
Z-SCORE OF LEFT VENTRICULAR DIMENSION IN END DIASTOLE: -0.78
Z-SCORE OF LEFT VENTRICULAR DIMENSION IN END SYSTOLE: -0.3

## 2025-05-22 PROCEDURE — 93306 TTE W/DOPPLER COMPLETE: CPT | Mod: 26,,, | Performed by: INTERNAL MEDICINE

## 2025-05-22 PROCEDURE — 93306 TTE W/DOPPLER COMPLETE: CPT | Mod: PO

## 2025-06-02 ENCOUNTER — PATIENT MESSAGE (OUTPATIENT)
Dept: RHEUMATOLOGY | Facility: CLINIC | Age: 80
End: 2025-06-02
Payer: MEDICARE

## 2025-06-16 ENCOUNTER — OFFICE VISIT (OUTPATIENT)
Dept: FAMILY MEDICINE | Facility: CLINIC | Age: 80
End: 2025-06-16
Payer: MEDICARE

## 2025-06-16 VITALS
DIASTOLIC BLOOD PRESSURE: 74 MMHG | HEIGHT: 63 IN | BODY MASS INDEX: 29.08 KG/M2 | OXYGEN SATURATION: 99 % | SYSTOLIC BLOOD PRESSURE: 136 MMHG | WEIGHT: 164.13 LBS | HEART RATE: 60 BPM

## 2025-06-16 DIAGNOSIS — R31.9 URINARY TRACT INFECTION WITH HEMATURIA, SITE UNSPECIFIED: ICD-10-CM

## 2025-06-16 DIAGNOSIS — L40.50 PSA (PSORIATIC ARTHRITIS): ICD-10-CM

## 2025-06-16 DIAGNOSIS — N39.0 URINARY TRACT INFECTION WITH HEMATURIA, SITE UNSPECIFIED: ICD-10-CM

## 2025-06-16 DIAGNOSIS — R30.0 DYSURIA: Primary | ICD-10-CM

## 2025-06-16 DIAGNOSIS — L40.9 PSORIASIS: ICD-10-CM

## 2025-06-16 PROCEDURE — 1160F RVW MEDS BY RX/DR IN RCRD: CPT | Mod: CPTII,S$GLB,, | Performed by: NURSE PRACTITIONER

## 2025-06-16 PROCEDURE — 99213 OFFICE O/P EST LOW 20 MIN: CPT | Mod: S$GLB,,, | Performed by: NURSE PRACTITIONER

## 2025-06-16 PROCEDURE — 3078F DIAST BP <80 MM HG: CPT | Mod: CPTII,S$GLB,, | Performed by: NURSE PRACTITIONER

## 2025-06-16 PROCEDURE — 3075F SYST BP GE 130 - 139MM HG: CPT | Mod: CPTII,S$GLB,, | Performed by: NURSE PRACTITIONER

## 2025-06-16 PROCEDURE — 3288F FALL RISK ASSESSMENT DOCD: CPT | Mod: CPTII,S$GLB,, | Performed by: NURSE PRACTITIONER

## 2025-06-16 PROCEDURE — 87186 SC STD MICRODIL/AGAR DIL: CPT | Performed by: NURSE PRACTITIONER

## 2025-06-16 PROCEDURE — 1125F AMNT PAIN NOTED PAIN PRSNT: CPT | Mod: CPTII,S$GLB,, | Performed by: NURSE PRACTITIONER

## 2025-06-16 PROCEDURE — 1159F MED LIST DOCD IN RCRD: CPT | Mod: CPTII,S$GLB,, | Performed by: NURSE PRACTITIONER

## 2025-06-16 PROCEDURE — 81003 URINALYSIS AUTO W/O SCOPE: CPT | Mod: QW,S$GLB,, | Performed by: NURSE PRACTITIONER

## 2025-06-16 PROCEDURE — 99999 PR PBB SHADOW E&M-EST. PATIENT-LVL III: CPT | Mod: PBBFAC,,, | Performed by: NURSE PRACTITIONER

## 2025-06-16 PROCEDURE — 1101F PT FALLS ASSESS-DOCD LE1/YR: CPT | Mod: CPTII,S$GLB,, | Performed by: NURSE PRACTITIONER

## 2025-06-16 RX ORDER — NITROFURANTOIN 25; 75 MG/1; MG/1
100 CAPSULE ORAL 2 TIMES DAILY
Qty: 14 CAPSULE | Refills: 0 | Status: SHIPPED | OUTPATIENT
Start: 2025-06-16 | End: 2025-06-23

## 2025-06-16 NOTE — PROGRESS NOTES
Subjective:       Patient ID: Yuliana Mattson is a 79 y.o. female.    Chief Complaint: Urinary Tract Infection    HPI  Lower abdominal cramping, blood in the urine with clots, burning with urination, urinary frequency X 3 days. No fever or back pain. Taking pyridium with relief.  Past Medical History:   Diagnosis Date    Allergic rhinitis     Anticoagulant long-term use     ASPIRIN ONLY - (stops it when she presents a hemorrhagic cystitis)    Bladder cancer     Blood transfusion     Breast cancer     CAD (coronary artery disease), native coronary artery     40% non obstructive    Cholelithiasis     Endometriosis     Headache(784.0)     HEARING LOSS     Hemorrhoids     HLD (hyperlipidemia)     Hypertension     Iritis     Left wrist fracture 2009    traumatic    Malignant neoplasm of other specified sites of bladder 12/03/2009    Osteoporosis, postmenopausal     PONV (postoperative nausea and vomiting)     Rash     Rosacea     UTI (urinary tract infection)     Uveitis     Vaginal delivery     x 2       Past Surgical History:   Procedure Laterality Date    ADENOIDECTOMY      APPENDECTOMY      BLADDER TUMOR EXCISION  1979    [facility], dr garcia - no recurrences since    BREAST BIOPSY Right     4 core bx negative    BREAST SURGERY Left 1998    ESOPHAGOGASTRODUODENOSCOPY N/A 04/21/2022    Procedure: ESOPHAGOGASTRODUODENOSCOPY (EGD);  Surgeon: Guru Maddox MD;  Location: [facility];  Service: Endoscopy;  Laterality: N/A;    EYE SURGERY  2019    HYSTERECTOMY      MASTECTOMY, PARTIAL  1995    left side - cancer - had radiotherapy 1995, 1998 had chemotherapy    oophrect      pelvic mass      benign    TONSILLECTOMY      TONSILLECTOMY, ADENOIDECTOMY, BILATERAL MYRINGOTOMY AND TUBES      tram flap Left 1998       Review of patient's allergies indicates:   Allergen Reactions    Prochlorperazine edisylate Anaphylaxis     compazine       Social History[1]    Medications Ordered Prior to Encounter[2]    Family  "History   Problem Relation Name Age of Onset    Glaucoma Father      Glaucoma Paternal Aunt      Glaucoma Paternal Grandmother      Ovarian cancer Cousin      Cancer Cousin          1st, ovarian    Amblyopia Neg Hx      Blindness Neg Hx      Cataracts Neg Hx      Hypertension Neg Hx      Macular degeneration Neg Hx      Retinal detachment Neg Hx      Strabismus Neg Hx      Stroke Neg Hx      Thyroid disease Neg Hx      Melanoma Neg Hx         Review of Systems   Constitutional: Negative.    HENT: Negative.     Eyes: Negative.    Respiratory: Negative.     Cardiovascular: Negative.    Gastrointestinal:  Positive for abdominal pain.   Endocrine: Negative.    Genitourinary:  Positive for dysuria, frequency, hematuria and urgency.   Musculoskeletal: Negative.    Skin: Negative.    Neurological: Negative.    Psychiatric/Behavioral: Negative.         Objective:      /74   Pulse 60   Ht 5' 3" (1.6 m)   Wt 74.5 kg (164 lb 2.1 oz)   SpO2 99%   BMI 29.07 kg/m²   Physical Exam  Vitals and nursing note reviewed.   Constitutional:       General: She is not in acute distress.     Appearance: Normal appearance. She is well-groomed.   HENT:      Head: Normocephalic.      Right Ear: Tympanic membrane, ear canal and external ear normal.      Left Ear: Tympanic membrane, ear canal and external ear normal.      Ears:      Comments: Clear fluid bubbles left TM     Nose: Nose normal.      Mouth/Throat:      Lips: Pink.      Mouth: Mucous membranes are moist.      Pharynx: Oropharynx is clear. No oropharyngeal exudate or posterior oropharyngeal erythema.   Eyes:      Extraocular Movements: Extraocular movements intact.      Conjunctiva/sclera: Conjunctivae normal.      Pupils: Pupils are equal, round, and reactive to light.   Cardiovascular:      Rate and Rhythm: Normal rate and regular rhythm.      Heart sounds: Murmur heard.   Pulmonary:      Effort: Pulmonary effort is normal.      Breath sounds: Normal breath sounds. "   Chest:      Chest wall: No tenderness.   Abdominal:      General: Bowel sounds are normal.      Palpations: Abdomen is soft.      Tenderness: There is abdominal tenderness (suprapubic). There is no right CVA tenderness or left CVA tenderness.   Musculoskeletal:         General: No swelling or tenderness. Normal range of motion.      Cervical back: Normal range of motion and neck supple.      Right lower leg: No edema.      Left lower leg: No edema.   Skin:     General: Skin is warm and dry.   Neurological:      General: No focal deficit present.      Mental Status: She is alert and oriented to person, place, and time. Mental status is at baseline.      Gait: Gait is intact.   Psychiatric:         Mood and Affect: Mood normal.         Behavior: Behavior normal.         Assessment:       1. Dysuria    2. PSA (psoriatic arthritis)    3. Psoriasis    4. Urinary tract infection with hematuria, site unspecified        Plan:       Dysuria  -     POCT Urinalysis(Instrument)    PSA (psoriatic arthritis)    Psoriasis    Urinary tract infection with hematuria, site unspecified  -     nitrofurantoin, macrocrystal-monohydrate, (MACROBID) 100 MG capsule; Take 1 capsule (100 mg total) by mouth 2 (two) times daily. for 7 days  Dispense: 14 capsule; Refill: 0  -     Urine Culture High Risk ($$); Future; Expected date: 06/16/2025          Results for orders placed or performed in visit on 06/16/25   POCT Urinalysis(Instrument)    Collection Time: 06/16/25  8:48 AM   Result Value Ref Range    Color, POC UA Yellow Yellow, Straw, Colorless    Clarity, POC UA Clear Clear    Glucose, POC UA Negative Negative    Bilirubin, POC UA Negative Negative    Ketones, POC UA Negative Negative    Spec Grav POC UA 1.010 1.005 - 1.030    Blood, POC UA Trace-intact (A) Negative    pH, POC UA 6.0 5.0 - 8.0    Protein, POC UA Negative Negative    Urobilinogen, POC UA 0.2 <=1.0    Nitrite, POC UA Negative Negative    WBC, POC UA Small (A) Negative        Macrobid X 7 days. Increase water intake. Continue azo. Repeat UA in 2 wks.         [1]   Social History  Socioeconomic History    Marital status:    Occupational History    Occupation: retired accounting   Tobacco Use    Smoking status: Never     Passive exposure: Past    Smokeless tobacco: Never   Substance and Sexual Activity    Alcohol use: No    Drug use: No     Social Drivers of Health     Financial Resource Strain: Low Risk  (3/21/2025)    Overall Financial Resource Strain (CARDIA)     Difficulty of Paying Living Expenses: Not hard at all   Food Insecurity: No Food Insecurity (3/21/2025)    Hunger Vital Sign     Worried About Running Out of Food in the Last Year: Never true     Ran Out of Food in the Last Year: Never true   Transportation Needs: No Transportation Needs (3/21/2025)    PRAPARE - Transportation     Lack of Transportation (Medical): No     Lack of Transportation (Non-Medical): No   Physical Activity: Insufficiently Active (3/21/2025)    Exercise Vital Sign     Days of Exercise per Week: 1 day     Minutes of Exercise per Session: 30 min   Stress: No Stress Concern Present (3/21/2025)    [employer] - Occupational Stress Questionnaire     Feeling of Stress : Only a little   Housing Stability: Low Risk  (3/21/2025)    Housing Stability Vital Sign     Unable to Pay for Housing in the Last Year: No     Number of Times Moved in the Last Year: 0     Homeless in the Last Year: No   [2]   Current Outpatient Medications on File Prior to Visit   Medication Sig Dispense Refill    amLODIPine (NORVASC) 5 MG tablet Take 1 tablet (5 mg total) by mouth once daily.      cranberry extract-d-mannose 500-50 mg Chew Take 500 mg by mouth once daily.      difluprednate (DUREZOL) 0.05 % Drop ophthalmic solution Place 1 drop into the left eye 2 (two) times daily.      esomeprazole (NEXIUM) 40 MG capsule Take 1 capsule (40 mg total) by mouth before breakfast. 90 capsule 3     estradioL (ESTRACE) 0.01 % (0.1 mg/gram) vaginal cream Place 1 fingertip of cream first at urethral opening and then external vagina every other day. Please do not use plastic applicator 42.5 g 3    fluocinolone acetonide oiL (DERMOTIC OIL) 0.01 % Drop Place 3 drops in ear(s) 2 (two) times daily as needed (itchy ears). 20 mL 2    fluticasone propionate (FLONASE) 50 mcg/actuation nasal spray 1 spray (50 mcg total) by Each Nostril route 2 (two) times a day. 16 g 2    losartan (COZAAR) 100 MG tablet TAKE 1 TABLET(100 MG) BY MOUTH DAILY 90 tablet 3    montelukast (SINGULAIR) 10 mg tablet Take 10 mg by mouth once daily.      risankizumab-rzaa (SKYRIZI) 150 mg/mL PnIj Inject 150 mg into the skin every 28 days. (Patient taking differently: Inject 150 mg into the skin every 3 (three) months.) 1 mL 1    SIMBRINZA 1-0.2 % DrpS Place 1 drop into both eyes 2 (two) times a day.      sulfacetamide sodium-sulfur 10-5 % (w/w) Clsr Apply topically.      tretinoin (RETIN-A) 0.025 % cream Apply a pea-sized amount to entire face at bedtime, start every other night and increase as tolerated. Take night off if irritation develops. 45 g 3    vitamin D (VITAMIN D3) 1000 units Tab Take 5,000 Units by mouth once daily. 125mg/5000IU      [DISCONTINUED] hydroCHLOROthiazide (HYDRODIURIL) 12.5 MG Tab Take 1 tablet (12.5 mg total) by mouth once daily. (Patient not taking: Reported on 6/16/2025) 30 tablet 5    [DISCONTINUED] SEMAGLUTIDE, WEIGHT LOSS, SUBQ Inject into the skin once a week. (Patient not taking: Reported on 6/16/2025)       No current facility-administered medications on file prior to visit.

## 2025-06-18 LAB — BACTERIA UR CULT: ABNORMAL

## 2025-06-19 ENCOUNTER — RESULTS FOLLOW-UP (OUTPATIENT)
Dept: FAMILY MEDICINE | Facility: CLINIC | Age: 80
End: 2025-06-19

## 2025-06-25 ENCOUNTER — LAB VISIT (OUTPATIENT)
Dept: LAB | Facility: HOSPITAL | Age: 80
End: 2025-06-25
Attending: NURSE PRACTITIONER
Payer: MEDICARE

## 2025-06-25 ENCOUNTER — PATIENT MESSAGE (OUTPATIENT)
Dept: RHEUMATOLOGY | Facility: CLINIC | Age: 80
End: 2025-06-25
Payer: MEDICARE

## 2025-06-25 DIAGNOSIS — L40.9 PSORIASIS: ICD-10-CM

## 2025-06-25 DIAGNOSIS — L40.50 PSA (PSORIATIC ARTHRITIS): ICD-10-CM

## 2025-06-25 DIAGNOSIS — H20.9 UVEITIS OF BOTH EYES: ICD-10-CM

## 2025-06-25 DIAGNOSIS — M35.3 PMR (POLYMYALGIA RHEUMATICA): ICD-10-CM

## 2025-06-25 DIAGNOSIS — R78.81 BACTEREMIA: ICD-10-CM

## 2025-06-25 LAB
BACTERIA #/AREA URNS HPF: ABNORMAL /HPF
BILIRUB UR QL STRIP.AUTO: NEGATIVE
CLARITY UR: CLEAR
COLOR UR AUTO: YELLOW
GLUCOSE UR QL STRIP: NEGATIVE
HGB UR QL STRIP: ABNORMAL
KETONES UR QL STRIP: NEGATIVE
LEUKOCYTE ESTERASE UR QL STRIP: ABNORMAL
MICROSCOPIC COMMENT: ABNORMAL
NITRITE UR QL STRIP: NEGATIVE
PH UR STRIP: 6 [PH]
PROT UR QL STRIP: NEGATIVE
RBC #/AREA URNS HPF: 3 /HPF (ref 0–4)
SP GR UR STRIP: <=1.005
WBC #/AREA URNS HPF: 15 /HPF (ref 0–5)

## 2025-06-25 PROCEDURE — 87086 URINE CULTURE/COLONY COUNT: CPT

## 2025-06-25 PROCEDURE — 81003 URINALYSIS AUTO W/O SCOPE: CPT | Mod: PO

## 2025-06-26 LAB — BACTERIA UR CULT: NORMAL

## 2025-06-30 ENCOUNTER — OFFICE VISIT (OUTPATIENT)
Dept: FAMILY MEDICINE | Facility: CLINIC | Age: 80
End: 2025-06-30
Payer: MEDICARE

## 2025-06-30 DIAGNOSIS — N39.0 URINARY TRACT INFECTION WITHOUT HEMATURIA, SITE UNSPECIFIED: Primary | ICD-10-CM

## 2025-06-30 PROCEDURE — 98004 SYNCH AUDIO-VIDEO EST SF 10: CPT | Mod: 95,,, | Performed by: NURSE PRACTITIONER

## 2025-06-30 PROCEDURE — 1160F RVW MEDS BY RX/DR IN RCRD: CPT | Mod: CPTII,95,, | Performed by: NURSE PRACTITIONER

## 2025-06-30 PROCEDURE — 1159F MED LIST DOCD IN RCRD: CPT | Mod: CPTII,95,, | Performed by: NURSE PRACTITIONER

## 2025-06-30 RX ORDER — CIPROFLOXACIN 500 MG/1
500 TABLET, FILM COATED ORAL 2 TIMES DAILY
Qty: 10 TABLET | Refills: 0 | Status: SHIPPED | OUTPATIENT
Start: 2025-06-30 | End: 2025-07-05

## 2025-06-30 NOTE — PROGRESS NOTES
The patient location is: [place], [place]  The chief complaint leading to consultation is: dysuria  Visit type: Virtual visit with synchronous audio and video  Total time spent with patient: 11 minutes  Each patient to whom he or she provides medical services by telemedicine is:  (1) informed of the relationship between the physician and patient and the respective role of any other health care provider with respect to management of the patient; and (2) notified that he or she may decline to receive medical services by telemedicine and may withdraw from such care at any time.    Subjective:       Patient ID: Yuliana Mattson is a 79 y.o. female.    Chief Complaint: No chief complaint on file.    Dysuria   This is a recurrent problem. The current episode started in the past 7 days. The problem occurs every urination. The problem has been waxing and waning. The quality of the pain is described as aching and burning. The pain is at a severity of 4/10. The pain is moderate. There has been no fever. She is Not sexually active. There is No history of pyelonephritis. Associated symptoms include frequency and urgency. Pertinent negatives include no chills, discharge, weight loss, constipation or rash. She has tried antibiotics for the symptoms. The treatment provided moderate relief. Her past medical history is significant for catheterization, hypertension, recurrent UTIs and a urological procedure. There is no history of diabetes insipidus, diabetes mellitus, genitourinary reflux, kidney stones, a single kidney, STD or urinary stasis.     Started with lower abdominal cramping, dysuria, and urinary urgency on 6/26/25. Took azo for 2 days then symptoms improved. Drinking cranberry juice and taking D-mannose. No vaginal discharge and no itching. Still having some dysuria and strong smelling urine. Finished macrobid 2 wks ago for +E coli UTI. Pt states she felt better until 6/26. Repeat UA on 6/26/25 showed 3+leukocytes, 1+  blood, and bacteria. Urine culture negative.    Past Medical History:   Diagnosis Date    Allergic rhinitis     Anticoagulant long-term use     ASPIRIN ONLY - (stops it when she presents a hemorrhagic cystitis)    Bladder cancer     Blood transfusion     Breast cancer     CAD (coronary artery disease), native coronary artery     40% non obstructive    Cholelithiasis     Endometriosis     Headache(784.0)     HEARING LOSS     Hemorrhoids     HLD (hyperlipidemia)     Hypertension     Iritis     Left wrist fracture 2009    traumatic    Malignant neoplasm of other specified sites of bladder 12/03/2009    Osteoporosis, postmenopausal     PONV (postoperative nausea and vomiting)     Rash     Rosacea     UTI (urinary tract infection)     Uveitis     Vaginal delivery     x 2       Past Surgical History:   Procedure Laterality Date    ADENOIDECTOMY      APPENDECTOMY      BLADDER TUMOR EXCISION  1979    [facility], dr garcia - no recurrences since    BREAST BIOPSY Right     4 core bx negative    BREAST SURGERY Left 1998    ESOPHAGOGASTRODUODENOSCOPY N/A 04/21/2022    Procedure: ESOPHAGOGASTRODUODENOSCOPY (EGD);  Surgeon: Guru Maddox MD;  Location: [facility];  Service: Endoscopy;  Laterality: N/A;    EYE SURGERY  2019    HYSTERECTOMY      MASTECTOMY, PARTIAL  1995    left side - cancer - had radiotherapy 1995, 1998 had chemotherapy    oophrect      pelvic mass      benign    TONSILLECTOMY      TONSILLECTOMY, ADENOIDECTOMY, BILATERAL MYRINGOTOMY AND TUBES      tram flap Left 1998       Review of patient's allergies indicates:   Allergen Reactions    Prochlorperazine edisylate Anaphylaxis     compazine       Social History[1]    Medications Ordered Prior to Encounter[2]    Family History   Problem Relation Name Age of Onset    Glaucoma Father      Glaucoma Paternal Aunt      Glaucoma Paternal Grandmother      Ovarian cancer Cousin      Cancer Cousin          1st, ovarian    Amblyopia Neg Hx      Blindness Neg Hx       Cataracts Neg Hx      Hypertension Neg Hx      Macular degeneration Neg Hx      Retinal detachment Neg Hx      Strabismus Neg Hx      Stroke Neg Hx      Thyroid disease Neg Hx      Melanoma Neg Hx         Review of Systems   Constitutional:  Negative for chills and weight loss.   Gastrointestinal:  Negative for constipation.   Genitourinary:  Positive for dysuria, frequency and urgency.   Skin:  Negative for rash.       Objective:      No acute distress noted. AAOX3.    Assessment:       1. Urinary tract infection without hematuria, site unspecified        Plan:       Urinary tract infection without hematuria, site unspecified  -     Urinalysis; Future; Expected date: 07/14/2025  -     Urine Culture High Risk; Future; Expected date: 07/14/2025    Other orders  -     ciprofloxacin HCl (CIPRO) 500 MG tablet; Take 1 tablet (500 mg total) by mouth 2 (two) times daily. for 5 days  Dispense: 10 tablet; Refill: 0        Cipro X 5 days increase water intake. Avoid bladder irritants such as caffeine, spicy or acidic foods. Repeat urine sample in 2 wks.       [1]   Social History  Socioeconomic History    Marital status:    Occupational History    Occupation: retired accounting   Tobacco Use    Smoking status: Never     Passive exposure: Past    Smokeless tobacco: Never   Substance and Sexual Activity    Alcohol use: No    Drug use: No     Social Drivers of Health     Financial Resource Strain: Low Risk  (3/21/2025)    Overall Financial Resource Strain (CARDIA)     Difficulty of Paying Living Expenses: Not hard at all   Food Insecurity: No Food Insecurity (3/21/2025)    Hunger Vital Sign     Worried About Running Out of Food in the Last Year: Never true     Ran Out of Food in the Last Year: Never true   Transportation Needs: No Transportation Needs (3/21/2025)    PRAPARE - Transportation     Lack of Transportation (Medical): No     Lack of Transportation (Non-Medical): No   Physical Activity: Insufficiently Active  (3/21/2025)    Exercise Vital Sign     Days of Exercise per Week: 1 day     Minutes of Exercise per Session: 30 min   Stress: No Stress Concern Present (3/21/2025)    [employer] - Occupational Stress Questionnaire     Feeling of Stress : Only a little   Housing Stability: Low Risk  (3/21/2025)    Housing Stability Vital Sign     Unable to Pay for Housing in the Last Year: No     Number of Times Moved in the Last Year: 0     Homeless in the Last Year: No   [2]   Current Outpatient Medications on File Prior to Visit   Medication Sig Dispense Refill    amLODIPine (NORVASC) 5 MG tablet Take 1 tablet (5 mg total) by mouth once daily.      cranberry extract-d-mannose 500-50 mg Chew Take 500 mg by mouth once daily.      difluprednate (DUREZOL) 0.05 % Drop ophthalmic solution Place 1 drop into the left eye 2 (two) times daily.      esomeprazole (NEXIUM) 40 MG capsule Take 1 capsule (40 mg total) by mouth before breakfast. 90 capsule 3    estradioL (ESTRACE) 0.01 % (0.1 mg/gram) vaginal cream Place 1 fingertip of cream first at urethral opening and then external vagina every other day. Please do not use plastic applicator 42.5 g 3    fluocinolone acetonide oiL (DERMOTIC OIL) 0.01 % Drop Place 3 drops in ear(s) 2 (two) times daily as needed (itchy ears). 20 mL 2    fluticasone propionate (FLONASE) 50 mcg/actuation nasal spray 1 spray (50 mcg total) by Each Nostril route 2 (two) times a day. 16 g 2    losartan (COZAAR) 100 MG tablet TAKE 1 TABLET(100 MG) BY MOUTH DAILY 90 tablet 3    montelukast (SINGULAIR) 10 mg tablet Take 10 mg by mouth once daily.      risankizumab-rzaa (SKYRIZI) 150 mg/mL PnIj Inject 150 mg into the skin every 28 days. (Patient taking differently: Inject 150 mg into the skin every 3 (three) months.) 1 mL 1    SIMBRINZA 1-0.2 % DrpS Place 1 drop into both eyes 2 (two) times a day.      sulfacetamide sodium-sulfur 10-5 % (w/w) Clsr Apply topically.      tretinoin (RETIN-A)  0.025 % cream Apply a pea-sized amount to entire face at bedtime, start every other night and increase as tolerated. Take night off if irritation develops. 45 g 3    vitamin D (VITAMIN D3) 1000 units Tab Take 5,000 Units by mouth once daily. 125mg/5000IU       No current facility-administered medications on file prior to visit.

## 2025-07-02 ENCOUNTER — OFFICE VISIT (OUTPATIENT)
Dept: FAMILY MEDICINE | Facility: CLINIC | Age: 80
End: 2025-07-02
Payer: MEDICARE

## 2025-07-02 ENCOUNTER — HOSPITAL ENCOUNTER (OUTPATIENT)
Dept: RADIOLOGY | Facility: HOSPITAL | Age: 80
Discharge: HOME | End: 2025-07-02
Attending: NURSE PRACTITIONER
Payer: MEDICARE

## 2025-07-02 VITALS
HEART RATE: 80 BPM | SYSTOLIC BLOOD PRESSURE: 132 MMHG | OXYGEN SATURATION: 98 % | BODY MASS INDEX: 29.41 KG/M2 | TEMPERATURE: 98 F | DIASTOLIC BLOOD PRESSURE: 78 MMHG | WEIGHT: 166 LBS | HEIGHT: 63 IN

## 2025-07-02 DIAGNOSIS — M81.0 OSTEOPOROSIS, UNSPECIFIED OSTEOPOROSIS TYPE, UNSPECIFIED PATHOLOGICAL FRACTURE PRESENCE: ICD-10-CM

## 2025-07-02 DIAGNOSIS — R07.81 RIB PAIN ON LEFT SIDE: ICD-10-CM

## 2025-07-02 DIAGNOSIS — S22.32XA FRACTURE OF ONE RIB, LEFT SIDE, INITIAL ENCOUNTER FOR CLOSED FRACTURE: Primary | ICD-10-CM

## 2025-07-02 PROCEDURE — 71100 X-RAY EXAM RIBS UNI 2 VIEWS: CPT | Mod: TC,FY,PO,LT

## 2025-07-02 PROCEDURE — 1160F RVW MEDS BY RX/DR IN RCRD: CPT | Mod: CPTII,S$GLB,, | Performed by: NURSE PRACTITIONER

## 2025-07-02 PROCEDURE — 3288F FALL RISK ASSESSMENT DOCD: CPT | Mod: CPTII,S$GLB,, | Performed by: NURSE PRACTITIONER

## 2025-07-02 PROCEDURE — 1159F MED LIST DOCD IN RCRD: CPT | Mod: CPTII,S$GLB,, | Performed by: NURSE PRACTITIONER

## 2025-07-02 PROCEDURE — 99999 PR PBB SHADOW E&M-EST. PATIENT-LVL IV: CPT | Mod: PBBFAC,,, | Performed by: NURSE PRACTITIONER

## 2025-07-02 PROCEDURE — 99213 OFFICE O/P EST LOW 20 MIN: CPT | Mod: S$GLB,,, | Performed by: NURSE PRACTITIONER

## 2025-07-02 PROCEDURE — 1125F AMNT PAIN NOTED PAIN PRSNT: CPT | Mod: CPTII,S$GLB,, | Performed by: NURSE PRACTITIONER

## 2025-07-02 PROCEDURE — 71100 X-RAY EXAM RIBS UNI 2 VIEWS: CPT | Mod: 26,LT,, | Performed by: STUDENT IN AN ORGANIZED HEALTH CARE EDUCATION/TRAINING PROGRAM

## 2025-07-02 PROCEDURE — 3075F SYST BP GE 130 - 139MM HG: CPT | Mod: CPTII,S$GLB,, | Performed by: NURSE PRACTITIONER

## 2025-07-02 PROCEDURE — 1101F PT FALLS ASSESS-DOCD LE1/YR: CPT | Mod: CPTII,S$GLB,, | Performed by: NURSE PRACTITIONER

## 2025-07-02 PROCEDURE — 3078F DIAST BP <80 MM HG: CPT | Mod: CPTII,S$GLB,, | Performed by: NURSE PRACTITIONER

## 2025-07-02 RX ORDER — FLUCONAZOLE 150 MG/1
150 TABLET ORAL DAILY
Qty: 1 TABLET | Refills: 0 | Status: SHIPPED | OUTPATIENT
Start: 2025-07-02 | End: 2025-07-03

## 2025-07-02 RX ORDER — IBUPROFEN 600 MG/1
600 TABLET, FILM COATED ORAL EVERY 8 HOURS PRN
Qty: 30 TABLET | Refills: 0 | Status: SHIPPED | OUTPATIENT
Start: 2025-07-02

## 2025-07-02 RX ORDER — LIDOCAINE 50 MG/G
1 PATCH TOPICAL DAILY
Qty: 30 PATCH | Refills: 0 | Status: SHIPPED | OUTPATIENT
Start: 2025-07-02

## 2025-07-02 RX ORDER — METHOCARBAMOL 750 MG/1
750 TABLET, FILM COATED ORAL 3 TIMES DAILY PRN
Qty: 30 TABLET | Refills: 0 | Status: SHIPPED | OUTPATIENT
Start: 2025-07-02

## 2025-07-02 NOTE — PROGRESS NOTES
Subjective:       Patient ID: Yuliana Mattson is a 79 y.o. female.    Chief Complaint: Follow-up (Hurt rib from fall)    HPI  Left rib pain after falling in the bathroom yesterday. Pt states she has urinary frequency from current UTI and was rushing to the bathroom when her foot got caught on the bathroom rug. Pt fell between her tub and toilet and struck her left rib and left side of the head on her tub. Denies LOC, nausea, vomiting or visual changes. Denies headache. Has sharp pain to left rib area with deep inspiration and movement. No shortness of breath.  Past Medical History:   Diagnosis Date    Allergic rhinitis     Anticoagulant long-term use     ASPIRIN ONLY - (stops it when she presents a hemorrhagic cystitis)    Bladder cancer     Blood transfusion     Breast cancer     CAD (coronary artery disease), native coronary artery     40% non obstructive    Cholelithiasis     Endometriosis     Headache(784.0)     HEARING LOSS     Hemorrhoids     HLD (hyperlipidemia)     Hypertension     Iritis     Left wrist fracture 2009    traumatic    Malignant neoplasm of other specified sites of bladder 12/03/2009    Osteoporosis, postmenopausal     PONV (postoperative nausea and vomiting)     Rash     Rosacea     UTI (urinary tract infection)     Uveitis     Vaginal delivery     x 2       Past Surgical History:   Procedure Laterality Date    ADENOIDECTOMY      APPENDECTOMY      BLADDER TUMOR EXCISION  1979    [facility], dr garcia - no recurrences since    BREAST BIOPSY Right     4 core bx negative    BREAST SURGERY Left 1998    ESOPHAGOGASTRODUODENOSCOPY N/A 04/21/2022    Procedure: ESOPHAGOGASTRODUODENOSCOPY (EGD);  Surgeon: Guru Maddox MD;  Location: [facility];  Service: Endoscopy;  Laterality: N/A;    EYE SURGERY  2019    HYSTERECTOMY      MASTECTOMY, PARTIAL  1995    left side - cancer - had radiotherapy 1995, 1998 had chemotherapy    oophrect      pelvic mass      benign    TONSILLECTOMY       "TONSILLECTOMY, ADENOIDECTOMY, BILATERAL MYRINGOTOMY AND TUBES      tram flap Left 1998       Review of patient's allergies indicates:   Allergen Reactions    Prochlorperazine edisylate Anaphylaxis     compazine       Social History[1]    Medications Ordered Prior to Encounter[2]    Family History   Problem Relation Name Age of Onset    Glaucoma Father      Glaucoma Paternal Aunt      Glaucoma Paternal Grandmother      Ovarian cancer Cousin      Cancer Cousin          1st, ovarian    Amblyopia Neg Hx      Blindness Neg Hx      Cataracts Neg Hx      Hypertension Neg Hx      Macular degeneration Neg Hx      Retinal detachment Neg Hx      Strabismus Neg Hx      Stroke Neg Hx      Thyroid disease Neg Hx      Melanoma Neg Hx         Review of Systems   Cardiovascular:  Positive for chest pain.       Objective:      /78   Pulse 80   Temp 98.1 °F (36.7 °C) (Oral)   Ht 5' 3" (1.6 m)   Wt 75.3 kg (166 lb 0.1 oz)   SpO2 98%   BMI 29.41 kg/m²   Physical Exam  Vitals and nursing note reviewed.   Constitutional:       General: She is not in acute distress.     Appearance: Normal appearance. She is well-groomed.   HENT:      Head: Normocephalic.      Right Ear: External ear normal.      Left Ear: External ear normal.      Nose: Nose normal.      Mouth/Throat:      Lips: Pink.      Mouth: Mucous membranes are moist.      Pharynx: Oropharynx is clear.   Eyes:      Extraocular Movements: Extraocular movements intact.      Conjunctiva/sclera: Conjunctivae normal.      Pupils: Pupils are equal, round, and reactive to light.   Cardiovascular:      Rate and Rhythm: Normal rate and regular rhythm.      Heart sounds: Normal heart sounds. No murmur heard.  Pulmonary:      Effort: Pulmonary effort is normal.      Breath sounds: Normal breath sounds.   Chest:      Chest wall: Tenderness (tenderness to left anterior and lateral chest (over area of 5th and 6th ribs)) present.   Abdominal:      General: Bowel sounds are normal.      " Palpations: Abdomen is soft.      Tenderness: There is no abdominal tenderness.   Musculoskeletal:         General: No swelling or tenderness. Normal range of motion.      Cervical back: Normal range of motion and neck supple.      Right lower leg: No edema.      Left lower leg: No edema.   Lymphadenopathy:      Cervical: No cervical adenopathy.   Skin:     General: Skin is warm and dry.   Neurological:      General: No focal deficit present.      Mental Status: She is alert and oriented to person, place, and time. Mental status is at baseline.      Cranial Nerves: No facial asymmetry.      Sensory: Sensation is intact.      Motor: Motor function is intact.      Gait: Gait is intact.   Psychiatric:         Mood and Affect: Mood normal.         Behavior: Behavior normal.         Assessment:       1. Fracture of one rib, left side, initial encounter for closed fracture    2. Rib pain on left side    3. Osteoporosis, unspecified osteoporosis type, unspecified pathological fracture presence        Plan:       Fracture of one rib, left side, initial encounter for closed fracture    Rib pain on left side  -     X-Ray Ribs 2 View Left; Future; Expected date: 07/02/2025  -     ibuprofen (ADVIL,MOTRIN) 600 MG tablet; Take 1 tablet (600 mg total) by mouth every 8 (eight) hours as needed for Pain.  Dispense: 30 tablet; Refill: 0  -     methocarbamoL (ROBAXIN) 750 MG Tab; Take 1 tablet (750 mg total) by mouth 3 (three) times daily as needed (muscle spasm).  Dispense: 30 tablet; Refill: 0  -     LIDOcaine (LIDODERM) 5 %; Place 1 patch onto the skin once daily. Remove & Discard patch within 12 hours or as directed by MD  Dispense: 30 patch; Refill: 0    Osteoporosis, unspecified osteoporosis type, unspecified pathological fracture presence  -     DXA Bone Density Axial Skeleton 1 or more sites; Future; Expected date: 07/02/2025        Left rib fracture. Ibuprofen prn pain. Methocarbamol prn muscle tightness/spasm. Lidocaine  patches prn pain. Splint left ribs against a pillow when deep breathing/coughing. Repeat xray in 4 wks if still having pain.     Osteoporosis: schedule dexa.       [1]   Social History  Socioeconomic History    Marital status:    Occupational History    Occupation: retired accounting   Tobacco Use    Smoking status: Never     Passive exposure: Past    Smokeless tobacco: Never   Substance and Sexual Activity    Alcohol use: No    Drug use: No     Social Drivers of Health     Financial Resource Strain: Low Risk  (3/21/2025)    Overall Financial Resource Strain (CARDIA)     Difficulty of Paying Living Expenses: Not hard at all   Food Insecurity: No Food Insecurity (3/21/2025)    Hunger Vital Sign     Worried About Running Out of Food in the Last Year: Never true     Ran Out of Food in the Last Year: Never true   Transportation Needs: No Transportation Needs (3/21/2025)    PRAPARE - Transportation     Lack of Transportation (Medical): No     Lack of Transportation (Non-Medical): No   Physical Activity: Insufficiently Active (3/21/2025)    Exercise Vital Sign     Days of Exercise per Week: 1 day     Minutes of Exercise per Session: 30 min   Stress: No Stress Concern Present (3/21/2025)    [employer] - Occupational Stress Questionnaire     Feeling of Stress : Only a little   Housing Stability: Low Risk  (3/21/2025)    Housing Stability Vital Sign     Unable to Pay for Housing in the Last Year: No     Number of Times Moved in the Last Year: 0     Homeless in the Last Year: No   [2]   Current Outpatient Medications on File Prior to Visit   Medication Sig Dispense Refill    amLODIPine (NORVASC) 5 MG tablet Take 1 tablet (5 mg total) by mouth once daily.      ciprofloxacin HCl (CIPRO) 500 MG tablet Take 1 tablet (500 mg total) by mouth 2 (two) times daily. for 5 days 10 tablet 0    cranberry extract-d-mannose 500-50 mg Chew Take 500 mg by mouth once daily.      difluprednate (DUREZOL) 0.05  % Drop ophthalmic solution Place 1 drop into the left eye 2 (two) times daily.      esomeprazole (NEXIUM) 40 MG capsule Take 1 capsule (40 mg total) by mouth before breakfast. 90 capsule 3    estradioL (ESTRACE) 0.01 % (0.1 mg/gram) vaginal cream Place 1 fingertip of cream first at urethral opening and then external vagina every other day. Please do not use plastic applicator 42.5 g 3    fluocinolone acetonide oiL (DERMOTIC OIL) 0.01 % Drop Place 3 drops in ear(s) 2 (two) times daily as needed (itchy ears). 20 mL 2    fluticasone propionate (FLONASE) 50 mcg/actuation nasal spray 1 spray (50 mcg total) by Each Nostril route 2 (two) times a day. 16 g 2    losartan (COZAAR) 100 MG tablet TAKE 1 TABLET(100 MG) BY MOUTH DAILY 90 tablet 3    montelukast (SINGULAIR) 10 mg tablet Take 10 mg by mouth once daily.      risankizumab-rzaa (SKYRIZI) 150 mg/mL PnIj Inject 150 mg into the skin every 28 days. (Patient taking differently: Inject 150 mg into the skin every 3 (three) months.) 1 mL 1    SIMBRINZA 1-0.2 % DrpS Place 1 drop into both eyes 2 (two) times a day.      sulfacetamide sodium-sulfur 10-5 % (w/w) Clsr Apply topically.      tretinoin (RETIN-A) 0.025 % cream Apply a pea-sized amount to entire face at bedtime, start every other night and increase as tolerated. Take night off if irritation develops. 45 g 3    vitamin D (VITAMIN D3) 1000 units Tab Take 5,000 Units by mouth once daily. 125mg/5000IU       No current facility-administered medications on file prior to visit.

## 2025-07-25 ENCOUNTER — LAB VISIT (OUTPATIENT)
Dept: LAB | Facility: HOSPITAL | Age: 80
End: 2025-07-25
Attending: INTERNAL MEDICINE
Payer: MEDICARE

## 2025-07-25 ENCOUNTER — OFFICE VISIT (OUTPATIENT)
Dept: RHEUMATOLOGY | Facility: CLINIC | Age: 80
End: 2025-07-25
Payer: MEDICARE

## 2025-07-25 VITALS
BODY MASS INDEX: 30.08 KG/M2 | WEIGHT: 169.75 LBS | DIASTOLIC BLOOD PRESSURE: 76 MMHG | HEART RATE: 73 BPM | SYSTOLIC BLOOD PRESSURE: 147 MMHG | HEIGHT: 63 IN

## 2025-07-25 DIAGNOSIS — N30.01 ACUTE CYSTITIS WITH HEMATURIA: ICD-10-CM

## 2025-07-25 DIAGNOSIS — N30.00 ACUTE CYSTITIS WITHOUT HEMATURIA: ICD-10-CM

## 2025-07-25 DIAGNOSIS — H20.9 UVEITIS OF BOTH EYES: ICD-10-CM

## 2025-07-25 DIAGNOSIS — M35.3 PMR (POLYMYALGIA RHEUMATICA): ICD-10-CM

## 2025-07-25 DIAGNOSIS — L40.50 PSA (PSORIATIC ARTHRITIS): ICD-10-CM

## 2025-07-25 DIAGNOSIS — L40.9 PSORIASIS: ICD-10-CM

## 2025-07-25 DIAGNOSIS — M35.05 SJOGREN SYNDROME WITH INFLAMMATORY ARTHRITIS: ICD-10-CM

## 2025-07-25 DIAGNOSIS — L40.50 PSA (PSORIATIC ARTHRITIS): Primary | ICD-10-CM

## 2025-07-25 DIAGNOSIS — I73.00 RAYNAUD'S DISEASE WITHOUT GANGRENE: ICD-10-CM

## 2025-07-25 LAB
ABSOLUTE EOSINOPHIL (OHS): 0.11 K/UL
ABSOLUTE MONOCYTE (OHS): 0.68 K/UL (ref 0.3–1)
ABSOLUTE NEUTROPHIL COUNT (OHS): 5.91 K/UL (ref 1.8–7.7)
ALBUMIN SERPL BCP-MCNC: 3.9 G/DL (ref 3.5–5.2)
ALP SERPL-CCNC: 169 UNIT/L (ref 40–150)
ALT SERPL W/O P-5'-P-CCNC: 19 UNIT/L (ref 10–44)
ANION GAP (OHS): 9 MMOL/L (ref 8–16)
AST SERPL-CCNC: 17 UNIT/L (ref 11–45)
BASOPHILS # BLD AUTO: 0.1 K/UL
BASOPHILS NFR BLD AUTO: 1.2 %
BILIRUB SERPL-MCNC: 0.5 MG/DL (ref 0.1–1)
BUN SERPL-MCNC: 13 MG/DL (ref 8–23)
CALCIUM SERPL-MCNC: 9.5 MG/DL (ref 8.7–10.5)
CHLORIDE SERPL-SCNC: 108 MMOL/L (ref 95–110)
CO2 SERPL-SCNC: 25 MMOL/L (ref 23–29)
CREAT SERPL-MCNC: 0.8 MG/DL (ref 0.5–1.4)
ERYTHROCYTE [DISTWIDTH] IN BLOOD BY AUTOMATED COUNT: 13.9 % (ref 11.5–14.5)
ERYTHROCYTE [SEDIMENTATION RATE] IN BLOOD BY PHOTOMETRIC METHOD: 38 MM/HR
GFR SERPLBLD CREATININE-BSD FMLA CKD-EPI: >60 ML/MIN/1.73/M2
GLUCOSE SERPL-MCNC: 109 MG/DL (ref 70–110)
HCT VFR BLD AUTO: 36.7 % (ref 37–48.5)
HGB BLD-MCNC: 12.6 GM/DL (ref 12–16)
IMM GRANULOCYTES # BLD AUTO: 0.05 K/UL (ref 0–0.04)
IMM GRANULOCYTES NFR BLD AUTO: 0.6 % (ref 0–0.5)
LYMPHOCYTES # BLD AUTO: 1.39 K/UL (ref 1–4.8)
MCH RBC QN AUTO: 30.7 PG (ref 27–31)
MCHC RBC AUTO-ENTMCNC: 34.3 G/DL (ref 32–36)
MCV RBC AUTO: 89 FL (ref 82–98)
NUCLEATED RBC (/100WBC) (OHS): 0 /100 WBC
PLATELET # BLD AUTO: 256 K/UL (ref 150–450)
PMV BLD AUTO: 9.2 FL (ref 9.2–12.9)
POTASSIUM SERPL-SCNC: 3.9 MMOL/L (ref 3.5–5.1)
PROT SERPL-MCNC: 7.5 GM/DL (ref 6–8.4)
RBC # BLD AUTO: 4.11 M/UL (ref 4–5.4)
RELATIVE EOSINOPHIL (OHS): 1.3 %
RELATIVE LYMPHOCYTE (OHS): 16.9 % (ref 18–48)
RELATIVE MONOCYTE (OHS): 8.3 % (ref 4–15)
RELATIVE NEUTROPHIL (OHS): 71.7 % (ref 38–73)
SODIUM SERPL-SCNC: 142 MMOL/L (ref 136–145)
WBC # BLD AUTO: 8.24 K/UL (ref 3.9–12.7)

## 2025-07-25 PROCEDURE — 1125F AMNT PAIN NOTED PAIN PRSNT: CPT | Mod: CPTII,S$GLB,, | Performed by: INTERNAL MEDICINE

## 2025-07-25 PROCEDURE — 99999 PR PBB SHADOW E&M-EST. PATIENT-LVL V: CPT | Mod: PBBFAC,,, | Performed by: INTERNAL MEDICINE

## 2025-07-25 PROCEDURE — 1100F PTFALLS ASSESS-DOCD GE2>/YR: CPT | Mod: CPTII,S$GLB,, | Performed by: INTERNAL MEDICINE

## 2025-07-25 PROCEDURE — 3077F SYST BP >= 140 MM HG: CPT | Mod: CPTII,S$GLB,, | Performed by: INTERNAL MEDICINE

## 2025-07-25 PROCEDURE — 86235 NUCLEAR ANTIGEN ANTIBODY: CPT | Mod: 59

## 2025-07-25 PROCEDURE — 86039 ANTINUCLEAR ANTIBODIES (ANA): CPT

## 2025-07-25 PROCEDURE — 36415 COLL VENOUS BLD VENIPUNCTURE: CPT | Mod: PN

## 2025-07-25 PROCEDURE — 86235 NUCLEAR ANTIGEN ANTIBODY: CPT

## 2025-07-25 PROCEDURE — 3288F FALL RISK ASSESSMENT DOCD: CPT | Mod: CPTII,S$GLB,, | Performed by: INTERNAL MEDICINE

## 2025-07-25 PROCEDURE — 99215 OFFICE O/P EST HI 40 MIN: CPT | Mod: 25,S$GLB,, | Performed by: INTERNAL MEDICINE

## 2025-07-25 PROCEDURE — 1159F MED LIST DOCD IN RCRD: CPT | Mod: CPTII,S$GLB,, | Performed by: INTERNAL MEDICINE

## 2025-07-25 PROCEDURE — 1160F RVW MEDS BY RX/DR IN RCRD: CPT | Mod: CPTII,S$GLB,, | Performed by: INTERNAL MEDICINE

## 2025-07-25 PROCEDURE — 96372 THER/PROPH/DIAG INJ SC/IM: CPT | Mod: S$GLB,,, | Performed by: INTERNAL MEDICINE

## 2025-07-25 PROCEDURE — 84460 ALANINE AMINO (ALT) (SGPT): CPT | Mod: PN

## 2025-07-25 PROCEDURE — 85025 COMPLETE CBC W/AUTO DIFF WBC: CPT | Mod: PN

## 2025-07-25 PROCEDURE — 86225 DNA ANTIBODY NATIVE: CPT

## 2025-07-25 PROCEDURE — 85652 RBC SED RATE AUTOMATED: CPT

## 2025-07-25 PROCEDURE — 3078F DIAST BP <80 MM HG: CPT | Mod: CPTII,S$GLB,, | Performed by: INTERNAL MEDICINE

## 2025-07-25 RX ORDER — REPOSITORY CORTICOTROPIN 80 [USP'U]/ML
80 INJECTION SUBCUTANEOUS
Qty: 10 ML | Refills: 12 | Status: SHIPPED | OUTPATIENT
Start: 2025-07-25

## 2025-07-25 RX ORDER — METHYLPREDNISOLONE ACETATE 80 MG/ML
80 INJECTION, SUSPENSION INTRA-ARTICULAR; INTRALESIONAL; INTRAMUSCULAR; SOFT TISSUE
Status: COMPLETED | OUTPATIENT
Start: 2025-07-25 | End: 2025-07-25

## 2025-07-25 RX ORDER — TRAMADOL HYDROCHLORIDE 50 MG/1
50 TABLET, FILM COATED ORAL EVERY 8 HOURS PRN
Qty: 90 TABLET | Refills: 3 | Status: SHIPPED | OUTPATIENT
Start: 2025-07-25

## 2025-07-25 RX ORDER — KETOROLAC TROMETHAMINE 30 MG/ML
60 INJECTION, SOLUTION INTRAMUSCULAR; INTRAVENOUS
Status: COMPLETED | OUTPATIENT
Start: 2025-07-25 | End: 2025-07-25

## 2025-07-25 RX ADMIN — METHYLPREDNISOLONE ACETATE 80 MG: 80 INJECTION, SUSPENSION INTRA-ARTICULAR; INTRALESIONAL; INTRAMUSCULAR; SOFT TISSUE at 11:07

## 2025-07-25 RX ADMIN — KETOROLAC TROMETHAMINE 60 MG: 30 INJECTION, SOLUTION INTRAMUSCULAR; INTRAVENOUS at 11:07

## 2025-07-25 ASSESSMENT — ROUTINE ASSESSMENT OF PATIENT INDEX DATA (RAPID3)
MDHAQ FUNCTION SCORE: 0.8
PAIN SCORE: 4.5
PATIENT GLOBAL ASSESSMENT SCORE: 3.5
PSYCHOLOGICAL DISTRESS SCORE: 0
TOTAL RAPID3 SCORE: 3.55
FATIGUE SCORE: 2.2

## 2025-07-25 NOTE — PROGRESS NOTES
Subjective:     Patient ID:  Yuliana Mattson    Chief Complaint:  Disease Management     History of Present Illness:  Pt is a 79 y.o. female dx psoriatic arthritis and sicca syndrome. She had cataract surgery and had Skyrizi ordered last visit, but she has not started treatment yet. She has been suffering with recurrent UTI since earlier in the year.she fell from running to the bathroomShe also has bilateral uveitis on pred forte and symbriza; this is the 3rd episode this year. She is followed by [facility]ist Dr. Mac Andrade.      She also has ongoing psoriasis in her ears. She has pain in both thumbs up the forearms, plantar fasciitis, and R ankle pain. She has pain as well in her neck and thoracic spine. She has adjusted her activity level based on her pain/fatigue.      Current treatment:  1. Skyrizi     Prior treatment:  1. Otezla (OFF)  Rheumatologic History:   - Diagnosis/es:  - Positive serologies:  - Infectious screening labs:  - Previous Treatments:  - Current Treatments:     Interval History:   Hospitalization since last office visit: No    Patient Active Problem List    Diagnosis Date Noted    PSA (psoriatic arthritis) 12/20/2023    Aortic calcification 04/08/2023    Moderate persistent asthma without complication 03/22/2023    Malignant (primary) neoplasm, unspecified 03/19/2022    Vitamin D deficiency 01/17/2020    Neck pain 07/30/2019    History of bladder cancer 03/20/2019    Hypertension     Allergic rhinitis     HLD (hyperlipidemia)     History of breast cancer 05/02/2013    Osteoporosis, post-menopausal 12/06/2012    Other disorders of vitreous 06/09/2011    Family history of ischemic heart disease 05/17/2011    Personal history of antineoplastic chemotherapy 05/17/2011    Personal history of irradiation, presenting hazards to health 05/17/2011    Unspecified hearing loss 10/07/2010    Hypermetropia 01/25/2010     Past Surgical History:   Procedure Laterality Date    ADENOIDECTOMY       APPENDECTOMY      BLADDER TUMOR EXCISION  1979    [facility], [place], operated by [facility], dr garcia - no recurrences since    BREAST BIOPSY Right     4 core bx negative    BREAST SURGERY Left 1998    ESOPHAGOGASTRODUODENOSCOPY N/A 04/21/2022    Procedure: ESOPHAGOGASTRODUODENOSCOPY (EGD);  Surgeon: Guru Maddox MD;  Location: [facility];  Service: Endoscopy;  Laterality: N/A;    EYE SURGERY  2019    HYSTERECTOMY      MASTECTOMY, PARTIAL  1995    left side - cancer - had radiotherapy 1995, 1998 had chemotherapy    oophrect      pelvic mass      benign    TONSILLECTOMY      TONSILLECTOMY, ADENOIDECTOMY, BILATERAL MYRINGOTOMY AND TUBES      tram flap Left 1998     Social History[1]  Family History   Problem Relation Name Age of Onset    Glaucoma Father      Glaucoma Paternal Aunt      Glaucoma Paternal Grandmother      Ovarian cancer Cousin      Cancer Cousin          1st, ovarian    Amblyopia Neg Hx      Blindness Neg Hx      Cataracts Neg Hx      Hypertension Neg Hx      Macular degeneration Neg Hx      Retinal detachment Neg Hx      Strabismus Neg Hx      Stroke Neg Hx      Thyroid disease Neg Hx      Melanoma Neg Hx       Review of patient's allergies indicates:   Allergen Reactions    Prochlorperazine edisylate Anaphylaxis     compazine       Review of Systems   Review of Systems   Constitutional:  Positive for chills and fatigue. Negative for activity change, appetite change, diaphoresis, fever and unexpected weight change.   HENT:  Negative for congestion, dental problem, ear discharge, ear pain, facial swelling, mouth sores, nosebleeds, postnasal drip, rhinorrhea, sinus pressure, sneezing, sore throat, tinnitus, trouble swallowing and voice change.    Eyes:  Negative for photophobia, pain, discharge, redness and itching.   Respiratory:  Positive for cough. Negative for apnea, chest tightness, shortness of breath and wheezing.    Cardiovascular:  Positive for leg swelling. Negative for chest pain and palpitations.    Gastrointestinal:  Positive for abdominal pain. Negative for abdominal distention, constipation, diarrhea, nausea and vomiting.   Endocrine: Negative for cold intolerance, heat intolerance, polydipsia and polyuria.   Genitourinary:  Negative for decreased urine volume, difficulty urinating, dysuria, flank pain, frequency, hematuria and urgency.   Musculoskeletal:  Positive for arthralgias, back pain, gait problem, joint swelling, myalgias, neck pain and neck stiffness.   Skin:  Negative for pallor, rash and wound.   Allergic/Immunologic: Positive for immunocompromised state.   Neurological:  Negative for dizziness, tremors, numbness and headaches.   Hematological:  Negative for adenopathy. Does not bruise/bleed easily.   Psychiatric/Behavioral:  Negative for sleep disturbance. The patient is not nervous/anxious.         Current Medications:  Current Outpatient Medications   Medication Instructions    ACTHAR SELFJECT 80 mg, Subcutaneous, Every 72 hours    amLODIPine (NORVASC) 5 mg, Oral, Daily    cranberry extract-d-mannose 500-50 mg Chew 500 mg, Daily    difluprednate (DUREZOL) 0.05 % Drop ophthalmic solution 1 drop, 2 times daily    esomeprazole (NEXIUM) 40 mg, Oral, Before breakfast    estradioL (ESTRACE) 0.01 % (0.1 mg/gram) vaginal cream Place 1 fingertip of cream first at urethral opening and then external vagina every other day. Please do not use plastic applicator    fluocinolone acetonide oiL (DERMOTIC OIL) 0.01 % Drop 3 drops, Otic, 2 times daily PRN    fluticasone propionate (FLONASE) 50 mcg, Each Nostril, 2 times daily    ibuprofen (ADVIL,MOTRIN) 600 mg, Oral, Every 8 hours PRN    LIDOcaine (LIDODERM) 5 % 1 patch, Transdermal, Daily, Remove & Discard patch within 12 hours or as directed by MD    losartan (COZAAR) 100 MG tablet TAKE 1 TABLET(100 MG) BY MOUTH DAILY    methocarbamoL (ROBAXIN) 750 mg, Oral, 3 times daily PRN    montelukast (SINGULAIR) 10 mg, Daily    SIMBRINZA 1-0.2 % DrpS 1 drop, 2 times  "daily    SKYRIZI 150 mg, Subcutaneous, Every 28 days    sulfacetamide sodium-sulfur 10-5 % (w/w) Clsr Apply topically.    tretinoin (RETIN-A) 0.025 % cream Apply a pea-sized amount to entire face at bedtime, start every other night and increase as tolerated. Take night off if irritation develops.    vitamin D (VITAMIN D3) 5,000 Units, Daily         Objective:     Vitals:    07/25/25 0853   BP: (!) 147/76   Pulse: 73   Weight: 77 kg (169 lb 12.1 oz)   Height: 5' 2.99" (1.6 m)   PainSc:   5      Body mass index is 30.08 kg/m².     Physical Examinations:  Physical Exam   Constitutional: She is oriented to person, place, and time.   HENT:   Head: Normocephalic and atraumatic.   Mouth/Throat: Oropharynx is clear and moist.   Eyes: Pupils are equal, round, and reactive to light.   Neck: No thyromegaly present.   Cardiovascular: Normal rate, regular rhythm and normal heart sounds. Exam reveals no gallop and no friction rub.   No murmur heard.  Pulmonary/Chest: Breath sounds normal. She has no wheezes. She has no rales. She exhibits no tenderness.   Abdominal: There is no abdominal tenderness. There is no rebound and no guarding.   Musculoskeletal:         General: Tenderness and deformity present.      Right shoulder: Tenderness present.      Left shoulder: Tenderness present.      Right elbow: Normal.      Left elbow: Tenderness present.      Right wrist: Tenderness present.      Left wrist: Tenderness present.      Cervical back: Neck supple.      Right knee: Effusion present. Tenderness present.      Left knee: Effusion present. Tenderness present.   Lymphadenopathy:     She has no cervical adenopathy.   Neurological: She is alert and oriented to person, place, and time. Gait normal.   Skin: Rash noted. There is erythema. There is pallor.   Psychiatric: Mood, memory, affect and judgment normal.   Vitals reviewed.      Right Side Rheumatological Exam     Examination finds the elbow normal.    The patient is tender to " palpation of the shoulder, wrist, knee, 1st PIP, 1st MCP, 2nd PIP, 2nd MCP, 3rd PIP, 3rd MCP, 4th PIP, 4th MCP, 5th PIP and 5th MCP    Shoulder Exam   Tenderness Location: acromion    Range of Motion   Active abduction:  abnormal   Adduction: abnormal  Sensation: normal    Knee Exam   Tenderness Location: medial joint line and LCL  Patellofemoral Crepitus: positive  Effusion: positive  Sensation: normal    Hip Exam   Tenderness Location: posterior and lateral  Sensation: normal    Elbow/Wrist Exam   Tenderness Location: no tenderness  Sensation: normal    Left Side Rheumatological Exam     The patient is tender to palpation of the shoulder, elbow, wrist, knee, 1st PIP, 1st MCP, 2nd PIP, 2nd MCP, 3rd PIP, 3rd MCP, 4th PIP, 4th MCP, 5th PIP and 5th MCP.    Shoulder Exam   Tenderness Location: acromion    Range of Motion   Active abduction:  abnormal   Sensation: normal    Knee Exam   Tenderness Location: lateral joint line and medial joint line    Patellofemoral Crepitus: positive  Effusion: positive  Sensation: normal    Hip Exam   Tenderness Location: posterior and lateral  Sensation: normal    Elbow/Wrist Exam   Sensation: normal      Back/Neck Exam   Tenderness Right paramedian tenderness of the Upper C-Spine, Upper T-Spine, Upper L-Spine and SI Joint.Left paramedian tenderness of the Upper C-Spine, Upper T-Spine, SI Joint and Upper L-Spine.         Disease Assessment Scores:  Patient's Global Assessment of arthritis (0-10): 1  Physician's Global Assessment of arthritis (0-10): 1  Number of Tender Joints (0-28): 2  Number of Swollen Joints (0-28): 2        1/2/2024     7:13 PM   Rapid3 Question Responses and Scores   MDHAQ Score 0.4   Psychologic Score 3.3   Pain Score 3.5   When you awakened in the morning OVER THE LAST WEEK, did you feel stiff? Yes   If Yes, please indicate the number of hours until you are as limber as you will be for the day 1   Fatigue Score 5   Global Health Score 5   RAPID3 Score 3.28        Monitoring Lab Results:  Lab Results   Component Value Date    WBC 7.10 02/17/2025    WBC 7.10 02/17/2025    RBC 4.25 02/17/2025    RBC 4.25 02/17/2025    HGB 12.9 02/17/2025    HGB 12.9 02/17/2025    HCT 38.4 02/17/2025    HCT 38.4 02/17/2025    MCV 90 02/17/2025    MCV 90 02/17/2025    MCH 30.4 02/17/2025    MCH 30.4 02/17/2025    MCHC 33.6 02/17/2025    MCHC 33.6 02/17/2025    RDW 14.0 02/17/2025    RDW 14.0 02/17/2025     02/17/2025     02/17/2025        Lab Results   Component Value Date     02/17/2025     02/17/2025    K 3.8 02/17/2025    K 3.8 02/17/2025     02/17/2025     02/17/2025    CO2 21 (L) 02/17/2025    CO2 21 (L) 02/17/2025     (H) 02/17/2025     (H) 02/17/2025    BUN 10 02/17/2025    BUN 10 02/17/2025    CREATININE 0.8 02/17/2025    CREATININE 0.8 02/17/2025    CALCIUM 10.0 02/17/2025    CALCIUM 10.0 02/17/2025    PROT 6.9 02/17/2025    PROT 6.9 02/17/2025    ALBUMIN 3.6 02/17/2025    ALBUMIN 3.6 02/17/2025    BILITOT 0.3 02/17/2025    BILITOT 0.3 02/17/2025    ALKPHOS 126 02/17/2025    ALKPHOS 126 02/17/2025    AST 18 02/17/2025    AST 18 02/17/2025    ALT 16 02/17/2025    ALT 16 02/17/2025    ANIONGAP 11 02/17/2025    ANIONGAP 11 02/17/2025    EGFRNORACEVR >60 02/17/2025    EGFRNORACEVR >60 02/17/2025       Lab Results   Component Value Date    SEDRATE 38 (H) 02/17/2025    CRP 9.5 (H) 02/17/2025        Lab Results   Component Value Date    OQHTSMLU61ZB 47 05/20/2024    OQDKMAAP50 367 05/14/2020        Lab Results   Component Value Date    CHOL 202 (H) 05/20/2024    HDL 56 05/20/2024    LDLCALC 124.2 05/20/2024    TRIG 109 05/20/2024       Lab Results   Component Value Date    RF <10.0 12/23/2020    CCPANTIBODIE <0.5 12/23/2020     Lab Results   Component Value Date    ANASCREEN Positive (A) 04/28/2022    ANATITER 1:320 04/28/2022    DSDNA Negative 1:10 04/28/2022     Lab Results   Component Value Date    HLABB27 Negative 05/14/2020  "      Infectious Disease Screening:  Lab Results   Component Value Date    HEPBSAG Non-reactive 09/16/2024    HEPBCAB Non-reactive 09/16/2024    HEPBSAB <3.00 09/16/2024    HEPBSAB Non-reactive 09/16/2024    HEPBSURFABQU Negative 09/16/2024    HEPBSURFABQU <3 09/16/2024     Lab Results   Component Value Date    HEPCAB Non-reactive 09/16/2024     Lab Results   Component Value Date    TBGOLDPLUS Negative 09/16/2024     No results found for: "QUANTIFERON", "SVCMT", "QUANTAGVALUE", "QUANTNILVALU", "QUANTMITOGEN", "QFTTBAG", "QINT"     Imaging: DEXA, Xrays, MRIs, CTs, etc    Old & Outside Medical Records:  Reviewed old and all outside medical records available in Care Everywhere     Assessment:     Encounter Diagnoses   Name Primary?    PSA (psoriatic arthritis) Yes    Psoriasis     PMR (polymyalgia rheumatica)     Uveitis of both eyes     Acute cystitis without hematuria     Acute cystitis with hematuria          Plan:      Encounter Diagnoses   Name Primary?    PSA (psoriatic arthritis) Yes    Psoriasis     PMR (polymyalgia rheumatica)     Uveitis of both eyes     Acute cystitis without hematuria     Acute cystitis with hematuria    PSA (psoriatic arthritis)  -     ketorolac injection 60 mg  -     Sedimentation rate; Future; Expected date: 07/25/2025  -     Sjogrens syndrome-B extractable nuclear antibody; Future; Expected date: 07/25/2025  -     Sjogrens syndrome-A extractable nuclear antibody; Future; Expected date: 07/25/2025  -     Comprehensive Metabolic Panel; Future; Expected date: 07/25/2025  -     CBC Auto Differential; Future; Expected date: 07/25/2025  -     Cancel: Anti-Smith Antibody; Future; Expected date: 07/25/2025  -     Cancel: Anti-Scleroderma Antibody; Future; Expected date: 07/25/2025  -     Cancel: Anti-Histone Antibody; Future; Expected date: 07/25/2025  -     Anti-DNA Ab, Double-Stranded; Future; Expected date: 07/25/2025  -     Cancel: Anti Sm/RNP Antibody; Future; Expected date: 07/25/2025  -  "    LISS Screen w/Reflex; Future; Expected date: 07/25/2025  -     traMADoL (ULTRAM) 50 mg tablet; Take 1 tablet (50 mg total) by mouth every 8 (eight) hours as needed for Pain.  Dispense: 90 tablet; Refill: 3  -     X-Ray Cervical Spine AP And Lateral; Future; Expected date: 07/25/2025  -     X-Ray Thoracic Spine AP Lateral; Future; Expected date: 07/25/2025    Psoriasis  -     ketorolac injection 60 mg  -     Sedimentation rate; Future; Expected date: 07/25/2025  -     Sjogrens syndrome-B extractable nuclear antibody; Future; Expected date: 07/25/2025  -     Sjogrens syndrome-A extractable nuclear antibody; Future; Expected date: 07/25/2025  -     Comprehensive Metabolic Panel; Future; Expected date: 07/25/2025  -     CBC Auto Differential; Future; Expected date: 07/25/2025  -     Cancel: Anti-Smith Antibody; Future; Expected date: 07/25/2025  -     Cancel: Anti-Scleroderma Antibody; Future; Expected date: 07/25/2025  -     Cancel: Anti-Histone Antibody; Future; Expected date: 07/25/2025  -     Anti-DNA Ab, Double-Stranded; Future; Expected date: 07/25/2025  -     Cancel: Anti Sm/RNP Antibody; Future; Expected date: 07/25/2025  -     LISS Screen w/Reflex; Future; Expected date: 07/25/2025  -     traMADoL (ULTRAM) 50 mg tablet; Take 1 tablet (50 mg total) by mouth every 8 (eight) hours as needed for Pain.  Dispense: 90 tablet; Refill: 3  -     X-Ray Cervical Spine AP And Lateral; Future; Expected date: 07/25/2025  -     X-Ray Thoracic Spine AP Lateral; Future; Expected date: 07/25/2025    PMR (polymyalgia rheumatica)  -     ketorolac injection 60 mg  -     Sedimentation rate; Future; Expected date: 07/25/2025  -     Sjogrens syndrome-B extractable nuclear antibody; Future; Expected date: 07/25/2025  -     Sjogrens syndrome-A extractable nuclear antibody; Future; Expected date: 07/25/2025  -     Comprehensive Metabolic Panel; Future; Expected date: 07/25/2025  -     CBC Auto Differential; Future; Expected date:  07/25/2025  -     Cancel: Anti-Smith Antibody; Future; Expected date: 07/25/2025  -     Cancel: Anti-Scleroderma Antibody; Future; Expected date: 07/25/2025  -     Cancel: Anti-Histone Antibody; Future; Expected date: 07/25/2025  -     Anti-DNA Ab, Double-Stranded; Future; Expected date: 07/25/2025  -     Cancel: Anti Sm/RNP Antibody; Future; Expected date: 07/25/2025  -     LISS Screen w/Reflex; Future; Expected date: 07/25/2025  -     traMADoL (ULTRAM) 50 mg tablet; Take 1 tablet (50 mg total) by mouth every 8 (eight) hours as needed for Pain.  Dispense: 90 tablet; Refill: 3  -     X-Ray Cervical Spine AP And Lateral; Future; Expected date: 07/25/2025  -     X-Ray Thoracic Spine AP Lateral; Future; Expected date: 07/25/2025    Uveitis of both eyes  -     corticotropin (ACTHAR SELFJECT) 80 unit/mL PnIj; Inject 80 mg into the skin every 72 hours.  Dispense: 10 mL; Refill: 12  -     ketorolac injection 60 mg  -     Sedimentation rate; Future; Expected date: 07/25/2025  -     Sjogrens syndrome-B extractable nuclear antibody; Future; Expected date: 07/25/2025  -     Sjogrens syndrome-A extractable nuclear antibody; Future; Expected date: 07/25/2025  -     Comprehensive Metabolic Panel; Future; Expected date: 07/25/2025  -     CBC Auto Differential; Future; Expected date: 07/25/2025  -     Cancel: Anti-Smith Antibody; Future; Expected date: 07/25/2025  -     Cancel: Anti-Scleroderma Antibody; Future; Expected date: 07/25/2025  -     Cancel: Anti-Histone Antibody; Future; Expected date: 07/25/2025  -     Anti-DNA Ab, Double-Stranded; Future; Expected date: 07/25/2025  -     Cancel: Anti Sm/RNP Antibody; Future; Expected date: 07/25/2025  -     LISS Screen w/Reflex; Future; Expected date: 07/25/2025  -     traMADoL (ULTRAM) 50 mg tablet; Take 1 tablet (50 mg total) by mouth every 8 (eight) hours as needed for Pain.  Dispense: 90 tablet; Refill: 3  -     X-Ray Cervical Spine AP And Lateral; Future; Expected date:  07/25/2025  -     X-Ray Thoracic Spine AP Lateral; Future; Expected date: 07/25/2025    Acute cystitis without hematuria  Comments:  on d-mannose  Orders:  -     Ambulatory referral/consult to Urology; Future; Expected date: 08/01/2025  -     ketorolac injection 60 mg  -     Sedimentation rate; Future; Expected date: 07/25/2025  -     Sjogrens syndrome-B extractable nuclear antibody; Future; Expected date: 07/25/2025  -     Sjogrens syndrome-A extractable nuclear antibody; Future; Expected date: 07/25/2025  -     Comprehensive Metabolic Panel; Future; Expected date: 07/25/2025  -     CBC Auto Differential; Future; Expected date: 07/25/2025  -     Cancel: Anti-Smith Antibody; Future; Expected date: 07/25/2025  -     Cancel: Anti-Scleroderma Antibody; Future; Expected date: 07/25/2025  -     Cancel: Anti-Histone Antibody; Future; Expected date: 07/25/2025  -     Anti-DNA Ab, Double-Stranded; Future; Expected date: 07/25/2025  -     Cancel: Anti Sm/RNP Antibody; Future; Expected date: 07/25/2025  -     LISS Screen w/Reflex; Future; Expected date: 07/25/2025    Acute cystitis with hematuria  -     Ambulatory referral/consult to Urology; Future; Expected date: 08/01/2025  -     ketorolac injection 60 mg  -     Sedimentation rate; Future; Expected date: 07/25/2025  -     Sjogrens syndrome-B extractable nuclear antibody; Future; Expected date: 07/25/2025  -     Sjogrens syndrome-A extractable nuclear antibody; Future; Expected date: 07/25/2025  -     Comprehensive Metabolic Panel; Future; Expected date: 07/25/2025  -     CBC Auto Differential; Future; Expected date: 07/25/2025  -     Cancel: Anti-Smith Antibody; Future; Expected date: 07/25/2025  -     Cancel: Anti-Scleroderma Antibody; Future; Expected date: 07/25/2025  -     Cancel: Anti-Histone Antibody; Future; Expected date: 07/25/2025  -     Anti-DNA Ab, Double-Stranded; Future; Expected date: 07/25/2025  -     Cancel: Anti Sm/RNP Antibody; Future; Expected date:  07/25/2025  -     LISS Screen w/Reflex; Future; Expected date: 07/25/2025    Raynaud's disease without gangrene  -     traMADoL (ULTRAM) 50 mg tablet; Take 1 tablet (50 mg total) by mouth every 8 (eight) hours as needed for Pain.  Dispense: 90 tablet; Refill: 3  -     X-Ray Cervical Spine AP And Lateral; Future; Expected date: 07/25/2025  -     X-Ray Thoracic Spine AP Lateral; Future; Expected date: 07/25/2025    Sjogren syndrome with inflammatory arthritis  -     traMADoL (ULTRAM) 50 mg tablet; Take 1 tablet (50 mg total) by mouth every 8 (eight) hours as needed for Pain.  Dispense: 90 tablet; Refill: 3       1. Start acthar asap  2. Referral to urology  3. Continue skyrizi     Follow-up 6 months  More than 50% of the  40 minute encounter was spent face to face counseling the patient regarding current status and future plan of care as well as side effects  of the medications. All questions were answered to patient's satisfaction also includes  non-face to face time preparing to see the patient (eg, review of tests), Obtaining and/or reviewing separately obtained history, Documenting clinical information in the electronic or other health record, Independently interpreting results           [1]   Social History  Tobacco Use    Smoking status: Never     Passive exposure: Past    Smokeless tobacco: Never   Substance Use Topics    Alcohol use: No    Drug use: No

## 2025-07-28 LAB
ANA (OHS): POSITIVE
ANA PATTERN 1 (OHS): ABNORMAL
ANA TITER 1 (OHS): ABNORMAL
DSDNA ANTIBODY (OHS): NORMAL
DSDNA ANTIBODY TITER (OHS): NORMAL

## 2025-07-29 ENCOUNTER — HOSPITAL ENCOUNTER (OUTPATIENT)
Dept: RADIOLOGY | Facility: HOSPITAL | Age: 80
Discharge: HOME | End: 2025-07-29
Attending: INTERNAL MEDICINE
Payer: MEDICARE

## 2025-07-29 ENCOUNTER — OFFICE VISIT (OUTPATIENT)
Dept: FAMILY MEDICINE | Facility: CLINIC | Age: 80
End: 2025-07-29
Payer: MEDICARE

## 2025-07-29 ENCOUNTER — HOSPITAL ENCOUNTER (OUTPATIENT)
Dept: RADIOLOGY | Facility: HOSPITAL | Age: 80
Discharge: HOME | End: 2025-07-29
Attending: NURSE PRACTITIONER
Payer: MEDICARE

## 2025-07-29 VITALS
WEIGHT: 169.31 LBS | DIASTOLIC BLOOD PRESSURE: 66 MMHG | TEMPERATURE: 98 F | HEIGHT: 62 IN | HEART RATE: 72 BPM | SYSTOLIC BLOOD PRESSURE: 128 MMHG | OXYGEN SATURATION: 97 % | BODY MASS INDEX: 31.16 KG/M2

## 2025-07-29 DIAGNOSIS — L40.50 PSA (PSORIATIC ARTHRITIS): ICD-10-CM

## 2025-07-29 DIAGNOSIS — L40.9 PSORIASIS: ICD-10-CM

## 2025-07-29 DIAGNOSIS — I73.00 RAYNAUD'S DISEASE WITHOUT GANGRENE: ICD-10-CM

## 2025-07-29 DIAGNOSIS — S22.32XD CLOSED FRACTURE OF ONE RIB OF LEFT SIDE WITH ROUTINE HEALING, SUBSEQUENT ENCOUNTER: ICD-10-CM

## 2025-07-29 DIAGNOSIS — H20.9 UVEITIS OF BOTH EYES: ICD-10-CM

## 2025-07-29 DIAGNOSIS — S22.42XD CLOSED FRACTURE OF MULTIPLE RIBS OF LEFT SIDE WITH ROUTINE HEALING, SUBSEQUENT ENCOUNTER: Primary | ICD-10-CM

## 2025-07-29 DIAGNOSIS — M35.3 PMR (POLYMYALGIA RHEUMATICA): ICD-10-CM

## 2025-07-29 LAB
SM  ANTIBODY (OHS): 0.1 RATIO
SM INTERPRETATION (OHS): NEGATIVE
SM/RNP ANTIBODY (OHS): 0.08 RATIO
SM/RNP INTERPRETATION (OHS): NEGATIVE
SSA  ANTIBODY (OHS): 0.05 RATIO (ref 0–0.99)
SSA INTERPRETATION (OHS): NEGATIVE
SSB  ANTIBODY (OHS): 0.06 RATIO
SSB INTERPRETATION (OHS): NEGATIVE

## 2025-07-29 PROCEDURE — 71100 X-RAY EXAM RIBS UNI 2 VIEWS: CPT | Mod: 26,LT,, | Performed by: STUDENT IN AN ORGANIZED HEALTH CARE EDUCATION/TRAINING PROGRAM

## 2025-07-29 PROCEDURE — 1100F PTFALLS ASSESS-DOCD GE2>/YR: CPT | Mod: CPTII,S$GLB,, | Performed by: NURSE PRACTITIONER

## 2025-07-29 PROCEDURE — 3074F SYST BP LT 130 MM HG: CPT | Mod: CPTII,S$GLB,, | Performed by: NURSE PRACTITIONER

## 2025-07-29 PROCEDURE — 99999 PR PBB SHADOW E&M-EST. PATIENT-LVL IV: CPT | Mod: PBBFAC,,, | Performed by: NURSE PRACTITIONER

## 2025-07-29 PROCEDURE — 1125F AMNT PAIN NOTED PAIN PRSNT: CPT | Mod: CPTII,S$GLB,, | Performed by: NURSE PRACTITIONER

## 2025-07-29 PROCEDURE — 72070 X-RAY EXAM THORAC SPINE 2VWS: CPT | Mod: 26,,, | Performed by: RADIOLOGY

## 2025-07-29 PROCEDURE — 71100 X-RAY EXAM RIBS UNI 2 VIEWS: CPT | Mod: TC,PO,LT

## 2025-07-29 PROCEDURE — 3078F DIAST BP <80 MM HG: CPT | Mod: CPTII,S$GLB,, | Performed by: NURSE PRACTITIONER

## 2025-07-29 PROCEDURE — 72040 X-RAY EXAM NECK SPINE 2-3 VW: CPT | Mod: 26,,, | Performed by: RADIOLOGY

## 2025-07-29 PROCEDURE — 1160F RVW MEDS BY RX/DR IN RCRD: CPT | Mod: CPTII,S$GLB,, | Performed by: NURSE PRACTITIONER

## 2025-07-29 PROCEDURE — 72040 X-RAY EXAM NECK SPINE 2-3 VW: CPT | Mod: TC,PO

## 2025-07-29 PROCEDURE — 72070 X-RAY EXAM THORAC SPINE 2VWS: CPT | Mod: TC,PO

## 2025-07-29 PROCEDURE — 99213 OFFICE O/P EST LOW 20 MIN: CPT | Mod: S$GLB,,, | Performed by: NURSE PRACTITIONER

## 2025-07-29 PROCEDURE — 3288F FALL RISK ASSESSMENT DOCD: CPT | Mod: CPTII,S$GLB,, | Performed by: NURSE PRACTITIONER

## 2025-07-29 PROCEDURE — 1159F MED LIST DOCD IN RCRD: CPT | Mod: CPTII,S$GLB,, | Performed by: NURSE PRACTITIONER

## 2025-08-11 ENCOUNTER — HOSPITAL ENCOUNTER (OUTPATIENT)
Dept: RADIOLOGY | Facility: HOSPITAL | Age: 80
Discharge: HOME | End: 2025-08-11
Attending: NURSE PRACTITIONER
Payer: MEDICARE

## 2025-08-11 DIAGNOSIS — M81.0 OSTEOPOROSIS, UNSPECIFIED OSTEOPOROSIS TYPE, UNSPECIFIED PATHOLOGICAL FRACTURE PRESENCE: ICD-10-CM

## 2025-08-11 PROCEDURE — 77080 DXA BONE DENSITY AXIAL: CPT | Mod: 26,,, | Performed by: RADIOLOGY

## 2025-08-11 PROCEDURE — 77091 TBS TECHL CALCULATION ONLY: CPT | Mod: PO

## 2025-08-11 PROCEDURE — 77092 TBS I&R FX RSK QHP: CPT | Mod: ,,, | Performed by: RADIOLOGY

## 2025-08-27 DIAGNOSIS — I10 HYPERTENSION, UNSPECIFIED TYPE: ICD-10-CM

## 2025-08-27 RX ORDER — AMLODIPINE BESYLATE 5 MG/1
5 TABLET ORAL DAILY
Qty: 90 TABLET | Refills: 2 | Status: SHIPPED | OUTPATIENT
Start: 2025-08-27 | End: 2026-05-24

## 2025-09-03 ENCOUNTER — HOSPITAL ENCOUNTER (OUTPATIENT)
Dept: RADIOLOGY | Facility: HOSPITAL | Age: 80
Discharge: HOME | End: 2025-09-03
Attending: FAMILY MEDICINE
Payer: MEDICARE

## 2025-09-03 DIAGNOSIS — Z12.31 ENCOUNTER FOR SCREENING MAMMOGRAM FOR BREAST CANCER: ICD-10-CM

## 2025-09-03 PROCEDURE — 77063 BREAST TOMOSYNTHESIS BI: CPT | Mod: TC,52,PN

## 2025-09-03 PROCEDURE — 77067 SCR MAMMO BI INCL CAD: CPT | Mod: 26,52,, | Performed by: RADIOLOGY

## 2025-09-03 PROCEDURE — 77063 BREAST TOMOSYNTHESIS BI: CPT | Mod: 26,52,, | Performed by: RADIOLOGY

## 2025-09-05 ENCOUNTER — TELEPHONE (OUTPATIENT)
Dept: HEMATOLOGY/ONCOLOGY | Facility: CLINIC | Age: 80
End: 2025-09-05
Payer: MEDICARE